# Patient Record
Sex: FEMALE | Race: BLACK OR AFRICAN AMERICAN | Employment: OTHER | URBAN - METROPOLITAN AREA
[De-identification: names, ages, dates, MRNs, and addresses within clinical notes are randomized per-mention and may not be internally consistent; named-entity substitution may affect disease eponyms.]

---

## 2017-02-08 ENCOUNTER — HOSPITAL ENCOUNTER (OUTPATIENT)
Dept: LAB | Age: 77
Discharge: HOME | End: 2017-02-08
Payer: MEDICARE

## 2017-02-08 ENCOUNTER — OFFICE VISIT (OUTPATIENT)
Dept: VASCULAR SURGERY | Age: 77
End: 2017-02-08

## 2017-02-08 DIAGNOSIS — M79.89 SWELLING OF LIMB: Primary | ICD-10-CM

## 2017-02-08 DIAGNOSIS — M10.071 ACUTE IDIOPATHIC GOUT OF RIGHT FOOT: ICD-10-CM

## 2017-02-08 LAB
ANION GAP BLD CALC-SCNC: 9 MMOL/L (ref 3–18)
BUN SERPL-MCNC: 15 MG/DL (ref 7–18)
BUN/CREAT SERPL: 12 (ref 12–20)
CALCIUM SERPL-MCNC: 9.4 MG/DL (ref 8.5–10.1)
CHLORIDE SERPL-SCNC: 107 MMOL/L (ref 100–108)
CO2 SERPL-SCNC: 26 MMOL/L (ref 21–32)
CREAT SERPL-MCNC: 1.22 MG/DL (ref 0.6–1.3)
CRP SERPL-MCNC: 4.6 MG/DL (ref 0–0.3)
GLUCOSE SERPL-MCNC: 86 MG/DL (ref 74–99)
POTASSIUM SERPL-SCNC: 4.1 MMOL/L (ref 3.5–5.5)
RHEUMATOID FACT SER QL LA: NEGATIVE
SODIUM SERPL-SCNC: 142 MMOL/L (ref 136–145)
URATE SERPL-MCNC: 7.4 MG/DL (ref 2.6–7.2)

## 2017-02-08 PROCEDURE — 84550 ASSAY OF BLOOD/URIC ACID: CPT | Performed by: PODIATRIST

## 2017-02-08 PROCEDURE — 36415 COLL VENOUS BLD VENIPUNCTURE: CPT | Performed by: PODIATRIST

## 2017-02-08 PROCEDURE — 86430 RHEUMATOID FACTOR TEST QUAL: CPT | Performed by: PODIATRIST

## 2017-02-08 PROCEDURE — 86140 C-REACTIVE PROTEIN: CPT | Performed by: PODIATRIST

## 2017-02-08 PROCEDURE — 80048 BASIC METABOLIC PNL TOTAL CA: CPT | Performed by: PODIATRIST

## 2017-02-08 PROCEDURE — 86225 DNA ANTIBODY NATIVE: CPT | Performed by: PODIATRIST

## 2017-02-08 PROCEDURE — 86060 ANTISTREPTOLYSIN O TITER: CPT | Performed by: PODIATRIST

## 2017-02-08 NOTE — PROCEDURES
[facility]   *** FINAL REPORT ***    Name: Shireen Byrne  MRN: [id number]       Outpatient  : 1940  HIS Order #: [id number]  [address] [facility] Visit #: [id number]  Date: 2017    TYPE OF TEST: Peripheral Venous Testing    REASON FOR TEST  Limb swelling    Right Leg:-  Deep venous thrombosis:           No  Superficial venous thrombosis:    Not examined  Deep venous insufficiency:        Not examined  Superficial venous insufficiency: Not examined    Left Leg:-  Deep venous thrombosis:           No  Superficial venous thrombosis:    Not examined  Deep venous insufficiency:        Not examined  Superficial venous insufficiency: Not examined      INTERPRETATION/FINDINGS  Duplex images were obtained using 2-D gray scale, color flow and  spectral doppler analysis. 1. No evidence of deep venous thrombosis identified in the common  femoral, femoral, profunda and popliteal veins. The left calf veins  are difficult to visualize in gray scale but patent with color and  doppler imaging. 2. Edema noted bilaterally in the calves. Susie Lee called w/report 11:15am    I have personally reviewed the data relevant to the interpretation of  this  study. TECHNOLOGIST: Bruce Macias RDMS  Signed: 2017 11:16 AM    PHYSICIAN: Chery Cuevas.  Jennifer Toscano MD  Signed: 2017 05:12 PM

## 2017-02-09 LAB
ASO AB SERPL-ACNC: 54 IU/ML (ref 0–200)
CENTROMERE B AB SER-ACNC: <0.2 AI (ref 0–0.9)
CHROMATIN AB SERPL-ACNC: 1.1 AI (ref 0–0.9)
DSDNA AB SER-ACNC: 7 IU/ML (ref 0–9)
ENA JO1 AB SER-ACNC: <0.2 AI (ref 0–0.9)
ENA RNP AB SER-ACNC: 1 AI (ref 0–0.9)
ENA SCL70 AB SER-ACNC: <0.2 AI (ref 0–0.9)
ENA SM AB SER-ACNC: 3.9 AI (ref 0–0.9)
ENA SS-A AB SER-ACNC: >8 AI (ref 0–0.9)
ENA SS-B AB SER-ACNC: >8 AI (ref 0–0.9)
SEE BELOW, 164869: ABNORMAL

## 2017-02-24 ENCOUNTER — HOSPITAL ENCOUNTER (EMERGENCY)
Age: 77
Discharge: HOME | End: 2017-02-24
Attending: EMERGENCY MEDICINE
Payer: MEDICARE

## 2017-02-24 ENCOUNTER — APPOINTMENT (OUTPATIENT)
Dept: GENERAL RADIOLOGY | Age: 77
End: 2017-02-24
Attending: EMERGENCY MEDICINE
Payer: MEDICARE

## 2017-02-24 VITALS
BODY MASS INDEX: 34.99 KG/M2 | DIASTOLIC BLOOD PRESSURE: 57 MMHG | TEMPERATURE: 97.6 F | HEART RATE: 65 BPM | WEIGHT: 210 LBS | RESPIRATION RATE: 15 BRPM | HEIGHT: 65 IN | OXYGEN SATURATION: 100 % | SYSTOLIC BLOOD PRESSURE: 156 MMHG

## 2017-02-24 DIAGNOSIS — R55 SYNCOPE AND COLLAPSE: Primary | ICD-10-CM

## 2017-02-24 LAB
ALBUMIN SERPL BCP-MCNC: 2.9 G/DL (ref 3.4–5)
ALBUMIN/GLOB SERPL: 0.6 {RATIO} (ref 0.8–1.7)
ALP SERPL-CCNC: 82 U/L (ref 45–117)
ALT SERPL-CCNC: 15 U/L (ref 13–56)
ANION GAP BLD CALC-SCNC: 7 MMOL/L (ref 3–18)
AST SERPL W P-5'-P-CCNC: 16 U/L (ref 15–37)
ATRIAL RATE: 61 BPM
BASOPHILS # BLD AUTO: 0 K/UL (ref 0–0.06)
BASOPHILS # BLD: 0 % (ref 0–2)
BILIRUB SERPL-MCNC: 0.3 MG/DL (ref 0.2–1)
BUN SERPL-MCNC: 39 MG/DL (ref 7–18)
BUN/CREAT SERPL: 14 (ref 12–20)
CALCIUM SERPL-MCNC: 9.6 MG/DL (ref 8.5–10.1)
CALCULATED P AXIS, ECG09: 16 DEGREES
CALCULATED R AXIS, ECG10: -74 DEGREES
CALCULATED T AXIS, ECG11: 104 DEGREES
CHLORIDE SERPL-SCNC: 98 MMOL/L (ref 100–108)
CK MB CFR SERPL CALC: 0.8 % (ref 0–4)
CK MB CFR SERPL CALC: 1.2 % (ref 0–4)
CK MB SERPL-MCNC: 1.2 NG/ML (ref 5–25)
CK MB SERPL-MCNC: 1.5 NG/ML (ref 5–25)
CK SERPL-CCNC: 128 U/L (ref 26–192)
CK SERPL-CCNC: 150 U/L (ref 26–192)
CO2 SERPL-SCNC: 29 MMOL/L (ref 21–32)
CREAT SERPL-MCNC: 2.69 MG/DL (ref 0.6–1.3)
DIAGNOSIS, 93000: NORMAL
DIFFERENTIAL METHOD BLD: ABNORMAL
EOSINOPHIL # BLD: 0.1 K/UL (ref 0–0.4)
EOSINOPHIL NFR BLD: 2 % (ref 0–5)
ERYTHROCYTE [DISTWIDTH] IN BLOOD BY AUTOMATED COUNT: 13.4 % (ref 11.6–14.5)
GLOBULIN SER CALC-MCNC: 5 G/DL (ref 2–4)
GLUCOSE SERPL-MCNC: 111 MG/DL (ref 74–99)
HCT VFR BLD AUTO: 32.7 % (ref 35–45)
HGB BLD-MCNC: 10.7 G/DL (ref 12–16)
LYMPHOCYTES # BLD AUTO: 18 % (ref 21–52)
LYMPHOCYTES # BLD: 1.2 K/UL (ref 0.9–3.6)
MCH RBC QN AUTO: 27.9 PG (ref 24–34)
MCHC RBC AUTO-ENTMCNC: 32.7 G/DL (ref 31–37)
MCV RBC AUTO: 85.2 FL (ref 74–97)
MONOCYTES # BLD: 0.5 K/UL (ref 0.05–1.2)
MONOCYTES NFR BLD AUTO: 7 % (ref 3–10)
NEUTS SEG # BLD: 4.8 K/UL (ref 1.8–8)
NEUTS SEG NFR BLD AUTO: 73 % (ref 40–73)
P-R INTERVAL, ECG05: 288 MS
PLATELET # BLD AUTO: 282 K/UL (ref 135–420)
PMV BLD AUTO: 9.4 FL (ref 9.2–11.8)
POTASSIUM SERPL-SCNC: 3.6 MMOL/L (ref 3.5–5.5)
PROT SERPL-MCNC: 7.9 G/DL (ref 6.4–8.2)
Q-T INTERVAL, ECG07: 496 MS
QRS DURATION, ECG06: 170 MS
QTC CALCULATION (BEZET), ECG08: 499 MS
RBC # BLD AUTO: 3.84 M/UL (ref 4.2–5.3)
SODIUM SERPL-SCNC: 134 MMOL/L (ref 136–145)
TROPONIN I SERPL-MCNC: 0.02 NG/ML (ref 0–0.04)
TROPONIN I SERPL-MCNC: <0.02 NG/ML (ref 0–0.04)
VENTRICULAR RATE, ECG03: 61 BPM
WBC # BLD AUTO: 6.5 K/UL (ref 4.6–13.2)

## 2017-02-24 PROCEDURE — 85025 COMPLETE CBC W/AUTO DIFF WBC: CPT | Performed by: EMERGENCY MEDICINE

## 2017-02-24 PROCEDURE — 71020 XR CHEST PA LAT: CPT

## 2017-02-24 PROCEDURE — 94762 N-INVAS EAR/PLS OXIMTRY CONT: CPT

## 2017-02-24 PROCEDURE — 82550 ASSAY OF CK (CPK): CPT | Performed by: EMERGENCY MEDICINE

## 2017-02-24 PROCEDURE — 93005 ELECTROCARDIOGRAM TRACING: CPT

## 2017-02-24 PROCEDURE — 80053 COMPREHEN METABOLIC PANEL: CPT | Performed by: EMERGENCY MEDICINE

## 2017-02-24 PROCEDURE — 99285 EMERGENCY DEPT VISIT HI MDM: CPT

## 2017-02-24 NOTE — ED TRIAGE NOTES
Patient arrived via EMS due to possible syncopal episode. Patient states she was at the foot doctor \"getting gout checked\" and when the nurse went to take her vital signs she states she \"got hot, face got flushed, eyes rolled in back of head and when she came to she vomited\". Patient states she has not eaten today. 150/78 60 16 100%RA Glu 154. Patient states she has a pacemaker/defib.

## 2017-02-24 NOTE — DISCHARGE INSTRUCTIONS
Lightheadedness or Faintness: Care Instructions  Your Care Instructions  Lightheadedness is a feeling that you are about to faint or \"pass out. \" You do not feel as if you or your surroundings are moving. It is different from vertigo, which is the feeling that you or things around you are spinning or tilting. Lightheadedness usually goes away or gets better when you lie down. If lightheadedness gets worse, it can lead to a fainting spell. It is common to feel lightheaded from time to time. Lightheadedness usually is not caused by a serious problem. It often is caused by a short-lasting drop in blood pressure and blood flow to your head that occurs when you get up too quickly from a seated or lying position. Follow-up care is a key part of your treatment and safety. Be sure to make and go to all appointments, and call your doctor if you are having problems. It's also a good idea to know your test results and keep a list of the medicines you take. How can you care for yourself at home? · Lie down for 1 or 2 minutes when you feel lightheaded. After lying down, sit up slowly and remain sitting for 1 to 2 minutes before slowly standing up. · Avoid movements, positions, or activities that have made you lightheaded in the past.  · Get plenty of rest, especially if you have a cold or flu, which can cause lightheadedness. · Make sure you drink plenty of fluids, especially if you have a fever or have been sweating. · Do not drive or put yourself and others in danger while you feel lightheaded. When should you call for help? Call 911 anytime you think you may need emergency care. For example, call if:  · You have symptoms of a stroke. These may include:  ¨ Sudden numbness, tingling, weakness, or loss of movement in your face, arm, or leg, especially on only one side of your body. ¨ Sudden vision changes. ¨ Sudden trouble speaking. ¨ Sudden confusion or trouble understanding simple statements.   ¨ Sudden problems with walking or balance. ¨ A sudden, severe headache that is different from past headaches. · You have symptoms of a heart attack. These may include:  ¨ Chest pain or pressure, or a strange feeling in the chest.  ¨ Sweating. ¨ Shortness of breath. ¨ Nausea or vomiting. ¨ Pain, pressure, or a strange feeling in the back, neck, jaw, or upper belly or in one or both shoulders or arms. ¨ Lightheadedness or sudden weakness. ¨ A fast or irregular heartbeat. After you call 911, the  may tell you to chew 1 adult-strength or 2 to 4 low-dose aspirin. Wait for an ambulance. Do not try to drive yourself. Watch closely for changes in your health, and be sure to contact your doctor if:  · Your lightheadedness gets worse or does not get better with home care. Where can you learn more? Go to http://jacquelyn-alberto.info/. Enter Z963 in the search box to learn more about \"Lightheadedness or Faintness: Care Instructions. \"  Current as of: May 27, 2016  Content Version: 11.1  © 6748-5682 [employer]. Care instructions adapted under license by [employer] (which disclaims liability or warranty for this information). If you have questions about a medical condition or this instruction, always ask your healthcare professional. [address] any warranty or liability for your use of this information.

## 2017-02-24 NOTE — ED PROVIDER NOTES
HPI Comments: Gisela Pratt  Is a 78year old female with pmhx of ICM s/p pacemaker defib,  CAD, HTN, CVA, who presents with syncope. Pt has been followed by podiatry for gout and fx and was in office today when she got nauseated, became poorly responsive and then vomited. Her daughter was there as a witness and stated the nurse was unable to attempt vital signs. PT recovered quickly with no post ictal phase. Pt has no complaints at this time. No cp. No sob. No focal weakness. No other aggravating or alleviating factors. No other associated symptoms. Past Medical History:   Diagnosis Date    Abnormal ankle brachial index 11/13/2014    Mild arterial disease in right leg. R VICTOR MANUEL 0.88. L VICTOR MANUEL 1.00.  Anemia     Arm DVT (deep venous thromboembolism), acute (HCC)     s/p anticoagulation    Atherosclerotic heart disease     Atrial fibrillation (HCC)     AV block     CAD (coronary artery disease)     Cardiomyopathy, ischemic     Carotid artery stenosis     Carotid duplex 11/13/2014    Mild <50% bilateral ICA plaquing. Possible left vertebral occlusion.  Chest pain     CVA (cerebral infarction)     Echocardiogram 08/19/2015    EF 55%. Apical inferior, apical septal hypk (new since study of 12/19/11), likely due to chronic V-pacing. Mild LVH. RVSP 30 mmHg. Mild LAE. Mild MR. Mild TR.  Gastritis     GERD (gastroesophageal reflux disease)     Hiatal hernia     Hyperlipidemia     Hypertension     Hypertensive cardiovascular disease     Intracranial aneurysm     left cavernous ICA 2mm aneurysm from head/neck CTA in 2014    Long term (current) use of anticoagulants 3/10/2011    Lower extremity venous duplex 01/26/2015    No DVT bilaterally.  Nuclear cardiac imaging test 09/24/2015    Low risk. Sm apical infarction w/no ischemia vs apical thinning. Sm basal inferior defect, likely artifact. EF 56%. Inferoseptal, inferoapical WMA related to sternotomy or paced rhythm.     Pacemaker     PAD (peripheral artery disease) (HCC)     PVC's     chronic    S/P CABG x 3 2008    LIMA-LAD. SVG-OM. SVG-dRCA     S/P cardiac cath 2008    pRCA 100%. LM patent. Cx patent. OM1 100%. mLAD 95%. LVEDP 27-29mmHg. EF 20%. mod MR    Tilt table evaluation 1995    Positive for orthostatic hypotension    Vitamin D deficiency        Past Surgical History:   Procedure Laterality Date    CABG, ARTERY-VEIN, THREE  2008    CARDIAC SURG PROCEDURE UNLIST      medtronic dual-chamber cardioverted-defibrillator    HX  SECTION      x2    HX ENDOSCOPY  3/1/16    HX ENDOSCOPY  3/1/16    HX HEMORRHOIDECTOMY      1985    HX HYSTERECTOMY          HX ORTHOPAEDIC      knee surgery    HX OTHER SURGICAL  2/10/16    Pacemaker Gen Change    HX TUMOR REMOVAL      removed from arm.  benign         No family history on file. Social History     Social History    Marital status:      Spouse name: N/A    Number of children: N/A    Years of education: N/A     Occupational History    Not on file. Social History Main Topics    Smoking status: Former Smoker    Smokeless tobacco: Never Used    Alcohol use No    Drug use: Not on file    Sexual activity: Not on file     Other Topics Concern    Not on file     Social History Narrative         ALLERGIES: Ace inhibitors; Codeine; and Lipitor [atorvastatin]    Review of Systems   All other systems reviewed and are negative. Vitals:    17 1012   BP: 127/58   Pulse: 63   Resp: 16   Temp: 97.6 °F (36.4 °C)   SpO2: 100%   Weight: 95.3 kg (210 lb)   Height: 5' 4.5\" (1.638 m)            Physical Exam   Constitutional: She is oriented to person, place, and time. She appears well-developed. HENT:   Head: Normocephalic and atraumatic. Eyes: EOM are normal. Pupils are equal, round, and reactive to light. Neck: Normal range of motion. Neck supple. Cardiovascular: Normal rate, regular rhythm and normal heart sounds. Exam reveals no friction rub. No murmur heard. Pulmonary/Chest: Effort normal and breath sounds normal. No respiratory distress. She has no wheezes. Abdominal: Soft. She exhibits no distension. There is no tenderness. There is no rebound and no guarding. Musculoskeletal: Normal range of motion. Neurological: She is alert and oriented to person, place, and time. Skin: Skin is warm and dry. Psychiatric: She has a normal mood and affect. Her behavior is normal. Thought content normal.        MDM  Number of Diagnoses or Management Options  Diagnosis management comments: EK:G av paced at 61. First deg av block at 288; NS IVCD at 170. Pacemaker interrogated. Low rate 60. No VF, no AT/AF.   1 near sycnope. Likely vagal; has pacemaker. 2. Cr noted; d/w pt; elevated but not necessitating admission. Repeat trop neg. Ok to go.        ED Course       Procedures

## 2017-02-28 ENCOUNTER — TELEPHONE (OUTPATIENT)
Dept: INTERNAL MEDICINE CLINIC | Age: 77
End: 2017-02-28

## 2017-02-28 ENCOUNTER — PATIENT OUTREACH (OUTPATIENT)
Dept: INTERNAL MEDICINE CLINIC | Age: 77
End: 2017-02-28

## 2017-02-28 RX ORDER — CELECOXIB 200 MG/1
CAPSULE ORAL 2 TIMES DAILY
COMMUNITY
End: 2017-03-12 | Stop reason: ALTCHOICE

## 2017-02-28 NOTE — TELEPHONE ENCOUNTER
Pt was recently in the ED and had JAS. I attempted to phone her but was unable to reach her so I left a message. Will ask our team to schedule her for a 30 minute apt.     Dr. Yris Tomlinson  [facility] [address]  Phone: [phone]  Fax: [phone]

## 2017-02-28 NOTE — PROGRESS NOTES
NNTOCED  Patient admitted to [facility], 2/24/17 to 2/24/17 for syncope and collapse. Presenting symptoms:   Syncope and collapse  Vomiting    New medications/changes to current medications:  None     ED admissions in the past 6 months: 1    Contacted patient for ED follow up. Verified 2 patient identifiers. Introduced self, role and reason for call. Patient reports:  Right foot pain  Bilateral foot/leg swelling; left foot more than right  BP has gone up  Dizziness X 2 days    Patient denies:  Chest pain/pressure  SOB  Weakness  N/V  Abdominal pain  Changes in vision    ADL's:  Feeds self: independently  Ambulates: with use of cane; previous independent    Self grooming: independently  Toileting: independently    DME:   Cane; has crutches on hand    Support:  Children    Appointments:  Dr. Melany Segura 3/3/17 at 3:30 PM  Patient aware and plans to drive self. Instructed patient to have someone else drive her to appointment if she is not feeling up to it. Patient reported she is doing ok. Has been in bed for X 2 weeks d/t painful right foot. Was unable to bear weight. Was using cruthches but they were slipping; now using cane. Dr. Deo Abdul has prescribed Celebrex for pain. Patient reported medication makes her \"woozy\". Son has been staying with her since being seen at ED. Patient also reported BP has gone up. Patient communicated when she refilled her prescription for Benicar she was given the generic form olmesartan and since that time her BP has been higher than normal. Was unable to provide BP readings but stated it had been stable prior to taking generic form of medication. Reviewed new medications or changes to previous medications and allergies reviewed. Patient verbalized she sometimes forget to take potassium pil. Discussed the importance of taking potassium while on Lasix. Instructed patient to contact office if condition worsens or with any new or concerning symptoms.  Patient verbalized understanding of information discussed and is aware of  when to seek medical attention. Instructed to bring all medications or list of medications with her to next appointment. Opportunity to ask questions was provided. Contact information was provided for future reference or further questions.

## 2017-03-01 ENCOUNTER — TELEPHONE (OUTPATIENT)
Dept: CARDIOLOGY CLINIC | Age: 77
End: 2017-03-01

## 2017-03-01 NOTE — TELEPHONE ENCOUNTER
Patient is scheduled for March 16, 2017 for appointment with Kd Bowers and a device check.            Message  Received: 5 days ago       [facility]; Gabino Rucker                     Please call this patient at home to move up her appt. She is currently scheduled to see Holly Soto on 4/3/17 at 0840am. She was seen in the ED today for syncope. Her device was interrogated and she was deemed stable for discharge home. Would like to have her follow up in the office with official pacer check as well.      Thank you,     Breonna Me

## 2017-03-03 ENCOUNTER — HOSPITAL ENCOUNTER (OUTPATIENT)
Dept: LAB | Age: 77
Discharge: HOME | End: 2017-03-03
Payer: MEDICARE

## 2017-03-03 ENCOUNTER — HOSPITAL ENCOUNTER (OUTPATIENT)
Dept: LAB | Age: 77
Discharge: HOME | End: 2017-03-03
Attending: INTERNAL MEDICINE
Payer: MEDICARE

## 2017-03-03 ENCOUNTER — OFFICE VISIT (OUTPATIENT)
Dept: INTERNAL MEDICINE CLINIC | Age: 77
End: 2017-03-03

## 2017-03-03 ENCOUNTER — HOSPITAL ENCOUNTER (OUTPATIENT)
Dept: ULTRASOUND IMAGING | Age: 77
Discharge: HOME | End: 2017-03-03
Attending: INTERNAL MEDICINE
Payer: MEDICARE

## 2017-03-03 VITALS
SYSTOLIC BLOOD PRESSURE: 181 MMHG | WEIGHT: 202.6 LBS | OXYGEN SATURATION: 100 % | HEIGHT: 65 IN | TEMPERATURE: 97.8 F | RESPIRATION RATE: 16 BRPM | HEART RATE: 62 BPM | DIASTOLIC BLOOD PRESSURE: 72 MMHG | BODY MASS INDEX: 33.76 KG/M2

## 2017-03-03 DIAGNOSIS — I10 ESSENTIAL HYPERTENSION: ICD-10-CM

## 2017-03-03 DIAGNOSIS — N39.0 URINARY TRACT INFECTION WITHOUT HEMATURIA, SITE UNSPECIFIED: ICD-10-CM

## 2017-03-03 DIAGNOSIS — M10.9 ACUTE GOUT OF FOOT, UNSPECIFIED CAUSE, UNSPECIFIED LATERALITY: ICD-10-CM

## 2017-03-03 DIAGNOSIS — N17.9 ACUTE RENAL FAILURE, UNSPECIFIED ACUTE RENAL FAILURE TYPE (HCC): Primary | ICD-10-CM

## 2017-03-03 DIAGNOSIS — N17.9 ACUTE RENAL FAILURE, UNSPECIFIED ACUTE RENAL FAILURE TYPE (HCC): ICD-10-CM

## 2017-03-03 LAB
APPEARANCE UR: CLEAR
BASOPHILS # BLD AUTO: 0.1 K/UL (ref 0–0.06)
BASOPHILS # BLD: 1 % (ref 0–2)
BILIRUB UR QL: NEGATIVE
COLOR UR: YELLOW
DIFFERENTIAL METHOD BLD: ABNORMAL
EOSINOPHIL # BLD: 0.3 K/UL (ref 0–0.4)
EOSINOPHIL NFR BLD: 6 % (ref 0–5)
ERYTHROCYTE [DISTWIDTH] IN BLOOD BY AUTOMATED COUNT: 14.2 % (ref 11.6–14.5)
GLUCOSE UR STRIP.AUTO-MCNC: NEGATIVE MG/DL
HCT VFR BLD AUTO: 33.6 % (ref 35–45)
HGB BLD-MCNC: 10.5 G/DL (ref 12–16)
HGB UR QL STRIP: NEGATIVE
KETONES UR QL STRIP.AUTO: NEGATIVE MG/DL
LEUKOCYTE ESTERASE UR QL STRIP.AUTO: NEGATIVE
LYMPHOCYTES # BLD AUTO: 43 % (ref 21–52)
LYMPHOCYTES # BLD: 2.2 K/UL (ref 0.9–3.6)
MCH RBC QN AUTO: 27.9 PG (ref 24–34)
MCHC RBC AUTO-ENTMCNC: 31.3 G/DL (ref 31–37)
MCV RBC AUTO: 89.1 FL (ref 74–97)
MONOCYTES # BLD: 0.2 K/UL (ref 0.05–1.2)
MONOCYTES NFR BLD AUTO: 4 % (ref 3–10)
NEUTS SEG # BLD: 2.2 K/UL (ref 1.8–8)
NEUTS SEG NFR BLD AUTO: 46 % (ref 40–73)
NITRITE UR QL STRIP.AUTO: NEGATIVE
PH UR STRIP: 5 [PH] (ref 5–8)
PLATELET # BLD AUTO: 225 K/UL (ref 135–420)
PLATELET COMMENTS,PCOM: ABNORMAL
PROT UR STRIP-MCNC: NEGATIVE MG/DL
RBC # BLD AUTO: 3.77 M/UL (ref 4.2–5.3)
RBC MORPH BLD: ABNORMAL
SP GR UR REFRACTOMETRY: 1.01 (ref 1–1.03)
UROBILINOGEN UR QL STRIP.AUTO: 0.2 EU/DL (ref 0.2–1)
WBC # BLD AUTO: 5 K/UL (ref 4.6–13.2)

## 2017-03-03 PROCEDURE — 85025 COMPLETE CBC W/AUTO DIFF WBC: CPT | Performed by: INTERNAL MEDICINE

## 2017-03-03 PROCEDURE — 87086 URINE CULTURE/COLONY COUNT: CPT | Performed by: INTERNAL MEDICINE

## 2017-03-03 PROCEDURE — 81003 URINALYSIS AUTO W/O SCOPE: CPT | Performed by: INTERNAL MEDICINE

## 2017-03-03 PROCEDURE — 36415 COLL VENOUS BLD VENIPUNCTURE: CPT | Performed by: INTERNAL MEDICINE

## 2017-03-03 PROCEDURE — 76770 US EXAM ABDO BACK WALL COMP: CPT

## 2017-03-03 RX ORDER — OXYCODONE AND ACETAMINOPHEN 5; 325 MG/1; MG/1
1 TABLET ORAL
Qty: 21 TAB | Refills: 0 | Status: SHIPPED | OUTPATIENT
Start: 2017-03-03 | End: 2017-03-16 | Stop reason: ALTCHOICE

## 2017-03-03 RX ORDER — AMOXICILLIN 500 MG/1
500 CAPSULE ORAL 2 TIMES DAILY
Qty: 20 CAP | Refills: 0 | Status: SHIPPED | OUTPATIENT
Start: 2017-03-03 | End: 2017-03-13

## 2017-03-03 RX ORDER — HYDRALAZINE HYDROCHLORIDE 25 MG/1
25 TABLET, FILM COATED ORAL 3 TIMES DAILY
Qty: 90 TAB | Refills: 3 | Status: SHIPPED | OUTPATIENT
Start: 2017-03-03 | End: 2017-03-16 | Stop reason: DRUGHIGH

## 2017-03-03 NOTE — PROGRESS NOTES
Chief Complaint   Patient presents with   [facility] ER visit [id number] due to Syncope      1. Have you been to the ER, urgent care clinic since your last visit? Hospitalized since your last visit? Yes: 2-24-17 Where: [facility] ER Reason: Syncope    2. Have you seen or consulted any other health care providers outside of the [address] [place] [address] since your last visit? Include any pap smears or colon screening.  No

## 2017-03-03 NOTE — MR AVS SNAPSHOT
Visit Information Date & Time Provider Department Dept. Phone Encounter #  
 3/3/2017  3:30 PM Jenn Gracia MD Internist of [address] [place] [address] Place [id number] Follow-up Instructions Return in about 1 week (around 3/10/2017) for BP check. Your Appointments 3/16/2017 10:30 AM  
POST HOSPITAL with Giselle Sorensen NP [facility] ([facility]) Appt Note: per Silvina Healy . . patient was in ER . . move April appt. sooner . . nothing with Rudolph Mckeon [place] [phone]  
[phone] [place] [phone]  
  
    
 3/16/2017 10:30 AM  
PROCEDURE with Pacer Lester Csi [facility] ([facility]) Appt Note: per Silvina Healy . . patient was in ER . . move April appt. sooner . . also seeing Darrian Sat today [place] [phone]  
[phone] [place] [phone]  
  
    
 4/3/2017  8:40 AM  
Follow Up with Lorenza Segovia DO [facility] ([facility]) Appt Note: Return in about 6 months [place] [phone]  
[phone] Bib Ramirez  
  
    
 7/10/2017  8:30 AM  
Office Visit with Jenn Gracia MD  
Internist of [address] [place] [address] Appt Note: 1 yr f/up; .  
 [address] [facility], [address] [facility] [address] [place]  
  
   
 [address] [facility], [address]  
  
    
 12/18/2017  2:45 PM  
PROCEDURE with [facility] 2  
[facility] (PAZ SCHEDULING) Appt Note: cv wild [address] [address]  
[phone]  [address] [place] [address] [place] [address]  
  
    
 12/18/2017  3:45 PM  
PROCEDURE with [facility] NONIMAGING  
 [facility] (PAZ SCHEDULING) Appt Note: leg art wild [address] [id number] [phone] [address] [facility] [address]  
  
    
 1/4/2018  9:00 AM  
Follow Up with [address]  
[facility] (PAZ [address] [place]) [address] [address]  
[phone]  
  
   
 [address] [address] Upcoming Health Maintenance Date Due Pneumococcal 65+ Low/Medium Risk (2 of 2 - PPSV23) 7/6/2017 MEDICARE YEARLY EXAM 7/7/2017 COLONOSCOPY 1/25/2018 GLAUCOMA SCREENING Q2Y 8/8/2018 DTaP/Tdap/Td series (2 - Td) 11/22/2026 Allergies as of 3/3/2017  Review Complete On: 3/3/2017 By: Katy Speaker Severity Noted Reaction Type Reactions Ace Inhibitors    Cough Codeine  11/12/2008    Unknown (comments), Nausea Only, Nausea and Vomiting Lipitor [Atorvastatin]  05/20/2014    Nausea and Vomiting, Vertigo Current Immunizations  Never Reviewed No immunizations on file. Not reviewed this visit You Were Diagnosed With   
  
 Codes Comments Essential hypertension    -  Primary ICD-10-CM: I10 
ICD-9-CM: 401.9 Acute renal failure, unspecified acute renal failure type ([facility]     ICD-10-CM: N17.9 ICD-9-CM: 584.9 Urinary tract infection without hematuria, site unspecified     ICD-10-CM: N39.0 ICD-9-CM: 599.0 Vitals BP Pulse Temp Resp Height(growth percentile) Weight(growth percentile) 181/72 (BP 1 Location: Left arm, BP Patient Position: Sitting) 62 97.8 °F (36.6 °C) (Oral) 16 5' 4.5\" (1.638 m) 202 lb 9.6 oz (91.9 kg) SpO2 BMI OB Status Smoking Status 100% 34.24 kg/m2 Postmenopausal Former Smoker BMI and BSA Data Body Mass Index Body Surface Area  
 34.24 kg/m 2 2.05 m 2 Preferred Pharmacy Pharmacy Name Phone [id number], [address] [place] [address] Your Updated Medication List  
  
   
This list is accurate as of: 3/3/17  4:45 PM.  Always use your most recent med list.  
  
  
  
  
 amoxicillin 500 mg capsule Commonly known as:  AMOXIL Take 1 Cap by mouth two (2) times a day for 10 days. aspirin 81 mg tablet Take 81 mg by mouth daily. carvedilol 25 mg tablet Commonly known as:  May Hush Take 1 Tab by mouth two (2) times a day. celecoxib 200 mg capsule Commonly known as:  CELEBREX Take  by mouth two (2) times a day. hydrALAZINE 25 mg tablet Commonly known as:  APRESOLINE Take 1 Tab by mouth three (3) times daily. KLOR-CON M20 20 mEq tablet Generic drug:  potassium chloride TAKE 1 TABLET BY MOUTH EVERY DAY  
  
 oxyCODONE-acetaminophen 5-325 mg per tablet Commonly known as:  PERCOCET Take 1 Tab by mouth every eight (8) hours as needed for Pain. Max Daily Amount: 3 Tabs. Prescriptions Printed Refills  
 oxyCODONE-acetaminophen (PERCOCET) 5-325 mg per tablet 0 Sig: Take 1 Tab by mouth every eight (8) hours as needed for Pain. Max Daily Amount: 3 Tabs. Class: Print Route: Oral  
  
Prescriptions Sent to Pharmacy Refills  
 hydrALAZINE (APRESOLINE) 25 mg tablet 3 Sig: Take 1 Tab by mouth three (3) times daily. Class: Normal  
 Pharmacy: [facility] #[id number], [address] Ph #: [phone] Route: Oral  
 amoxicillin (AMOXIL) 500 mg capsule 0 Sig: Take 1 Cap by mouth two (2) times a day for 10 days. Class: Normal  
 Pharmacy: [facility]/pharmacy #[id number] Ph #: [phone] Route: Oral  
  
Follow-up Instructions Return in about 1 week (around 3/10/2017) for BP check. To-Do List   
 03/03/2017 Lab:  CBC WITH AUTOMATED DIFF   
  
 03/03/2017 Microbiology:  CULTURE, URINE Around 03/03/2017 Lab: METABOLIC PANEL, BASIC   
  
 03/03/2017 Lab:  URINALYSIS W/ RFLX MICROSCOPIC   
  
 03/03/2017 Imaging:  US RETROPERITONEUM COMP   
  
 03/03/2017 5:15 PM  
  Appointment with [address] at [address] [place] [address] ([phone]) OUTSIDE FILMS  - Any outside films related to the study being scheduled should be brought with you on the day of the exam.  If this cannot be done there may be a delay in the reading of the study. MEDICATIONS  - Patient must bring a complete list of all medications currently taking to include prescriptions, over-the-counter meds, herbals, vitamins & any dietary supplements Patient Instructions There are no preventive care reminders to display for this patient. PLAN: 
Key points we discussed today: 1. Call our office if symptoms worsen or if you have any questions 2. Stop taking potassium, benicar, and lasix. 3. Continue taking coreg/carvedilol AND start taking hydralazine - to improve your blood pressure 4. We will check labs today and in 1 week. 5. Ultrasound of your kidneys have been ordered. 6. Amoxicillin course - antibiotic - Please take probiotics supplements/food products while on antibiotics to replenish the good bacteria in your intestinal tract that may be destroyed by the antibiotic medication I am prescribing for you today. Notify us if you develop diarrhea (multiple loose stools per day) while on antibiotics. 7. Drink 8 cups of water per day. 8. Your goal is to keep your \"top\" blood pressure number below 150 and your \"bottom\" blood pressure number below 90. If you can, check your blood pressure 3 times per week. Notify us if your blood pressure is greater than 150/90. 
 
9. Stop taking celebrex. Take low dose percocet. Dr. J Luis Cali [facility] [address] [place] [address] [address], [address] [place] [address] [address] Phone: [phone] Fax: [phone] Thank you for choosing [facility] for all of your health care needs. Hydralazine (By mouth) Hydralazine Hydrochloride (wmu-ZSXI-l-ingrid song-droe-KLOR-ant) Treats high blood pressure. Brand Name(s):Apresoline There may be other brand names for this medicine. When This Medicine Should Not Be Used: This medicine is not right for everyone. Do not use it if you had an allergic reaction to hydralazine, or if you have coronary artery disease or rheumatic heart disease. How to Use This Medicine:  
Tablet · Take your medicine as directed. Your dose may need to be changed several times to find what works best for you. · Missed dose: Take a dose as soon as you remember. If it is almost time for your next dose, wait until then and take a regular dose. Do not take extra medicine to make up for a missed dose. · Store the medicine in a closed container at room temperature, away from heat, moisture, and direct light. Drugs and Foods to Avoid: Ask your doctor or pharmacist before using any other medicine, including over-the-counter medicines, vitamins, and herbal products. · Some foods and medicines can affect how hydralazine works. Tell your doctor if you are using diazoxide or an MAO inhibitor. Warnings While Using This Medicine: · Tell your doctor if you are pregnant or breastfeeding, or if you have kidney disease, heart or blood vessel disease, heart rhythm problems, lupus, or if you had a heart attack or stroke. · This medicine may cause the following problems: 
¨ Lupus-like syndrome ¨ Changes in heart rhythm ¨ Nerve problems · This medicine may lower your blood pressure too much and cause you to feel dizzy. Do not drive or do anything else that could be dangerous until you know how this medicine affects you. · Your doctor will do lab tests at regular visits to check on the effects of this medicine. Keep all appointments. · Keep all medicine out of the reach of children.  Never share your medicine with anyone. Possible Side Effects While Using This Medicine:  
Call your doctor right away if you notice any of these side effects: · Allergic reaction: Itching or hives, swelling in your face or hands, swelling or tingling in your mouth or throat, chest tightness, trouble breathing · Change in how much or how often you urinate · Chest pain that may spread to your arms, jaw, back, or neck, trouble breathing, unusual sweating, faintness · Fast, pounding, or uneven heartbeat · Fever, chills, cough, sore throat, and body aches · Lightheadedness, dizziness, or fainting · Numbness, tingling, or burning pain in your hands, arms, legs, or feet · Unusual bleeding, bruising, or weakness If you notice these less serious side effects, talk with your doctor: · Diarrhea, nausea, vomiting, loss of appetite · Headache · Stuffy nose or watery eyes If you notice other side effects that you think are caused by this medicine, tell your doctor. Call your doctor for medical advice about side effects. You may report side effects to FDA at 1-429-FDA-8025 © 2016 [address] is for End User's use only and may not be sold, redistributed or otherwise used for commercial purposes. The above information is an  only. It is not intended as medical advice for individual conditions or treatments. Talk to your doctor, nurse or pharmacist before following any medical regimen to see if it is safe and effective for you. Acute High Blood Pressure: Care Instructions Your Care Instructions Acute high blood pressure is very high blood pressure. It's a serious problem. Very high blood pressure can damage your brain, heart, eyes, and kidneys. You may have been given medicines to lower your blood pressure. You may have gotten them as pills or through a needle in one of your veins. This is called an IV. And maybe you were given other medicines too.  These can be needed when high blood pressure causes other problems. To keep your blood pressure at a lower level, you may need to make healthy lifestyle changes. And you will probably need to take medicines. Be sure to follow up with your doctor about your blood pressure and what you can do about it. Follow-up care is a key part of your treatment and safety. Be sure to make and go to all appointments, and call your doctor if you are having problems. It's also a good idea to know your test results and keep a list of the medicines you take. How can you care for yourself at home? · See your doctor as often as he or she recommends. This is to make sure your blood pressure is under control. You will probably go at least 2 times a year. But it may be more often at first. 
· Take your blood pressure medicine exactly as prescribed. You may take one or more types. They include diuretics, beta-blockers, ACE inhibitors, calcium channel blockers, and angiotensin II receptor blockers. Call your doctor if you think you are having a problem with your medicine. · If you take blood pressure medicine, talk to your doctor before you take decongestants or anti-inflammatory medicine, such as ibuprofen. These can raise blood pressure. · Learn how to check your blood pressure at home. Check it often. · Ask your doctor if it's okay to drink alcohol. · Talk to your doctor about lifestyle changes that can help blood pressure. These include being active and not smoking. When should you call for help? Call 911 anytime you think you may need emergency care. This may mean having symptoms that suggest that your blood pressure is causing a serious heart or blood vessel problem. Your blood pressure may be over 180/110. For example, call 911 if: 
· You have symptoms of a heart attack. These may include: ¨ Chest pain or pressure, or a strange feeling in the chest. 
¨ Sweating. ¨ Shortness of breath. ¨ Nausea or vomiting. ¨ Pain, pressure, or a strange feeling in the back, neck, jaw, or upper belly or in one or both shoulders or arms. ¨ Lightheadedness or sudden weakness. ¨ A fast or irregular heartbeat. · You have symptoms of a stroke. These may include: 
¨ Sudden numbness, tingling, weakness, or loss of movement in your face, arm, or leg, especially on only one side of your body. ¨ Sudden vision changes. ¨ Sudden trouble speaking. ¨ Sudden confusion or trouble understanding simple statements. ¨ Sudden problems with walking or balance. ¨ A sudden, severe headache that is different from past headaches. · You have severe back or belly pain. Do not wait until your blood pressure comes down on its own. Get help right away. Call your doctor now or seek immediate care if: 
· Your blood pressure is much higher than normal (such as 180/110 or higher), but you don't have symptoms. · You think high blood pressure is causing symptoms, such as: ¨ Severe headache. ¨ Blurry vision. Watch closely for changes in your health, and be sure to contact your doctor if: 
· Your blood pressure measures 140/90 or higher at least 2 times. That means the top number is 140 or higher or the bottom number is 90 or higher, or both. · You think you may be having side effects from your blood pressure medicine. · Your blood pressure is usually normal, but it goes above normal at least 2 times. Where can you learn more? Go to http://jacquelyn-alberto.info/. Enter L848 in the search box to learn more about \"Acute High Blood Pressure: Care Instructions. \" Current as of: August 8, 2016 Content Version: 11.1 © 4970-7822 [employer]. Care instructions adapted under license by [employer] (which disclaims liability or warranty for this information).  If you have questions about a medical condition or this instruction, always ask your healthcare professional. [employer] disclaims any warranty or liability for your use of this information. Acute Kidney Injury: Care Instructions Your Care Instructions Acute kidney injury is the sudden loss of kidney function that happens when the kidneys stop working over a period of hours, days or, in some cases, weeks. It is also known as acute renal failure. Common causes of acute kidney injury are dehydration, blood loss, and medicines. When acute kidney injury happens, the kidneys cannot remove waste and excess fluids from the body. The waste and fluids build up and become harmful. Kidney function may return to normal if the cause of acute kidney injury is treated quickly. Your chance of a full recovery depends on what caused the problem, how quickly the cause was treated, and what other medical problems you have. A machine may be used to help your kidneys remove waste and fluids for a short period of time. This is called dialysis. Follow-up care is a key part of your treatment and safety. Be sure to make and go to all appointments, and call your doctor if you are having problems. It's also a good idea to know your test results and keep a list of the medicines you take. How can you care for yourself at home? · Talk to your doctor about how much fluid you should drink. · Eat a balanced diet. Talk to your doctor or a dietitian about what type of diet may be best for you. · Follow the instructions and schedule for dialysis that your doctor gives you. · Do not smoke. Smoking can make your condition worse. If you need help quitting, talk to your doctor about stop-smoking programs and medicines. These can increase your chances of quitting for good. · Do not drink alcohol. · Review all of your medicines with your doctor. Do not take any medicines, including nonsteroidal anti-inflammatory drugs (NSAIDs) such as ibuprofen (Advil, Motrin) or naproxen (Aleve), unless your doctor says it is safe for you to do so. · Make sure that anyone treating you for any health problem knows that you have had acute kidney injury. When should you call for help? Call 911 anytime you think you may need emergency care. For example, call if: 
· You passed out (lost consciousness). · You have severe trouble breathing. Call your doctor now or seek immediate medical care if: 
· You have less urine than normal or no urine. · You have trouble urinating or can urinate only very small amounts. · You are confused or have trouble thinking clearly. · You feel weaker or more tired than usual. 
· You are very thirsty, lightheaded, or dizzy. · You have nausea and vomiting. · You have new swelling of your arms or feet, or your swelling is worse. · You have blood in your urine. · Your body weight goes up every day. · You have new or worse trouble breathing. Watch closely for changes in your health, and be sure to contact your doctor if: 
· You do not get better as expected. Where can you learn more? Go to http://jacquelyn-alberto.info/. Enter R746 in the search box to learn more about \"Acute Kidney Injury: Care Instructions. \" Current as of: November 20, 2015 Content Version: 11.1 © 0327-1412 [employer]. Care instructions adapted under license by [employer] (which disclaims liability or warranty for this information). If you have questions about a medical condition or this instruction, always ask your healthcare professional. [address] any warranty or liability for your use of this information. Please provide this summary of care documentation to your next provider. Your primary care clinician is listed as Keith Peña. If you have any questions after today's visit, please call [phone].

## 2017-03-03 NOTE — PATIENT INSTRUCTIONS
There are no preventive care reminders to display for this patient. PLAN:  Key points we discussed today:  1. Call our office if symptoms worsen or if you have any questions    2. Stop taking potassium, benicar, and lasix. 3. Continue taking coreg/carvedilol AND start taking hydralazine - to improve your blood pressure    4. We will check labs today and in 1 week. 5. Ultrasound of your kidneys have been ordered. 6. Amoxicillin course - antibiotic - Please take probiotics supplements/food products while on antibiotics to replenish the good bacteria in your intestinal tract that may be destroyed by the antibiotic medication I am prescribing for you today. Notify us if you develop diarrhea (multiple loose stools per day) while on antibiotics. 7. Drink 8 cups of water per day. 8. Your goal is to keep your \"top\" blood pressure number below 150 and your \"bottom\" blood pressure number below 90. If you can, check your blood pressure 3 times per week. Notify us if your blood pressure is greater than 150/90.    9. Stop taking celebrex. Take low dose percocet. Dr. Justice Turk  [facility] [address].  Phone: [phone]  Fax: [phone]    Thank you for choosing [facility] for all of your health care needs. Hydralazine (By mouth)   Hydralazine Hydrochloride ([id number]-r-zeen song-droe-KLOR-ant)  Treats high blood pressure. Brand Name(s):Apresoline   There may be other brand names for this medicine. When This Medicine Should Not Be Used: This medicine is not right for everyone. Do not use it if you had an allergic reaction to hydralazine, or if you have coronary artery disease or rheumatic heart disease. How to Use This Medicine:   Tablet  · Take your medicine as directed. Your dose may need to be changed several times to find what works best for you. · Missed dose: Take a dose as soon as you remember.  If it is almost time for your next dose, wait until then and take a regular dose. Do not take extra medicine to make up for a missed dose. · Store the medicine in a closed container at room temperature, away from heat, moisture, and direct light. Drugs and Foods to Avoid:   Ask your doctor or pharmacist before using any other medicine, including over-the-counter medicines, vitamins, and herbal products. · Some foods and medicines can affect how hydralazine works. Tell your doctor if you are using diazoxide or an MAO inhibitor. Warnings While Using This Medicine:   · Tell your doctor if you are pregnant or breastfeeding, or if you have kidney disease, heart or blood vessel disease, heart rhythm problems, lupus, or if you had a heart attack or stroke. · This medicine may cause the following problems:  ¨ Lupus-like syndrome  ¨ Changes in heart rhythm  ¨ Nerve problems  · This medicine may lower your blood pressure too much and cause you to feel dizzy. Do not drive or do anything else that could be dangerous until you know how this medicine affects you. · Your doctor will do lab tests at regular visits to check on the effects of this medicine. Keep all appointments. · Keep all medicine out of the reach of children. Never share your medicine with anyone.   Possible Side Effects While Using This Medicine:   Call your doctor right away if you notice any of these side effects:  · Allergic reaction: Itching or hives, swelling in your face or hands, swelling or tingling in your mouth or throat, chest tightness, trouble breathing  · Change in how much or how often you urinate  · Chest pain that may spread to your arms, jaw, back, or neck, trouble breathing, unusual sweating, faintness  · Fast, pounding, or uneven heartbeat  · Fever, chills, cough, sore throat, and body aches  · Lightheadedness, dizziness, or fainting  · Numbness, tingling, or burning pain in your hands, arms, legs, or feet  · Unusual bleeding, bruising, or weakness  If you notice these less serious side effects, talk with your doctor:   · Diarrhea, nausea, vomiting, loss of appetite  · Headache  · Stuffy nose or watery eyes  If you notice other side effects that you think are caused by this medicine, tell your doctor. Call your doctor for medical advice about side effects. You may report side effects to FDA at 0-156-RCD-8289  © 2016 [address] is for End User's use only and may not be sold, redistributed or otherwise used for commercial purposes. The above information is an  only. It is not intended as medical advice for individual conditions or treatments. Talk to your doctor, nurse or pharmacist before following any medical regimen to see if it is safe and effective for you. Acute High Blood Pressure: Care Instructions  Your Care Instructions  Acute high blood pressure is very high blood pressure. It's a serious problem. Very high blood pressure can damage your brain, heart, eyes, and kidneys. You may have been given medicines to lower your blood pressure. You may have gotten them as pills or through a needle in one of your veins. This is called an IV. And maybe you were given other medicines too. These can be needed when high blood pressure causes other problems. To keep your blood pressure at a lower level, you may need to make healthy lifestyle changes. And you will probably need to take medicines. Be sure to follow up with your doctor about your blood pressure and what you can do about it. Follow-up care is a key part of your treatment and safety. Be sure to make and go to all appointments, and call your doctor if you are having problems. It's also a good idea to know your test results and keep a list of the medicines you take. How can you care for yourself at home? · See your doctor as often as he or she recommends. This is to make sure your blood pressure is under control.  You will probably go at least 2 times a year. But it may be more often at first.  · Take your blood pressure medicine exactly as prescribed. You may take one or more types. They include diuretics, beta-blockers, ACE inhibitors, calcium channel blockers, and angiotensin II receptor blockers. Call your doctor if you think you are having a problem with your medicine. · If you take blood pressure medicine, talk to your doctor before you take decongestants or anti-inflammatory medicine, such as ibuprofen. These can raise blood pressure. · Learn how to check your blood pressure at home. Check it often. · Ask your doctor if it's okay to drink alcohol. · Talk to your doctor about lifestyle changes that can help blood pressure. These include being active and not smoking. When should you call for help? Call 911 anytime you think you may need emergency care. This may mean having symptoms that suggest that your blood pressure is causing a serious heart or blood vessel problem. Your blood pressure may be over 180/110. For example, call 911 if:  · You have symptoms of a heart attack. These may include:  ¨ Chest pain or pressure, or a strange feeling in the chest.  ¨ Sweating. ¨ Shortness of breath. ¨ Nausea or vomiting. ¨ Pain, pressure, or a strange feeling in the back, neck, jaw, or upper belly or in one or both shoulders or arms. ¨ Lightheadedness or sudden weakness. ¨ A fast or irregular heartbeat. · You have symptoms of a stroke. These may include:  ¨ Sudden numbness, tingling, weakness, or loss of movement in your face, arm, or leg, especially on only one side of your body. ¨ Sudden vision changes. ¨ Sudden trouble speaking. ¨ Sudden confusion or trouble understanding simple statements. ¨ Sudden problems with walking or balance. ¨ A sudden, severe headache that is different from past headaches. · You have severe back or belly pain. Do not wait until your blood pressure comes down on its own. Get help right away.   Call your doctor now or seek immediate care if:  · Your blood pressure is much higher than normal (such as 180/110 or higher), but you don't have symptoms. · You think high blood pressure is causing symptoms, such as:  ¨ Severe headache. ¨ Blurry vision. Watch closely for changes in your health, and be sure to contact your doctor if:  · Your blood pressure measures 140/90 or higher at least 2 times. That means the top number is 140 or higher or the bottom number is 90 or higher, or both. · You think you may be having side effects from your blood pressure medicine. · Your blood pressure is usually normal, but it goes above normal at least 2 times. Where can you learn more? Go to http://jacquelyn-alberto.info/. Enter T704 in the search box to learn more about \"Acute High Blood Pressure: Care Instructions. \"  Current as of: August 8, 2016  Content Version: 11.1  © 7427-2827 [employer]. Care instructions adapted under license by [facility] (which disclaims liability or warranty for this information). If you have questions about a medical condition or this instruction, always ask your healthcare professional. [place] [address] any warranty or liability for your use of this information. Acute Kidney Injury: Care Instructions  Your Care Instructions  Acute kidney injury is the sudden loss of kidney function that happens when the kidneys stop working over a period of hours, days or, in some cases, weeks. It is also known as acute renal failure. Common causes of acute kidney injury are dehydration, blood loss, and medicines. When acute kidney injury happens, the kidneys cannot remove waste and excess fluids from the body. The waste and fluids build up and become harmful. Kidney function may return to normal if the cause of acute kidney injury is treated quickly.  Your chance of a full recovery depends on what caused the problem, how quickly the cause was treated, and what other medical problems you have. A machine may be used to help your kidneys remove waste and fluids for a short period of time. This is called dialysis. Follow-up care is a key part of your treatment and safety. Be sure to make and go to all appointments, and call your doctor if you are having problems. It's also a good idea to know your test results and keep a list of the medicines you take. How can you care for yourself at home? · Talk to your doctor about how much fluid you should drink. · Eat a balanced diet. Talk to your doctor or a dietitian about what type of diet may be best for you. · Follow the instructions and schedule for dialysis that your doctor gives you. · Do not smoke. Smoking can make your condition worse. If you need help quitting, talk to your doctor about stop-smoking programs and medicines. These can increase your chances of quitting for good. · Do not drink alcohol. · Review all of your medicines with your doctor. Do not take any medicines, including nonsteroidal anti-inflammatory drugs (NSAIDs) such as ibuprofen (Advil, Motrin) or naproxen (Aleve), unless your doctor says it is safe for you to do so. · Make sure that anyone treating you for any health problem knows that you have had acute kidney injury. When should you call for help? Call 911 anytime you think you may need emergency care. For example, call if:  · You passed out (lost consciousness). · You have severe trouble breathing. Call your doctor now or seek immediate medical care if:  · You have less urine than normal or no urine. · You have trouble urinating or can urinate only very small amounts. · You are confused or have trouble thinking clearly. · You feel weaker or more tired than usual.  · You are very thirsty, lightheaded, or dizzy. · You have nausea and vomiting. · You have new swelling of your arms or feet, or your swelling is worse. · You have blood in your urine.   · Your body weight goes up every day.  · You have new or worse trouble breathing. Watch closely for changes in your health, and be sure to contact your doctor if:  · You do not get better as expected. Where can you learn more? Go to http://jacquelyn-alberto.info/. Enter Z807 in the search box to learn more about \"Acute Kidney Injury: Care Instructions. \"  Current as of: November 20, 2015  Content Version: 11.1  © 2127-8718 [employer]. Care instructions adapted under license by [employer] (which disclaims liability or warranty for this information). If you have questions about a medical condition or this instruction, always ask your healthcare professional. [place] [address] any warranty or liability for your use of this information.

## 2017-03-05 LAB
BACTERIA SPEC CULT: NORMAL
SERVICE CMNT-IMP: NORMAL

## 2017-03-05 NOTE — PROGRESS NOTES
INTERNISTS OF [facility]:  3/5/2017, MRN: [id number]      Shadi Nunez is a 78 y.o. female and presents to clinic for Hospital Follow Up [facility] ER visit [id number] due to Syncope )    Subjective:   Patient is a 47-year-old -American female with history of allergic rhinitis, gout, cataract, overactive bladder, hyperlipidemia, GERD, chronic gastritis with bleeding in March 2016, history of stroke, vitamin D deficiency, dysphasia status post dilation, anemia, pacemaker for history of AV block, left cavernous ICA aneurysm, hypertension, atrial fibrillation, carotid stenosis, peripheral artery disease, PVCs, DVT, and CAD status post CABG. Acute kidney injury, HTN, and Gout:  - Patient went to the [facility] the emergency department on February 24, 2017 due to dizziness and syncopal episode.  -During the emergency department visit, the patient was found to have acute kidney injury with creatinine of 2.69.  -The patient was discharged home and instructed to follow-up with her PCP for further evaluation  -Patient reports some back discomfort along her left thoracic spine that is new  -She reports some lower abdominal cramping that is off and on as well since her emergency department visit  -Urination has declined since her emergency department visit.   Patient reports poor water intake and hydration.  -Patient takes Benicar 20 mg daily, Lasix 40 mg daily, carvedilol 25 mg twice daily, aspirin, Celebrex, and potassium.  -Patient has long-standing history of hypertension (for several years)  -Patient blood pressure is elevated today at 181/72  -Patient is compliant with medications and reports no adverse side effects  -She reports pain in her feet associated with history of gout (note that pt has h/o gout >6 months)      Patient Active Problem List    Diagnosis Date Noted    Seasonal allergic rhinitis 11/23/2016    Gout of left foot 11/22/2016    Cataract 08/09/2016    OAB (overactive bladder) 07/06/2016    Mixed hyperlipidemia 07/05/2016    Gastroesophageal reflux disease without esophagitis 07/05/2016    Chronic gastritis with bleeding - in February/March of 2016 per FOBT and EGD results 07/05/2016    History of CVA (cerebrovascular accident) 07/05/2016    Vitamin D deficiency 07/05/2016    Dysphagia s/p dilation 07/05/2016    Anemia 07/05/2016    Pacemaker (for h/o AV block) 07/05/2016    Intracranial aneurysm - left cavernous ICA on 2014 head/neck CTA 12/01/2014    HTN (hypertension) 10/13/2014    Atrial fibrillation ([facility] 10/13/2014    Carotid stenosis 10/13/2014    PAD (peripheral artery disease) ([facility] 10/13/2014    PVC's (premature ventricular contractions) 12/09/2011    [facility] 01/18/2011    Atherosclerotic heart disease, s/p CABG X3 in 6/08, in the setting of severe left ventricular dysfunction, and s/p a dual-chamber     S/P CABG x 3     Hypertensive cardiovascular disease        Current Outpatient Prescriptions   Medication Sig Dispense Refill    hydrALAZINE (APRESOLINE) 25 mg tablet Take 1 Tab by mouth three (3) times daily. 90 Tab 3    amoxicillin (AMOXIL) 500 mg capsule Take 1 Cap by mouth two (2) times a day for 10 days. 20 Cap 0    oxyCODONE-acetaminophen (PERCOCET) 5-325 mg per tablet Take 1 Tab by mouth every eight (8) hours as needed for Pain. Max Daily Amount: 3 Tabs. 21 Tab 0    celecoxib (CELEBREX) 200 mg capsule Take  by mouth two (2) times a day.  carvedilol (COREG) 25 mg tablet Take 1 Tab by mouth two (2) times a day. 180 Tab 3    KLOR-CON M20 20 mEq tablet TAKE 1 TABLET BY MOUTH EVERY DAY 90 Tab 3    aspirin 81 mg tablet Take 81 mg by mouth daily.          Allergies   Allergen Reactions    Ace Inhibitors Cough    Codeine Unknown (comments), Nausea Only and Nausea and Vomiting    Lipitor [Atorvastatin] Nausea and Vomiting and Vertigo       Past Medical History:   Diagnosis Date    Abnormal ankle brachial index 11/13/2014    Mild arterial disease in right leg. R VICTOR MANUEL 0.88. L VICTOR MANUEL 1.00.  Anemia     Arm DVT (deep venous thromboembolism), acute ([facility])     s/p anticoagulation    Atherosclerotic heart disease     Atrial fibrillation ([facility])     AV block     CAD (coronary artery disease)     Cardiomyopathy, ischemic     Carotid artery stenosis     Carotid duplex 2014    Mild <50% bilateral ICA plaquing. Possible left vertebral occlusion.  Chest pain     CVA (cerebral infarction)     Echocardiogram 2015    EF 55%. Apical inferior, apical septal hypk (new since study of 11), likely due to chronic V-pacing. Mild LVH. RVSP 30 mmHg. Mild LAE. Mild MR. Mild TR.  Gastritis     GERD (gastroesophageal reflux disease)     Hiatal hernia     Hyperlipidemia     Hypertension     Hypertensive cardiovascular disease     Intracranial aneurysm     left cavernous ICA 2mm aneurysm from head/neck CTA in     Long term (current) use of anticoagulants 3/10/2011    Lower extremity venous duplex 2015    No DVT bilaterally.  Nuclear cardiac imaging test 2015    Low risk. Sm apical infarction w/no ischemia vs apical thinning. Sm basal inferior defect, likely artifact. EF 56%. Inferoseptal, inferoapical WMA related to sternotomy or paced rhythm.  Pacemaker     PAD (peripheral artery disease) ([facility])     PVC's     chronic    S/P CABG x 3 2008    LIMA-LAD. SVG-OM. SVG-dRCA     S/P cardiac cath 2008    pRCA 100%. LM patent. Cx patent. OM1 100%. mLAD 95%. LVEDP 27-29mmHg. EF 20%.  mod MR    Tilt table evaluation 1995    Positive for orthostatic hypotension    Vitamin D deficiency        Past Surgical History:   Procedure Laterality Date    CABG, ARTERY-VEIN, THREE  2008    CARDIAC SURG PROCEDURE UNLIST      medtronic dual-chamber cardioverted-defibrillator    HX  SECTION      x2    HX ENDOSCOPY  3/1/16    HX ENDOSCOPY  3/1/16    HX HEMORRHOIDECTOMY      1985    HX HYSTERECTOMY 1980    HX ORTHOPAEDIC      knee surgery    HX OTHER SURGICAL  2/10/16    Pacemaker Gen Change    HX TUMOR REMOVAL      removed from arm.  benign       History reviewed. No pertinent family history. Social History   Substance Use Topics    Smoking status: Former Smoker    Smokeless tobacco: Never Used    Alcohol use No       ROS   Review of Systems   Constitutional: Positive for malaise/fatigue. Negative for chills and fever. HENT: Negative for ear pain and sore throat. Eyes: Negative for blurred vision and pain. Respiratory: Positive for cough. Negative for shortness of breath. Cardiovascular: Negative for chest pain. Gastrointestinal: Positive for abdominal pain. Genitourinary: Positive for flank pain. Negative for dysuria and hematuria. Musculoskeletal: Positive for joint pain (in feet from gout). Negative for myalgias. Skin: Negative for rash. Neurological: Negative for tingling, focal weakness and headaches. Endo/Heme/Allergies: Does not bruise/bleed easily. Psychiatric/Behavioral: Negative for substance abuse. Objective     Vitals:    03/03/17 1555   BP: 181/72   Pulse: 62   Resp: 16   Temp: 97.8 °F (36.6 °C)   TempSrc: Oral   SpO2: 100%   Weight: 202 lb 9.6 oz (91.9 kg)   Height: 5' 4.5\" (1.638 m)   PainSc:   4   PainLoc: Foot       Physical Exam   Constitutional: She is oriented to person, place, and time and well-developed, well-nourished, and in no distress. HENT:   Head: Normocephalic and atraumatic. Right Ear: External ear normal.   Left Ear: External ear normal.   Nose: Nose normal.   Mouth/Throat: Oropharynx is clear and moist. No oropharyngeal exudate. Eyes: Conjunctivae and EOM are normal. Pupils are equal, round, and reactive to light. Right eye exhibits no discharge. Left eye exhibits no discharge. No scleral icterus. Neck: Neck supple. Cardiovascular: Normal rate and intact distal pulses. Exam reveals no gallop and no friction rub.     No murmur heard.  Pulmonary/Chest: Effort normal and breath sounds normal. No respiratory distress. She has no wheezes. She has no rales. Abdominal: Soft. Bowel sounds are normal. She exhibits no distension and no mass. There is tenderness (along suprapubic area). There is no rebound and no guarding. No CVA tenderness to palpation   Musculoskeletal: She exhibits no edema or tenderness (BUE are NTTP; pt's left foot anterior area is slightly swollen and TTP; no increased warmth or redness is present). Lymphadenopathy:     She has no cervical adenopathy. Neurological: She is alert and oriented to person, place, and time. She exhibits normal muscle tone. Gait normal.   Skin: Skin is warm and dry. No erythema. Psychiatric: Affect normal.   Nursing note and vitals reviewed. LABS   Data Review:   Lab Results   Component Value Date/Time    WBC 5.0 03/03/2017 04:40 PM    HGB 10.5 03/03/2017 04:40 PM    HCT 33.6 03/03/2017 04:40 PM    PLATELET 811 04/18/2438 04:40 PM    MCV 89.1 03/03/2017 04:40 PM       Lab Results   Component Value Date/Time    Sodium 134 02/24/2017 11:10 AM    Potassium 3.6 02/24/2017 11:10 AM    Chloride 98 02/24/2017 11:10 AM    CO2 29 02/24/2017 11:10 AM    Anion gap 7 02/24/2017 11:10 AM    Glucose 111 02/24/2017 11:10 AM    BUN 39 02/24/2017 11:10 AM    Creatinine 2.69 02/24/2017 11:10 AM    BUN/Creatinine ratio 14 02/24/2017 11:10 AM    GFR est AA 21 02/24/2017 11:10 AM    GFR est non-AA 17 02/24/2017 11:10 AM    Calcium 9.6 02/24/2017 11:10 AM       Lab Results   Component Value Date/Time    Cholesterol, total 178 08/19/2015 12:00 AM    Cholesterol, Total 183 11/23/2015 08:46 AM    HDL Cholesterol 74 08/19/2015 12:00 AM    LDL, calculated 94 08/19/2015 12:00 AM    VLDL, calculated 10 08/19/2015 12:00 AM    Triglyceride 52 08/19/2015 12:00 AM       Assessment/Plan:   1. Essential hypertension: Blood pressure is not at goal today.  -Continue taking carvedilol.   Stop Lasix, potassium, and Benicar (in setting of JAS  - see #2)  -Checking a BMP. -Starting hydralazine 25 mg 3 times daily. Return to clinic for repeat BP check. ORDERS:  - hydrALAZINE (APRESOLINE) 25 mg tablet; Take 1 Tab by mouth three (3) times daily. Dispense: 90 Tab; Refill: 3  - METABOLIC PANEL, BASIC; Future    2. Acute renal failure, unspecified acute renal failure type: Etiology unknown. Must rule out urinary tract infection and obstruction. Likely from dehydration.  -Avoid nephrotoxic medications (benicar, celebrex, potassium, and lasix). -Ordering a urinalysis, BMP, CBC, and retroperitoneal ultrasound  -Amoxicillin course prescribed for presumed urinary tract infection pending UA and ultrasound results    ORDERS:  - URINALYSIS W/ RFLX MICROSCOPIC; Future  - METABOLIC PANEL, BASIC; Future  - amoxicillin (AMOXIL) 500 mg capsule; Take 1 Cap by mouth two (2) times a day for 10 days. Dispense: 20 Cap; Refill: 0  - US RETROPERITONEUM COMP; Future  - CBC WITH AUTOMATED DIFF; Future    3. Gout: stopping celebrex in setting of #2.  -Low-dose Percocet prescribed for presumed gout pain along feet  -Instructed patient on the potential side effects of this medication      Health Maintenance Due   Topic Date Due    Pneumococcal 65+ High/Highest Risk (1 of 2 - PCV13) 04/02/2007       Lab review: orders written for new lab studies as appropriate; see orders    I have discussed the diagnosis with the patient and the intended plan as seen in the above orders. The patient has received an after-visit summary and questions were answered concerning future plans. I have discussed medication side effects and warnings with the patient as well. I have reviewed the plan of care with the patient, accepted their input and they are in agreement with the treatment goals. All questions were answered. The patient understands the plan of care. Handouts provided today with above information.  Pt instructed if symptoms worsen to call the office or report to the ED for continued care. Greater than 50% of the visit time was spent in counseling and/or coordination of care. Follow-up Disposition:  Return in about 1 week (around 3/10/2017) for BP check.     Wisam Montoya MD

## 2017-03-08 ENCOUNTER — PATIENT OUTREACH (OUTPATIENT)
Dept: INTERNAL MEDICINE CLINIC | Age: 77
End: 2017-03-08

## 2017-03-09 ENCOUNTER — OFFICE VISIT (OUTPATIENT)
Dept: INTERNAL MEDICINE CLINIC | Age: 77
End: 2017-03-09

## 2017-03-09 ENCOUNTER — HOSPITAL ENCOUNTER (OUTPATIENT)
Dept: LAB | Age: 77
Discharge: HOME | End: 2017-03-09
Payer: MEDICARE

## 2017-03-09 VITALS
RESPIRATION RATE: 20 BRPM | BODY MASS INDEX: 35.12 KG/M2 | OXYGEN SATURATION: 99 % | HEART RATE: 60 BPM | WEIGHT: 210.8 LBS | HEIGHT: 65 IN | DIASTOLIC BLOOD PRESSURE: 65 MMHG | SYSTOLIC BLOOD PRESSURE: 144 MMHG | TEMPERATURE: 98.3 F

## 2017-03-09 DIAGNOSIS — N17.9 AKI (ACUTE KIDNEY INJURY) (HCC): ICD-10-CM

## 2017-03-09 DIAGNOSIS — I10 ESSENTIAL HYPERTENSION: ICD-10-CM

## 2017-03-09 DIAGNOSIS — N17.9 AKI (ACUTE KIDNEY INJURY) (HCC): Primary | ICD-10-CM

## 2017-03-09 LAB
ANION GAP BLD CALC-SCNC: 10 MMOL/L (ref 3–18)
BUN SERPL-MCNC: 22 MG/DL (ref 7–18)
BUN/CREAT SERPL: 16 (ref 12–20)
CALCIUM SERPL-MCNC: 8.4 MG/DL (ref 8.5–10.1)
CHLORIDE SERPL-SCNC: 107 MMOL/L (ref 100–108)
CO2 SERPL-SCNC: 22 MMOL/L (ref 21–32)
CREAT SERPL-MCNC: 1.37 MG/DL (ref 0.6–1.3)
GLUCOSE SERPL-MCNC: 95 MG/DL (ref 74–99)
POTASSIUM SERPL-SCNC: 4.6 MMOL/L (ref 3.5–5.5)
SODIUM SERPL-SCNC: 139 MMOL/L (ref 136–145)

## 2017-03-09 PROCEDURE — 80048 BASIC METABOLIC PNL TOTAL CA: CPT | Performed by: INTERNAL MEDICINE

## 2017-03-09 RX ORDER — FUROSEMIDE 40 MG/1
TABLET ORAL
Refills: 6 | COMMUNITY
Start: 2017-02-18 | End: 2017-03-09 | Stop reason: ALTCHOICE

## 2017-03-09 NOTE — MR AVS SNAPSHOT
Visit Information Date & Time Provider Department Dept. Phone Encounter #  
 3/9/2017 11:30 AM Cris Caldwell MD Internist of [address] [place] [address] [phone] Follow-up Instructions Return if symptoms worsen or fail to improve. Your Appointments 3/16/2017 10:30 AM  
POST HOSPITAL with Milagro Loredo NP [facility] ([address] [place] [address]) Appt Note: per Juan . . patient was in ER . . move April appt. sooner . . nothing with NATALIA [place]e [place] [phone]  
[phone] [phone]  
  
    
 3/16/2017 10:30 AM  
PROCEDURE with Pacer [facility] C[facility] ([address] [place] [address]) Appt Note: per Juan . . patient was in ER . . move April appt. sooner . . also seeing Yaniqueilia Moritz today [place] [place] [phone] [phone]  
  
    
 4/3/2017  8:40 AM  
Follow Up with Deanna Alcantara DO [facility] ([address] [place] [address]) Appt Note: Return in about 6 months [address] [place] [phone]  
[phone] [place]  
  
    
 7/10/2017  8:30 AM  
Office Visit with Cris Caldwell MD  
Internist of [address] [place] [address]) Appt Note: 1 yr f/up; .  
 [address] [facility], [address] [place] [address] [place]  
  
   
 [address] [facility], [address]  
  
    
 12/18/2017  2:45 PM  
PROCEDURE with [facility] IMAGING 2  
[facility] ([facility] SCHEDULING) Appt Note: cv wild [address] [address]  
[phone]  [address] [facility] [place] [address]  
  
    
 12/18/2017  3:45 PM  
PROCEDURE with [facility] NONIMAGING  
 [facility] ([facility]) Appt Note: leg art wild [address] [address]  
[phone] [address] [id number]  
  
    
 1/4/2018  9:00 AM  
Follow Up with HENRY Markham  
[facility] ([facility]) [address] [address] [address]  
[phone]  
  
   
 [address] [place] [address] [address] Upcoming Health Maintenance Date Due Pneumococcal 65+ High/Highest Risk (1 of 2 - PCV13) 4/2/2007 MEDICARE YEARLY EXAM 7/7/2017 COLONOSCOPY 1/25/2018 GLAUCOMA SCREENING Q2Y 8/8/2018 DTaP/Tdap/Td series (2 - Td) 11/22/2026 Allergies as of 3/9/2017  Review Complete On: 3/9/2017 By: Mauricio Carrasco Severity Noted Reaction Type Reactions Ace Inhibitors    Cough Codeine  11/12/2008    Unknown (comments), Nausea Only, Nausea and Vomiting Lipitor [Atorvastatin]  05/20/2014    Nausea and Vomiting, Vertigo Current Immunizations  Never Reviewed No immunizations on file. Not reviewed this visit You Were Diagnosed With   
  
 Codes Comments JAS (acute kidney injury) ([facility]    -  Primary ICD-10-CM: N17.9 ICD-9-CM: 024. 9 Vitals BP Pulse Temp Resp Height(growth percentile) Weight(growth percentile) 144/65 (BP 1 Location: Left arm, BP Patient Position: Sitting) 60 98.3 °F (36.8 °C) (Oral) 20 5' 4.5\" (1.638 m) 210 lb 12.8 oz (95.6 kg) SpO2 BMI OB Status Smoking Status 99% 35.62 kg/m2 Postmenopausal Former Smoker BMI and BSA Data Body Mass Index Body Surface Area  
 35.62 kg/m 2 2.09 m 2 Preferred Pharmacy Pharmacy Name Phone [id number] [address] Your Updated Medication List  
  
   
This list is accurate as of: 3/9/17 12:37 PM.  Always use your most recent med list.  
  
  
  
  
 amoxicillin 500 mg capsule Commonly known as:  AMOXIL Take 1 Cap by mouth two (2) times a day for 10 days. aspirin 81 mg tablet Take 81 mg by mouth daily. carvedilol 25 mg tablet Commonly known as:  Macel Hi Take 1 Tab by mouth two (2) times a day. celecoxib 200 mg capsule Commonly known as:  CELEBREX Take  by mouth two (2) times a day. hydrALAZINE 25 mg tablet Commonly known as:  APRESOLINE Take 1 Tab by mouth three (3) times daily. oxyCODONE-acetaminophen 5-325 mg per tablet Commonly known as:  PERCOCET Take 1 Tab by mouth every eight (8) hours as needed for Pain. Max Daily Amount: 3 Tabs. Follow-up Instructions Return if symptoms worsen or fail to improve. To-Do List   
 Around 03/09/2017 Lab:  METABOLIC PANEL, BASIC Patient Instructions Health Maintenance Due Topic Date Due  Pneumococcal 65+ High/Highest Risk (1 of 2 - PCV13) 04/02/2007 Please provide this summary of care documentation to your next provider. Your primary care clinician is listed as Socorro Moe. If you have any questions after today's visit, please call [phone].

## 2017-03-09 NOTE — PROGRESS NOTES
Chief Complaint   Patient presents with    Hypertension     follow up    Results     3-07-17 US Retroperitoneum to discuss    Labs     done 3-07-17 to discuss     1. Have you been to the ER, urgent care clinic since your last visit? Hospitalized since your last visit? No    2. Have you seen or consulted any other health care providers outside of the [address] [place] [address] since your last visit? Include any pap smears or colon screening.  No

## 2017-03-09 NOTE — PATIENT INSTRUCTIONS
Health Maintenance Due   Topic Date Due    Pneumococcal 65+ High/Highest Risk (1 of 2 - PCV13) 04/02/2007

## 2017-03-10 NOTE — PROGRESS NOTES
Pt should stop taking her celebrex. We will recheck her kidney function in 2 weeks with a lab visit. Pt's kidney function has improved and is almost at baseline normal value. Please notify her of these results and the plan of care mentioned above. Please schedule her for a lab visit in 2 wks.     Dr. Matthew Ta  [facility], [address] [place] [address] Str.  Phone: [phone]  Fax: [phone]

## 2017-03-12 NOTE — PROGRESS NOTES
INTERNISTS OF [facility]:  3/12/2017, MRN: [id number]      Phyllis Thao is a 78 y.o. female and presents to clinic for Hypertension (follow up); Results (3-07-17 US Retroperitoneum to discuss); and Labs (done 3-07-17 to discuss)    Subjective:   Patient is a 72-year-old -American female with history of allergic rhinitis, gout, cataract, overactive bladder, hyperlipidemia, GERD, chronic gastritis with bleeding in March 2016, history of stroke, vitamin D deficiency, dysphasia status post dilation, anemia, pacemaker for history of AV block, left cavernous ICA aneurysm, hypertension, atrial fibrillation, carotid stenosis, peripheral artery disease, PVCs, DVT, and CAD status post CABG. Acute kidney injury: Patient went to the [facility] emergency department on February 24, 2016 due to dizziness and syncopal episode. At that time, lab work revealed acute kidney injury with creatinine of 2.69. Patient also reported since then decreased urination and intermittent abdominal cramping. At that time she was taking Benicar 20 mg daily, Lasix 40 mg daily, carvedilol 25 mg twice daily, aspirin, potassium, and Celebrex. At her last appointment Benicar, Lasix, and potassium were discontinued. In order to treat her long-standing several year history of hypertension, patient was told to continue her carvedilol. Hydralazine was ordered to ensure blood pressure reduction in the absence of taking Lasix, Benicar, and potassium. Patient reports no adverse side effects of hydralazine. She has been hydrating well with water. Urination has increased. She had improved her fatigue. She denies dizziness. A retroperitoneal ultrasound was ordered since her last appointment. Results show no obstruction. At her last appointment a BMP was ordered. Patient's blood hemolyzed/clotted. The BMP was unable to be completed. A urine culture and urinalysis were obtained. Results are unremarkable.   Despite this, patient was placed on amoxicillin course for presumed urinary tract infection given her history of original symptoms in the emergency department. Patient notes no diarrhea. She has no dysuria or hematuria. She denies fever or chills. She has had no adverse reactions to amoxicillin. Patient has history of bilateral lower extremity edema for several years. For that reason she was started on Lasix for edema in addition to blood pressure reduction in the past.  Patient states a specialist recently rule out DVT in bilateral lower extremities. We do not have any records of this test.      Patient Active Problem List    Diagnosis Date Noted    Seasonal allergic rhinitis 11/23/2016    Gout of left foot 11/22/2016    Cataract 08/09/2016    OAB (overactive bladder) 07/06/2016    Mixed hyperlipidemia 07/05/2016    Gastroesophageal reflux disease without esophagitis 07/05/2016    Chronic gastritis with bleeding - in February/March of 2016 per FOBT and EGD results 07/05/2016    History of CVA (cerebrovascular accident) 07/05/2016    Vitamin D deficiency 07/05/2016    Dysphagia s/p dilation 07/05/2016    Anemia 07/05/2016    Pacemaker (for h/o AV block) 07/05/2016    Intracranial aneurysm - left cavernous ICA on 2014 head/neck CTA 12/01/2014    HTN (hypertension) 10/13/2014    Atrial fibrillation ([address] 10/13/2014    Carotid stenosis 10/13/2014    PAD (peripheral artery disease) ([address] 10/13/2014    PVC's (premature ventricular contractions) 12/09/2011    Deep vein thrombosis ([facility] 01/18/2011    Atherosclerotic heart disease, s/p CABG X3 in 6/08, in the setting of severe left ventricular dysfunction, and s/p a dual-chamber     S/P CABG x 3        Current Outpatient Prescriptions   Medication Sig Dispense Refill    hydrALAZINE (APRESOLINE) 25 mg tablet Take 1 Tab by mouth three (3) times daily. 90 Tab 3    amoxicillin (AMOXIL) 500 mg capsule Take 1 Cap by mouth two (2) times a day for 10 days.  20 Cap 0    celecoxib (CELEBREX) 200 mg capsule Take  by mouth two (2) times a day.  carvedilol (COREG) 25 mg tablet Take 1 Tab by mouth two (2) times a day. 180 Tab 3    aspirin 81 mg tablet Take 81 mg by mouth daily.  oxyCODONE-acetaminophen (PERCOCET) 5-325 mg per tablet Take 1 Tab by mouth every eight (8) hours as needed for Pain. Max Daily Amount: 3 Tabs. 21 Tab 0       Allergies   Allergen Reactions    Ace Inhibitors Cough    Codeine Unknown (comments), Nausea Only and Nausea and Vomiting    Lipitor [Atorvastatin] Nausea and Vomiting and Vertigo       Past Medical History:   Diagnosis Date    Abnormal ankle brachial index 11/13/2014    Mild arterial disease in right leg. R VICTOR MANUEL 0.88. L VICTOR MANUEL 1.00.  Anemia     Arm DVT (deep venous thromboembolism), acute (HCC)     s/p anticoagulation    Atherosclerotic heart disease     Atrial fibrillation (HCC)     AV block     CAD (coronary artery disease)     Cardiomyopathy, ischemic     Carotid artery stenosis     Carotid duplex 11/13/2014    Mild <50% bilateral ICA plaquing. Possible left vertebral occlusion.  Chest pain     CVA (cerebral infarction)     Echocardiogram 08/19/2015    EF 55%. Apical inferior, apical septal hypk (new since study of 12/19/11), likely due to chronic V-pacing. Mild LVH. RVSP 30 mmHg. Mild LAE. Mild MR. Mild TR.  Gastritis     GERD (gastroesophageal reflux disease)     Hiatal hernia     Hyperlipidemia     Hypertension     Hypertensive cardiovascular disease     Intracranial aneurysm     left cavernous ICA 2mm aneurysm from head/neck CTA in 2014    Long term (current) use of anticoagulants 3/10/2011    Lower extremity venous duplex 01/26/2015    No DVT bilaterally.  Nuclear cardiac imaging test 09/24/2015    Low risk. Sm apical infarction w/no ischemia vs apical thinning. Sm basal inferior defect, likely artifact. EF 56%. Inferoseptal, inferoapical WMA related to sternotomy or paced rhythm.     Pacemaker     PAD (peripheral artery disease) (HCC)     PVC's     chronic    S/P CABG x 3 2008    LIMA-LAD. SVG-OM. SVG-dRCA     S/P cardiac cath 2008    pRCA 100%. LM patent. Cx patent. OM1 100%. mLAD 95%. LVEDP 27-29mmHg. EF 20%. mod MR    Tilt table evaluation 1995    Positive for orthostatic hypotension    Vitamin D deficiency        Past Surgical History:   Procedure Laterality Date    CABG, ARTERY-VEIN, THREE  2008    CARDIAC SURG PROCEDURE UNLIST      medtronic dual-chamber cardioverted-defibrillator    HX  SECTION      x2    HX ENDOSCOPY  3/1/16    HX ENDOSCOPY  3/1/16    HX HEMORRHOIDECTOMY      1985    HX HYSTERECTOMY          HX ORTHOPAEDIC      knee surgery    HX OTHER SURGICAL  2/10/16    Pacemaker Gen Change    HX TUMOR REMOVAL      removed from arm.  benign       History reviewed. No pertinent family history. Social History   Substance Use Topics    Smoking status: Former Smoker    Smokeless tobacco: Never Used    Alcohol use No       ROS   Review of Systems   Constitutional: Negative for chills, fever and malaise/fatigue. HENT: Negative for ear pain and sore throat. Eyes: Negative for blurred vision and pain. Respiratory: Negative for cough and shortness of breath. Cardiovascular: Negative for chest pain. Gastrointestinal: Negative for abdominal pain. Genitourinary: Negative for dysuria and hematuria. Musculoskeletal: Negative for joint pain and myalgias. Skin: Negative for rash. Neurological: Negative for tingling, focal weakness and headaches. Endo/Heme/Allergies: Does not bruise/bleed easily. Psychiatric/Behavioral: Negative for substance abuse.        Objective     Vitals:    17 1154   BP: 144/65   Pulse: 60   Resp: 20   Temp: 98.3 °F (36.8 °C)   TempSrc: Oral   SpO2: 99%   Weight: 210 lb 12.8 oz (95.6 kg)   Height: 5' 4.5\" (1.638 m)   PainSc:   5   PainLoc: Foot       Physical Exam   Constitutional: She is oriented to person, place, and time and well-developed, well-nourished, and in no distress. HENT:   Head: Normocephalic and atraumatic. Right Ear: External ear normal.   Left Ear: External ear normal.   Nose: Nose normal.   Mouth/Throat: Oropharynx is clear and moist. No oropharyngeal exudate. Eyes: Conjunctivae and EOM are normal. Pupils are equal, round, and reactive to light. Right eye exhibits no discharge. Left eye exhibits no discharge. No scleral icterus. Neck: Neck supple. Cardiovascular: Normal rate, regular rhythm, normal heart sounds and intact distal pulses. Exam reveals no gallop and no friction rub. No murmur heard. Pulmonary/Chest: Effort normal and breath sounds normal. No respiratory distress. She has no wheezes. She has no rales. Abdominal: Soft. Bowel sounds are normal. She exhibits no distension and no mass. There is no tenderness. There is no rebound and no guarding. No hepatomegaly   Musculoskeletal: She exhibits no edema or tenderness (BUE are NTTP). Lymphadenopathy:     She has no cervical adenopathy. Neurological: She is alert and oriented to person, place, and time. She exhibits normal muscle tone. Gait normal.   Skin: Skin is warm and dry. No erythema. Psychiatric: Affect normal.   Nursing note and vitals reviewed.       LABS   Data Review:   Lab Results   Component Value Date/Time    WBC 5.0 03/03/2017 04:40 PM    HGB 10.5 03/03/2017 04:40 PM    HCT 33.6 03/03/2017 04:40 PM    PLATELET 345 43/17/2850 04:40 PM    MCV 89.1 03/03/2017 04:40 PM       Lab Results   Component Value Date/Time    Sodium 139 03/09/2017 12:52 PM    Potassium 4.6 03/09/2017 12:52 PM    Chloride 107 03/09/2017 12:52 PM    CO2 22 03/09/2017 12:52 PM    Anion gap 10 03/09/2017 12:52 PM    Glucose 95 03/09/2017 12:52 PM    BUN 22 03/09/2017 12:52 PM    Creatinine 1.37 03/09/2017 12:52 PM    BUN/Creatinine ratio 16 03/09/2017 12:52 PM    GFR est AA 46 03/09/2017 12:52 PM    GFR est non-AA 38 03/09/2017 12:52 PM Calcium 8.4 03/09/2017 12:52 PM       Lab Results   Component Value Date/Time    Cholesterol, total 178 08/19/2015 12:00 AM    Cholesterol, Total 183 11/23/2015 08:46 AM    HDL Cholesterol 74 08/19/2015 12:00 AM    LDL, calculated 94 08/19/2015 12:00 AM    VLDL, calculated 10 08/19/2015 12:00 AM    Triglyceride 52 08/19/2015 12:00 AM       Assessment/Plan:   1. Hypertension and JAS (acute kidney injury): Likely from dehydration and medications (benicar, lasix, and potassium). BP is goal (<150/90)  -Continue with carvedilol and hydralazine for blood pressure reduction  -Checking a BMP    ORDERS:  - METABOLIC PANEL, BASIC; Future    2. General:  -Requesting records of patient's most recent bilateral lower extremity PVL study      Health Maintenance Due   Topic Date Due    Pneumococcal 65+ High/Highest Risk (1 of 2 - PCV13) 04/02/2007       Lab review: orders written for new lab studies as appropriate; see orders    I have discussed the diagnosis with the patient and the intended plan as seen in the above orders. The patient has received an after-visit summary and questions were answered concerning future plans. I have discussed medication side effects and warnings with the patient as well. I have reviewed the plan of care with the patient, accepted their input and they are in agreement with the treatment goals. All questions were answered. The patient understands the plan of care. Handouts provided today with above information. Pt instructed if symptoms worsen to call the office or report to the ED for continued care. Greater than 50% of the visit time was spent in counseling and/or coordination of care. Follow-up Disposition:  Return if symptoms worsen or fail to improve.     Elizabeth Agarwal MD

## 2017-03-13 NOTE — PROGRESS NOTES
Patient notified of lab results and instructed to stop celebrex patient verbalized an understanding and was scheduled for a lab visit on 03/27/17 at 9:30am

## 2017-03-16 ENCOUNTER — OFFICE VISIT (OUTPATIENT)
Dept: CARDIOLOGY CLINIC | Age: 77
End: 2017-03-16

## 2017-03-16 ENCOUNTER — CLINICAL SUPPORT (OUTPATIENT)
Dept: CARDIOLOGY CLINIC | Age: 77
End: 2017-03-16

## 2017-03-16 VITALS
HEART RATE: 66 BPM | OXYGEN SATURATION: 98 % | HEIGHT: 64 IN | DIASTOLIC BLOOD PRESSURE: 78 MMHG | WEIGHT: 211 LBS | SYSTOLIC BLOOD PRESSURE: 156 MMHG | BODY MASS INDEX: 36.02 KG/M2

## 2017-03-16 DIAGNOSIS — I48.0 PAROXYSMAL ATRIAL FIBRILLATION (HCC): ICD-10-CM

## 2017-03-16 DIAGNOSIS — Z95.0 PACEMAKER: ICD-10-CM

## 2017-03-16 DIAGNOSIS — I42.9 CARDIOMYOPATHY (HCC): Primary | ICD-10-CM

## 2017-03-16 DIAGNOSIS — E78.2 MIXED HYPERLIPIDEMIA: ICD-10-CM

## 2017-03-16 DIAGNOSIS — M1A.09X0 CHRONIC GOUT OF MULTIPLE SITES, UNSPECIFIED CAUSE: ICD-10-CM

## 2017-03-16 DIAGNOSIS — I42.9 CARDIOMYOPATHY (HCC): ICD-10-CM

## 2017-03-16 DIAGNOSIS — Z95.810 CARDIAC DEFIBRILLATOR IN PLACE: ICD-10-CM

## 2017-03-16 DIAGNOSIS — I10 ESSENTIAL HYPERTENSION: Primary | ICD-10-CM

## 2017-03-16 DIAGNOSIS — Z95.810 AICD (AUTOMATIC CARDIOVERTER/DEFIBRILLATOR) PRESENT: ICD-10-CM

## 2017-03-16 RX ORDER — HYDRALAZINE HYDROCHLORIDE 50 MG/1
50 TABLET, FILM COATED ORAL 3 TIMES DAILY
Qty: 90 TAB | Refills: 6 | Status: SHIPPED | OUTPATIENT
Start: 2017-03-16 | End: 2017-04-03

## 2017-03-16 NOTE — PROGRESS NOTES
1. Have you been to the ER, urgent care clinic since your last visit? Hospitalized since your last visit? Yes, on 2/24/2017 for syncope    2. Have you seen or consulted any other health care providers outside of the [address] [place] [address] since your last visit? Include any pap smears or colon screening.  No

## 2017-03-16 NOTE — PATIENT INSTRUCTIONS
Restart Lasix (furosemide) at 20mg every other day.   Take 1/2 of your 40mg tablet every other day  Increase hydralazine to 50mg -  Take 2 of your 25mg tablets 3 times a day until you run out and then begin using the 50mg tablets and take 1 tablet, 3 times a day  BMP to be done as scheduled by Dr. Armen Varghese on 3/24/17  Follow-up with Dr. Jerome Camacho as scheduled in early April and as needed  Weigh daily and record  Limit sodium intake to 2000mg per day  Limit fluid intake to no more than  6 or 7, eight ounce glasses of any type of fluids per day  Call if you notice sudden or progressive weight gain (3-5 pounds in 2-3 days), increasing shortness of breath, abdominal bloating, increasing lower extremity edema, inability to lie flat or on your normal number of pillows, having to sit up to catch your breath, fatigue, increased somnolence (sleeping more), poor appetite

## 2017-03-16 NOTE — MR AVS SNAPSHOT
Visit Information Date & Time Provider Department Dept. Phone Encounter #  
 3/16/2017 10:30 AM Bisi Macias NP [facility] [id number] Your Appointments 3/27/2017  9:35 AM  
LAB with [facility] NURSE VISIT Internist of [facility] ([address]) Appt Note: labs [address], [address] [facility] [address] [place]  
  
   
 [address], [address]  
  
    
 4/3/2017  8:40 AM  
Follow Up with Arcadio Doll DO [facility] ([address]) Appt Note: Return in about 6 months Christiane Ellis [id number] [phone] [place]  
  
    
 7/10/2017  8:30 AM  
Office Visit with Malgorzata Pineda MD  
Internist of [address] [place] [address] [address] [facility]) Appt Note: 1 yr f/up; .  
 [address] [facility], [address] [facility] [address] [place]  
  
   
 [address] [facility], [address]  
  
    
 12/18/2017  2:45 PM  
PROCEDURE with [facility] IMAGING 2  
[facility] ([facility]ED[facility]) Appt Note: cv wild [address] [address]  
[phone] [address] [facility] [place] [address]  
  
    
 12/18/2017  3:45 PM  
PROCEDURE with [facility] NONIMAGING  
[facility] ([facility] SCHEDULING) Appt Note: leg art wild [address] [address]  
[phone] [address] [facility] [place] [address]  
  
    
 1/4/2018  9:00 AM  
Follow Up with HENRY Huntley  
[facility] ([facility] [place]) [address] [address]  
[phone]  
  
   
 [address] [address] Upcoming Health Maintenance Date Due Pneumococcal 65+ High/Highest Risk (1 of 2 - PCV13) 4/2/2007 MEDICARE YEARLY EXAM 7/7/2017 COLONOSCOPY 1/25/2018 GLAUCOMA SCREENING Q2Y 8/8/2018 DTaP/Tdap/Td series (2 - Td) 11/22/2026 Allergies as of 3/16/2017  Review Complete On: 3/16/2017 By: Lani Theodore NP Severity Noted Reaction Type Reactions Ace Inhibitors    Cough Codeine  11/12/2008    Unknown (comments), Nausea Only, Nausea and Vomiting Lipitor [Atorvastatin]  05/20/2014    Nausea and Vomiting, Vertigo Current Immunizations  Never Reviewed No immunizations on file. Not reviewed this visit You Were Diagnosed With   
  
 Codes Comments Essential hypertension    -  Primary ICD-10-CM: I10 
ICD-9-CM: 401.9 Cardiomyopathy ([address]     ICD-10-CM: I42.9 ICD-9-CM: 425.4 Paroxysmal atrial fibrillation (HCC)     ICD-10-CM: I48.0 ICD-9-CM: 427.31 Pacemaker     ICD-10-CM: Z95.0 ICD-9-CM: V45.01   
 AICD (automatic cardioverter/defibrillator) present     ICD-10-CM: Z95.810 ICD-9-CM: V45.02 Mixed hyperlipidemia     ICD-10-CM: E78.2 ICD-9-CM: 272.2 Chronic gout of multiple sites, unspecified cause     ICD-10-CM: M1A. 46Y4 ICD-9-CM: 274.02 Vitals BP Pulse Height(growth percentile) Weight(growth percentile) SpO2 BMI  
 156/78 (BP 1 Location: Left arm, BP Patient Position: Sitting) 66 5' 4\" (1.626 m) 211 lb (95.7 kg) 98% 36.22 kg/m2 OB Status Smoking Status Postmenopausal Former Smoker BMI and BSA Data Body Mass Index Body Surface Area  
 36.22 kg/m 2 2.08 m 2 Preferred Pharmacy Pharmacy Name Phone [id number]- [phone] Your Updated Medication List  
  
   
This list is accurate as of: 3/16/17 11:22 AM.  Always use your most recent med list.  
  
  
  
  
 aspirin 81 mg tablet Take 81 mg by mouth daily. carvedilol 25 mg tablet Commonly known as:  Gaylin Emmett Take 1 Tab by mouth two (2) times a day. hydrALAZINE 25 mg tablet Commonly known as:  APRESOLINE Take 1 Tab by mouth three (3) times daily. We Performed the Following AMB POC EKG ROUTINE W/ 12 LEADS, INTER & REP [09316 CPT(R)] Patient Instructions Restart Lasix (furosemide) at 20mg every other day. Take 1/2 of your 40mg tablet every other day Increase hydralazine to 50mg -  Take 2 of your 25mg tablets 3 times a day until you run out and then begin using the 50mg tablets and take 1 tablet, 3 times a day BMP to be done as scheduled by Dr. John Avalos on 3/24/17 Follow-up with Dr. Selena Paula as scheduled in early April and as needed Weigh daily and record Limit sodium intake to 2000mg per day Limit fluid intake to no more than  6 or 7, eight ounce glasses of any type of fluids per day Call if you notice sudden or progressive weight gain (3-5 pounds in 2-3 days), increasing shortness of breath, abdominal bloating, increasing lower extremity edema, inability to lie flat or on your normal number of pillows, having to sit up to catch your breath, fatigue, increased somnolence (sleeping more), poor appetite Please provide this summary of care documentation to your next provider. Your primary care clinician is listed as Cici Byers. If you have any questions after today's visit, please call [phone].

## 2017-03-16 NOTE — PROGRESS NOTES
Dominique Trevino presents today for a post ER follow-up after a near syncopal episode that occurred at the podiatrist's office. This occurred on February 24, 2017. She states that she became nauseated, became poorly responsive and then she vomited. She recovered quickly and there was no postictal phase. She was brought to the emergency room and diagnosed with a near syncopal episode likely vagal in nature. Cardiac enzymes and troponins were negative. She was also found to have acute kidney injury with a BUN of 39 and creatinine of 2.6. Her Lasix, Benicar, and potassium were held. Her blood pressure is currently being maintained by carvedilol and she states that she was recently started on hydralazine 25 mg 3 times a day. Mrs. Sourav Pak is a 78year old  female with a history of CAD (s/p CABG x 3 in June 2008), ischemic cardiomyopathy, hypertension, hyperlipidemia, GERD, CVA, and significant AV block (s/p dual chamber pacemaker in Oct. 2008 and generator change in 2016), and she also has a history of DVT for which she took Coumadin in the past which has been discontinued. Today, she states that she feels [place]. \"  She denies chest pain, tightness, heaviness, and palpitations. She denies shortness of breath at rest, admits to mild dyspnea on exertion, denies orthopnea and PND. She denies abdominal bloating. She denies lightheadedness, dizziness, and syncope. She admits to lower extremity edema. She denies claudication but has bilateral knee pain. Denies nausea, vomiting, diarrhea, fever, chills. One of her main complaints today is of gout. She states that she was previously taking allopurinol but it was discontinued and since that time, she has had more episodes of gout. PMH:  Past Medical History:   Diagnosis Date    Abnormal ankle brachial index 11/13/2014    Mild arterial disease in right leg. R VICTOR MANUEL 0.88. L VICTOR MANUEL 1.00.     Anemia     Arm DVT (deep venous thromboembolism), acute [facility])     s/p anticoagulation    Atherosclerotic heart disease     Atrial fibrillation (HCC)     AV block     CAD (coronary artery disease)     Cardiomyopathy, ischemic     Carotid artery stenosis     Carotid duplex 2014    Mild <50% bilateral ICA plaquing. Possible left vertebral occlusion.  Chest pain     CVA (cerebral infarction)     Echocardiogram 2015    EF 55%. Apical inferior, apical septal hypk (new since study of 11), likely due to chronic V-pacing. Mild LVH. RVSP 30 mmHg. Mild LAE. Mild MR. Mild TR.  Gastritis     GERD (gastroesophageal reflux disease)     Hiatal hernia     Hyperlipidemia     Hypertension     Hypertensive cardiovascular disease     Intracranial aneurysm     left cavernous ICA 2mm aneurysm from head/neck CTA in     Long term (current) use of anticoagulants 3/10/2011    Lower extremity venous duplex 2015    No DVT bilaterally.  Nuclear cardiac imaging test 2015    Low risk. Sm apical infarction w/no ischemia vs apical thinning. Sm basal inferior defect, likely artifact. EF 56%. Inferoseptal, inferoapical WMA related to sternotomy or paced rhythm.  Pacemaker     PAD (peripheral artery disease) ([facility])     PVC's     chronic    S/P CABG x 3 2008    LIMA-LAD. SVG-OM. SVG-dRCA     S/P cardiac cath 2008    pRCA 100%. LM patent. Cx patent. OM1 100%. mLAD 95%. LVEDP 27-29mmHg. EF 20%.  mod MR    Tilt table evaluation 1995    Positive for orthostatic hypotension    Vitamin D deficiency        PSH:  Past Surgical History:   Procedure Laterality Date    CABG, ARTERY-VEIN, THREE  2008    CARDIAC SURG PROCEDURE UNLIST      medtronic dual-chamber cardioverted-defibrillator    HX  SECTION      x2    HX ENDOSCOPY  3/1/16    HX ENDOSCOPY  3/1/16    HX HEMORRHOIDECTOMY      1985    HX HYSTERECTOMY          HX ORTHOPAEDIC      knee surgery    HX OTHER SURGICAL  2/10/16    Pacemaker Gen Change    HX TUMOR REMOVAL      removed from arm.  benign       MEDS:  Current Outpatient Prescriptions   Medication Sig    hydrALAZINE (APRESOLINE) 25 mg tablet Take 1 Tab by mouth three (3) times daily.  carvedilol (COREG) 25 mg tablet Take 1 Tab by mouth two (2) times a day.  aspirin 81 mg tablet Take 81 mg by mouth daily. No current facility-administered medications for this visit. Allergies and Sensitivities:  Allergies   Allergen Reactions    Ace Inhibitors Cough    Codeine Unknown (comments), Nausea Only and Nausea and Vomiting    Lipitor [Atorvastatin] Nausea and Vomiting and Vertigo       Family History:  History reviewed. No pertinent family history. Social History:  Social History   Substance Use Topics    Smoking status: Former Smoker    Smokeless tobacco: Never Used    Alcohol use No       Physical:  Visit Vitals    /78 (BP 1 Location: Left arm, BP Patient Position: Sitting)    Pulse 66    Ht 5' 4\" (1.626 m)    Wt 95.7 kg (211 lb)    SpO2 98%    BMI 36.22 kg/m2       She is up 1 pound since her last appointment. Exam:  Neck:  Supple, no JVD, no carotid bruits  CV:  Normal S1 and  S2, grade I-II apical systolic murmur. No diastolic murmur, rubs, or clicks noted. Lungs:  Clear to ausculation throughout, no wheezes or rales  Abd:  Soft, non-tender, non-distended with good bowel sounds. No hepatosplenomegaly  Extremities:  1+ edema bilaterally.       Data:    EKG:  Normal sinus rhythm with atrial sensing and ventricular pacing and some PACs with rate 66, ventricular pacing with one PVC      LABS:    Lab Results   Component Value Date/Time    Sodium 139 03/09/2017 12:52 PM    Potassium 4.6 03/09/2017 12:52 PM    Chloride 107 03/09/2017 12:52 PM    CO2 22 03/09/2017 12:52 PM    Glucose 95 03/09/2017 12:52 PM    BUN 22 03/09/2017 12:52 PM    Creatinine 1.37 03/09/2017 12:52 PM     Lab Results   Component Value Date/Time    Cholesterol, total 178 08/19/2015 12:00 AM    Cholesterol, Total 183 11/23/2015 08:46 AM    HDL Cholesterol 74 08/19/2015 12:00 AM    LDL, calculated 94 08/19/2015 12:00 AM    Triglyceride 52 08/19/2015 12:00 AM     Lab Results   Component Value Date/Time    ALT (SGPT) 15 02/24/2017 11:10 AM       Impression / Plan:  1. Chronic diastolic CHF, appears compensated but OptiVol level became elevated yesterday  2. Hypertension, blood pressure slightly elevated  3. CAD, s/p CABG x 3 in June 2008  4. History of DVT  5. Near syncopal episode, felt to be vagal in nature due to acute kidney injury and dehydration    Mrs. Eason presents today for follow-up after a recent ER visit after a near syncopal episode, felt to be vagal in nature due to acute kidney injury and dehydration. Her Benicar, Lasix, and potassium were discontinued at that time. She was also started on hydralazine 25mg TID for blood pressure control. Her carvedilol was unchanged at 25mg BID. She states that he has not had any further near syncopal or syncopal episodes. She recently saw her primary care physician, Dr. Milind Gamboa and she had lab work done. Her BMP was within normal limits. Her renal function had improved and her BUN was down to 22 and creatinine 1.3 on 3/9/2017. Her main complaint today is of gout in her right foot. She states that she has been having trouble with gout for some time especially since her allopurinol was discontinued. Her blood pressure is elevated today and as mentioned above, her Benicar was discontinued due to her acute kidney injury. At this time, her hydralazine will be increased to 50 mg 3 times daily. Her breath sounds are clear and she does have 1+ lower extremity edema. Her device was interrogated today and it showed that she is 99% ventricularly paced at 46% atrially paced. She has had no high rate episodes. As of yesterday, her Opti-Vol levels were noted to be increasing.   Her Lasix will be restarted but at 20 mg every other day (previously 40 mg daily). She is scheduled to have another BMP done next week as per Dr. Lindsay Rhodes. She is scheduled to see Dr. Minesh Sanabria in early April and she will be reassessed at that time. She will follow-up with Dr. Minesh Sanabria as scheduled and PRN.         Alyssa Brewster MSN, FNP-BC

## 2017-03-20 NOTE — PROGRESS NOTES
I have personally seen and evaluated the device findings. Interrogation reviewed and I agree with assessment.     Vaishali Mora

## 2017-03-23 ENCOUNTER — TELEPHONE (OUTPATIENT)
Dept: INTERNAL MEDICINE CLINIC | Age: 77
End: 2017-03-23

## 2017-03-23 ENCOUNTER — TELEPHONE (OUTPATIENT)
Dept: CARDIOLOGY CLINIC | Age: 77
End: 2017-03-23

## 2017-03-23 NOTE — TELEPHONE ENCOUNTER
Lashawn Shabazz from [facility] office called stating patient called their office complaining of side effects from hydralazine. I contacted patient, she states the hydralazine has made her feel dizzy, tired and she \" can't function\". She stated she is stopping hydralazine. I spoke with Danielle Tineo NP and she advised starting the patient on Imdur 60 mg daily. Patient states she does not want to start another medication at this time and will discuss further with Dr. Dalila Sheriff on her appointment on 4/3/17.

## 2017-03-23 NOTE — TELEPHONE ENCOUNTER
Patient called stated she is having a reaction to the hydralazine vomiting dizziness facial flushing she states that she is not taking it anymore she would like to go back on benicar however this has caused kidney injury patient advised to call her cardiologist patient requested that I call them for her which I did they will speak with one of the providers and call patient directly

## 2017-03-27 ENCOUNTER — HOSPITAL ENCOUNTER (OUTPATIENT)
Dept: LAB | Age: 77
Discharge: HOME | End: 2017-03-27
Payer: MEDICARE

## 2017-03-27 DIAGNOSIS — N17.9 AKI (ACUTE KIDNEY INJURY) (HCC): ICD-10-CM

## 2017-03-27 LAB
ANION GAP BLD CALC-SCNC: 11 MMOL/L (ref 3–18)
BUN SERPL-MCNC: 27 MG/DL (ref 7–18)
BUN/CREAT SERPL: 17 (ref 12–20)
CALCIUM SERPL-MCNC: 8.9 MG/DL (ref 8.5–10.1)
CHLORIDE SERPL-SCNC: 106 MMOL/L (ref 100–108)
CO2 SERPL-SCNC: 20 MMOL/L (ref 21–32)
CREAT SERPL-MCNC: 1.61 MG/DL (ref 0.6–1.3)
GLUCOSE SERPL-MCNC: 86 MG/DL (ref 74–99)
POTASSIUM SERPL-SCNC: 3.8 MMOL/L (ref 3.5–5.5)
SODIUM SERPL-SCNC: 137 MMOL/L (ref 136–145)

## 2017-03-27 PROCEDURE — 36415 COLL VENOUS BLD VENIPUNCTURE: CPT | Performed by: INTERNAL MEDICINE

## 2017-03-27 PROCEDURE — 80048 BASIC METABOLIC PNL TOTAL CA: CPT | Performed by: INTERNAL MEDICINE

## 2017-03-29 ENCOUNTER — TELEPHONE (OUTPATIENT)
Dept: INTERNAL MEDICINE CLINIC | Age: 77
End: 2017-03-29

## 2017-03-29 NOTE — TELEPHONE ENCOUNTER
Pt has an apt with the Cardiology team on Monday. I discussed her lab results. She stopped taking the hydralazine due to dizziness associated with this rx. She feels better since stopping this rx. I will add hydralazine as a medication intolerance. Pt's creatinine is elevated. She states that she was told to cut back her water intake to no more than 4 cups per day per the Cardiology team. I will wait for the pt to be seen by the Cardiology team before making recommendations. Will defer BP management and water intake to the Cardiology team. Recent labs suggest dehydration though.  Will defer f/u lab work to the Cardiology team.    Dr. Sabino Cui  [facility], [address] [place] [address] [address]  Phone: [phone]  Fax: [phone]

## 2017-03-30 ENCOUNTER — PATIENT OUTREACH (OUTPATIENT)
Dept: INTERNAL MEDICINE CLINIC | Age: 77
End: 2017-03-30

## 2017-03-30 NOTE — PROGRESS NOTES
Episode closed:   Patient admitted to [facility], 2/24/17 to 2/24/17 for syncope and collapse. Attended follow up with Dr. Justice Corea on 3/3/17. No hospitalization or ED admission post 30 days from discharge of 2/24/17.

## 2017-04-01 ENCOUNTER — TELEPHONE (OUTPATIENT)
Dept: INTERNAL MEDICINE CLINIC | Age: 77
End: 2017-04-01

## 2017-04-01 DIAGNOSIS — N17.9 ACUTE KIDNEY INJURY (HCC): ICD-10-CM

## 2017-04-01 DIAGNOSIS — R76.8 POSITIVE SM/RNP ANTIBODY: Primary | ICD-10-CM

## 2017-04-01 DIAGNOSIS — R76.8 ANTI-SMITH (ANTI-SM) ANTIBODY AND ANTIRIBONUCLEAR PROTEIN (ANTI-RNP) ANTIBODY POSITIVE: ICD-10-CM

## 2017-04-01 DIAGNOSIS — M10.9 GOUT, UNSPECIFIED CAUSE, UNSPECIFIED CHRONICITY, UNSPECIFIED SITE: ICD-10-CM

## 2017-04-01 DIAGNOSIS — M35.00 SJOGREN'S SYNDROME, WITH UNSPECIFIED ORGAN INVOLVEMENT (HCC): ICD-10-CM

## 2017-04-01 NOTE — TELEPHONE ENCOUNTER
Pt has positive RNP Ab, Parnell Ab, Sjogren Abs, and Antichromatin Ab. Placing referral to Rheumatology team and Nephrology team for evaluation.     Dr. Bryant Gonzalez  [facility] [address]  Phone: [phone]  Fax: [phone]

## 2017-04-03 ENCOUNTER — OFFICE VISIT (OUTPATIENT)
Dept: CARDIOLOGY CLINIC | Age: 77
End: 2017-04-03

## 2017-04-03 VITALS
HEART RATE: 61 BPM | DIASTOLIC BLOOD PRESSURE: 80 MMHG | SYSTOLIC BLOOD PRESSURE: 160 MMHG | HEIGHT: 64 IN | BODY MASS INDEX: 34.31 KG/M2 | OXYGEN SATURATION: 96 % | WEIGHT: 201 LBS

## 2017-04-03 DIAGNOSIS — I48.0 PAROXYSMAL ATRIAL FIBRILLATION (HCC): Primary | ICD-10-CM

## 2017-04-03 DIAGNOSIS — I25.10 ATHEROSCLEROSIS OF NATIVE CORONARY ARTERY OF NATIVE HEART WITHOUT ANGINA PECTORIS: ICD-10-CM

## 2017-04-03 DIAGNOSIS — I65.23 BILATERAL CAROTID ARTERY STENOSIS: ICD-10-CM

## 2017-04-03 DIAGNOSIS — Z95.1 S/P CABG X 3: ICD-10-CM

## 2017-04-03 DIAGNOSIS — E78.2 MIXED HYPERLIPIDEMIA: ICD-10-CM

## 2017-04-03 DIAGNOSIS — I10 ESSENTIAL HYPERTENSION: ICD-10-CM

## 2017-04-03 RX ORDER — AMLODIPINE BESYLATE 2.5 MG/1
2.5 TABLET ORAL DAILY
Qty: 30 TAB | Refills: 3 | Status: SHIPPED | OUTPATIENT
Start: 2017-04-03 | End: 2017-08-22 | Stop reason: ALTCHOICE

## 2017-04-03 NOTE — PROGRESS NOTES
Review of Systems   Constitutional: Negative for chills, diaphoresis, fever, malaise/fatigue and weight loss. Respiratory: Positive for cough, sputum production and shortness of breath. Negative for hemoptysis and wheezing. Cardiovascular: Positive for orthopnea and claudication. Negative for chest pain, palpitations, leg swelling and PND. Gastrointestinal: Negative. Musculoskeletal: Positive for back pain and joint pain. Negative for falls, myalgias and neck pain. Neurological: Positive for weakness.

## 2017-04-03 NOTE — PATIENT INSTRUCTIONS
Discontinue Lasix which you have been taking every other day. Get a bathroom scale and weigh daily the first thing in the morning after you get out of bed and urinate while still in you nightgown weigh yourself every morning. If you gain more than 3 pounds above the baseline which would be the first day you weigh yourself just call us and let us know.  Call [phone] and ask for [facility] or [place]

## 2017-04-03 NOTE — MR AVS SNAPSHOT
Visit Information Date & Time Provider Department Dept. Phone Encounter #  
 4/3/2017  8:40 AM Joanne Palafox DO [facility] [id number] Follow-up Instructions Return in about 6 months (around 10/3/2017), or if symptoms worsen or fail to improve. Follow-up and Disposition History Your Appointments 7/10/2017  8:30 AM  
Office Visit with Roselyn Augustine MD  
[facility] [address] [place] [address]) Appt Note: 1 yr f/up; .  
 [address] [facility], [address] [id number] [address]  
  
   
 [address] [facility], [address]  
  
    
 12/18/2017  2:45 PM  
PROCEDURE with [facility] IMAGING 2  
[facility] (PAZ SCHEDULING) Appt Note: cv wild [address] [address]  
[phone] [address] [address]  
  
    
 12/18/2017  3:45 PM  
PROCEDURE with [facility] NONIMAGING  
[facility] (PAZ SCHEDULING) Appt Note: leg art wild [address] [address]  
[phone] [address] [address]  
  
    
 1/4/2018  9:00 AM  
Follow Up with HENRY Torres  
[facility] (PAZ [address] [place]) [address] [address]  
[phone]  
  
   
 [address] [address] Upcoming Health Maintenance Date Due Pneumococcal 65+ High/Highest Risk (1 of 2 - PCV13) 4/2/2007 MEDICARE YEARLY EXAM 7/7/2017 COLONOSCOPY 1/25/2018 GLAUCOMA SCREENING Q2Y 8/8/2018 DTaP/Tdap/Td series (2 - Td) 11/22/2026 Allergies as of 4/3/2017  Review Complete On: 4/3/2017 By: Joanne Palafox DO Severity Noted Reaction Type Reactions Ace Inhibitors    Cough Codeine  11/12/2008    Unknown (comments), Nausea Only, Nausea and Vomiting Hydralazine  03/29/2017   Intolerance Other (comments) Dizziness and Fatigue Lipitor [Atorvastatin]  05/20/2014    Nausea and Vomiting, Vertigo Current Immunizations  Never Reviewed No immunizations on file. Not reviewed this visit You Were Diagnosed With   
  
 Codes Comments Paroxysmal atrial fibrillation (HCC)    -  Primary ICD-10-CM: I48.0 ICD-9-CM: 427.31 Atherosclerosis of native coronary artery of native heart without angina pectoris     ICD-10-CM: I25.10 ICD-9-CM: 414.01 S/P CABG x 3     ICD-10-CM: Z95.1 ICD-9-CM: V45.81 Mixed hyperlipidemia     ICD-10-CM: E78.2 ICD-9-CM: 272.2 Essential hypertension     ICD-10-CM: I10 
ICD-9-CM: 401.9 Bilateral carotid artery stenosis     ICD-10-CM: I65.23 ICD-9-CM: 433.10, 433.30 Vitals BP Pulse Height(growth percentile) Weight(growth percentile) SpO2 BMI  
 160/80 (BP 1 Location: Left arm, BP Patient Position: Sitting) 61 5' 4\" (1.626 m) 201 lb (91.2 kg) 96% 34.5 kg/m2 OB Status Smoking Status Postmenopausal Former Smoker Vitals History BMI and BSA Data Body Mass Index Body Surface Area 34.5 kg/m 2 2.03 m 2 Preferred Pharmacy Pharmacy Name Phone [id number]- [facility], [address] [place] [address] Your Updated Medication List  
  
   
This list is accurate as of: 4/3/17  9:51 AM.  Always use your most recent med list. amLODIPine 2.5 mg tablet Commonly known as:  Janet Pinedo Take 1 Tab by mouth daily. aspirin 81 mg tablet Take 81 mg by mouth daily. carvedilol 25 mg tablet Commonly known as:  Merkim Schimke Take 1 Tab by mouth two (2) times a day. Prescriptions Sent to Pharmacy Refills  
 amLODIPine (NORVASC) 2.5 mg tablet 3 Sig: Take 1 Tab by mouth daily. Class: Normal  
 Pharmacy: [id number], [address] [facility], [address] #: [phone] Route: Oral  
  
We Performed the Following AMB POC EKG ROUTINE W/ 12 LEADS, INTER & REP [64057 CPT(R)] Follow-up Instructions Return in about 6 months (around 10/3/2017), or if symptoms worsen or fail to improve. Patient Instructions Discontinue Lasix which you have been taking every other day. Get a bathroom scale and weigh daily the first thing in the morning after you get out of bed and urinate while still in you nightgown weigh yourself every morning. If you gain more than 3 pounds above the baseline which would be the first day you weigh yourself just call us and let us know. Call [phone] and ask for [place] or [place] Please provide this summary of care documentation to your next provider. Your primary care clinician is listed as Rosa Cohen. If you have any questions after today's visit, please call [phone].

## 2017-04-03 NOTE — PROGRESS NOTES
1. Have you been to the ER, urgent care clinic since your last visit? Hospitalized since your last visit? No     2. Have you seen or consulted any other health care providers outside of the [address] [place] [address] since your last visit? Include any pap smears or colon screening.  No

## 2017-04-03 NOTE — PROGRESS NOTES
HPI: I saw Marla Frances in my office today in cardiovascular evaluation regarding her ischemic cardiomyopathy. Ms. Liz Gauthier is a very pleasant 78year old obese  female with history of hypertension, hyperlipidemia, gastroesophageal reflux disease, and a CVA in the past. She also has history of coronary artery disease, status post coronary artery bypass grafting surgery x three in June of 2008 by Dr. Ld Baeza with the following bypasses:     1. Left internal mammary artery to the LAD. 2. Reverse saphenous vein graft to the first obtuse marginal branch of the circumflex. 3. Reverse saphenous vein graft to the distal right coronary artery.     She developed a deep vein thrombosis in her left arm after surgery and had to be on Coumadin for a number of months, but due to the fact that she was not having any paroxysmal atrial fibrillation and we were having trouble keeping her INRs controlled, we decided to take her off Coumadin a couple of years ago. She has had some problems with AV block and a dual chamber pacemaker was originally placed in October of 2008 with a generator change in February of 2016 by my associate Dr. Michela Junior. She comes in today relates that she is doing reasonably well now, but when they switched her to generic Benicar in mid January she began to have problems and apparently became dehydrated and had acute renal failure with a creatinine up to 2.69 on February 24, 2017 up from 1.22 on February 8, 2017. She had her Benicar discontinued and she was started on hydralazine but the hydralazine caused her to be very nauseated and dizzy and she was unable to function and she is actually discontinued that drug a week or so ago on her own. I should note that her creatinine came down to 1.37 on 3/9/2017 but then has gone back up to 1.61. She had been taking Lasix and had been decreased to every other day which is what she is taking now. Encounter Diagnoses   Name Primary?  Atherosclerosis of native coronary artery of native heart without angina pectoris     S/P CABG x 3 in 2008     Mixed hyperlipidemia     Essential hypertension     Paroxysmal atrial fibrillation (HCC) Yes    Bilateral carotid artery stenosis        Discussion: This patient appears to be doing reasonably well currently although her blood pressure is still not well controlled. Her renal function seems to be an issue and so I think we are going to have to discontinue her Lasix altogether and I have asked her to begin to weigh herself every day and if her weight goes up by more than 3 pounds over her baseline weight tomorrow morning she needs to give us a call and I would then possibly consider Lasix but I would go ahead and do a BMP again at that time to check on her renal function again. Her blood pressure is elevated today and I checked it again myself and got 160/80 so I think we need to get her blood pressure somewhat better controlled and since she is only taking Coreg 25 mill grams twice a day and had been taking Lasix 40 mg every other day I am going to just add in place of the Lasix we are discontinuing a very low dose of amlodipine at 2.5 mg daily or a low-dose nifedipine LA at 30 mg she ever her insurance covers better to help with her blood pressures since neither of these medications will affect her kidney function. She does have history of dyslipidemia for which she had been on Lipitor and that was discontinued. She has not had a lipid profile since 2015 and I would leave management of her cholesterol to Dr. Chepe Dean but I certainly would recommend that we get an cholesterol in the near future so that we can decide whether or not she really should be on cholesterol medication moving forward.     Her recent pacemaker check showed that her pacemaker was functioning normally with a number of years left on the battery and sInce she is otherwise doing well I will plan to see her again in several months. PCP:  Korina Gregory MD       Past Medical History:   Diagnosis Date    Abnormal ankle brachial index 11/13/2014    Mild arterial disease in right leg. R VICTOR MANUEL 0.88. L VICTOR MANUEL 1.00.  Anemia     Arm DVT (deep venous thromboembolism), acute ([facility])     s/p anticoagulation    Atherosclerotic heart disease     Atrial fibrillation ([facility])     AV block     CAD (coronary artery disease)     Cardiomyopathy, ischemic     Carotid artery stenosis     Carotid duplex 11/13/2014    Mild <50% bilateral ICA plaquing. Possible left vertebral occlusion.  Chest pain     CVA (cerebral infarction)     Echocardiogram 08/19/2015    EF 55%. Apical inferior, apical septal hypk (new since study of 12/19/11), likely due to chronic V-pacing. Mild LVH. RVSP 30 mmHg. Mild LAE. Mild MR. Mild TR.  Gastritis     GERD (gastroesophageal reflux disease)     Hiatal hernia     Hyperlipidemia     Hypertension     Hypertensive cardiovascular disease     Intracranial aneurysm     left cavernous ICA 2mm aneurysm from head/neck CTA in 2014    Long term (current) use of anticoagulants 3/10/2011    Lower extremity venous duplex 01/26/2015    No DVT bilaterally.  Nuclear cardiac imaging test 09/24/2015    Low risk. Sm apical infarction w/no ischemia vs apical thinning. Sm basal inferior defect, likely artifact. EF 56%. Inferoseptal, inferoapical WMA related to sternotomy or paced rhythm.  Pacemaker     PAD (peripheral artery disease) ([facility])     PVC's     chronic    S/P CABG x 3 06/08/2008    LIMA-LAD. SVG-OM. SVG-dRCA     S/P cardiac cath 06/08/2008    pRCA 100%. LM patent. Cx patent. OM1 100%. mLAD 95%. LVEDP 27-29mmHg. EF 20%.  mod MR    Tilt table evaluation 06/01/1995    Positive for orthostatic hypotension    Vitamin D deficiency          Past Surgical History:   Procedure Laterality Date    CABG, ARTERY-VEIN, THREE  6/2008    CARDIAC SURG PROCEDURE UNLIST      medtronic dual-chamber cardioverted-defibrillator    HX  SECTION      x2    HX ENDOSCOPY  3/1/16    HX ENDOSCOPY  3/1/16    HX HEMORRHOIDECTOMY      1985    HX HYSTERECTOMY          HX ORTHOPAEDIC      knee surgery    HX OTHER SURGICAL  2/10/16    Pacemaker Gen Change    HX TUMOR REMOVAL      removed from arm.  benign         Current Outpatient Rx   Name  Route  Sig  Dispense  Refill    KLOR-CON M20 20 mEq tablet        TAKE 1 TABLET BY MOUTH EVERY DAY    90 Tab    3      simvastatin (ZOCOR) 40 mg tablet    Oral    Take 40 mg by mouth two (2) times a week.  esomeprazole (NEXIUM) 40 mg capsule    Oral    Take 40 mg by mouth daily.  carvedilol (COREG) 25 mg tablet    Oral    Take 1 Tab by mouth two (2) times a day. 60 Tab    3      BENICAR 20 mg tablet        TAKE 1 TABLET BY MOUTH EVERY DAY    90 Tab    3      furosemide (LASIX) 40 mg tablet    Oral    Take 40 mg by mouth as needed.  aspirin 81 mg tablet    Oral    Take 81 mg by mouth daily. Allergies   Allergen Reactions    Ace Inhibitors Cough    Codeine Unknown (comments), Nausea Only and Nausea and Vomiting    Hydralazine Other (comments)     Dizziness and Fatigue    Lipitor [Atorvastatin] Nausea and Vomiting and Vertigo         Social History     Social History    Marital status:      Spouse name: N/A    Number of children: N/A    Years of education: N/A     Social History Main Topics    Smoking status: Former Smoker    Smokeless tobacco: Never Used    Alcohol use No    Drug use: None    Sexual activity: Not Asked     Other Topics Concern    None     Social History Narrative       No family history on file. Review of Systems:   Constitutional: Negative for chills, diaphoresis, fever, malaise/fatigue and weight loss. Respiratory: Positive for cough, sputum production and shortness of breath. Negative for hemoptysis and wheezing.     Cardiovascular: Positive for orthopnea and claudication. Negative for chest pain, palpitations, leg swelling and PND. Gastrointestinal: Negative. Musculoskeletal: Positive for back pain and joint pain. Negative for falls, myalgias and neck pain. Neurological: Positive for weakness. Physical Exam:   The patient is an alert, oriented, well developed, well nourished 78 y.o.  female who was in no acute distress at the time of my examination. Visit Vitals    /84    Pulse 61    Ht 5' 4\" (1.626 m)    Wt 91.2 kg (201 lb)    SpO2 96%    BMI 34.5 kg/m2      BP Readings from Last 3 Encounters:   04/03/17 152/84   03/16/17 156/78   03/09/17 144/65        Wt Readings from Last 3 Encounters:   04/03/17 91.2 kg (201 lb)   03/16/17 95.7 kg (211 lb)   03/09/17 95.6 kg (210 lb 12.8 oz)       HEENT: Conjunctivae white, mucosa moist, no pallor or cyanosis. Neck: Supple without masses, tenderness or thyromegaly. No jugular venous distention. Carotid upstrokes are full bilaterally, without bruits. Chest: symmetrical with good excursion. Cardiovascular: Well-healed incision in left upper chest over pacer-defibrillator insertion site with some keloid and some tenderness over the site. Mid-sternotomy scar with some keloid. [place] is displaced between the MCL and AAL. No lifts, heaves, or thrills felt on palpation. Normal S1 and S2, with a grade I-II/VI apical systolic murmur without radiation and without appreciable diastolic murmur, rubs, clicks or gallops   Lungs: Clear to auscultation in all fields. Abdomen: Soft, with no masses, tenderness or organomegaly. Extremities: Thick lower legs without edema in lower extremities with some decrease in peripheral pulses. Review of Data: Please refer to past medical history for most recent cardiac testing.     Lab Results   Component Value Date/Time    Cholesterol, total 178 08/19/2015 12:00 AM    Cholesterol, Total 183 11/23/2015 08:46 AM    HDL Cholesterol 74 08/19/2015 12:00 AM LDL, calculated 94 08/19/2015 12:00 AM    VLDL, calculated 10 08/19/2015 12:00 AM    Triglyceride 52 08/19/2015 12:00 AM       Results for orders placed or performed in visit on 03/16/17   AMB POC EKG ROUTINE W/ 12 LEADS, INTER & REP     Status: None    Narrative    Normal sinus rhythm with atrial sensing and ventricular pacing and some PACs with rate 66, ventricular pacing with one PVC. Dmitry Shah D.O., F.A.C.C. [facility]  [facility]  [address] [place] [address]. [address] [id number]  [address]    PLEASE NOTE:  This document has been produced using voice recognition software. Unrecognized errors in transcription may be present.

## 2017-04-03 NOTE — TELEPHONE ENCOUNTER
I spoke with the pt regarding her positive rheumatology labs. She states that she was told in the past that she may have rheumatoid arthritis, information which was never revealed to me until this phone call. I discussed my concern that she has an underlying rheumatologic disease and that it is causing kidney injury. Pt agreed with the referral to see a rheumatologist. The referral was placed. The pt was told to contact me if she is never contacted for an apt to see the Rheumatology team in 1 wk.     Dr. Willy Arambula  [facility] [address].  Phone: [phone]  Fax: [phone]

## 2017-05-02 ENCOUNTER — HOSPITAL ENCOUNTER (OUTPATIENT)
Dept: LAB | Age: 77
Discharge: HOME | End: 2017-05-02
Payer: MEDICARE

## 2017-05-02 DIAGNOSIS — M10.00 ACUTE IDIOPATHIC GOUT, UNSPECIFIED SITE: ICD-10-CM

## 2017-05-02 LAB
ALBUMIN SERPL BCP-MCNC: 3.1 G/DL (ref 3.4–5)
ALBUMIN/GLOB SERPL: 0.9 {RATIO} (ref 0.8–1.7)
ALP SERPL-CCNC: 91 U/L (ref 45–117)
ALT SERPL-CCNC: 11 U/L (ref 13–56)
ANION GAP BLD CALC-SCNC: 7 MMOL/L (ref 3–18)
AST SERPL W P-5'-P-CCNC: 9 U/L (ref 15–37)
BASOPHILS # BLD AUTO: 0 K/UL (ref 0–0.06)
BASOPHILS # BLD: 0 % (ref 0–2)
BILIRUB SERPL-MCNC: 0.3 MG/DL (ref 0.2–1)
BUN SERPL-MCNC: 25 MG/DL (ref 7–18)
BUN/CREAT SERPL: 22 (ref 12–20)
CALCIUM SERPL-MCNC: 9.1 MG/DL (ref 8.5–10.1)
CHLORIDE SERPL-SCNC: 108 MMOL/L (ref 100–108)
CO2 SERPL-SCNC: 26 MMOL/L (ref 21–32)
CREAT SERPL-MCNC: 1.13 MG/DL (ref 0.6–1.3)
DIFFERENTIAL METHOD BLD: ABNORMAL
EOSINOPHIL # BLD: 0.2 K/UL (ref 0–0.4)
EOSINOPHIL NFR BLD: 4 % (ref 0–5)
ERYTHROCYTE [DISTWIDTH] IN BLOOD BY AUTOMATED COUNT: 14.5 % (ref 11.6–14.5)
GLOBULIN SER CALC-MCNC: 3.5 G/DL (ref 2–4)
GLUCOSE SERPL-MCNC: 87 MG/DL (ref 74–99)
HCT VFR BLD AUTO: 28.4 % (ref 35–45)
HGB BLD-MCNC: 8.8 G/DL (ref 12–16)
LYMPHOCYTES # BLD AUTO: 38 % (ref 21–52)
LYMPHOCYTES # BLD: 1.8 K/UL (ref 0.9–3.6)
MCH RBC QN AUTO: 27 PG (ref 24–34)
MCHC RBC AUTO-ENTMCNC: 31 G/DL (ref 31–37)
MCV RBC AUTO: 87.1 FL (ref 74–97)
MONOCYTES # BLD: 0.3 K/UL (ref 0.05–1.2)
MONOCYTES NFR BLD AUTO: 6 % (ref 3–10)
NEUTS SEG # BLD: 2.4 K/UL (ref 1.8–8)
NEUTS SEG NFR BLD AUTO: 52 % (ref 40–73)
PLATELET # BLD AUTO: 260 K/UL (ref 135–420)
PMV BLD AUTO: 9.5 FL (ref 9.2–11.8)
POTASSIUM SERPL-SCNC: 4.5 MMOL/L (ref 3.5–5.5)
PROT SERPL-MCNC: 6.6 G/DL (ref 6.4–8.2)
RBC # BLD AUTO: 3.26 M/UL (ref 4.2–5.3)
SODIUM SERPL-SCNC: 141 MMOL/L (ref 136–145)
URATE SERPL-MCNC: 9.3 MG/DL (ref 2.6–7.2)
WBC # BLD AUTO: 4.7 K/UL (ref 4.6–13.2)

## 2017-05-02 PROCEDURE — 84550 ASSAY OF BLOOD/URIC ACID: CPT | Performed by: PODIATRIST

## 2017-05-02 PROCEDURE — 80053 COMPREHEN METABOLIC PANEL: CPT | Performed by: PODIATRIST

## 2017-05-02 PROCEDURE — 85025 COMPLETE CBC W/AUTO DIFF WBC: CPT | Performed by: PODIATRIST

## 2017-05-02 PROCEDURE — 36415 COLL VENOUS BLD VENIPUNCTURE: CPT | Performed by: PODIATRIST

## 2017-05-02 PROCEDURE — 86038 ANTINUCLEAR ANTIBODIES: CPT | Performed by: PODIATRIST

## 2017-05-03 LAB — ANA SER QL: POSITIVE

## 2017-05-05 ENCOUNTER — TELEPHONE (OUTPATIENT)
Dept: CARDIOLOGY CLINIC | Age: 77
End: 2017-05-05

## 2017-05-05 NOTE — TELEPHONE ENCOUNTER
Patient states she went to her PCP and her blood pressure was still a little elevated , she thinks her B/p was 180/90. Dr. Melissa Lucas  Started her on  the  Amlodipine 2.5 mg. She wants Dr. Melissa Lucas to increase amlodpine. PCP asked her to inform Dr. Melissa Lucas. After asking the patient if she kept a record of her blood pressure she said no, patient also stated that she missed a dose. Advise  Patient to continue current dose and to keep daily log of B/P for at least a week and then call office back with results. Patient also states her lower extremity is swollen and think she should be back on lasix. Patient weight is 205lbs today. She said she is not sure of her baseline weight. Dr. Melissa Lucas said it is ok for her to take lasix as prescribed before, But to make sure a she keep a record of her weight to prevent dehydration. Patient verbalized understanding of instructions.

## 2017-05-10 ENCOUNTER — TELEPHONE (OUTPATIENT)
Dept: INTERNAL MEDICINE CLINIC | Age: 77
End: 2017-05-10

## 2017-05-10 NOTE — TELEPHONE ENCOUNTER
I attempted to phone Russell Aldana back and left a message, as I was unable to reach the podiatry team. I will try again tomorrow.     Dr. Tigist Wong  [facility] [place] [address] [address]  Phone: [phone]  Fax: [phone]

## 2017-05-11 ENCOUNTER — TELEPHONE (OUTPATIENT)
Dept: INTERNAL MEDICINE CLINIC | Age: 77
End: 2017-05-11

## 2017-06-11 DIAGNOSIS — I10 ESSENTIAL HYPERTENSION: Primary | ICD-10-CM

## 2017-06-11 DIAGNOSIS — D64.9 ANEMIA, UNSPECIFIED TYPE: ICD-10-CM

## 2017-06-11 DIAGNOSIS — M35.1 MIXED CONNECTIVE TISSUE DISEASE (HCC): ICD-10-CM

## 2017-06-14 RX ORDER — FUROSEMIDE 40 MG/1
TABLET ORAL
Qty: 30 TAB | Refills: 3 | Status: SHIPPED | OUTPATIENT
Start: 2017-06-14 | End: 2018-11-20

## 2017-06-22 ENCOUNTER — HOSPITAL ENCOUNTER (OUTPATIENT)
Dept: LAB | Age: 77
Discharge: HOME | End: 2017-06-22
Payer: MEDICARE

## 2017-06-22 DIAGNOSIS — R80.9 PROTEINURIA: ICD-10-CM

## 2017-06-22 DIAGNOSIS — N18.30 CHRONIC KIDNEY DISEASE, STAGE III (MODERATE) (HCC): ICD-10-CM

## 2017-06-22 DIAGNOSIS — D64.9 ANEMIA, UNSPECIFIED: ICD-10-CM

## 2017-06-22 DIAGNOSIS — I25.10 CORONARY ATHEROSCLEROSIS OF NATIVE CORONARY ARTERY: ICD-10-CM

## 2017-06-22 DIAGNOSIS — E21.1 HYPERPARATHYROIDISM DUE TO 1,25(0H)2D3 (HCC): ICD-10-CM

## 2017-06-22 DIAGNOSIS — E66.9 OBESITY, UNSPECIFIED: ICD-10-CM

## 2017-06-22 DIAGNOSIS — N17.9 ACUTE KIDNEY FAILURE, UNSPECIFIED (HCC): ICD-10-CM

## 2017-06-22 DIAGNOSIS — I12.9 RENAL HYPERTENSION: ICD-10-CM

## 2017-06-22 DIAGNOSIS — M10.9 GOUT, UNSPECIFIED: ICD-10-CM

## 2017-06-22 LAB
ALBUMIN SERPL BCP-MCNC: 3.3 G/DL (ref 3.4–5)
ANION GAP BLD CALC-SCNC: 8 MMOL/L (ref 3–18)
APPEARANCE UR: CLEAR
BACTERIA URNS QL MICRO: ABNORMAL /HPF
BILIRUB UR QL: NEGATIVE
BUN SERPL-MCNC: 27 MG/DL (ref 7–18)
BUN/CREAT SERPL: 21 (ref 12–20)
CALCIUM SERPL-MCNC: 8.5 MG/DL (ref 8.5–10.1)
CALCIUM SERPL-MCNC: 8.6 MG/DL (ref 8.5–10.1)
CHLORIDE SERPL-SCNC: 108 MMOL/L (ref 100–108)
CO2 SERPL-SCNC: 26 MMOL/L (ref 21–32)
COLOR UR: YELLOW
CREAT SERPL-MCNC: 1.3 MG/DL (ref 0.6–1.3)
CREAT UR-MCNC: 116 MG/DL (ref 30–125)
EPITH CASTS URNS QL MICRO: ABNORMAL /LPF (ref 0–5)
FERRITIN SERPL-MCNC: 273 NG/ML (ref 8–388)
GLUCOSE SERPL-MCNC: 84 MG/DL (ref 74–99)
GLUCOSE UR STRIP.AUTO-MCNC: NEGATIVE MG/DL
HCT VFR BLD AUTO: 30.4 % (ref 35–45)
HGB BLD-MCNC: 9.5 G/DL (ref 12–16)
HGB UR QL STRIP: NEGATIVE
IRON SATN MFR SERPL: 16 %
IRON SERPL-MCNC: 42 UG/DL (ref 50–175)
KETONES UR QL STRIP.AUTO: NEGATIVE MG/DL
LEUKOCYTE ESTERASE UR QL STRIP.AUTO: ABNORMAL
MICROALBUMIN UR-MCNC: 9.04 MG/DL (ref 0–3)
MICROALBUMIN/CREAT UR-RTO: 78 MG/G (ref 0–30)
NITRITE UR QL STRIP.AUTO: NEGATIVE
PH UR STRIP: 5.5 [PH] (ref 5–8)
PHOSPHATE SERPL-MCNC: 3.2 MG/DL (ref 2.5–4.9)
POTASSIUM SERPL-SCNC: 4 MMOL/L (ref 3.5–5.5)
PROT UR STRIP-MCNC: ABNORMAL MG/DL
PTH-INTACT SERPL-MCNC: 355.3 PG/ML (ref 14–72)
RBC #/AREA URNS HPF: ABNORMAL /HPF (ref 0–5)
SODIUM SERPL-SCNC: 142 MMOL/L (ref 136–145)
SP GR UR REFRACTOMETRY: 1.01 (ref 1–1.03)
TIBC SERPL-MCNC: 263 UG/DL (ref 250–450)
UROBILINOGEN UR QL STRIP.AUTO: 0.2 EU/DL (ref 0.2–1)
WBC URNS QL MICRO: ABNORMAL /HPF (ref 0–4)

## 2017-06-22 PROCEDURE — 82043 UR ALBUMIN QUANTITATIVE: CPT | Performed by: INTERNAL MEDICINE

## 2017-06-22 PROCEDURE — 83970 ASSAY OF PARATHORMONE: CPT | Performed by: INTERNAL MEDICINE

## 2017-06-22 PROCEDURE — 83540 ASSAY OF IRON: CPT | Performed by: INTERNAL MEDICINE

## 2017-06-22 PROCEDURE — 36415 COLL VENOUS BLD VENIPUNCTURE: CPT | Performed by: INTERNAL MEDICINE

## 2017-06-22 PROCEDURE — 81001 URINALYSIS AUTO W/SCOPE: CPT | Performed by: INTERNAL MEDICINE

## 2017-06-22 PROCEDURE — 85014 HEMATOCRIT: CPT | Performed by: INTERNAL MEDICINE

## 2017-06-22 PROCEDURE — 82728 ASSAY OF FERRITIN: CPT | Performed by: INTERNAL MEDICINE

## 2017-06-22 PROCEDURE — 80069 RENAL FUNCTION PANEL: CPT | Performed by: INTERNAL MEDICINE

## 2017-06-27 ENCOUNTER — HOSPITAL ENCOUNTER (OUTPATIENT)
Dept: LAB | Age: 77
Discharge: HOME | End: 2017-06-27
Payer: MEDICARE

## 2017-06-27 DIAGNOSIS — I25.10 TRIPLE VESSEL DISEASE OF THE HEART: ICD-10-CM

## 2017-06-27 DIAGNOSIS — R80.9 MICROALBUMINURIA: ICD-10-CM

## 2017-06-27 DIAGNOSIS — I12.9 HYPERTENSIVE KIDNEY DISEASE, BENIGN, STAGE 1-4 OR UNSPECIFIED CHRONIC KIDNEY DISEASE: ICD-10-CM

## 2017-06-27 DIAGNOSIS — E66.9 OBESITY: ICD-10-CM

## 2017-06-27 DIAGNOSIS — R82.81 PYURIA: ICD-10-CM

## 2017-06-27 DIAGNOSIS — N18.30 CHRONIC KIDNEY DISEASE, STAGE III (MODERATE) (HCC): ICD-10-CM

## 2017-06-27 DIAGNOSIS — N17.9 ACUTE KIDNEY INJURY (HCC): ICD-10-CM

## 2017-06-27 DIAGNOSIS — D64.9 CHRONIC ANEMIA: ICD-10-CM

## 2017-06-27 DIAGNOSIS — M10.9 GOUT: ICD-10-CM

## 2017-06-27 LAB
APPEARANCE UR: ABNORMAL
BACTERIA URNS QL MICRO: ABNORMAL /HPF
BILIRUB UR QL: NEGATIVE
COLOR UR: YELLOW
EPITH CASTS URNS QL MICRO: ABNORMAL /LPF (ref 0–5)
GLUCOSE UR STRIP.AUTO-MCNC: NEGATIVE MG/DL
HGB UR QL STRIP: NEGATIVE
KETONES UR QL STRIP.AUTO: NEGATIVE MG/DL
LEUKOCYTE ESTERASE UR QL STRIP.AUTO: ABNORMAL
NITRITE UR QL STRIP.AUTO: NEGATIVE
PH UR STRIP: 5.5 [PH] (ref 5–8)
PROT UR STRIP-MCNC: ABNORMAL MG/DL
RBC #/AREA URNS HPF: NEGATIVE /HPF (ref 0–5)
SP GR UR REFRACTOMETRY: 1.01 (ref 1–1.03)
UROBILINOGEN UR QL STRIP.AUTO: 0.2 EU/DL (ref 0.2–1)
WBC URNS QL MICRO: ABNORMAL /HPF (ref 0–4)

## 2017-06-27 PROCEDURE — 87086 URINE CULTURE/COLONY COUNT: CPT | Performed by: INTERNAL MEDICINE

## 2017-06-27 PROCEDURE — 87077 CULTURE AEROBIC IDENTIFY: CPT | Performed by: INTERNAL MEDICINE

## 2017-06-27 PROCEDURE — 81001 URINALYSIS AUTO W/SCOPE: CPT | Performed by: INTERNAL MEDICINE

## 2017-06-27 PROCEDURE — 36415 COLL VENOUS BLD VENIPUNCTURE: CPT | Performed by: INTERNAL MEDICINE

## 2017-06-27 PROCEDURE — 87186 SC STD MICRODIL/AGAR DIL: CPT | Performed by: INTERNAL MEDICINE

## 2017-06-29 LAB
BACTERIA SPEC CULT: ABNORMAL
SERVICE CMNT-IMP: ABNORMAL

## 2017-07-10 ENCOUNTER — HOSPITAL ENCOUNTER (OUTPATIENT)
Dept: LAB | Age: 77
Discharge: HOME | End: 2017-07-10
Payer: MEDICARE

## 2017-07-10 ENCOUNTER — OFFICE VISIT (OUTPATIENT)
Dept: INTERNAL MEDICINE CLINIC | Age: 77
End: 2017-07-10

## 2017-07-10 VITALS
HEART RATE: 61 BPM | HEIGHT: 64 IN | WEIGHT: 210.2 LBS | DIASTOLIC BLOOD PRESSURE: 79 MMHG | SYSTOLIC BLOOD PRESSURE: 178 MMHG | BODY MASS INDEX: 35.89 KG/M2 | OXYGEN SATURATION: 99 % | TEMPERATURE: 97 F | RESPIRATION RATE: 20 BRPM

## 2017-07-10 DIAGNOSIS — Z00.00 ROUTINE GENERAL MEDICAL EXAMINATION AT A HEALTH CARE FACILITY: ICD-10-CM

## 2017-07-10 DIAGNOSIS — N18.30 CKD (CHRONIC KIDNEY DISEASE) STAGE 3, GFR 30-59 ML/MIN (HCC): ICD-10-CM

## 2017-07-10 DIAGNOSIS — D64.9 ANEMIA, UNSPECIFIED TYPE: ICD-10-CM

## 2017-07-10 DIAGNOSIS — R82.81 PYURIA: ICD-10-CM

## 2017-07-10 DIAGNOSIS — Z71.89 ADVANCE CARE PLANNING: ICD-10-CM

## 2017-07-10 DIAGNOSIS — I10 ESSENTIAL HYPERTENSION: ICD-10-CM

## 2017-07-10 DIAGNOSIS — R76.8 ANA POSITIVE: ICD-10-CM

## 2017-07-10 DIAGNOSIS — Z13.39 SCREENING FOR ALCOHOLISM: ICD-10-CM

## 2017-07-10 DIAGNOSIS — R60.0 BILATERAL EDEMA OF LOWER EXTREMITY: ICD-10-CM

## 2017-07-10 DIAGNOSIS — Z12.31 ENCOUNTER FOR SCREENING MAMMOGRAM FOR BREAST CANCER: ICD-10-CM

## 2017-07-10 DIAGNOSIS — N18.30 CKD (CHRONIC KIDNEY DISEASE) STAGE 3, GFR 30-59 ML/MIN (HCC): Primary | ICD-10-CM

## 2017-07-10 PROBLEM — R80.9 MICROALBUMINURIA: Status: ACTIVE | Noted: 2017-07-10

## 2017-07-10 LAB
ALBUMIN SERPL BCP-MCNC: 3.5 G/DL (ref 3.4–5)
ALBUMIN/GLOB SERPL: 1 {RATIO} (ref 0.8–1.7)
ALP SERPL-CCNC: 100 U/L (ref 45–117)
ALT SERPL-CCNC: 14 U/L (ref 13–56)
ANION GAP BLD CALC-SCNC: 8 MMOL/L (ref 3–18)
APPEARANCE UR: CLEAR
AST SERPL W P-5'-P-CCNC: 11 U/L (ref 15–37)
BASOPHILS # BLD AUTO: 0 K/UL (ref 0–0.1)
BASOPHILS # BLD: 1 % (ref 0–2)
BILIRUB SERPL-MCNC: 0.2 MG/DL (ref 0.2–1)
BILIRUB UR QL: NEGATIVE
BUN SERPL-MCNC: 36 MG/DL (ref 7–18)
BUN/CREAT SERPL: 28 (ref 12–20)
CALCIUM SERPL-MCNC: 8.9 MG/DL (ref 8.5–10.1)
CHLORIDE SERPL-SCNC: 108 MMOL/L (ref 100–108)
CHOLEST SERPL-MCNC: 215 MG/DL
CO2 SERPL-SCNC: 24 MMOL/L (ref 21–32)
COLOR UR: YELLOW
CREAT SERPL-MCNC: 1.3 MG/DL (ref 0.6–1.3)
CREAT UR-MCNC: 49.5 MG/DL (ref 30–125)
DIFFERENTIAL METHOD BLD: ABNORMAL
EOSINOPHIL # BLD: 0.2 K/UL (ref 0–0.4)
EOSINOPHIL NFR BLD: 4 % (ref 0–5)
ERYTHROCYTE [DISTWIDTH] IN BLOOD BY AUTOMATED COUNT: 15.3 % (ref 11.6–14.5)
GLOBULIN SER CALC-MCNC: 3.4 G/DL (ref 2–4)
GLUCOSE SERPL-MCNC: 81 MG/DL (ref 74–99)
GLUCOSE UR STRIP.AUTO-MCNC: NEGATIVE MG/DL
HCT VFR BLD AUTO: 31.9 % (ref 35–45)
HDLC SERPL-MCNC: 93 MG/DL (ref 40–60)
HDLC SERPL: 2.3 {RATIO} (ref 0–5)
HGB BLD-MCNC: 10 G/DL (ref 12–16)
HGB UR QL STRIP: NEGATIVE
IRON SATN MFR SERPL: 18 %
IRON SERPL-MCNC: 48 UG/DL (ref 50–175)
KETONES UR QL STRIP.AUTO: NEGATIVE MG/DL
LDLC SERPL CALC-MCNC: 108.6 MG/DL (ref 0–100)
LEUKOCYTE ESTERASE UR QL STRIP.AUTO: NEGATIVE
LIPID PROFILE,FLP: ABNORMAL
LYMPHOCYTES # BLD AUTO: 40 % (ref 21–52)
LYMPHOCYTES # BLD: 1.9 K/UL (ref 0.9–3.6)
MCH RBC QN AUTO: 27 PG (ref 24–34)
MCHC RBC AUTO-ENTMCNC: 31.3 G/DL (ref 31–37)
MCV RBC AUTO: 86.2 FL (ref 74–97)
MICROALBUMIN UR-MCNC: 3.44 MG/DL (ref 0–3)
MICROALBUMIN/CREAT UR-RTO: 69 MG/G (ref 0–30)
MONOCYTES # BLD: 0.2 K/UL (ref 0.05–1.2)
MONOCYTES NFR BLD AUTO: 5 % (ref 3–10)
NEUTS SEG # BLD: 2.4 K/UL (ref 1.8–8)
NEUTS SEG NFR BLD AUTO: 50 % (ref 40–73)
NITRITE UR QL STRIP.AUTO: NEGATIVE
PH UR STRIP: 5.5 [PH] (ref 5–8)
PLATELET # BLD AUTO: 237 K/UL (ref 135–420)
PMV BLD AUTO: 10.2 FL (ref 9.2–11.8)
POTASSIUM SERPL-SCNC: 4.2 MMOL/L (ref 3.5–5.5)
PROT SERPL-MCNC: 6.9 G/DL (ref 6.4–8.2)
PROT UR STRIP-MCNC: NEGATIVE MG/DL
RBC # BLD AUTO: 3.7 M/UL (ref 4.2–5.3)
SODIUM SERPL-SCNC: 140 MMOL/L (ref 136–145)
SP GR UR REFRACTOMETRY: 1.01 (ref 1–1.03)
T4 FREE SERPL-MCNC: 1.2 NG/DL (ref 0.7–1.5)
TIBC SERPL-MCNC: 265 UG/DL (ref 250–450)
TRIGL SERPL-MCNC: 67 MG/DL (ref ?–150)
TSH SERPL DL<=0.05 MIU/L-ACNC: 1.87 UIU/ML (ref 0.36–3.74)
UROBILINOGEN UR QL STRIP.AUTO: 0.2 EU/DL (ref 0.2–1)
VLDLC SERPL CALC-MCNC: 13.4 MG/DL
WBC # BLD AUTO: 4.7 K/UL (ref 4.6–13.2)

## 2017-07-10 PROCEDURE — 82043 UR ALBUMIN QUANTITATIVE: CPT | Performed by: INTERNAL MEDICINE

## 2017-07-10 PROCEDURE — 36415 COLL VENOUS BLD VENIPUNCTURE: CPT | Performed by: INTERNAL MEDICINE

## 2017-07-10 PROCEDURE — 80061 LIPID PANEL: CPT | Performed by: INTERNAL MEDICINE

## 2017-07-10 PROCEDURE — 83540 ASSAY OF IRON: CPT | Performed by: INTERNAL MEDICINE

## 2017-07-10 PROCEDURE — 80053 COMPREHEN METABOLIC PANEL: CPT | Performed by: INTERNAL MEDICINE

## 2017-07-10 PROCEDURE — 85025 COMPLETE CBC W/AUTO DIFF WBC: CPT | Performed by: INTERNAL MEDICINE

## 2017-07-10 PROCEDURE — 81003 URINALYSIS AUTO W/O SCOPE: CPT | Performed by: INTERNAL MEDICINE

## 2017-07-10 PROCEDURE — 84439 ASSAY OF FREE THYROXINE: CPT | Performed by: INTERNAL MEDICINE

## 2017-07-10 RX ORDER — FERROUS SULFATE 325(65) MG
TABLET, DELAYED RELEASE (ENTERIC COATED) ORAL
Refills: 3 | COMMUNITY
Start: 2017-06-30 | End: 2018-02-27 | Stop reason: ALTCHOICE

## 2017-07-10 NOTE — ACP (ADVANCE CARE PLANNING)
Advance Care Planning  Advance Care Planning (ACP) Provider Conversation     Date of ACP Conversation: 07/10/17  Persons included in Conversation:  patient    Authorized Decision Maker (if patient is incapable of making informed decisions): This person is:   Healthcare Agent/Medical Power of  under Advance Directive          For Patients with Decision Making Capacity:   Values/Goals: Exploration of values, goals, and preferences if recovery is not expected, even with continued medical treatment in the event of:  Imminent death  Severe, permanent brain injury    Conversation Outcomes / Follow-Up Plan:   Advanced Directive in the case that an injury or illness causes the patient to be unable to make health care decisions was discussed with the patient. Patient does not have a scanned advanced directive in the EHR. She would prefer for her son to be her power of . He lives locally. She states that she completed her advanced directive but left at home. We discussed the content of the advanced directive including the 2 scenarios regarding end-of-life care/wishes. All questions were answered. The patient agreed to bring her advanced directive that he can be scanned into the EHR.     Dr. Michael Thompson  [facility] [address].  Phone: [phone]  Fax: [phone]

## 2017-07-10 NOTE — PATIENT INSTRUCTIONS
Health Maintenance Due   Topic Date Due    Pneumococcal 65+ High/Highest Risk (1 of 2 - PCV13) 04/02/2007    MEDICARE YEARLY EXAM  07/07/2017     Patient still has her copy of the Advanced Directive form and understands to bring it in once completed      Medicare Part B Preventive Services Limitations Recommendation Scheduled   Bone Mass Measurement  (age 72 & older, biennial) Requires diagnosis related to osteoporosis or estrogen deficiency. Biennial benefit unless patient has history of long-term glucocorticoid tx or baseline is needed because initial test was by other method This should be done at age 72 and again if on osteoporosis medication at 2-3 year intervals. Overdue   Cardiovascular Screening Blood Tests (every 5 years)  Total cholesterol, HDL, Triglycerides Order as a panel if possible We should check your cholesterol panel at least once every 5 years. Overdue   Colorectal Cancer Screening  -Fecal occult blood test (annual)  -Flexible sigmoidoscopy (5y)  -Screening colonoscopy (10y)  -Barium Enema  Due per your Gastroenterologist's recommendations. Up to date   Counseling to Prevent Tobacco Use (up to 8 sessions per year)  - Counseling greater than 3 and up to 10 minutes  - Counseling greater than 10 minutes Patients must be asymptomatic of tobacco-related conditions to receive as preventive service Continue with smoking cessation    Diabetes Screening Tests (at least every 3 years, Medicare covers annually or at 6-month intervals for prediabetic patients)    Fasting blood sugar (FBS) or glucose tolerance test (GTT) Patient must be diagnosed with one of the following:  -Hypertension, Dyslipidemia, obesity, previous impaired FBS or GTT  Or any two of the following: overweight, FH of diabetes, age ? 72, history of gestational diabetes, birth of baby weighing more than 9 pounds Should be done yearly Overdue   Diabetes Self-Management Training (DSMT) (no USPSTF recommendation) Requires referral by treating physician for patient with diabetes or renal disease. 10 hours of initial DSMT session of no less than 30 minutes each in a continuous 12-month period. 2 hours of follow-up DSMT in subsequent years. Not applicable    Glaucoma Screening (no USPSTF recommendation) Diabetes mellitus, family history, , age 48 or over,  American, age 72 or over Continue with annual eye exams. Up to date   Human Immunodeficiency Virus (HIV) Screening (annually for increased risk patients)  HIV-1 and HIV-2 by EIA, ROSE MARIE, rapid antibody test, or oral mucosa transudate Patient must be at increased risk for HIV infection per USPSTF guidelines or pregnant. Tests covered annually for patients at increased risk. Pregnant patients may receive up to 3 test during pregnancy. Not applicable    Medical Nutrition Therapy (MNT) (for diabetes or renal disease not recommended schedule) Requires referral by treating physician for patient with diabetes or renal disease. Can be provided in same year as diabetes self-management training (DSMT), and CMS recommends medical nutrition therapy take place after DSMT. Up to 3 hours for initial year and 2 hours in subsequent years. Not applicable    Shingles Vaccination A shingles vaccine is also recommended once in a lifetime after age 61 Vaccination recommended for shingles vaccination. Overdue   Seasonal Influenza Vaccination (annually)  Continue with yearly \"flu\" shot annually Due later this year   Pneumococcal Vaccination (once after 65)  Please receive this vaccination at age 72. Overdue   Hepatitis B Vaccinations (if medium/high risk) Medium/high risk factors:  End-stage renal disease,  Hemophiliacs who received Factor VIII or IX concentrates, Clients of institutions for the mentally retarded, Persons who live in the same house as a HepB virus carrier, Homosexual men, Illicit injectable drug abusers.  Not applicable    Screening Mammography (biennial age 54-69) Annually (age 36 or over) You need a mammogram yearly to screen for breast cancer. Overdue   Screening Pap Tests and Pelvic Examination (up to age 79 and after 79 if unknown history or abnormal study last 10 years) Every 24 months except high risk You need no Pap smear at this time. Ultrasound Screening for Abdominal Aortic Aneurysm (AAA) (once) Patient must be referred through IPPE and not have had a screening for abdominal aortic aneurysm before under Medicare. Limited to patients who meet one of the following criteria:  - Men who are 73-68 years old and have smoked more than 100 cigarettes in their lifetime.  -Anyone with a FH of AAA  -Anyone recommended for screening by USPSTF Not applicable           Advance Care Planning: Care Instructions  Your Care Instructions  It can be hard to live with an illness that cannot be cured. But if your health is getting worse, you may want to make decisions about end-of-life care. Planning for the end of your life does not mean that you are giving up. It is a way to make sure that your wishes are met. Clearly stating your wishes can make it easier for your loved ones. Making plans while you are still able may also ease your mind and make your final days less stressful and more meaningful. Follow-up care is a key part of your treatment and safety. Be sure to make and go to all appointments, and call your doctor if you are having problems. It's also a good idea to know your test results and keep a list of the medicines you take. What can you do to plan for the end of life? · You can bring these issues up with your doctor. You do not need to wait until your doctor starts the conversation. You might start with \"I would not be willing to live with . Melchor De Jesus \" When you complete this sentence it helps your doctor understand your wishes. · Talk openly and honestly with your doctor. This is the best way to understand the decisions you will need to make as your health changes.  Know that you can always change your mind. · Ask your doctor about commonly used life-support measures. These include tube feedings, breathing machines, and fluids given through a vein (IV). Understanding these treatments will help you decide whether you want them. · You may choose to have these life-supporting treatments for a limited time. This allows a trial period to see whether they will help you. You may also decide that you want your doctor to take only certain measures to keep you alive. It is important to spell out these conditions so that your doctor and family understand them. · Talk to your doctor about how long you are likely to live. He or she may be able to give you an idea of what usually happens with your specific illness. · Think about preparing papers that state your wishes. This way there will not be any confusion about what you want. You can change your instructions at any time. Which papers should you prepare? Advance directives are legal papers that tell doctors how you want to be cared for at the end of your life. You do not need a  to write these papers. Ask your doctor or your state health department for information on how to write your advance directives. They may have the forms for each of these types of papers. Make sure your doctor has a copy of these on file, and give a copy to a family member or close friend. · Consider a do-not-resuscitate order (DNR). This order asks that no extra treatments be done if your heart stops or you stop breathing. Extra treatments may include cardiopulmonary resuscitation (CPR), electrical shock to restart your heart, or a machine to breathe for you. If you decide to have a DNR order, ask your doctor to explain and write it. Place the order in your home where everyone can easily see it. · Consider a living will. A living will explains your wishes about life support and other treatments at the end of your life if you become unable to speak for yourself. Living crawley tell doctors to use or not use treatments that would keep you alive. You must have one or two witnesses or a notary present when you sign this form. · Consider a durable power of  for health care. This allows you to name a person to make decisions about your care if you are not able to. Most people ask a close friend or family member. Talk to this person about the kinds of treatments you want and those that you do not want. Make sure this person understands your wishes. These legal papers are simple to change. Tell your doctor what you want to change, and ask him or her to make a note in your medical file. Give your family updated copies of the papers. Where can you learn more? Go to http://jacquelyn-alberto.info/. Enter P184 in the search box to learn more about \"Advance Care Planning: Care Instructions. \"  Current as of: November 17, 2016  Content Version: 11.3  © 4153-4298 [employer]. Care instructions adapted under license by [employer] (which disclaims liability or warranty for this information). If you have questions about a medical condition or this instruction, always ask your healthcare professional. [place] [address] any warranty or liability for your use of this information.

## 2017-07-10 NOTE — PROGRESS NOTES
Chief Complaint   Patient presents with   Brittany Fisher Annual Wellness Visit     Medicare     Patient still has her copy of the Advanced Directive form and understands to bring it in once completed. 1. Have you been to the ER, urgent care clinic since your last visit? Hospitalized since your last visit? No    2. Have you seen or consulted any other health care providers outside of the [address] [place] [address] since your last visit? Include any pap smears or colon screening.  No

## 2017-07-10 NOTE — MR AVS SNAPSHOT
Visit Information Date & Time Provider Department Dept. Phone Encounter #  
 7/10/2017  8:30 AM Marie Jordan MD Internist of [facility] [address] [id number] Follow-up Instructions Return in about 6 weeks (around 8/21/2017) for BP check. Your Appointments 8/22/2017  8:00 AM  
Office Visit with Marie Jordan MD  
Internist of [address]) Appt Note: ov per [place] [address], Suite [facility] [place] [address] [place]  
  
   
 [address], [address]  
  
    
 10/24/2017  9:00 AM  
Follow Up with Ne Reis DO [facility] ([address] [place] [address]) Appt Note: 6 month follow up [place] [id number] [address] [id number] [phone] [facility] 53 [phone]  
  
    
 12/18/2017  2:45 PM  
PROCEDURE with [facility]ING 2 [facility] ([address] [place] [address]) Appt Note: cv wild; .  
 [address] 200 [facility] [address]  
[phone] Sarah Rich [address] [facility]  
  
    
 12/18/2017  3:45 PM  
PROCEDURE with [facility] NONIMAGING [facility] ([address] [place] [address]) Appt Note: leg art wild; .  
 [address] 722 200 [facility] [address]  
[phone] 2300 [facility] Renée Lunsford 47 [facility]  
  
    
 1/4/2018  9:00 AM  
Follow Up with Miroslava Ahuja [facility] ([address] [place] [address]) Appt Note: 2 year fu after studies at [facility] on 12/18/2017; .  
 [address] 200 [facility]  
[phone] 2300 [facility] [place] [place] 200 [facility] [address] Upcoming Health Maintenance Date Due Pneumococcal 65+ High/Highest Risk (1 of 2 - PCV13) 4/2/2007 MEDICARE YEARLY EXAM 7/7/2017 INFLUENZA AGE 9 TO ADULT 8/1/2017 COLONOSCOPY 1/25/2018 GLAUCOMA SCREENING Q2Y 8/8/2018 DTaP/Tdap/Td series (2 - Td) 11/22/2026 Allergies as of 7/10/2017  Review Complete On: 7/10/2017 By: Lucy Crockett MD  
  
 Severity Noted Reaction Type Reactions Ace Inhibitors    Cough Codeine  11/12/2008    Unknown (comments), Nausea Only, Nausea and Vomiting Hydralazine  03/29/2017   Intolerance Other (comments) Dizziness and Fatigue Lipitor [Atorvastatin]  05/20/2014    Nausea and Vomiting, Vertigo Current Immunizations  Never Reviewed No immunizations on file. Not reviewed this visit You Were Diagnosed With   
  
 Codes Comments Pyuria    -  Primary ICD-10-CM: N39.0 ICD-9-CM: 791.9 Essential hypertension     ICD-10-CM: I10 
ICD-9-CM: 401.9 CKD (chronic kidney disease) stage 3, GFR 30-59 ml/min     ICD-10-CM: N18.3 ICD-9-CM: 556. 3 FOREST positive     ICD-10-CM: R76.8 ICD-9-CM: 795.79 Anemia, unspecified type     ICD-10-CM: D64.9 ICD-9-CM: 285.9 Bilateral edema of lower extremity     ICD-10-CM: R60.0 ICD-9-CM: 782.3 Encounter for screening mammogram for breast cancer     ICD-10-CM: Z12.31 
ICD-9-CM: V76.12 Vitals BP Pulse Temp Resp Height(growth percentile) Weight(growth percentile) 178/79 (BP 1 Location: Left arm, BP Patient Position: Sitting) 61 97 °F (36.1 °C) (Oral) 20 5' 4\" (1.626 m) 210 lb 3.2 oz (95.3 kg) SpO2 BMI OB Status Smoking Status 99% 36.08 kg/m2 Postmenopausal Former Smoker Vitals History BMI and BSA Data Body Mass Index Body Surface Area 36.08 kg/m 2 2.07 m 2 Preferred Pharmacy Pharmacy Name Phone [id number] [phone] Your Updated Medication List  
  
   
This list is accurate as of: 7/10/17  9:26 AM.  Always use your most recent med list. amLODIPine 2.5 mg tablet Commonly known as:  Brock Faustin Take 1 Tab by mouth daily. aspirin 81 mg tablet Take 81 mg by mouth daily. carvedilol 25 mg tablet Commonly known as:  Carolyn Shade Take 1 Tab by mouth two (2) times a day. ferrous sulfate 325 mg (65 mg iron) EC tablet Commonly known as:  IRON  
TAKE 1 TABLET BY MOUTH ONCE A DAY  
  
 furosemide 40 mg tablet Commonly known as:  LASIX TAKE 1 TABLET BY MOUTH AS NEEDED We Performed the Following REFERRAL TO UROLOGY [ZRO559 Custom] Follow-up Instructions Return in about 6 weeks (around 8/21/2017) for BP check. To-Do List   
 07/10/2017 Lab:  CBC WITH AUTOMATED DIFF   
  
 07/10/2017 Imaging:  DUPLEX LOWER EXT VENOUS BILAT   
  
 07/10/2017 Lab:  IRON PROFILE   
  
 07/10/2017 Lab:  LIPID PANEL   
  
 07/10/2017 Imaging:  SHIVA MAMMO BI SCREENING INCL CAD   
  
 07/10/2017 Lab:  METABOLIC PANEL, COMPREHENSIVE   
  
 07/10/2017 Lab:  MICROALBUMIN, UR, RAND W/ MICROALBUMIN/CREA RATIO Around 07/10/2017 Lab:  TSH AND FREE T4   
  
 07/10/2017 Lab:  URINALYSIS W/ RFLX MICROSCOPIC Referral Information Referral ID Referred By Referred To  
  
 [id number] Maria R Knight Not Available Visits Status Start Date End Date 1 New Request 7/10/17 7/10/18 If your referral has a status of pending review or denied, additional information will be sent to support the outcome of this decision. Patient Instructions Health Maintenance Due Topic Date Due  Pneumococcal 65+ High/Highest Risk (1 of 2 - PCV13) 04/02/2007  MEDICARE YEARLY EXAM  07/07/2017 Patient still has her copy of the Advanced Directive form and understands to bring it in once completed Medicare Part B Preventive Services Limitations Recommendation Scheduled Bone Mass Measurement 
(age 72 & older, biennial) Requires diagnosis related to osteoporosis or estrogen deficiency.  Biennial benefit unless patient has history of long-term glucocorticoid tx or baseline is needed because initial test was by other method This should be done at age 72 and again if on osteoporosis medication at 2-3 year intervals. Overdue Cardiovascular Screening Blood Tests (every 5 years) Total cholesterol, HDL, Triglycerides Order as a panel if possible We should check your cholesterol panel at least once every 5 years. Overdue Colorectal Cancer Screening 
-Fecal occult blood test (annual) -Flexible sigmoidoscopy (5y) 
-Screening colonoscopy (10y) -Barium Enema  Due per your Gastroenterologist's recommendations. Up to date Counseling to Prevent Tobacco Use (up to 8 sessions per year) - Counseling greater than 3 and up to 10 minutes - Counseling greater than 10 minutes Patients must be asymptomatic of tobacco-related conditions to receive as preventive service Continue with smoking cessation Diabetes Screening Tests (at least every 3 years, Medicare covers annually or at 6-month intervals for prediabetic patients) Fasting blood sugar (FBS) or glucose tolerance test (GTT) Patient must be diagnosed with one of the following: 
-Hypertension, Dyslipidemia, obesity, previous impaired FBS or GTT 
Or any two of the following: overweight, FH of diabetes, age ? 72, history of gestational diabetes, birth of baby weighing more than 9 pounds Should be done yearly Overdue Diabetes Self-Management Training (DSMT) (no USPSTF recommendation) Requires referral by treating physician for patient with diabetes or renal disease. 10 hours of initial DSMT session of no less than 30 minutes each in a continuous 12-month period. 2 hours of follow-up DSMT in subsequent years. Not applicable Glaucoma Screening (no USPSTF recommendation) Diabetes mellitus, family history, , age 48 or over,  American, age 72 or over Continue with annual eye exams. Up to date Human Immunodeficiency Virus (HIV) Screening (annually for increased risk patients) HIV-1 and HIV-2 by EIA, ROSE MARIE, rapid antibody test, or oral mucosa transudate Patient must be at increased risk for HIV infection per USPSTF guidelines or pregnant. Tests covered annually for patients at increased risk. Pregnant patients may receive up to 3 test during pregnancy. Not applicable Medical Nutrition Therapy (MNT) (for diabetes or renal disease not recommended schedule) Requires referral by treating physician for patient with diabetes or renal disease. Can be provided in same year as diabetes self-management training (DSMT), and CMS recommends medical nutrition therapy take place after DSMT. Up to 3 hours for initial year and 2 hours in subsequent years. Not applicable Shingles Vaccination A shingles vaccine is also recommended once in a lifetime after age 61 Vaccination recommended for shingles vaccination. Overdue Seasonal Influenza Vaccination (annually)  Continue with yearly \"flu\" shot annually Due later this year Pneumococcal Vaccination (once after 65)  Please receive this vaccination at age 72. Overdue Hepatitis B Vaccinations (if medium/high risk) Medium/high risk factors:  End-stage renal disease, Hemophiliacs who received Factor VIII or IX concentrates, Clients of institutions for the mentally retarded, Persons who live in the same house as a HepB virus carrier, Homosexual men, Illicit injectable drug abusers. Not applicable Screening Mammography (biennial age 54-69) Annually (age 36 or over) You need a mammogram yearly to screen for breast cancer. Overdue Screening Pap Tests and Pelvic Examination (up to age 79 and after 79 if unknown history or abnormal study last 10 years) Every 24 months except high risk You need no Pap smear at this time. Ultrasound Screening for Abdominal Aortic Aneurysm (AAA) (once) Patient must be referred through IPPE and not have had a screening for abdominal aortic aneurysm before under Medicare.   Limited to patients who meet one of the following criteria: 
- Men who are 73-68 years old and have smoked more than 100 cigarettes in their lifetime. 
-Anyone with a FH of AAA 
-Anyone recommended for screening by USPSTF Not applicable Advance Care Planning: Care Instructions Your Care Instructions It can be hard to live with an illness that cannot be cured. But if your health is getting worse, you may want to make decisions about end-of-life care. Planning for the end of your life does not mean that you are giving up. It is a way to make sure that your wishes are met. Clearly stating your wishes can make it easier for your loved ones. Making plans while you are still able may also ease your mind and make your final days less stressful and more meaningful. Follow-up care is a key part of your treatment and safety. Be sure to make and go to all appointments, and call your doctor if you are having problems. It's also a good idea to know your test results and keep a list of the medicines you take. What can you do to plan for the end of life? · You can bring these issues up with your doctor. You do not need to wait until your doctor starts the conversation. You might start with \"I would not be willing to live with . Lacie Yeny Lacie Yeny Lacie Yeny \" When you complete this sentence it helps your doctor understand your wishes. · Talk openly and honestly with your doctor. This is the best way to understand the decisions you will need to make as your health changes. Know that you can always change your mind. · Ask your doctor about commonly used life-support measures. These include tube feedings, breathing machines, and fluids given through a vein (IV). Understanding these treatments will help you decide whether you want them. · You may choose to have these life-supporting treatments for a limited time. This allows a trial period to see whether they will help you.  You may also decide that you want your doctor to take only certain measures to keep you alive. It is important to spell out these conditions so that your doctor and family understand them. · Talk to your doctor about how long you are likely to live. He or she may be able to give you an idea of what usually happens with your specific illness. · Think about preparing papers that state your wishes. This way there will not be any confusion about what you want. You can change your instructions at any time. Which papers should you prepare? Advance directives are legal papers that tell doctors how you want to be cared for at the end of your life. You do not need a  to write these papers. Ask your doctor or your [facility] department for information on how to write your advance directives. They may have the forms for each of these types of papers. Make sure your doctor has a copy of these on file, and give a copy to a family member or close friend. · Consider a do-not-resuscitate order (DNR). This order asks that no extra treatments be done if your heart stops or you stop breathing. Extra treatments may include cardiopulmonary resuscitation (CPR), electrical shock to restart your heart, or a machine to breathe for you. If you decide to have a DNR order, ask your doctor to explain and write it. Place the order in your home where everyone can easily see it. · Consider a living will. A living will explains your wishes about life support and other treatments at the end of your life if you become unable to speak for yourself. Living crawley tell doctors to use or not use treatments that would keep you alive. You must have one or two witnesses or a notary present when you sign this form. · Consider a durable power of  for health care. This allows you to name a person to make decisions about your care if you are not able to. Most people ask a close friend or family member. Talk to this person about the kinds of treatments you want and those that you do not want.  Make sure this person understands your wishes. These legal papers are simple to change. Tell your doctor what you want to change, and ask him or her to make a note in your medical file. Give your family updated copies of the papers. Where can you learn more? Go to http://jacquelyn-alberto.info/. Enter P184 in the search box to learn more about \"Advance Care Planning: Care Instructions. \" Current as of: November 17, 2016 Content Version: 11.3 © 3038-5459 [employer]. Care instructions adapted under license by [employer] (which disclaims liability or warranty for this information). If you have questions about a medical condition or this instruction, always ask your healthcare professional. [address] any warranty or liability for your use of this information. Please provide this summary of care documentation to your next provider. Your primary care clinician is listed as Luiz Foote. If you have any questions after today's visit, please call [phone].

## 2017-07-10 NOTE — PROGRESS NOTES
INTERNISTS OF [facility]:  7/10/2017, MRN: [id number]      Kerwin Edge is a 78 y.o. female and presents to clinic for Annual Wellness Visit (Medicare)    Subjective:   Patient is a 68-year-old -American female with history of allergic rhinitis, gout, cataract, overactive bladder, hyperlipidemia, GERD, chronic gastritis with bleeding in March 2016, history of stroke, vitamin D deficiency, dysphasia status post dilation, anemia, pacemaker for history of AV block, left cavernous ICA aneurysm, hypertension, atrial fibrillation, carotid stenosis, peripheral artery disease, PVCs, DVT hx (LUE - postoperatively), and CAD status post CABG. 1. JAS and SLE: The patient has had intermittent acute kidney injury episodes off and on since February. She was taken off of Benicar and NSAIDs. She also has a history of not consuming adequate hydration. She ended up having a retroperitoneal ultrasound that did not show any significant findings. She is positive for FOREST, RNP Ab, Sjogren Abs, Antichromatin Ab, and Parnell Ab. She also has an anemia. Per the EHR, she has h/o DVT (left upper extremity postoperatively). The patient was referred in April to Dr. Renan Turner with rheumatology. Because the patient did not recognize a phone number to her office, she did not answer to schedule an appointment. She is followed by nephrology who has an appointment with her every 6-8 weeks. 2. HTN: This is a chronic condition, present over 6 months. Patient has associated chronic kidney disease stage III. He also has microalbuminuria. She takes carvedilol, amlodipine, and Lasix. She is compliant with her medications. Blood pressure was initially 943 systolic today. BP Readings from Last 3 Encounters:   07/10/17 178/79   04/03/17 160/80   03/16/17 156/78     3. Pyuria: The patient has 2 urinalyses done within the past month showing significant pyuria.   She denies dysuria, hematuria, urinary frequency, and abdominal pain.    Results for Kenyon Lux (MRN [id number]) as of 7/10/2017 10:20   Ref. Range 6/22/2017 09:05 6/22/2017 09:05 6/27/2017 06:30   WBC Latest Ref Range: 0 - 4 /hpf 25 to 35  35 to 40     4. Bilateral lower extremity edema: The patient has a history of a right upper extremity DVT. She has no history of DVT in her bilateral lower extremities. She has no shortness of breath. She has bilateral lower extremity edema for several months at least.  She is on a small dose of amlodipine 2.5 mg daily. The patient had edema prior to taking amlodipine. She has not had bilateral PVL studies to evaluate her legs.         Patient Active Problem List    Diagnosis Date Noted    Microalbuminuria 07/10/2017    FOREST positive 07/10/2017    CKD (chronic kidney disease) stage 3, GFR 30-59 ml/min 07/10/2017    Positive sm/RNP antibody, Sjogren Abs, Antichromatin Ab, and Parnell Ab 04/01/2017    Seasonal allergic rhinitis 11/23/2016    Gout of left foot 11/22/2016    Cataract 08/09/2016    OAB (overactive bladder) 07/06/2016    Mixed hyperlipidemia 07/05/2016    Gastroesophageal reflux disease without esophagitis 07/05/2016    Chronic gastritis with bleeding - in February/March of 2016 per FOBT and EGD results 07/05/2016    History of CVA (cerebrovascular accident) 07/05/2016    Vitamin D deficiency 07/05/2016    Dysphagia s/p dilation 07/05/2016    Anemia 07/05/2016    Pacemaker (for h/o AV block) 07/05/2016    Intracranial aneurysm - left cavernous ICA on 2014 head/neck CTA 12/01/2014    HTN (hypertension) 10/13/2014    Atrial fibrillation ([address] 10/13/2014    Carotid stenosis 10/13/2014    PAD (peripheral artery disease) ([address] 10/13/2014    PVC's (premature ventricular contractions) 12/09/2011    Deep vein thrombosis ([facility] [address] 01/18/2011    Atherosclerotic heart disease, s/p CABG X3 in 6/08, in the setting of severe left ventricular dysfunction, and s/p a dual-chamber     S/P CABG x 3        Current Outpatient Prescriptions   Medication Sig Dispense Refill    ferrous sulfate (IRON) 325 mg (65 mg iron) EC tablet TAKE 1 TABLET BY MOUTH ONCE A DAY  3    furosemide (LASIX) 40 mg tablet TAKE 1 TABLET BY MOUTH AS NEEDED 30 Tab 3    amLODIPine (NORVASC) 2.5 mg tablet Take 1 Tab by mouth daily. 30 Tab 3    carvedilol (COREG) 25 mg tablet Take 1 Tab by mouth two (2) times a day. 180 Tab 3    aspirin 81 mg tablet Take 81 mg by mouth daily. Allergies   Allergen Reactions    Ace Inhibitors Cough    Codeine Unknown (comments), Nausea Only and Nausea and Vomiting    Hydralazine Other (comments)     Dizziness and Fatigue    Lipitor [Atorvastatin] Nausea and Vomiting and Vertigo       Past Medical History:   Diagnosis Date    Abnormal ankle brachial index 11/13/2014    Mild arterial disease in right leg. R VICTOR MANUEL 0.88. L VICTOR MANUEL 1.00.  Anemia     Arm DVT (deep venous thromboembolism), acute (HCC)     s/p anticoagulation    Atherosclerotic heart disease     Atrial fibrillation (HCC)     AV block     CAD (coronary artery disease)     Cardiomyopathy, ischemic     Carotid artery stenosis     Carotid duplex 11/13/2014    Mild <50% bilateral ICA plaquing. Possible left vertebral occlusion.  Chest pain     CVA (cerebral infarction)     Echocardiogram 08/19/2015    EF 55%. Apical inferior, apical septal hypk (new since study of 12/19/11), likely due to chronic V-pacing. Mild LVH. RVSP 30 mmHg. Mild LAE. Mild MR. Mild TR.  Gastritis     GERD (gastroesophageal reflux disease)     Hiatal hernia     Hyperlipidemia     Hypertension     Hypertensive cardiovascular disease     Intracranial aneurysm     left cavernous ICA 2mm aneurysm from head/neck CTA in 2014    Long term (current) use of anticoagulants 3/10/2011    Lower extremity venous duplex 01/26/2015    No DVT bilaterally.  Nuclear cardiac imaging test 09/24/2015    Low risk. Sm apical infarction w/no ischemia vs apical thinning.   Sm basal inferior defect, likely artifact. EF 56%. Inferoseptal, inferoapical WMA related to sternotomy or paced rhythm.  Pacemaker     PAD (peripheral artery disease) (HCC)     PVC's     chronic    S/P CABG x 3 2008    LIMA-LAD. SVG-OM. SVG-dRCA     S/P cardiac cath 2008    pRCA 100%. LM patent. Cx patent. OM1 100%. mLAD 95%. LVEDP 27-29mmHg. EF 20%. mod MR    Tilt table evaluation 1995    Positive for orthostatic hypotension    Vitamin D deficiency        Past Surgical History:   Procedure Laterality Date    CABG, ARTERY-VEIN, THREE  2008    CARDIAC SURG PROCEDURE UNLIST      medtronic dual-chamber cardioverted-defibrillator    HX  SECTION      x2    HX ENDOSCOPY  3/1/16    HX ENDOSCOPY  3/1/16    HX HEMORRHOIDECTOMY      1985    HX HYSTERECTOMY          HX ORTHOPAEDIC      knee surgery    HX OTHER SURGICAL  2/10/16    Pacemaker Gen Change    HX TUMOR REMOVAL      removed from arm.  benign       History reviewed. No pertinent family history. Social History   Substance Use Topics    Smoking status: Former Smoker    Smokeless tobacco: Never Used    Alcohol use No       ROS   Review of Systems   Constitutional: Negative for chills and fever. HENT: Negative for congestion, ear pain and sore throat. Eyes: Negative for blurred vision and pain. Respiratory: Negative for cough and shortness of breath. Cardiovascular: Positive for leg swelling (chronic). Negative for chest pain. Gastrointestinal: Negative for abdominal pain, blood in stool and melena. Genitourinary: Negative for dysuria, hematuria and urgency. Musculoskeletal: Negative for joint pain and myalgias. Skin: Negative for rash. Neurological: Negative for tingling, focal weakness and headaches. Endo/Heme/Allergies: Does not bruise/bleed easily. Psychiatric/Behavioral: Negative for substance abuse.        Objective     Vitals:    07/10/17 0828 07/10/17 0921   BP: 166/75 178/79   Pulse: 60 61 Resp: 20    Temp: 97 °F (36.1 °C)    TempSrc: Oral    SpO2: 99%    Weight: 210 lb 3.2 oz (95.3 kg)    Height: 5' 4\" (1.626 m)    PainSc:   0 - No pain        Physical Exam   Constitutional: She is oriented to person, place, and time and well-developed, well-nourished, and in no distress. HENT:   Head: Normocephalic and atraumatic. Right Ear: External ear normal.   Left Ear: External ear normal.   Nose: Nose normal.   Mouth/Throat: Oropharynx is clear and moist. No oropharyngeal exudate. Clear TMs   Eyes: Conjunctivae and EOM are normal. Pupils are equal, round, and reactive to light. Right eye exhibits no discharge. Left eye exhibits no discharge. No scleral icterus. Neck: Neck supple. Cardiovascular: Normal rate, regular rhythm, normal heart sounds and intact distal pulses. Exam reveals no gallop and no friction rub. No murmur heard. Pulmonary/Chest: Effort normal and breath sounds normal. No respiratory distress. She has no wheezes. She has no rales. Abdominal: Soft. Bowel sounds are normal. She exhibits no distension. There is no tenderness. There is no rebound and no guarding. Musculoskeletal: She exhibits edema (BLE symmetric edema). She exhibits no tenderness (BUE are NTTP). Lymphadenopathy:     She has no cervical adenopathy. Neurological: She is alert and oriented to person, place, and time. She exhibits normal muscle tone. Gait normal.   Skin: Skin is warm and dry. No erythema. Psychiatric: Affect normal.   Nursing note and vitals reviewed.       LABS   Data Review:   Lab Results   Component Value Date/Time    WBC 4.7 05/02/2017 10:45 AM    HGB 9.5 06/22/2017 09:05 AM    HCT 30.4 06/22/2017 09:05 AM    PLATELET 909 84/15/5470 10:45 AM    MCV 87.1 05/02/2017 10:45 AM       Lab Results   Component Value Date/Time    Sodium 142 06/22/2017 09:05 AM    Potassium 4.0 06/22/2017 09:05 AM    Chloride 108 06/22/2017 09:05 AM    CO2 26 06/22/2017 09:05 AM    Anion gap 8 06/22/2017 09:05 AM Glucose 84 06/22/2017 09:05 AM    BUN 27 06/22/2017 09:05 AM    Creatinine 1.30 06/22/2017 09:05 AM    BUN/Creatinine ratio 21 06/22/2017 09:05 AM    GFR est AA 48 06/22/2017 09:05 AM    GFR est non-AA 40 06/22/2017 09:05 AM    Calcium 8.6 06/22/2017 09:05 AM    Calcium 8.5 06/22/2017 09:05 AM       Lab Results   Component Value Date/Time    Cholesterol, total 178 08/19/2015 12:00 AM    Cholesterol, Total 183 11/23/2015 08:46 AM    HDL Cholesterol 74 08/19/2015 12:00 AM    LDL, calculated 94 08/19/2015 12:00 AM    VLDL, calculated 10 08/19/2015 12:00 AM    Triglyceride 52 08/19/2015 12:00 AM       Assessment/Plan:   1. Pyuria: The pt is presently asymptomatic. We will check a CBC, CMP, and urinalysis  We are placing a referral to the urology team evaluation of pyuria. The patient may need cystoscopy to further evaluate her urinalyses. ORDERS:  - CBC WITH AUTOMATED DIFF; Future  - METABOLIC PANEL, COMPREHENSIVE; Future  - URINALYSIS W/ RFLX MICROSCOPIC; Future  - REFERRAL TO UROLOGY    2. Essential hypertension: BP is not at goal. +Microalbuminuria. We will check a CMP, urine protein screen, and lipid panel. Continue with medications as prescribed. I will have her return for a blood pressure check. If it is still above 150/90, I will adjust her medications appropriately. ORDERS:  - METABOLIC PANEL, COMPREHENSIVE; Future  - LIPID PANEL; Future  - MICROALBUMIN, UR, RAND W/ MICROALBUMIN/CREA RATIO; Future    3. CKD (chronic kidney disease) stage 3, h/o JAS off/on, and Positive autoimmune serologies: Last creatinine of 1.3. +Suspected connective tissue disease/autoimmune disease. Checking a CMP, urinalysis, and urine protein screen  I encouraged patient to continue following up with her nephrologist.  Rogerio Mcdonald encouraged patient to go to her appointments with Dr. Adolfo Faustin and with the urology team given underlying connective tissue disorder and pyuria.     ORDERS:  - METABOLIC PANEL, COMPREHENSIVE; Future  - URINALYSIS W/ RFLX MICROSCOPIC; Future  - MICROALBUMIN, UR, RAND W/ MICROALBUMIN/CREA RATIO; Future  - REFERRAL TO UROLOGY    4. Anemia: +Iron deficient. On iron rx. Hemoglobin is in the 9 range. +Suspected connective tissue disease per positive autoimmune serologies. Checking a CBC, CMP, urinalysis, TFTs, and an iron profile. Continue with iron therapy as prescribed and the results above. ORDERS:  - CBC WITH AUTOMATED DIFF; Future  - METABOLIC PANEL, COMPREHENSIVE; Future  - URINALYSIS W/ RFLX MICROSCOPIC; Future  - TSH AND FREE T4; Future  - IRON PROFILE; Future    5. Bilateral edema of lower extremity: Has h/o LUE DVT s/p rx. Checking a bilateral lower cavity PVL study to rule out DVT. ORDERS:  - DUPLEX LOWER EXT VENOUS BILAT; Future      Lab review: labs are reviewed in the EHR. Labs ordered as mentioned above. I have discussed the diagnosis with the patient and the intended plan as seen in the above orders. The patient has received an after-visit summary and questions were answered concerning future plans. I have discussed medication side effects and warnings with the patient as well. I have reviewed the plan of care with the patient, accepted their input and they are in agreement with the treatment goals. All questions were answered. The patient understands the plan of care. Handouts provided today with above information. Pt instructed if symptoms worsen to call the office or report to the ED for continued care. Greater than 50% of the visit time was spent in counseling and/or coordination of care. Follow-up Disposition:  Return in about 6 weeks (around 8/21/2017) for BP check. MEDICARE ANNUAL WELLNESS VISIT/EXAM   INTERNISTS [facility]:  07/10/17, [id number]      The Subsequent [facility] PROGRESS NOTE    I have reviewed the patient's medical history in detail and updated the computerized patient record. Jada Carreno is a 78 y.o.   female and presents for an annual wellness exam.    Subjective:   Health Maintenance History:  Immunizations reviewed: Tdap over-due and pt declines this vaccination today   Pneumovax: over-due and pt declines this vaccination today   Flu: Due later this year  Zoster: over-due and pt declines this vaccination today      There is no immunization history on file for this patient. Colonoscopy: up to date; her next colonoscopy is due in 1-2 years per pt hx. We do not have records to substantiate this. Eye exam: Up to date. No changes to her vision reported today    Mammo: Overdue; no breast pain/masses    Dexascan: Overdue    Pelvic/Pap: No vaginal bleeding      Past Medical History:   Diagnosis Date    Abnormal ankle brachial index 11/13/2014    Mild arterial disease in right leg. R VICTOR MANUEL 0.88. L VICTOR MANUEL 1.00.  Anemia     Arm DVT (deep venous thromboembolism), acute (HCC)     s/p anticoagulation    Atherosclerotic heart disease     Atrial fibrillation (HCC)     AV block     CAD (coronary artery disease)     Cardiomyopathy, ischemic     Carotid artery stenosis     Carotid duplex 11/13/2014    Mild <50% bilateral ICA plaquing. Possible left vertebral occlusion.  Chest pain     CVA (cerebral infarction)     Echocardiogram 08/19/2015    EF 55%. Apical inferior, apical septal hypk (new since study of 12/19/11), likely due to chronic V-pacing. Mild LVH. RVSP 30 mmHg. Mild LAE. Mild MR. Mild TR.  Gastritis     GERD (gastroesophageal reflux disease)     Hiatal hernia     Hyperlipidemia     Hypertension     Hypertensive cardiovascular disease     Intracranial aneurysm     left cavernous ICA 2mm aneurysm from head/neck CTA in 2014    Long term (current) use of anticoagulants 3/10/2011    Lower extremity venous duplex 01/26/2015    No DVT bilaterally.  Nuclear cardiac imaging test 09/24/2015    Low risk. Sm apical infarction w/no ischemia vs apical thinning. Sm basal inferior defect, likely artifact. EF 56%. Inferoseptal, inferoapical WMA related to sternotomy or paced rhythm.  Pacemaker     PAD (peripheral artery disease) (HCC)     PVC's     chronic    S/P CABG x 3 2008    LIMA-LAD. SVG-OM. SVG-dRCA     S/P cardiac cath 2008    pRCA 100%. LM patent. Cx patent. OM1 100%. mLAD 95%. LVEDP 27-29mmHg. EF 20%. mod MR    Tilt table evaluation 1995    Positive for orthostatic hypotension    Vitamin D deficiency         Past Surgical History:   Procedure Laterality Date    CABG, ARTERY-VEIN, THREE  2008    CARDIAC SURG PROCEDURE UNLIST      medtronic dual-chamber cardioverted-defibrillator    HX  SECTION      x2    HX ENDOSCOPY  3/1/16    HX ENDOSCOPY  3/1/16    HX HEMORRHOIDECTOMY          HX HYSTERECTOMY          HX ORTHOPAEDIC      knee surgery    HX OTHER SURGICAL  2/10/16    Pacemaker Gen Change    HX TUMOR REMOVAL      removed from arm.  benign       Current Outpatient Prescriptions   Medication Sig Dispense Refill    ferrous sulfate (IRON) 325 mg (65 mg iron) EC tablet TAKE 1 TABLET BY MOUTH ONCE A DAY  3    furosemide (LASIX) 40 mg tablet TAKE 1 TABLET BY MOUTH AS NEEDED 30 Tab 3    amLODIPine (NORVASC) 2.5 mg tablet Take 1 Tab by mouth daily. 30 Tab 3    carvedilol (COREG) 25 mg tablet Take 1 Tab by mouth two (2) times a day. 180 Tab 3    aspirin 81 mg tablet Take 81 mg by mouth daily. Allergies   Allergen Reactions    Ace Inhibitors Cough    Codeine Unknown (comments), Nausea Only and Nausea and Vomiting    Hydralazine Other (comments)     Dizziness and Fatigue    Lipitor [Atorvastatin] Nausea and Vomiting and Vertigo       History reviewed. No pertinent family history.     Social History   Substance Use Topics    Smoking status: Former Smoker    Smokeless tobacco: Never Used    Alcohol use No       Patient Active Problem List   Diagnosis Code    Atherosclerotic heart disease, s/p CABG X3 in , in the setting of severe left ventricular dysfunction, and s/p a dual-chamber I25.10    S/P CABG x 3 Z95.1    Deep vein thrombosis (HCC) I82.409    PVC's (premature ventricular contractions) I49.3    HTN (hypertension) I10    Atrial fibrillation (HCC) I48.91    Carotid stenosis I65.29    PAD (peripheral artery disease) ([facility]) I73.9    Intracranial aneurysm - left cavernous ICA on 2014 head/neck CTA I67.1    Mixed hyperlipidemia E78.2    Gastroesophageal reflux disease without esophagitis K21.9    Chronic gastritis with bleeding - in February/March of 2016 per FOBT and EGD results K29.51    History of CVA (cerebrovascular accident) Z80.78    Vitamin D deficiency E55.9    Dysphagia s/p dilation R13.10    Anemia D64.9    Pacemaker (for h/o AV block) Z95.0    OAB (overactive bladder) N32.81    Cataract H26.9    Gout of left foot M10.9    Seasonal allergic rhinitis J30.2    Positive sm/RNP antibody, Sjogren Abs, Antichromatin Ab, and Parnell Ab R76.8    Microalbuminuria R80.9    FOREST positive R76.8    CKD (chronic kidney disease) stage 3, GFR 30-59 ml/min N18.3       Review of Systems   Constitutional: Negative for chills and fever. HENT: Negative for congestion, ear pain and sore throat. Eyes: Negative for blurred vision and pain. Respiratory: Negative for cough and shortness of breath. Cardiovascular: Positive for leg swelling (chronic). Negative for chest pain. Gastrointestinal: Negative for abdominal pain, blood in stool and melena. Genitourinary: Negative for dysuria, hematuria and urgency. Musculoskeletal: Negative for joint pain and myalgias. Skin: Negative for rash. Neurological: Negative for tingling, focal weakness and headaches. Endo/Heme/Allergies: Does not bruise/bleed easily. Psychiatric/Behavioral: Negative for substance abuse.        Depression Risk Factor Screening:    Patient Health Questionnaire (PHQ-2)   Over the last 2 weeks, how often have you been bothered by any of the following problems? · Little interest or pleasure in doing things? · Not at all. [0]  · Feeling down, depressed, or hopeless? · Not at all. [0]    Total Score: 0/6  PHQ-2 Assessment Scoring:   A score of 2 or more requires further screening with the PHQ-9    Alcohol Risk Factor Screening:   Women:   1. On any occasion during the past 3 months, have you had more than 3 drinks containing alcohol? no  2. Do you average more than 7 drinks per week?no    Tobacco Use Screening:     History   Smoking Status    Former Smoker   Smokeless Tobacco    Never Used     Hearing Loss    none reported today. Pt denies hearing loss. Activities of Daily Living   Self-care. She does not require assistance with ADLs/IADLs. Requires assistance with: no ADLs    Fall Risk   No falls within the past year    Abuse Screen   None; she feels safe in her home. Additional Examination Findings:  Vitals:    07/10/17 0828 07/10/17 0921   BP: 166/75 178/79   Pulse: 60 61   Resp: 20    Temp: 97 °F (36.1 °C)    TempSrc: Oral    SpO2: 99%    Weight: 210 lb 3.2 oz (95.3 kg)    Height: 5' 4\" (1.626 m)    PainSc:   0 - No pain       Body mass index is 36.08 kg/(m^2). Evaluation of Cognitive Function:  Mood/affect: Euthymic  Appearance: Well-groomed  Family member/caregiver input: The pt is not accompanied by a family member today    Physical Exam   Constitutional: She is oriented to person, place, and time and well-developed, well-nourished, and in no distress. HENT:   Head: Normocephalic and atraumatic. Right Ear: External ear normal.   Left Ear: External ear normal.   Nose: Nose normal.   Mouth/Throat: Oropharynx is clear and moist. No oropharyngeal exudate. Clear TMs   Eyes: Conjunctivae and EOM are normal. Pupils are equal, round, and reactive to light. Right eye exhibits no discharge. Left eye exhibits no discharge. No scleral icterus. Neck: Neck supple.    Cardiovascular: Normal rate, regular rhythm, normal heart sounds and intact distal pulses. Exam reveals no gallop and no friction rub. No murmur heard. Pulmonary/Chest: Effort normal and breath sounds normal. No respiratory distress. She has no wheezes. She has no rales. Abdominal: Soft. Bowel sounds are normal. She exhibits no distension. There is no tenderness. There is no rebound and no guarding. Musculoskeletal: She exhibits edema (BLE symmetric edema). She exhibits no tenderness (BUE are NTTP). Lymphadenopathy:     She has no cervical adenopathy. Neurological: She is alert and oriented to person, place, and time. She exhibits normal muscle tone. Gait normal.   Skin: Skin is warm and dry. No erythema. Psychiatric: Affect normal.   Nursing note and vitals reviewed. Dementia Screen (Mini-Cog):   Three Item Recall: 3/3  Clock Drawing (ten past eleven) Exercise: unremarkable clock drawing exercise      LABS   Data Review:   Lab Results   Component Value Date/Time    Sodium 142 06/22/2017 09:05 AM    Potassium 4.0 06/22/2017 09:05 AM    Chloride 108 06/22/2017 09:05 AM    CO2 26 06/22/2017 09:05 AM    Anion gap 8 06/22/2017 09:05 AM    Glucose 84 06/22/2017 09:05 AM    BUN 27 06/22/2017 09:05 AM    Creatinine 1.30 06/22/2017 09:05 AM    BUN/Creatinine ratio 21 06/22/2017 09:05 AM    GFR est AA 48 06/22/2017 09:05 AM    GFR est non-AA 40 06/22/2017 09:05 AM    Calcium 8.6 06/22/2017 09:05 AM    Calcium 8.5 06/22/2017 09:05 AM       Lab Results   Component Value Date/Time    WBC 4.7 05/02/2017 10:45 AM    HGB 9.5 06/22/2017 09:05 AM    HCT 30.4 06/22/2017 09:05 AM    PLATELET 859 31/63/4360 10:45 AM    MCV 87.1 05/02/2017 10:45 AM       Lab Results   Component Value Date/Time    Cholesterol, total 178 08/19/2015 12:00 AM    Cholesterol, Total 183 11/23/2015 08:46 AM    HDL Cholesterol 74 08/19/2015 12:00 AM    LDL, calculated 94 08/19/2015 12:00 AM    VLDL, calculated 10 08/19/2015 12:00 AM    Triglyceride 52 08/19/2015 12:00 AM       Patient Care Team:  Lawrence King MD as PCP - General (Family Practice)  Jenna Garrison MD (Vascular Surgery)  Adilson Ramírez DO (Cardiology)  [address] [place] [address], [place] (Podiatry)  Michelle Rico RN as Ambulatory Care Navigator (Internal Medicine)    End-of-life planning  Advanced Directive in the case that an injury or illness causes the patient to be unable to make health care decisions was discussed with the patient. Patient does not have a scanned advanced directive in the EHR. She would prefer for her son to be her power of . He lives locally. She states that she completed her advanced directive but left at home. We discussed the content of the advanced directive including the 2 scenarios regarding end-of-life care/wishes. All questions were answered. The patient agreed to bring her advanced directive that he can be scanned into the EHR. Advice/Referrals/Counselling/Plan:   Education and counseling provided:  Are appropriate based on today's review and evaluation  End-of-Life planning (with patient's consent)  Pneumococcal Vaccine  Influenza Vaccine  Screening Mammography  Screening Pap and pelvic (covered once every 2 years)  Colorectal cancer screening tests  Cardiovascular screening blood test  Bone mass measurement (DEXA)  Screening for glaucoma  Diabetes screening test     Include in education list (weight loss, physical activity, smoking cessation, fall prevention, and nutrition)    ICD-10-CM ICD-9-CM    1. CKD (chronic kidney disease) stage 3, GFR 30-59 ml/min V80.9 981.6 METABOLIC PANEL, COMPREHENSIVE      URINALYSIS W/ RFLX MICROSCOPIC      MICROALBUMIN, UR, RAND W/ MICROALBUMIN/CREA RATIO      REFERRAL TO UROLOGY   2. Pyuria N39.0 791.9 CBC WITH AUTOMATED DIFF      METABOLIC PANEL, COMPREHENSIVE      URINALYSIS W/ RFLX MICROSCOPIC      REFERRAL TO UROLOGY   3. Essential hypertension Y42 487.9 METABOLIC PANEL, COMPREHENSIVE      LIPID PANEL      MICROALBUMIN, UR, RAND W/ MICROALBUMIN/CREA RATIO   4. FOREST positive R76.8 795.79 CBC WITH AUTOMATED DIFF      METABOLIC PANEL, COMPREHENSIVE      URINALYSIS W/ RFLX MICROSCOPIC   5. Anemia, unspecified type D64.9 285.9 CBC WITH AUTOMATED DIFF      METABOLIC PANEL, COMPREHENSIVE      URINALYSIS W/ RFLX MICROSCOPIC      TSH AND FREE T4      IRON PROFILE   6. Bilateral edema of lower extremity R60.0 782.3 DUPLEX LOWER EXT VENOUS BILAT   7. Encounter for screening mammogram for breast cancer Z12.31 V76.12 SHIVA MAMMO BI SCREENING INCL CAD     lab results and schedule of future lab studies reviewed with patient  reviewed diet, exercise and weight control. Brief written plan, checklist - health maintenance - given to patient. Assessment/Plan:    Health maintenance:  The patient declined all vaccinations today. She declined a bone density study at this time. A mammogram was ordered to screen for breast cancer. We requesting records of her last colonoscopy. Lab review: labs are reviewed in the [facility] ([facility] Provider Conversation     Date of ACP Conversation: 07/10/17  Persons included in Conversation:  patient    Authorized Decision Maker (if patient is incapable of making informed decisions): This person is:   Healthcare Agent/Medical Power of  under Advance Directive          For Patients with Decision Making Capacity:   Values/Goals: Exploration of values, goals, and preferences if recovery is not expected, even with continued medical treatment in the event of:  Imminent death  Severe, permanent brain injury    Conversation Outcomes / Follow-Up Plan:   Advanced Directive in the case that an injury or illness causes the patient to be unable to make health care decisions was discussed with the patient. Patient does not have a scanned advanced directive in the EHR. She would prefer for her son to be her power of . He lives locally.   She states that she completed her advanced directive but left at home.  We discussed the content of the advanced directive including the 2 scenarios regarding end-of-life care/wishes. All questions were answered. The patient agreed to bring her advanced directive that he can be scanned into the EHR. I have discussed the diagnosis/diagnoses with the patient and the intended plan as seen in the above orders. The patient has received an after-visit summary and questions were answered concerning future plans. I have discussed medication side effects and warnings with the patient as well. I have reviewed the plan of care with the patient, accepted their input and they are in agreement with the treatment goals. All questions were answered. Follow-up Disposition:  Return in about 6 weeks (around 8/21/2017) for BP check.

## 2017-07-20 ENCOUNTER — HOSPITAL ENCOUNTER (OUTPATIENT)
Dept: MAMMOGRAPHY | Age: 77
Discharge: HOME | End: 2017-07-20
Attending: INTERNAL MEDICINE
Payer: MEDICARE

## 2017-07-20 ENCOUNTER — HOSPITAL ENCOUNTER (OUTPATIENT)
Dept: VASCULAR SURGERY | Age: 77
Discharge: HOME | End: 2017-07-20
Attending: INTERNAL MEDICINE
Payer: MEDICARE

## 2017-07-20 DIAGNOSIS — Z12.31 ENCOUNTER FOR SCREENING MAMMOGRAM FOR BREAST CANCER: ICD-10-CM

## 2017-07-20 DIAGNOSIS — R60.0 BILATERAL EDEMA OF LOWER EXTREMITY: ICD-10-CM

## 2017-07-20 PROCEDURE — 77067 SCR MAMMO BI INCL CAD: CPT

## 2017-07-20 PROCEDURE — 93970 EXTREMITY STUDY: CPT

## 2017-07-20 NOTE — PROCEDURES
[facility]  *** FINAL REPORT ***    Name: Chris Watt  MRN: [id number]    Outpatient  : 1940  HIS Order #: [id number]  [address] [facility] Visit #: [id number]  Date: 2017    TYPE OF TEST: Peripheral Venous Testing    REASON FOR TEST  Pain in limb    Right Leg:-  Deep venous thrombosis:           No  Superficial venous thrombosis:    No  Deep venous insufficiency:        Not examined  Superficial venous insufficiency: Not examined    Left Leg:-  Deep venous thrombosis:           No  Superficial venous thrombosis:    No  Deep venous insufficiency:        Not examined  Superficial venous insufficiency: Not examined      INTERPRETATION/FINDINGS  Duplex images were obtained using 2-D gray scale, color flow, and  spectral Doppler analysis. Right leg :  1. Deep vein(s) visualized include the common femoral, proximal  femoral, mid femoral, distal femoral, popliteal(above knee),  popliteal(fossa), popliteal(below knee), posterior tibial and peroneal   veins. 2. No evidence of deep venous thrombosis detected in the veins  visualized. 3. Superficial vein(s) visualized include the great saphenous vein. 4. No evidence of superficial thrombosis detected. Left leg :  1. Deep vein(s) visualized include the common femoral, proximal  femoral, mid femoral, distal femoral, popliteal(above knee),  popliteal(fossa), popliteal(below knee), posterior tibial and peroneal   veins. 2. No evidence of deep venous thrombosis detected in the veins  visualized. 3. Superficial vein(s) visualized include the great saphenous vein. 4. No evidence of superficial thrombosis detected. ADDITIONAL COMMENTS    I have personally reviewed the data relevant to the interpretation of  this  study. TECHNOLOGIST: Dg Tavares, [facility], RVT/  Signed: 2017 09:58 AM    PHYSICIAN: Angelina Oneill.  Lord Hannah MD  Signed: 2017 08:56 AM

## 2017-07-20 NOTE — ACP (ADVANCE CARE PLANNING)
Advance Care Planning (ACP) Provider Conversation Snapshot    Date of ACP Conversation: 07/20/17  Persons included in Conversation:  patient  Length of ACP Conversation in minutes:  <16 minutes (Non-Billable)    Authorized Decision Maker (if patient is incapable of making informed decisions): This person is:   Healthcare Agent/Medical Power of  under Advance Directive      For Patients with Decision Making Capacity:   Values/Goals: Exploration of values, goals, and preferences if recovery is not expected, even with continued medical treatment in the event of:  Imminent death  Severe, permanent brain injury    Conversation Outcomes / Follow-Up Plan:   Completed new Advance Directive. The pt wants Reanna Rutledge and Mago Sher to be her POA and successor POA respectively. The pt declines life support measures in the event that she has \"injury to the brain,\" and she is dying - death is imminent - and she will not recover with medical treatment. Otherwise, she wants all treatments to prolong her life. If she is unaware of her self, others, and her surroundings, and she is unable to interact with others, and it is reasonably certain she will not recover, even with medical treatment, the pt still wants 1 year on life support treatment/measures before the removal of such treatment modalities if she does not improve/regain her abilities. She completed her advanced directive.     Dr. Torres Whiteside  [facility] [address].  Phone: [phone]  Fax: [phone]

## 2017-08-22 ENCOUNTER — HOSPITAL ENCOUNTER (OUTPATIENT)
Dept: LAB | Age: 77
Discharge: HOME | End: 2017-08-22
Payer: MEDICARE

## 2017-08-22 ENCOUNTER — OFFICE VISIT (OUTPATIENT)
Dept: INTERNAL MEDICINE CLINIC | Age: 77
End: 2017-08-22

## 2017-08-22 VITALS
OXYGEN SATURATION: 99 % | DIASTOLIC BLOOD PRESSURE: 82 MMHG | WEIGHT: 214.6 LBS | BODY MASS INDEX: 36.64 KG/M2 | SYSTOLIC BLOOD PRESSURE: 185 MMHG | RESPIRATION RATE: 20 BRPM | HEIGHT: 64 IN | HEART RATE: 65 BPM | TEMPERATURE: 98.4 F

## 2017-08-22 DIAGNOSIS — I10 ESSENTIAL HYPERTENSION: Primary | ICD-10-CM

## 2017-08-22 DIAGNOSIS — R19.5 POSITIVE OCCULT STOOL BLOOD TEST: ICD-10-CM

## 2017-08-22 DIAGNOSIS — D64.9 ANEMIA, UNSPECIFIED TYPE: ICD-10-CM

## 2017-08-22 DIAGNOSIS — M35.1 MIXED CONNECTIVE TISSUE DISEASE (HCC): ICD-10-CM

## 2017-08-22 DIAGNOSIS — R82.81 PYURIA: ICD-10-CM

## 2017-08-22 PROCEDURE — 82274 ASSAY TEST FOR BLOOD FECAL: CPT | Performed by: INTERNAL MEDICINE

## 2017-08-22 RX ORDER — NIFEDIPINE 60 MG/1
60 TABLET, EXTENDED RELEASE ORAL DAILY
Qty: 30 TAB | Refills: 11 | Status: SHIPPED | OUTPATIENT
Start: 2017-08-22 | End: 2017-08-29 | Stop reason: SINTOL

## 2017-08-22 NOTE — PROGRESS NOTES
[facility]:  8/22/2017, MRN: [id number]      Leo Kowalski is a 78 y.o. female and presents to clinic for Hypertension (follow up); Chronic Kidney Disease (follow up); and Labs (done 7-10-17 to discuss)    Subjective:   Patient is a 59-year-old -American female with history of allergic rhinitis, gout, cataract, overactive bladder, hyperlipidemia, GERD, mixed connective tissue disease (positive FOREST, RNP Ab, Sjogren's Ab, anti-chromatin Ab, and Anti-Parnell Ab), chronic gastritis with bleeding in March 2016, history of stroke, vitamin D deficiency, dysphasia status post dilation, anemia, pacemaker for history of AV block, left cavernous ICA aneurysm, hypertension, atrial fibrillation, carotid stenosis, peripheral artery disease, PVCs, DVT hx (LUE - postoperatively), and CAD status post CABG. 1.  Acute kidney injury and Mixed Connective Tissue Disease: The patient's most recent lab work shows resolution of underlying acute kidney injury. She has had intermittent episodes of AK I the past several months. Complicating this matter is the fact that the patient admits to not always hydrating well with water. At some point, a retroperitoneal ultrasound was done earlier this year did not show any significant findings. Note that she is positive for FOREST, RNP antibody, Sjogren's antibodies, anti-chromatin antibody, anti-Smith antibody. She also has anemia. The patient was referred to the rheumatology team for evaluation. The patient has yet to schedule an appointment with rheumatology team.  No alopecia or mouth sores. No chest pain or shortness of breath at rest.    2.  Hypertension: This is a chronic condition, present over one year. Her last creatinine was in the 1.2 range. She is followed by a nephrologist.  She has a history of microalbuminuria. She is on carvedilol, amlodipine, and Lasix. She admits only taking the Lasix every other day for bilateral lower extremity edema.   Her amlodipine dose is only 2.5 mg daily. 3.  Pyuria: The patient has a history of significant pyuria on more than 2 urinalyses. A referral was placed to the urology team at her last appointment. She denies all urinary symptoms. She was scheduled to see Dr. Clinton Piña but missed this appointment. 4.  Anemia: The patient has a hemoglobin consistently in the 10 range. She has mixed connective tissue disease per my diagnosis based on recent serology studies mentioned above. She denies hematuria, vaginal bleeding, and blood in the stool. She had a fecal occult blood test in 2016. At that time she was found to have microscopic blood in her stool. Per patient, she is not due for her colonoscopy. She has not had a follow-up fecal occult blood test since 2016. She does have shortness of breath when walking for prolonged amounts of time. Given bilateral lower extremity edema, and her last appointment if PVL study was ordered and negative for DVT. She has a history of a left upper extremity DVT postoperatively. She completed anticoagulation appropriately.       Patient Active Problem List    Diagnosis Date Noted    Microalbuminuria 07/10/2017    FOREST positive 07/10/2017    CKD (chronic kidney disease) stage 3, GFR 30-59 ml/min 07/10/2017    Positive sm/RNP antibody, Sjogren Abs, Antichromatin Ab, and Parnell Ab 04/01/2017    Seasonal allergic rhinitis 11/23/2016    Gout of left foot 11/22/2016    Cataract 08/09/2016    OAB (overactive bladder) 07/06/2016    Mixed hyperlipidemia 07/05/2016    Gastroesophageal reflux disease without esophagitis 07/05/2016    Chronic gastritis with bleeding - in February/March of 2016 per FOBT and EGD results 07/05/2016    History of CVA (cerebrovascular accident) 07/05/2016    Vitamin D deficiency 07/05/2016    Dysphagia s/p dilation 07/05/2016    Anemia 07/05/2016    Pacemaker (for h/o AV block) 07/05/2016    Intracranial aneurysm - left cavernous ICA on 2014 head/neck CTA 12/01/2014    HTN (hypertension) 10/13/2014    Atrial fibrillation ([facility] [place] [facility] 10/13/2014    Carotid stenosis 10/13/2014    PAD (peripheral artery disease) ([facility] [place] [facility] 10/13/2014    PVC's (premature ventricular contractions) 12/09/2011    Deep vein thrombosis ([facility] [place] [facility] 01/18/2011    Atherosclerotic heart disease, s/p CABG X3 in 6/08, in the setting of severe left ventricular dysfunction, and s/p a dual-chamber     S/P CABG x 3        Current Outpatient Prescriptions   Medication Sig Dispense Refill    NIFEdipine ER (ADALAT CC) 60 mg ER tablet Take 1 Tab by mouth daily. 30 Tab 11    ferrous sulfate (IRON) 325 mg (65 mg iron) EC tablet TAKE 1 TABLET BY MOUTH ONCE A DAY  3    furosemide (LASIX) 40 mg tablet TAKE 1 TABLET BY MOUTH AS NEEDED 30 Tab 3    carvedilol (COREG) 25 mg tablet Take 1 Tab by mouth two (2) times a day. 180 Tab 3    aspirin 81 mg tablet Take 81 mg by mouth daily. Allergies   Allergen Reactions    Ace Inhibitors Cough    Codeine Unknown (comments), Nausea Only and Nausea and Vomiting    Hydralazine Other (comments)     Dizziness and Fatigue    Lipitor [Atorvastatin] Nausea and Vomiting and Vertigo       Past Medical History:   Diagnosis Date    Abnormal ankle brachial index 11/13/2014    Mild arterial disease in right leg. R VICTOR MANUEL 0.88. L VICTOR MANUEL 1.00.  Anemia     Arm DVT (deep venous thromboembolism), acute (HCC)     s/p anticoagulation    Atherosclerotic heart disease     Atrial fibrillation (HCC)     AV block     CAD (coronary artery disease)     Cardiomyopathy, ischemic     Carotid artery stenosis     Carotid duplex 11/13/2014    Mild <50% bilateral ICA plaquing. Possible left vertebral occlusion.  Chest pain     CVA (cerebral infarction)     Echocardiogram 08/19/2015    EF 55%. Apical inferior, apical septal hypk (new since study of 12/19/11), likely due to chronic V-pacing. Mild LVH. RVSP 30 mmHg. Mild LAE. Mild MR. Mild TR.       Gastritis     GERD (gastroesophageal reflux disease)     Hiatal hernia     Hyperlipidemia     Hypertension     Hypertensive cardiovascular disease     Intracranial aneurysm     left cavernous ICA 2mm aneurysm from head/neck CTA in 2014    Long term (current) use of anticoagulants 3/10/2011    Lower extremity venous duplex 2015    No DVT bilaterally.  Nuclear cardiac imaging test 2015    Low risk. Sm apical infarction w/no ischemia vs apical thinning. Sm basal inferior defect, likely artifact. EF 56%. Inferoseptal, inferoapical WMA related to sternotomy or paced rhythm.  Pacemaker     PAD (peripheral artery disease) (HCC)     PVC's     chronic    S/P CABG x 3 2008    LIMA-LAD. SVG-OM. SVG-dRCA     S/P cardiac cath 2008    pRCA 100%. LM patent. Cx patent. OM1 100%. mLAD 95%. LVEDP 27-29mmHg. EF 20%. mod MR    Tilt table evaluation 1995    Positive for orthostatic hypotension    Vitamin D deficiency        Past Surgical History:   Procedure Laterality Date    CABG, ARTERY-VEIN, THREE  2008    CARDIAC SURG PROCEDURE UNLIST      medtronic dual-chamber cardioverted-defibrillator    HX  SECTION      x2    HX ENDOSCOPY  3/1/16    HX ENDOSCOPY  3/1/16    HX HEMORRHOIDECTOMY      1985    HX HYSTERECTOMY          HX ORTHOPAEDIC      knee surgery    HX OTHER SURGICAL  2/10/16    Pacemaker Gen Change    HX TUMOR REMOVAL      removed from arm.  benign       History reviewed. No pertinent family history. Social History   Substance Use Topics    Smoking status: Former Smoker    Smokeless tobacco: Never Used    Alcohol use No       ROS   Review of Systems   Constitutional: Negative for chills and fever. HENT: Negative for ear pain and sore throat. Eyes: Negative for blurred vision and pain. Respiratory: Negative for cough and shortness of breath. Cardiovascular: Positive for leg swelling (chronic). Negative for chest pain.    Gastrointestinal: Negative for abdominal pain, blood in stool and melena. Genitourinary: Negative for dysuria. Musculoskeletal: Negative for joint pain and myalgias. Skin: Negative for rash. Neurological: Negative for tingling, focal weakness and headaches. Endo/Heme/Allergies: Does not bruise/bleed easily. Psychiatric/Behavioral: Negative for substance abuse. Objective     Vitals:    08/22/17 0804   BP: 185/82   Pulse: 65   Resp: 20   Temp: 98.4 °F (36.9 °C)   TempSrc: Oral   SpO2: 99%   Weight: 214 lb 9.6 oz (97.3 kg)   Height: 5' 4\" (1.626 m)   PainSc:   0 - No pain       Physical Exam   Constitutional: She is oriented to person, place, and time and well-developed, well-nourished, and in no distress. HENT:   Head: Normocephalic and atraumatic. Right Ear: External ear normal.   Left Ear: External ear normal.   Nose: Nose normal.   Mouth/Throat: Oropharynx is clear and moist. No oropharyngeal exudate. Eyes: Conjunctivae and EOM are normal. Pupils are equal, round, and reactive to light. Right eye exhibits no discharge. Left eye exhibits no discharge. No scleral icterus. Neck: Neck supple. Cardiovascular: Normal rate, regular rhythm, normal heart sounds and intact distal pulses. Exam reveals no gallop and no friction rub. No murmur heard. Pulmonary/Chest: Effort normal and breath sounds normal. No respiratory distress. She has no wheezes. She has no rales. Abdominal: Soft. Bowel sounds are normal. She exhibits no distension. There is no tenderness. Musculoskeletal: She exhibits edema (+1 edema - symmetric in BLE). She exhibits no tenderness (bue are NTTP). Lymphadenopathy:     She has no cervical adenopathy. Neurological: She is alert and oriented to person, place, and time. She exhibits normal muscle tone. Gait normal.   Skin: Skin is warm and dry. No erythema. Psychiatric: Affect normal.   Nursing note and vitals reviewed.       LABS   Data Review:   Lab Results   Component Value Date/Time    WBC 4.7 07/10/2017 11:17 AM    HGB 10.0 07/10/2017 11:17 AM    HCT 31.9 07/10/2017 11:17 AM    PLATELET 818 06/40/3843 11:17 AM    MCV 86.2 07/10/2017 11:17 AM       Lab Results   Component Value Date/Time    Sodium 140 07/10/2017 11:17 AM    Potassium 4.2 07/10/2017 11:17 AM    Chloride 108 07/10/2017 11:17 AM    CO2 24 07/10/2017 11:17 AM    Anion gap 8 07/10/2017 11:17 AM    Glucose 81 07/10/2017 11:17 AM    BUN 36 07/10/2017 11:17 AM    Creatinine 1.30 07/10/2017 11:17 AM    BUN/Creatinine ratio 28 07/10/2017 11:17 AM    GFR est AA 48 07/10/2017 11:17 AM    GFR est non-AA 40 07/10/2017 11:17 AM    Calcium 8.9 07/10/2017 11:17 AM       Lab Results   Component Value Date/Time    Cholesterol, total 215 07/10/2017 11:17 AM    HDL Cholesterol 93 07/10/2017 11:17 AM    LDL, calculated 108.6 07/10/2017 11:17 AM    VLDL, calculated 13.4 07/10/2017 11:17 AM    Triglyceride 67 07/10/2017 11:17 AM    CHOL/HDL Ratio 2.3 07/10/2017 11:17 AM     Assessment/Plan:   1. Anemia: H/o a positive fecal occult blood test in 2016.   -Ordering occult blood stool test.  If this is positive, I discussed the need for her to get a colonoscopy to investigate. ORDERS:  - OCCULT BLOOD, IMMUNOASSAY (FIT); Future    2. Mixed connective tissue disease: +Positive for FOREST, RNP Ab, Sjogren Abs, Antichromatin Ab, and Parnell Ab  -We will assist the patient was getting an appointment scheduled with the rheumatology team.  She was given the contact information for Dr. Rhys Collins office again and instructed to schedule an appointment ASAP to establish care.  -A several page handout was given to the patient on mixed connective tissue disease. All questions were answered. 3.  Pyuria: Etiology is unknown.  -Patient was given the contact information to Dr. Rodrigues Corporal office. We instructed her to schedule an appointment for potential cystoscopy to better investigate her UA findings    4.   Hypertension: Blood pressure is not at goal.  Her home BP cuff measurement is not consistent with our machine today.  -I encouraged patient to use a different BP cuff. -Discontinuing amlodipine given bilateral lower extremity edema.  -Ordering nifedipine 60 mg daily. The patient was instructed to continue taking carvedilol. I instructed her to also take Lasix on a daily basis. -I will have her return to clinic in 1 week to check her blood pressure. Health Maintenance Due   Topic Date Due    COLONOSCOPY  01/25/2018     Lab review: labs are reviewed in the EHR    I have discussed the diagnosis with the patient and the intended plan as seen in the above orders. The patient has received an after-visit summary and questions were answered concerning future plans. I have discussed medication side effects and warnings with the patient as well. I have reviewed the plan of care with the patient, accepted their input and they are in agreement with the treatment goals. All questions were answered. The patient understands the plan of care. Handouts provided today with above information. Pt instructed if symptoms worsen to call the office or report to the ED for continued care. Greater than 50% of the visit time was spent in counseling and/or coordination of care. Follow-up Disposition:  Return in about 1 week (around 8/29/2017), or if symptoms worsen or fail to improve, for BP check.     Krystyna Castellon MD

## 2017-08-22 NOTE — PATIENT INSTRUCTIONS
Body Mass Index: Care Instructions  Your Care Instructions    Body mass index (BMI) can help you see if your weight is raising your risk for health problems. It uses a formula to compare how much you weigh with how tall you are. · A BMI lower than 18.5 is considered underweight. · A BMI between 18.5 and 24.9 is considered healthy. · A BMI between 25 and 29.9 is considered overweight. A BMI of 30 or higher is considered obese. If your BMI is in the normal range, it means that you have a lower risk for weight-related health problems. If your BMI is in the overweight or obese range, you may be at increased risk for weight-related health problems, such as high blood pressure, heart disease, stroke, arthritis or joint pain, and diabetes. If your BMI is in the underweight range, you may be at increased risk for health problems such as fatigue, lower protection (immunity) against illness, muscle loss, bone loss, hair loss, and hormone problems. BMI is just one measure of your risk for weight-related health problems. You may be at higher risk for health problems if you are not active, you eat an unhealthy diet, or you drink too much alcohol or use tobacco products. Follow-up care is a key part of your treatment and safety. Be sure to make and go to all appointments, and call your doctor if you are having problems. It's also a good idea to know your test results and keep a list of the medicines you take. How can you care for yourself at home? · Practice healthy eating habits. This includes eating plenty of fruits, vegetables, whole grains, lean protein, and low-fat dairy. · If your doctor recommends it, get more exercise. Walking is a good choice. Bit by bit, increase the amount you walk every day. Try for at least 30 minutes on most days of the week. · Do not smoke. Smoking can increase your risk for health problems. If you need help quitting, talk to your doctor about stop-smoking programs and medicines. These can increase your chances of quitting for good. · Limit alcohol to 2 drinks a day for men and 1 drink a day for women. Too much alcohol can cause health problems. If you have a BMI higher than 25  · Your doctor may do other tests to check your risk for weight-related health problems. This may include measuring the distance around your waist. A waist measurement of more than 40 inches in men or 35 inches in women can increase the risk of weight-related health problems. · Talk with your doctor about steps you can take to stay healthy or improve your health. You may need to make lifestyle changes to lose weight and stay healthy, such as changing your diet and getting regular exercise. If you have a BMI lower than 18.5  · Your doctor may do other tests to check your risk for health problems. · Talk with your doctor about steps you can take to stay healthy or improve your health. You may need to make lifestyle changes to gain or maintain weight and stay healthy, such as getting more healthy foods in your diet and doing exercises to build muscle. Where can you learn more? Go to http://jacquelyn-alberto.info/. Enter S176 in the search box to learn more about \"Body Mass Index: Care Instructions. \"  Current as of: January 23, 2017  Content Version: 11.3  © 1913-8269 [employer]. Care instructions adapted under license by [employer] (which disclaims liability or warranty for this information). If you have questions about a medical condition or this instruction, always ask your healthcare professional. [place] [address] any warranty or liability for your use of this information. Health Maintenance Due   Topic Date Due    INFLUENZA AGE 9 TO ADULT  08/01/2017    COLONOSCOPY  01/25/2018     Nifedipine (By mouth)   Nifedipine (ory-ZDM-e-peen)  Treats high blood pressure and angina (chest pain). This medicine is a calcium channel blocker.    Brand Name(s): Adalat CC, Afeditab CR, Nifedical XL, Procardia, Procardia XL   There may be other brand names for this medicine. When This Medicine Should Not Be Used: This medicine is not right for everyone. Do not use it if you had an allergic reaction to nifedipine. How to Use This Medicine:   Capsule, Liquid Filled Capsule, Long Acting Tablet  · Take your medicine as directed. Your dose may need to be changed several times to find what works best for you. · It is best to take this medicine on an empty stomach. · Swallow the capsule or tablet whole. Do not break, crush, or chew it. · If you take the extended-release tablet, part of the tablet may pass into your stools. This is normal and is nothing to worry about. · Missed dose: Take a dose as soon as you remember. If it is almost time for your next dose, wait until then and take a regular dose. Do not take extra medicine to make up for a missed dose. · Store the medicine in a closed container at room temperature, away from heat, moisture, and direct light. Drugs and Foods to Avoid:   Ask your doctor or pharmacist before using any other medicine, including over-the-counter medicines, vitamins, and herbal products. · Some foods and medicines can affect how nifedipine works. Tell your doctor if you are using the following:   ¨ Cimetidine, clarithromycin, digoxin, erythromycin, fentanyl, fluconazole, fluoxetine, indinavir, itraconazole, nefazodone, nelfinavir, phenytoin, quinidine, ranitidine, saquinavir  ¨ Beta-blockers  ¨ Blood thinner (such as warfarin)  · Do not eat grapefruit or drink grapefruit juice while you are using this medicine. Warnings While Using This Medicine:   · Tell your doctor if you are pregnant or breastfeeding, or if you have stomach problems, heart failure, coronary artery disease, or recently had a heart attack.   · This medicine may cause the following problems:   ¨ Heart failure  ¨ Worsening chest pain or increased risk of heart attack  ¨ Stomach or bowel blockage or ulcers  · This medicine could lower your blood pressure too much, especially when you first use it or if you are dehydrated. Stand or sit up slowly if you feel lightheaded or dizzy. · Do not stop using this medicine without asking your doctor, even if you feel well. This medicine will not cure high blood pressure, but it will help keep it in normal range. You may have to take blood pressure medicine for the rest of your life. · Tell any doctor or dentist who treats you that you are using this medicine. You may need to stop using this medicine several days before you have surgery or medical tests. · Tell any doctor or dentist who treats you that you are using this medicine. This medicine may affect certain medical test results. · Your doctor will do lab tests at regular visits to check on the effects of this medicine. Keep all appointments. · Keep all medicine out of the reach of children. Never share your medicine with anyone. Possible Side Effects While Using This Medicine:   Call your doctor right away if you notice any of these side effects:  · Allergic reaction: Itching or hives, swelling in your face or hands, swelling or tingling in your mouth or throat, chest tightness, trouble breathing  · Blistering, peeling, red skin rash  · Chest pain that may spread, trouble breathing, nausea, unusual sweating, fainting  · Fast, pounding, or uneven heartbeat  · Lightheadedness, dizziness, or fainting  · Rapid weight gain, swelling in your hands, ankles, or feet  · Stomach pain or bloating, nausea, vomiting, weight loss  If you notice these less serious side effects, talk with your doctor:   · Headache  · Muscle cramps or tremors  · Warmth or redness in your face, neck, arms, or upper chest  If you notice other side effects that you think are caused by this medicine, tell your doctor. Call your doctor for medical advice about side effects.  You may report side effects to FDA at 5-455-FDA-3662  © 2017 [facility] Information is for End User's use only and may not be sold, redistributed or otherwise used for commercial purposes. The above information is an  only. It is not intended as medical advice for individual conditions or treatments. Talk to your doctor, nurse or pharmacist before following any medical regimen to see if it is safe and effective for you.

## 2017-08-22 NOTE — MR AVS SNAPSHOT
Visit Information Date & Time Provider Department Dept. Phone Encounter #  
 8/22/2017  8:00 AM Art Moran MD Internist of [facility] [address] [id number] Follow-up Instructions Return in about 1 week (around 8/29/2017), or if symptoms worsen or fail to improve, for BP check. Your Appointments 8/29/2017 10:15 AM  
Office Visit with Art Moran MD  
Internist of [address] [place] [address]) Appt Note: ov 1wk per felton [address], [address] [place] [address]  
  
   
 [address], [facility]  
  
    
 9/5/2017  9:30 AM  
New Patient with Anaya Spencer MD  
[facility] [address] [place] [address]) Appt Note: Pyuria/Mailed paperwork; .  
 [address] [address]  
[phone] [address] [address]  
  
    
 10/24/2017  9:00 AM  
Follow Up with Awais Lindo DO [facility] [place] [address] ([address] [place] [address]) Appt Note: 6 month follow up [place] [phone]  
[phone] [facility] 53 [id number]  
  
    
 12/18/2017  2:45 PM  
PROCEDURE with [facility] 2 [facility] ([address] [place] [address]) Appt Note: cv wild; .  
 [phone] [facility] [address]  
[phone] Sarah Rich [address] [place] [address]  
  
    
 12/18/2017  3:45 PM  
PROCEDURE with [facility] NONIMAGING [facility] ([address] [place] [address]) Appt Note: leg art wild; .  
 [phone] [facility] [address]  
[phone] 2300 [facility] Areli Judah [address] [place] [address]  
  
    
 1/4/2018  9:00 AM  
Follow Up with Miroslava Norris [facility] ([address] [place] [address]) Appt Note: 2 year fu after studies at [facility] on 12/18/2017; .  
 [phone] [facility] [address]  
[phone] 2301 [facility] 200 [facility] [address] Upcoming Health Maintenance Date Due COLONOSCOPY 1/25/2018 Pneumococcal 65+ High/Highest Risk (2 of 2 - PPSV23) 9/4/2017 MEDICARE YEARLY EXAM 7/11/2018 GLAUCOMA SCREENING Q2Y 8/8/2018 DTaP/Tdap/Td series (2 - Td) 11/22/2026 Allergies as of 8/22/2017  Review Complete On: 8/22/2017 By: Johnny Levy Severity Noted Reaction Type Reactions Ace Inhibitors    Cough Codeine  11/12/2008    Unknown (comments), Nausea Only, Nausea and Vomiting Hydralazine  03/29/2017   Intolerance Other (comments) Dizziness and Fatigue Lipitor [Atorvastatin]  05/20/2014    Nausea and Vomiting, Vertigo Current Immunizations  Never Reviewed No immunizations on file. Not reviewed this visit You Were Diagnosed With   
  
 Codes Comments Anemia, unspecified type    -  Primary ICD-10-CM: D64.9 ICD-9-CM: 285.9 Positive occult stool blood test     ICD-10-CM: R19.5 ICD-9-CM: 792.1 Mixed connective tissue disease ([facility]     ICD-10-CM: M35.1 ICD-9-CM: 710.8 Vitals BP Pulse Temp Resp Height(growth percentile) Weight(growth percentile) 185/82 (BP 1 Location: Left arm, BP Patient Position: Sitting) 65 98.4 °F (36.9 °C) (Oral) 20 5' 4\" (1.626 m) 214 lb 9.6 oz (97.3 kg) SpO2 BMI OB Status Smoking Status 99% 36.84 kg/m2 Postmenopausal Former Smoker BMI and BSA Data Body Mass Index Body Surface Area  
 36.84 kg/m 2 2.1 m 2 Preferred Pharmacy Pharmacy Name Phone [id number] Karolina, [address] [place] [address] [id number] [place] [address] Your Updated Medication List  
  
   
This list is accurate as of: 8/22/17  8:44 AM.  Always use your most recent med list.  
  
  
  
  
 aspirin 81 mg tablet Take 81 mg by mouth daily. carvedilol 25 mg tablet Commonly known as:  Francetammi Elders Take 1 Tab by mouth two (2) times a day. ferrous sulfate 325 mg (65 mg iron) EC tablet Commonly known as:  IRON  
TAKE 1 TABLET BY MOUTH ONCE A DAY  
  
 furosemide 40 mg tablet Commonly known as:  LASIX TAKE 1 TABLET BY MOUTH AS NEEDED  
  
 NIFEdipine ER 60 mg ER tablet Commonly known as:  ADALAT CC Take 1 Tab by mouth daily. Prescriptions Sent to Pharmacy Refills NIFEdipine ER (ADALAT CC) 60 mg ER tablet 11 Sig: Take 1 Tab by mouth daily. Class: Normal  
 Pharmacy: [id number]- Nadiya Rico, [address] [facility],[address] #: [phone] Route: Oral  
  
Follow-up Instructions Return in about 1 week (around 8/29/2017), or if symptoms worsen or fail to improve, for BP check. To-Do List   
 08/22/2017 Lab:  OCCULT BLOOD, IMMUNOASSAY (FIT) Patient Instructions Body Mass Index: Care Instructions Your Care Instructions Body mass index (BMI) can help you see if your weight is raising your risk for health problems. It uses a formula to compare how much you weigh with how tall you are. · A BMI lower than 18.5 is considered underweight. · A BMI between 18.5 and 24.9 is considered healthy. · A BMI between 25 and 29.9 is considered overweight. A BMI of 30 or higher is considered obese. If your BMI is in the normal range, it means that you have a lower risk for weight-related health problems. If your BMI is in the overweight or obese range, you may be at increased risk for weight-related health problems, such as high blood pressure, heart disease, stroke, arthritis or joint pain, and diabetes. If your BMI is in the underweight range, you may be at increased risk for health problems such as fatigue, lower protection (immunity) against illness, muscle loss, bone loss, hair loss, and hormone problems. BMI is just one measure of your risk for weight-related health problems. You may be at higher risk for health problems if you are not active, you eat an unhealthy diet, or you drink too much alcohol or use tobacco products. Follow-up care is a key part of your treatment and safety. Be sure to make and go to all appointments, and call your doctor if you are having problems. It's also a good idea to know your test results and keep a list of the medicines you take. How can you care for yourself at home? · Practice healthy eating habits. This includes eating plenty of fruits, vegetables, whole grains, lean protein, and low-fat dairy. · If your doctor recommends it, get more exercise. Walking is a good choice. Bit by bit, increase the amount you walk every day. Try for at least 30 minutes on most days of the week. · Do not smoke. Smoking can increase your risk for health problems. If you need help quitting, talk to your doctor about stop-smoking programs and medicines. These can increase your chances of quitting for good. · Limit alcohol to 2 drinks a day for men and 1 drink a day for women. Too much alcohol can cause health problems. If you have a BMI higher than 25 · Your doctor may do other tests to check your risk for weight-related health problems. This may include measuring the distance around your waist. A waist measurement of more than 40 inches in men or 35 inches in women can increase the risk of weight-related health problems. · Talk with your doctor about steps you can take to stay healthy or improve your health. You may need to make lifestyle changes to lose weight and stay healthy, such as changing your diet and getting regular exercise. If you have a BMI lower than 18.5 · Your doctor may do other tests to check your risk for health problems. · Talk with your doctor about steps you can take to stay healthy or improve your health.  You may need to make lifestyle changes to gain or maintain weight and stay healthy, such as getting more healthy foods in your diet and doing exercises to build muscle. Where can you learn more? Go to http://jacquelyn-alberto.info/. Enter S176 in the search box to learn more about \"Body Mass Index: Care Instructions. \" Current as of: January 23, 2017 Content Version: 11.3 © 8051-3422 [employer]. Care instructions adapted under license by [employer] (which disclaims liability or warranty for this information). If you have questions about a medical condition or this instruction, always ask your healthcare professional. [place] [address] any warranty or liability for your use of this information. Health Maintenance Due Topic Date Due  
 INFLUENZA AGE 9 TO ADULT  08/01/2017  COLONOSCOPY  01/25/2018 Nifedipine (By mouth) Nifedipine (tez-EUC-b-peen) Treats high blood pressure and angina (chest pain). This medicine is a calcium channel blocker. Brand Name(s): Adalat CC, Afeditab CR, Nifedical XL, Procardia, Procardia XL There may be other brand names for this medicine. When This Medicine Should Not Be Used: This medicine is not right for everyone. Do not use it if you had an allergic reaction to nifedipine. How to Use This Medicine:  
Capsule, Liquid Filled Capsule, Long Acting Tablet · Take your medicine as directed. Your dose may need to be changed several times to find what works best for you. · It is best to take this medicine on an empty stomach. · Swallow the capsule or tablet whole. Do not break, crush, or chew it. · If you take the extended-release tablet, part of the tablet may pass into your stools. This is normal and is nothing to worry about. · Missed dose: Take a dose as soon as you remember. If it is almost time for your next dose, wait until then and take a regular dose. Do not take extra medicine to make up for a missed dose. · Store the medicine in a closed container at room temperature, away from heat, moisture, and direct light. Drugs and Foods to Avoid: Ask your doctor or pharmacist before using any other medicine, including over-the-counter medicines, vitamins, and herbal products. · Some foods and medicines can affect how nifedipine works. Tell your doctor if you are using the following: ¨ Cimetidine, clarithromycin, digoxin, erythromycin, fentanyl, fluconazole, fluoxetine, indinavir, itraconazole, nefazodone, nelfinavir, phenytoin, quinidine, ranitidine, saquinavir ¨ Beta-blockers ¨ Blood thinner (such as warfarin) · Do not eat grapefruit or drink grapefruit juice while you are using this medicine. Warnings While Using This Medicine: · Tell your doctor if you are pregnant or breastfeeding, or if you have stomach problems, heart failure, coronary artery disease, or recently had a heart attack. · This medicine may cause the following problems:  
¨ Heart failure ¨ Worsening chest pain or increased risk of heart attack ¨ Stomach or bowel blockage or ulcers · This medicine could lower your blood pressure too much, especially when you first use it or if you are dehydrated. Stand or sit up slowly if you feel lightheaded or dizzy. · Do not stop using this medicine without asking your doctor, even if you feel well. This medicine will not cure high blood pressure, but it will help keep it in normal range. You may have to take blood pressure medicine for the rest of your life. · Tell any doctor or dentist who treats you that you are using this medicine. You may need to stop using this medicine several days before you have surgery or medical tests. · Tell any doctor or dentist who treats you that you are using this medicine. This medicine may affect certain medical test results. · Your doctor will do lab tests at regular visits to check on the effects of this medicine. Keep all appointments. · Keep all medicine out of the reach of children. Never share your medicine with anyone. Possible Side Effects While Using This Medicine:  
Call your doctor right away if you notice any of these side effects: · Allergic reaction: Itching or hives, swelling in your face or hands, swelling or tingling in your mouth or throat, chest tightness, trouble breathing · Blistering, peeling, red skin rash · Chest pain that may spread, trouble breathing, nausea, unusual sweating, fainting · Fast, pounding, or uneven heartbeat · Lightheadedness, dizziness, or fainting · Rapid weight gain, swelling in your hands, ankles, or feet · Stomach pain or bloating, nausea, vomiting, weight loss If you notice these less serious side effects, talk with your doctor:  
· Headache · Muscle cramps or tremors · Warmth or redness in your face, neck, arms, or upper chest 
If you notice other side effects that you think are caused by this medicine, tell your doctor. Call your doctor for medical advice about side effects. You may report side effects to FDA at 1-567-QTU-9264 © 2017 [facility] Information is for End User's use only and may not be sold, redistributed or otherwise used for commercial purposes. The above information is an  only. It is not intended as medical advice for individual conditions or treatments. Talk to your doctor, nurse or pharmacist before following any medical regimen to see if it is safe and effective for you. Please provide this summary of care documentation to your next provider. Your primary care clinician is listed as Opal Donnelly. If you have any questions after today's visit, please call [phone].

## 2017-08-22 NOTE — PROGRESS NOTES
Chief Complaint   Patient presents with    Hypertension     follow up    Chronic Kidney Disease     follow up    Labs     done 7-10-17 to discuss     1. Have you been to the ER, urgent care clinic since your last visit? Hospitalized since your last visit? No    2. Have you seen or consulted any other health care providers outside of the [facility] since your last visit? Include any pap smears or colon screening.  No

## 2017-08-25 LAB — HEMOCCULT STL QL IA: NEGATIVE

## 2017-08-29 ENCOUNTER — OFFICE VISIT (OUTPATIENT)
Dept: INTERNAL MEDICINE CLINIC | Age: 77
End: 2017-08-29

## 2017-08-29 VITALS
BODY MASS INDEX: 36.81 KG/M2 | DIASTOLIC BLOOD PRESSURE: 80 MMHG | HEIGHT: 64 IN | TEMPERATURE: 96.4 F | RESPIRATION RATE: 20 BRPM | OXYGEN SATURATION: 98 % | WEIGHT: 215.6 LBS | HEART RATE: 69 BPM | SYSTOLIC BLOOD PRESSURE: 138 MMHG

## 2017-08-29 DIAGNOSIS — D64.9 ANEMIA, UNSPECIFIED TYPE: ICD-10-CM

## 2017-08-29 DIAGNOSIS — R82.81 PYURIA: ICD-10-CM

## 2017-08-29 DIAGNOSIS — I10 ESSENTIAL HYPERTENSION: Primary | ICD-10-CM

## 2017-08-29 DIAGNOSIS — M35.1 MIXED CONNECTIVE TISSUE DISEASE (HCC): ICD-10-CM

## 2017-08-29 RX ORDER — AMLODIPINE BESYLATE 2.5 MG/1
TABLET ORAL
Refills: 1 | COMMUNITY
Start: 2017-08-03 | End: 2018-06-12 | Stop reason: SDUPTHER

## 2017-08-29 NOTE — MR AVS SNAPSHOT
Visit Information Date & Time Provider Department Dept. Phone Encounter #  
 8/29/2017 10:15 AM Mehreen Montana MD Internist of [address] [place] [address] Place [id number] Follow-up Instructions Return in about 6 months (around 2/28/2018) for BP check. Your Appointments 9/11/2017  9:00 AM  
New Patient with Shi Tilley MD  
[facility]) Appt Note: Pyuria/Mailed paperwork; leave address to office on ; .  
 [address] [address]  
[phone] [address] [address]  
  
    
 10/24/2017  9:00 AM  
Follow Up with Yudelka Clark DO [facility] ([facility]) Appt Note: 6 month follow up [place] [address] [address] [address] [phone] [place] [phone]  
  
    
 12/18/2017  2:45 PM  
PROCEDURE with [facility] IMAGING 2 [facility] ([facility]) Appt Note: cv wild; .  
 [place], [place] [address] [facility] [address]  
[phone] Sarah Du [address]  
  
    
 12/18/2017  3:45 PM  
PROCEDURE with [facility] NONIMAGING [facility] ([facility]) Appt Note: leg art wild; .  
 [place], [place] [address] [facility] [address]  
[phone] [address]  
  
    
 1/4/2018  9:00 AM  
Follow Up with Miroslava Calhoun [facility] ([facility]) Appt Note: 2 year fu after studies at [facility] on 12/18/2017; .  
 [place], [place] [address] [address] [facility] [address]  
[phone] [facility] [address]  
  
    
 2/27/2018  8:30 AM  
Office Visit with Mehreen Montana MD  
Internist of [address] [place] [address] Appt Note: ov 6mos. purnima [address] [facility], [address]  [facility] [address] [place]  
  
   
 [address], [address] Upcoming Health Maintenance Date Due COLONOSCOPY 1/25/2018 Pneumococcal 65+ High/Highest Risk (2 of 2 - PPSV23) 9/4/2017 MEDICARE YEARLY EXAM 7/11/2018 GLAUCOMA SCREENING Q2Y 8/8/2018 DTaP/Tdap/Td series (2 - Td) 11/22/2026 Allergies as of 8/29/2017  Review Complete On: 8/29/2017 By: Gaby Jones Severity Noted Reaction Type Reactions Nifedipine High 08/29/2017    Nausea and Vomiting, Other (comments) Dizzy Ace Inhibitors    Cough Codeine  11/12/2008    Unknown (comments), Nausea Only, Nausea and Vomiting Hydralazine  03/29/2017   Intolerance Other (comments) Dizziness and Fatigue Lipitor [Atorvastatin]  05/20/2014    Nausea and Vomiting, Vertigo Current Immunizations  Never Reviewed No immunizations on file. Not reviewed this visit Vitals BP Pulse Temp Resp Height(growth percentile) Weight(growth percentile) 138/80 (BP 1 Location: Left arm, BP Patient Position: Sitting) 69 96.4 °F (35.8 °C) (Oral) 20 5' 4\" (1.626 m) 215 lb 9.6 oz (97.8 kg) SpO2 BMI OB Status Smoking Status 98% 37.01 kg/m2 Postmenopausal Former Smoker Vitals History BMI and BSA Data Body Mass Index Body Surface Area  
 37.01 kg/m 2 2.1 m 2 Preferred Pharmacy Pharmacy Name Phone [id number] [address] Your Updated Medication List  
  
   
This list is accurate as of: 8/29/17 11:08 AM.  Always use your most recent med list. amLODIPine 2.5 mg tablet Commonly known as:  Mg Selby TAKE 1 TABLET BY MOUTH DAILY  
  
 aspirin 81 mg tablet Take 81 mg by mouth daily. carvedilol 25 mg tablet Commonly known as:  Dale Gamez Take 1 Tab by mouth two (2) times a day. ferrous sulfate 325 mg (65 mg iron) EC tablet Commonly known as:  IRON  
TAKE 1 TABLET BY MOUTH ONCE A DAY  
  
 furosemide 40 mg tablet Commonly known as:  LASIX TAKE 1 TABLET BY MOUTH AS NEEDED Follow-up Instructions Return in about 6 months (around 2/28/2018) for BP check. Patient Instructions Anemia: Care Instructions Your Care Instructions Anemia is a low level of red blood cells, which carry oxygen throughout your body. Many things can cause anemia. Lack of iron is one of the most common causes. Your body needs iron to make hemoglobin, a substance in red blood cells that carries oxygen from the lungs to your body's cells. Without enough iron, the body produces fewer and smaller red blood cells. As a result, your body's cells do not get enough oxygen, and you feel tired and weak. And you may have trouble concentrating. Bleeding is the most common cause of a lack of iron. You may have heavy menstrual bleeding or bleeding caused by conditions such as ulcers, hemorrhoids, or cancer. Regular use of aspirin or other anti-inflammatory medicines (such as ibuprofen) also can cause bleeding in some people. A lack of iron in your diet also can cause anemia, especially at times when the body needs more iron, such as during pregnancy, infancy, and the teen years. Your doctor may have prescribed iron pills. It may take several months of treatment for your iron levels to return to normal. Your doctor also may suggest that you eat foods that are rich in iron, such as meat and beans. There are many other causes of anemia. It is not always due to a lack of iron. Finding the specific cause of your anemia will help your doctor find the right treatment for you. Follow-up care is a key part of your treatment and safety. Be sure to make and go to all appointments, and call your doctor if you are having problems. It's also a good idea to know your test results and keep a list of the medicines you take. How can you care for yourself at home? · Take your medicines exactly as prescribed. Call your doctor if you think you are having a problem with your medicine. · If your doctor recommends iron pills, take them as directed: ¨ Try to take the pills on an empty stomach about 1 hour before or 2 hours after meals. But you may need to take iron with food to avoid an upset stomach. ¨ Do not take antacids or drink milk or caffeine drinks (such as coffee, tea, or cola) at the same time or within 2 hours of the time that you take your iron. They can make it hard for your body to absorb the iron. ¨ Vitamin C (from food or supplements) helps your body absorb iron. Try taking iron pills with a glass of orange juice or some other food that is high in vitamin C, such as citrus fruits. ¨ Iron pills may cause stomach problems, such as heartburn, nausea, diarrhea, constipation, and cramps. Be sure to drink plenty of fluids, and include fruits, vegetables, and fiber in your diet each day. Iron pills often make your bowel movements dark or green. ¨ If you forget to take an iron pill, do not take a double dose of iron the next time you take a pill. ¨ Keep iron pills out of the reach of small children. An overdose of iron can be very dangerous. · Follow your doctor's advice about eating iron-rich foods. These include red meat, shellfish, poultry, eggs, beans, raisins, whole-grain bread, and leafy green vegetables. · Steam vegetables to help them keep their iron content. When should you call for help? Call 911 anytime you think you may need emergency care. For example, call if: 
· You have symptoms of a heart attack. These may include: ¨ Chest pain or pressure, or a strange feeling in the chest. 
¨ Sweating. ¨ Shortness of breath. ¨ Nausea or vomiting. ¨ Pain, pressure, or a strange feeling in the back, neck, jaw, or upper belly or in one or both shoulders or arms. ¨ Lightheadedness or sudden weakness. ¨ A fast or irregular heartbeat. After you call 911, the  may tell you to chew 1 adult-strength or 2 to 4 low-dose aspirin. Wait for an ambulance. Do not try to drive yourself. · You passed out (lost consciousness). Call your doctor now or seek immediate medical care if: 
· You have new or increased shortness of breath. · You are dizzy or lightheaded, or you feel like you may faint. · Your fatigue and weakness continue or get worse. · You have any abnormal bleeding, such as: 
¨ Nosebleeds. ¨ Vaginal bleeding that is different (heavier, more frequent, at a different time of the month) than what you are used to. ¨ Bloody or black stools, or rectal bleeding. ¨ Bloody or pink urine. Watch closely for changes in your health, and be sure to contact your doctor if: 
· You do not get better as expected. Where can you learn more? Go to http://jacquelyn-alberto.info/. Enter R301 in the search box to learn more about \"Anemia: Care Instructions. \" Current as of: October 13, 2016 Content Version: 11.3 © 1383-7195 [employer]. Care instructions adapted under license by [employer] (which disclaims liability or warranty for this information). If you have questions about a medical condition or this instruction, always ask your healthcare professional. [address] any warranty or liability for your use of this information. Health Maintenance Due Topic Date Due  
 COLONOSCOPY  01/25/2018 Please provide this summary of care documentation to your next provider. Your primary care clinician is listed as Gil Sandhu. If you have any questions after today's visit, please call [phone].

## 2017-08-29 NOTE — PROGRESS NOTES
INTERNISTS OF [place]:  8/30/2017, MRN: [id number]      Ja Pisano is a 78 y.o. female and presents to clinic for Hypertension (follow up)    Subjective:   Patient is a 72-year-old -American female with history of allergic rhinitis, gout, cataract, overactive bladder, hyperlipidemia, GERD, mixed connective tissue disease (positive FOREST, RNP Ab, Sjogren's Ab, anti-chromatin Ab, and Anti-Parnell Ab), chronic gastritis with bleeding in March 2016, history of stroke, vitamin D deficiency, dysphasia status post dilation, anemia, pacemaker for history of AV block, left cavernous ICA aneurysm, hypertension, atrial fibrillation, carotid stenosis, peripheral artery disease, PVCs, DVT hx (LUE - postoperatively), and CAD status post CABG. 1.  Mixed connective tissue disease:  The patient has a history of positive for FOREST, RNP antibody, Sjogren's antibodies, anti-chromatin antibody, anti-Parnell antibody. She was referred to the rheumatology team for evaluation/treatment of suspected mixed connective tissue disease. She scheduled an apt with  in 11/17. 2.  Hypertension: Patient has chronic kidney disease that may be related to underlying hypertension and/or #1. She is followed by the nephrology team.  She has a history of microalbuminuria. The patient was instructed at her last appointment to take Lasix daily, carvedilol, and nifedipine but at a higher dose of 60 mg daily. She has underlying bilateral lower extremity edema as a result her amlodipine was discontinued at her last appointment. She presents today to follow-up on her blood pressure. She attempted on 2 occasions to take nifedipine instead of amlodipine and has dizziness and nausea. As result, she stopped taking this medication and restart her amlodipine. The patient feels stressed this morning, having to transport both of her grandchildren on errands. Her blood pressure today is 181/78.   The patient admits to taking Lasix every other day despite being told to take it daily. Her last dose of Lasix was yesterday and is overdue. 3.  Pyuria: The patient has had recurrent pyuria noted on repeat urinalyses. The patient was referred to Dr. Iwona Hogue office but did not attend her appointment. She is scheduled to see Chuck Doty in 1-2 wks. 4.  Anemia: The patient has a stable hemoglobin in 10 range. She had a negative occult blood stool test since her last apt. no vaginal bleeding. No hematochezia or melena.       Patient Active Problem List    Diagnosis Date Noted    Mixed connective tissue disease ([facility] 08/22/2017    Microalbuminuria 07/10/2017    CKD (chronic kidney disease) stage 3, GFR 30-59 ml/min 07/10/2017    Seasonal allergic rhinitis 11/23/2016    Gout of left foot 11/22/2016    Cataract 08/09/2016    OAB (overactive bladder) 07/06/2016    Mixed hyperlipidemia 07/05/2016    Gastroesophageal reflux disease without esophagitis 07/05/2016    Chronic gastritis with bleeding - in February/March of 2016 per FOBT and EGD results 07/05/2016    History of CVA (cerebrovascular accident) 07/05/2016    Vitamin D deficiency 07/05/2016    Dysphagia s/p dilation 07/05/2016    Anemia 07/05/2016    Pacemaker (for h/o AV block) 07/05/2016    Intracranial aneurysm - left cavernous ICA on 2014 head/neck CTA 12/01/2014    HTN (hypertension) 10/13/2014    Atrial fibrillation ([facility] 10/13/2014    Carotid stenosis 10/13/2014    PAD (peripheral artery disease) ([facility] 10/13/2014    PVC's (premature ventricular contractions) 12/09/2011    Atherosclerotic heart disease, s/p CABG X3 in 6/08, in the setting of severe left ventricular dysfunction, and s/p a dual-chamber     S/P CABG x 3        Current Outpatient Prescriptions   Medication Sig Dispense Refill    amLODIPine (NORVASC) 2.5 mg tablet TAKE 1 TABLET BY MOUTH DAILY  1    furosemide (LASIX) 40 mg tablet TAKE 1 TABLET BY MOUTH AS NEEDED 30 Tab 3    carvedilol (COREG) 25 mg tablet Take 1 Tab by mouth two (2) times a day. 180 Tab 3    aspirin 81 mg tablet Take 81 mg by mouth daily.  ferrous sulfate (IRON) 325 mg (65 mg iron) EC tablet TAKE 1 TABLET BY MOUTH ONCE A DAY  3       Allergies   Allergen Reactions    Nifedipine Nausea and Vomiting and Other (comments)     Dizzy    Ace Inhibitors Cough    Codeine Unknown (comments), Nausea Only and Nausea and Vomiting    Hydralazine Other (comments)     Dizziness and Fatigue    Lipitor [Atorvastatin] Nausea and Vomiting and Vertigo       Past Medical History:   Diagnosis Date    Abnormal ankle brachial index 11/13/2014    Mild arterial disease in right leg. R VICTOR MANUEL 0.88. L VICTOR MANUEL 1.00.  Anemia     Arm DVT (deep venous thromboembolism), acute (HCC)     s/p anticoagulation    Atherosclerotic heart disease     Atrial fibrillation (HCC)     AV block     CAD (coronary artery disease)     Cardiomyopathy, ischemic     Carotid artery stenosis     Carotid duplex 11/13/2014    Mild <50% bilateral ICA plaquing. Possible left vertebral occlusion.  Chest pain     CVA (cerebral infarction)     Echocardiogram 08/19/2015    EF 55%. Apical inferior, apical septal hypk (new since study of 12/19/11), likely due to chronic V-pacing. Mild LVH. RVSP 30 mmHg. Mild LAE. Mild MR. Mild TR.  Gastritis     GERD (gastroesophageal reflux disease)     Hiatal hernia     Hyperlipidemia     Hypertension     Hypertensive cardiovascular disease     Intracranial aneurysm     left cavernous ICA 2mm aneurysm from head/neck CTA in 2014    Long term (current) use of anticoagulants 3/10/2011    Lower extremity venous duplex 01/26/2015    No DVT bilaterally.  Nuclear cardiac imaging test 09/24/2015    Low risk. Sm apical infarction w/no ischemia vs apical thinning. Sm basal inferior defect, likely artifact. EF 56%. Inferoseptal, inferoapical WMA related to sternotomy or paced rhythm.     Pacemaker     PAD (peripheral artery disease) ([facility]     PVC's     chronic    S/P CABG x 3 2008    LIMA-LAD. SVG-OM. SVG-dRCA     S/P cardiac cath 2008    pRCA 100%. LM patent. Cx patent. OM1 100%. mLAD 95%. LVEDP 27-29mmHg. EF 20%. mod MR    Tilt table evaluation 1995    Positive for orthostatic hypotension    Vitamin D deficiency        Past Surgical History:   Procedure Laterality Date    CABG, ARTERY-VEIN, THREE  2008    CARDIAC SURG PROCEDURE UNLIST      medtronic dual-chamber cardioverted-defibrillator    HX  SECTION      x2    HX ENDOSCOPY  3/1/16    HX ENDOSCOPY  3/1/16    HX HEMORRHOIDECTOMY      1985    HX HYSTERECTOMY          HX ORTHOPAEDIC      knee surgery    HX OTHER SURGICAL  2/10/16    Pacemaker Gen Change    HX TUMOR REMOVAL      removed from arm.  benign       History reviewed. No pertinent family history. Social History   Substance Use Topics    Smoking status: Former Smoker    Smokeless tobacco: Never Used      Comment: just smoked 2 weeks in college    Alcohol use No       ROS   Review of Systems   Constitutional: Negative for chills and fever. HENT: Negative for ear pain and sore throat. Eyes: Negative for blurred vision and pain. Respiratory: Negative for shortness of breath. Cardiovascular: Positive for leg swelling (chronic). Negative for chest pain. Gastrointestinal: Negative for abdominal pain, blood in stool and melena. Genitourinary: Negative for dysuria and hematuria. Musculoskeletal: Negative for joint pain and myalgias. Skin: Negative for rash. Neurological: Negative for tingling, focal weakness and headaches. Endo/Heme/Allergies: Does not bruise/bleed easily. Psychiatric/Behavioral: Negative for substance abuse.        Objective     Vitals:    17 1033 17 1104   BP: 181/78 138/80   Pulse: 69 69   Resp: 20    Temp: 96.4 °F (35.8 °C)    TempSrc: Oral    SpO2: 98%    Weight: 215 lb 9.6 oz (97.8 kg)    Height: 5' 4\" (1.626 m)    PainSc:   0 - No pain Physical Exam   Constitutional: She is oriented to person, place, and time and well-developed, well-nourished, and in no distress. HENT:   Head: Normocephalic and atraumatic. Right Ear: External ear normal.   Left Ear: External ear normal.   Nose: Nose normal.   Mouth/Throat: Oropharynx is clear and moist. No oropharyngeal exudate. Eyes: Conjunctivae and EOM are normal. Pupils are equal, round, and reactive to light. Right eye exhibits no discharge. Left eye exhibits no discharge. No scleral icterus. Neck: Neck supple. Cardiovascular: Normal rate, regular rhythm, normal heart sounds and intact distal pulses. Exam reveals no gallop and no friction rub. No murmur heard. Pulmonary/Chest: Effort normal and breath sounds normal. No respiratory distress. She has no wheezes. She has no rales. Abdominal: Soft. Bowel sounds are normal. She exhibits no distension. There is no tenderness. There is no rebound and no guarding. Musculoskeletal: She exhibits edema (chronic in BLE). She exhibits no tenderness (BUE are NTTP). Lymphadenopathy:     She has no cervical adenopathy. Neurological: She is alert and oriented to person, place, and time. She exhibits normal muscle tone. Gait normal.   Skin: Skin is warm and dry. No erythema. Psychiatric: Affect normal.   Nursing note and vitals reviewed.       LABS   Data Review:   Lab Results   Component Value Date/Time    WBC 4.7 07/10/2017 11:17 AM    HGB 10.0 07/10/2017 11:17 AM    HCT 31.9 07/10/2017 11:17 AM    PLATELET 088 30/02/6781 11:17 AM    MCV 86.2 07/10/2017 11:17 AM       Lab Results   Component Value Date/Time    Sodium 140 07/10/2017 11:17 AM    Potassium 4.2 07/10/2017 11:17 AM    Chloride 108 07/10/2017 11:17 AM    CO2 24 07/10/2017 11:17 AM    Anion gap 8 07/10/2017 11:17 AM    Glucose 81 07/10/2017 11:17 AM    BUN 36 07/10/2017 11:17 AM    Creatinine 1.30 07/10/2017 11:17 AM    BUN/Creatinine ratio 28 07/10/2017 11:17 AM    GFR est AA 48 07/10/2017 11:17 AM    GFR est non-AA 40 07/10/2017 11:17 AM    Calcium 8.9 07/10/2017 11:17 AM       Lab Results   Component Value Date/Time    Cholesterol, total 215 07/10/2017 11:17 AM    HDL Cholesterol 93 07/10/2017 11:17 AM    LDL, calculated 108.6 07/10/2017 11:17 AM    VLDL, calculated 13.4 07/10/2017 11:17 AM    Triglyceride 67 07/10/2017 11:17 AM    CHOL/HDL Ratio 2.3 07/10/2017 11:17 AM       Assessment/Plan:   1. Anemia: Negative fecal occult blood test (2017). +Mixed connective tissue disease.   -We discussed her diagnoses of anemia. I suspect it is likely connected to her diagnosis of mixed connective tissue disease. I instructed the patient to notify me if she develops any melena, hematochezia, vaginal bleeding, or hematuria. -I encouraged her to follow-up with Dr. Kayden Sellers    2. Mixed connective tissue disease: +Positive for FOREST, RNP Ab, Sjogren Abs, Antichromatin Ab, and Parnell Ab  -I encouraged patient to keep her schedule appointment with Dr. Kayden Sellers. We briefly discussed the diagnosis of mixed connective tissue disease and the importance of following with a  rheumatologist.    3.  Pyuria: Unknown etiology.  -The patient was encouraged to keep her appointment with Dr. Leandro Valdez. We discussed the importance of following up with Dr. Leandro Valdez for cystoscopy if deemed necessary to evaluate unknown etiology of pyuria. 4.  Hypertension: Blood pressure is not at goal but the patient did not take her Lasix. She was unable to tolerate nifedipine.  -Nifedipine was added as a medication intolerance.  -Okay to continue with amlodipine, carvedilol, and Lasix. I instructed the patient to take Lasix daily. When she takes these 3 medications as prescribed, her blood pressure has been less than 150/90 from previous measurements earlier this year. I instructed the patient to return to clinic if her pressures are consistently greater than 150/91 taking all 3 of these medications daily.       Health Maintenance Due Topic Date Due    COLONOSCOPY  01/25/2018       Lab review: labs are reviewed in the EHR    I have discussed the diagnosis with the patient and the intended plan as seen in the above orders. The patient has received an after-visit summary and questions were answered concerning future plans. I have discussed medication side effects and warnings with the patient as well. I have reviewed the plan of care with the patient, accepted their input and they are in agreement with the treatment goals. All questions were answered. The patient understands the plan of care. Handouts provided today with above information. Pt instructed if symptoms worsen to call the office or report to the ED for continued care. Greater than 50% of the visit time was spent in counseling and/or coordination of care. I spent 25 minutes the patient for this encounter, 15 of which were spent counseling her as described above. Follow-up Disposition:  Return in about 6 months (around 2/28/2018) for BP check.     Ed Kumar MD

## 2017-08-29 NOTE — PATIENT INSTRUCTIONS
Anemia: Care Instructions  Your Care Instructions    Anemia is a low level of red blood cells, which carry oxygen throughout your body. Many things can cause anemia. Lack of iron is one of the most common causes. Your body needs iron to make hemoglobin, a substance in red blood cells that carries oxygen from the lungs to your body's cells. Without enough iron, the body produces fewer and smaller red blood cells. As a result, your body's cells do not get enough oxygen, and you feel tired and weak. And you may have trouble concentrating. Bleeding is the most common cause of a lack of iron. You may have heavy menstrual bleeding or bleeding caused by conditions such as ulcers, hemorrhoids, or cancer. Regular use of aspirin or other anti-inflammatory medicines (such as ibuprofen) also can cause bleeding in some people. A lack of iron in your diet also can cause anemia, especially at times when the body needs more iron, such as during pregnancy, infancy, and the teen years. Your doctor may have prescribed iron pills. It may take several months of treatment for your iron levels to return to normal. Your doctor also may suggest that you eat foods that are rich in iron, such as meat and beans. There are many other causes of anemia. It is not always due to a lack of iron. Finding the specific cause of your anemia will help your doctor find the right treatment for you. Follow-up care is a key part of your treatment and safety. Be sure to make and go to all appointments, and call your doctor if you are having problems. It's also a good idea to know your test results and keep a list of the medicines you take. How can you care for yourself at home? · Take your medicines exactly as prescribed. Call your doctor if you think you are having a problem with your medicine. · If your doctor recommends iron pills, take them as directed:  ¨ Try to take the pills on an empty stomach about 1 hour before or 2 hours after meals. But you may need to take iron with food to avoid an upset stomach. ¨ Do not take antacids or drink milk or caffeine drinks (such as coffee, tea, or cola) at the same time or within 2 hours of the time that you take your iron. They can make it hard for your body to absorb the iron. ¨ Vitamin C (from food or supplements) helps your body absorb iron. Try taking iron pills with a glass of orange juice or some other food that is high in vitamin C, such as citrus fruits. ¨ Iron pills may cause stomach problems, such as heartburn, nausea, diarrhea, constipation, and cramps. Be sure to drink plenty of fluids, and include fruits, vegetables, and fiber in your diet each day. Iron pills often make your bowel movements dark or green. ¨ If you forget to take an iron pill, do not take a double dose of iron the next time you take a pill. ¨ Keep iron pills out of the reach of small children. An overdose of iron can be very dangerous. · Follow your doctor's advice about eating iron-rich foods. These include red meat, shellfish, poultry, eggs, beans, raisins, whole-grain bread, and leafy green vegetables. · Steam vegetables to help them keep their iron content. When should you call for help? Call 911 anytime you think you may need emergency care. For example, call if:  · You have symptoms of a heart attack. These may include:  ¨ Chest pain or pressure, or a strange feeling in the chest.  ¨ Sweating. ¨ Shortness of breath. ¨ Nausea or vomiting. ¨ Pain, pressure, or a strange feeling in the back, neck, jaw, or upper belly or in one or both shoulders or arms. ¨ Lightheadedness or sudden weakness. ¨ A fast or irregular heartbeat. After you call 911, the  may tell you to chew 1 adult-strength or 2 to 4 low-dose aspirin. Wait for an ambulance. Do not try to drive yourself. · You passed out (lost consciousness).   Call your doctor now or seek immediate medical care if:  · You have new or increased shortness of breath. · You are dizzy or lightheaded, or you feel like you may faint. · Your fatigue and weakness continue or get worse. · You have any abnormal bleeding, such as:  ¨ Nosebleeds. ¨ Vaginal bleeding that is different (heavier, more frequent, at a different time of the month) than what you are used to. ¨ Bloody or black stools, or rectal bleeding. ¨ Bloody or pink urine. Watch closely for changes in your health, and be sure to contact your doctor if:  · You do not get better as expected. Where can you learn more? Go to http://jacquelyn-alberto.info/. Enter R301 in the search box to learn more about \"Anemia: Care Instructions. \"  Current as of: October 13, 2016  Content Version: 11.3  © 9790-6959 [employer]. Care instructions adapted under license by [employer] (which disclaims liability or warranty for this information). If you have questions about a medical condition or this instruction, always ask your healthcare professional. [place] [address] any warranty or liability for your use of this information.   Health Maintenance Due   Topic Date Due    COLONOSCOPY  01/25/2018

## 2017-08-29 NOTE — PROGRESS NOTES
Chief Complaint   Patient presents with    Hypertension     follow up     1. Have you been to the ER, urgent care clinic since your last visit? Hospitalized since your last visit? No    2. Have you seen or consulted any other health care providers outside of the [facility] [place] since your last visit? Include any pap smears or colon screening.  No

## 2017-08-30 PROBLEM — R82.81 PYURIA: Status: ACTIVE | Noted: 2017-08-30

## 2017-09-11 ENCOUNTER — OFFICE VISIT (OUTPATIENT)
Dept: UROLOGY | Age: 77
End: 2017-09-11

## 2017-09-11 VITALS — HEIGHT: 64 IN | HEART RATE: 60 BPM | OXYGEN SATURATION: 97 % | WEIGHT: 210 LBS | BODY MASS INDEX: 35.85 KG/M2

## 2017-09-11 DIAGNOSIS — N28.1 RENAL CYST: ICD-10-CM

## 2017-09-11 DIAGNOSIS — R82.81 PYURIA: Primary | ICD-10-CM

## 2017-09-11 LAB
BILIRUB UR QL STRIP: NEGATIVE
GLUCOSE UR-MCNC: NEGATIVE MG/DL
KETONES P FAST UR STRIP-MCNC: NEGATIVE MG/DL
PH UR STRIP: 5.5 [PH] (ref 4.6–8)
PROT UR QL STRIP: NORMAL MG/DL
SP GR UR STRIP: 1.02 (ref 1–1.03)
UA UROBILINOGEN AMB POC: NORMAL (ref 0.2–1)
URINALYSIS CLARITY POC: CLEAR
URINALYSIS COLOR POC: YELLOW
URINE BLOOD POC: NEGATIVE
URINE LEUKOCYTES POC: NEGATIVE
URINE NITRITES POC: NEGATIVE

## 2017-09-11 RX ORDER — NIFEDIPINE 60 MG/1
TABLET, EXTENDED RELEASE ORAL
Refills: 11 | COMMUNITY
Start: 2017-08-22 | End: 2018-02-27 | Stop reason: ALTCHOICE

## 2017-09-11 NOTE — PATIENT INSTRUCTIONS
Urinary Tract Infection in Women: Care Instructions  Your Care Instructions    A urinary tract infection, or UTI, is a general term for an infection anywhere between the kidneys and the urethra (where urine comes out). Most UTIs are bladder infections. They often cause pain or burning when you urinate. UTIs are caused by bacteria and can be cured with antibiotics. Be sure to complete your treatment so that the infection goes away. Follow-up care is a key part of your treatment and safety. Be sure to make and go to all appointments, and call your doctor if you are having problems. It's also a good idea to know your test results and keep a list of the medicines you take. How can you care for yourself at home? · Take your antibiotics as directed. Do not stop taking them just because you feel better. You need to take the full course of antibiotics. · Drink extra water and other fluids for the next day or two. This may help wash out the bacteria that are causing the infection. (If you have kidney, heart, or liver disease and have to limit fluids, talk with your doctor before you increase your fluid intake.)  · Avoid drinks that are carbonated or have caffeine. They can irritate the bladder. · Urinate often. Try to empty your bladder each time. · To relieve pain, take a hot bath or lay a heating pad set on low over your lower belly or genital area. Never go to sleep with a heating pad in place. To prevent UTIs  · Drink plenty of water each day. This helps you urinate often, which clears bacteria from your system. (If you have kidney, heart, or liver disease and have to limit fluids, talk with your doctor before you increase your fluid intake.)  · Urinate when you need to. · Urinate right after you have sex. · Change sanitary pads often. · Avoid douches, bubble baths, feminine hygiene sprays, and other feminine hygiene products that have deodorants.   · After going to the bathroom, wipe from front to back.  When should you call for help? Call your doctor now or seek immediate medical care if:  · Symptoms such as fever, chills, nausea, or vomiting get worse or appear for the first time. · You have new pain in your back just below your rib cage. This is called flank pain. · There is new blood or pus in your urine. · You have any problems with your antibiotic medicine. Watch closely for changes in your health, and be sure to contact your doctor if:  · You are not getting better after taking an antibiotic for 2 days. · Your symptoms go away but then come back. Where can you learn more? Go to http://jacquelyn-alberto.info/. Enter G839 in the search box to learn more about \"Urinary Tract Infection in Women: Care Instructions. \"  Current as of: November 28, 2016  Content Version: 11.3  © 8302-6388 [employer]. Care instructions adapted under license by [employer] (which disclaims liability or warranty for this information). If you have questions about a medical condition or this instruction, always ask your healthcare professional. [address] any warranty or liability for your use of this information.

## 2017-09-11 NOTE — PROGRESS NOTES
Allie Plant    Chief Complaint   Patient presents with    New Patient     pyuria       History and Physical    The patient is a delightful 65-year-old -American female who comes to this office with 2 fundamental urologic issues. She does have a degree of chronic renal insufficiency with proteinuria and a creatinine of 1.5. In March of this year the patient underwent standard renal ultrasound and there is a question about 1 lesion in the left mid kidney of 1 cm could not be adequately characterized as a cyst at that time. The patient also had leukocyturia on 2 occasions in June and did have an E. coli urinary tract infection. The patient did not have any symptoms. The patient did not take any antibiotics but a subsequent urine at the [facility] office was unremarkable. The patient does not have any urinary irritative symptoms    Past Medical History:   Diagnosis Date    Abnormal ankle brachial index 11/13/2014    Mild arterial disease in right leg. R VICTOR MANUEL 0.88. L VICTOR MANUEL 1.00.  Anemia     Arm DVT (deep venous thromboembolism), acute ([facility])     s/p anticoagulation    Atherosclerotic heart disease     Atrial fibrillation ([facility])     AV block     CAD (coronary artery disease)     Cardiomyopathy, ischemic     Carotid artery stenosis     Carotid duplex 11/13/2014    Mild <50% bilateral ICA plaquing. Possible left vertebral occlusion.  Cerebral artery occlusion with cerebral infarction [facility])     Chest pain     CVA (cerebral infarction)     Echocardiogram 08/19/2015    EF 55%. Apical inferior, apical septal hypk (new since study of 12/19/11), likely due to chronic V-pacing. Mild LVH. RVSP 30 mmHg. Mild LAE. Mild MR. Mild TR.       Gastritis     GERD (gastroesophageal reflux disease)     Hiatal hernia     Hyperlipidemia     Hypertension     Hypertensive cardiovascular disease     Intracranial aneurysm     left cavernous ICA 2mm aneurysm from head/neck CTA in 2014    Long term (current) use of anticoagulants 3/10/2011    Lower extremity venous duplex 01/26/2015    No DVT bilaterally.  Nuclear cardiac imaging test 09/24/2015    Low risk. Sm apical infarction w/no ischemia vs apical thinning. Sm basal inferior defect, likely artifact. EF 56%. Inferoseptal, inferoapical WMA related to sternotomy or paced rhythm.  Pacemaker     PAD (peripheral artery disease) ([facility])     PVC's     chronic    S/P CABG x 3 06/08/2008    LIMA-LAD. SVG-OM. SVG-dRCA     S/P cardiac cath 06/08/2008    pRCA 100%. LM patent. Cx patent. OM1 100%. mLAD 95%. LVEDP 27-29mmHg. EF 20%.  mod MR    Tilt table evaluation 06/01/1995    Positive for orthostatic hypotension    Vitamin D deficiency      Patient Active Problem List   Diagnosis Code    Atherosclerotic heart disease, s/p CABG X3 in 6/08, in the setting of severe left ventricular dysfunction, and s/p a dual-chamber I25.10    S/P CABG x 3 Z95.1    PVC's (premature ventricular contractions) I49.3    HTN (hypertension) I10    Atrial fibrillation ([facility]) I48.91    Carotid stenosis I65.29    PAD (peripheral artery disease) ([facility]) I73.9    Intracranial aneurysm - left cavernous ICA on 2014 head/neck CTA I67.1    Mixed hyperlipidemia E78.2    Gastroesophageal reflux disease without esophagitis K21.9    Chronic gastritis with bleeding - in February/March of 2016 per FOBT and EGD results K29.51    History of CVA (cerebrovascular accident) Z80.78    Vitamin D deficiency E55.9    Dysphagia s/p dilation R13.10    Anemia D64.9    Pacemaker (for h/o AV block) Z95.0    OAB (overactive bladder) N32.81    Cataract H26.9    Gout of left foot M10.9    Seasonal allergic rhinitis J30.2    Microalbuminuria R80.9    CKD (chronic kidney disease) stage 3, GFR 30-59 ml/min N18.3    Mixed connective tissue disease ([facility]) M35.1    Pyuria N39.0     Past Surgical History:   Procedure Laterality Date    CABG, ARTERY-VEIN, THREE  6/2008   [facility] CARDIAC SURG PROCEDURE UNLIST      medtronic dual-chamber cardioverted-defibrillator    HX  SECTION      x2    HX ENDOSCOPY  3/1/16    HX ENDOSCOPY  3/1/16    HX HEMORRHOIDECTOMY          HX HYSTERECTOMY          HX ORTHOPAEDIC      knee surgery    HX OTHER SURGICAL  2/10/16    Pacemaker Gen Change    HX TUMOR REMOVAL      removed from arm.  benign     Current Outpatient Prescriptions   Medication Sig Dispense Refill    amLODIPine (NORVASC) 2.5 mg tablet TAKE 1 TABLET BY MOUTH DAILY  1    furosemide (LASIX) 40 mg tablet TAKE 1 TABLET BY MOUTH AS NEEDED 30 Tab 3    carvedilol (COREG) 25 mg tablet Take 1 Tab by mouth two (2) times a day. 180 Tab 3    aspirin 81 mg tablet Take 81 mg by mouth daily.  NIFEdipine ER (ADALAT CC) 60 mg ER tablet TAKE 1 TABLET BY MOUTH EVERY DAY  11    ferrous sulfate (IRON) 325 mg (65 mg iron) EC tablet TAKE 1 TABLET BY MOUTH ONCE A DAY  3     Allergies   Allergen Reactions    Nifedipine Nausea and Vomiting and Other (comments)     Dizzy    Ace Inhibitors Cough    Codeine Unknown (comments), Nausea Only and Nausea and Vomiting    Hydralazine Other (comments)     Dizziness and Fatigue    Lipitor [Atorvastatin] Nausea and Vomiting and Vertigo     Social History     Social History    Marital status:      Spouse name: N/A    Number of children: N/A    Years of education: N/A     Occupational History    Not on file. Social History Main Topics    Smoking status: Former Smoker    Smokeless tobacco: Never Used      Comment: just smoked 2 weeks in college    Alcohol use No    Drug use: No    Sexual activity: No     Other Topics Concern    Not on file     Social History Narrative      History reviewed. No pertinent family history.           Visit Vitals    Pulse 60    Ht 5' 4\" (1.626 m)    Wt 210 lb (95.3 kg)    SpO2 97%    BMI 36.05 kg/m2     Physical        Gen: WDWN adult NAD  Head  : normocephalic,  Normal ROM; eyes without normal pupils, EOMs, no masses;  conjunctiva normal  Neck: normal movement,  no evident mass,  No evident adenopathy, trachea midline,    Abd :bowel sounds normal, no masses, tenderness, organomegaly  Flanks     -    Extremities-  edema,  No arthritis, deformity, swelling  Psych- oriented, no evident anxiety, no cognitive impairment evident      Urine today is negative with exception of anticipated proteinuria  Renal ultrasound is reviewed with the patient and this appears to be a fairly straightforward simple cyst to me                  Impression/ PLAN  Stage III renal insufficiency without obstructive component  Probable renal cyst  Leukocyturia secondary to E. coli urinary tract infection without symptoms now resolved with negative urine    Plan:  Because it has been 6 months since the last renal ultrasound I believe that it is reasonable to repeat that study and see if we cannot get better acoustic windows. I have reassured the patient that I believe that this is not of any great significance  The urinary tract infection is no longer present. There is certainly going to be some difficulties because the patient does not have relevant symptoms. I believe, however, that the patient's frequent visits to her primary care doctor's office with urine analyses done each time should be sufficient to monitor her. She certainly should come in if she develops any irritative symptoms. She will return after the ultrasound so that we can review            This visit exceeded 30 minutes and >50% was counselling  The patient understands the discussion and plan    PLEASE NOTE:      This document has been produced using voice recognition software.   Unrecognized errors in transcription may be present    Raheem Olson MD

## 2017-09-11 NOTE — MR AVS SNAPSHOT
Visit Information Date & Time Provider Department Dept. Phone Encounter #  
 9/11/2017  9:00 AM Raheem Olson [facility] [phone] Your Appointments 10/24/2017  9:00 AM  
Follow Up with Rosalind Tenorio DO [facility] ([address] [place] [address]) Appt Note: 6 month follow up Christiane Rudolph [phone]  
[phone] Sarah [phone]  
  
    
 12/18/2017  2:45 PM  
PROCEDURE with [facility] IMAGING 2 [facility] ([address] [place] [address]) Appt Note: cv wild; .  
 [phone] [facility] [address]  
[phone] Sarah Du [address]  
  
    
 12/18/2017  3:45 PM  
PROCEDURE with [facility] NONIMAGING [facility] ([address] [place] [address]) Appt Note: leg art wild; .  
 [phone] [facility] [address]  
[phone] [address]  
  
    
 1/4/2018  9:00 AM  
Follow Up with Miroslava Mc[facility] ([address] [place] [address]) Appt Note: 2 year fu after studies at [facility] on 12/18/2017; .  
 [phone] [facility] [address]  
[phone] [address]  
  
    
 2/27/2018  8:30 AM  
Office Visit with Cat Velez MD  
Internist of [address] [place] [address]) Appt Note: ov 6mos. [place] [address], [address] [place]  
  
   
 [address], [address] Upcoming Health Maintenance Date Due Pneumococcal 65+ High/Highest Risk (2 of 2 - PPSV23) 9/4/2017 COLONOSCOPY 1/25/2018 MEDICARE YEARLY EXAM 7/11/2018 GLAUCOMA SCREENING Q2Y 8/8/2018 DTaP/Tdap/Td series (2 - Td) 11/22/2026 Allergies as of 9/11/2017  Review Complete On: 9/11/2017 By: Gerard Martel MD  
  
 Severity Noted Reaction Type Reactions Nifedipine High 08/29/2017    Nausea and Vomiting, Other (comments) Dizzy Ace Inhibitors    Cough Codeine  11/12/2008    Unknown (comments), Nausea Only, Nausea and Vomiting Hydralazine  03/29/2017   Intolerance Other (comments) Dizziness and Fatigue Lipitor [Atorvastatin]  05/20/2014    Nausea and Vomiting, Vertigo Current Immunizations  Never Reviewed No immunizations on file. Not reviewed this visit You Were Diagnosed With   
  
 Codes Comments Pyuria    -  Primary ICD-10-CM: N39.0 ICD-9-CM: 791.9 Vitals Pulse Height(growth percentile) Weight(growth percentile) SpO2 BMI OB Status 60 5' 4\" (1.626 m) 210 lb (95.3 kg) 97% 36.05 kg/m2 Postmenopausal  
 Smoking Status Former Smoker Vitals History BMI and BSA Data Body Mass Index Body Surface Area 36.05 kg/m 2 2.07 m 2 Preferred Pharmacy Pharmacy Name Phone [id number] Your Updated Medication List  
  
   
This list is accurate as of: 9/11/17 10:09 AM.  Always use your most recent med list. amLODIPine 2.5 mg tablet Commonly known as:  Deldiane Matos TAKE 1 TABLET BY MOUTH DAILY  
  
 aspirin 81 mg tablet Take 81 mg by mouth daily. carvedilol 25 mg tablet Commonly known as:  Carolyn Shade Take 1 Tab by mouth two (2) times a day. ferrous sulfate 325 mg (65 mg iron) EC tablet Commonly known as:  IRON  
TAKE 1 TABLET BY MOUTH ONCE A DAY  
  
 furosemide 40 mg tablet Commonly known as:  LASIX TAKE 1 TABLET BY MOUTH AS NEEDED  
  
 NIFEdipine ER 60 mg ER tablet Commonly known as:  ADALAT CC  
TAKE 1 TABLET BY MOUTH EVERY DAY We Performed the Following AMB POC URINALYSIS DIP STICK AUTO W/O MICRO [29152 CPT(R)] Patient Instructions Urinary Tract Infection in Women: Care Instructions Your Care Instructions A urinary tract infection, or UTI, is a general term for an infection anywhere between the kidneys and the urethra (where urine comes out). Most UTIs are bladder infections. They often cause pain or burning when you urinate. UTIs are caused by bacteria and can be cured with antibiotics. Be sure to complete your treatment so that the infection goes away. Follow-up care is a key part of your treatment and safety. Be sure to make and go to all appointments, and call your doctor if you are having problems. It's also a good idea to know your test results and keep a list of the medicines you take. How can you care for yourself at home? · Take your antibiotics as directed. Do not stop taking them just because you feel better. You need to take the full course of antibiotics. · Drink extra water and other fluids for the next day or two. This may help wash out the bacteria that are causing the infection. (If you have kidney, heart, or liver disease and have to limit fluids, talk with your doctor before you increase your fluid intake.) · Avoid drinks that are carbonated or have caffeine. They can irritate the bladder. · Urinate often. Try to empty your bladder each time. · To relieve pain, take a hot bath or lay a heating pad set on low over your lower belly or genital area. Never go to sleep with a heating pad in place. To prevent UTIs · Drink plenty of water each day. This helps you urinate often, which clears bacteria from your system. (If you have kidney, heart, or liver disease and have to limit fluids, talk with your doctor before you increase your fluid intake.) · Urinate when you need to. · Urinate right after you have sex. · Change sanitary pads often. · Avoid douches, bubble baths, feminine hygiene sprays, and other feminine hygiene products that have deodorants. · After going to the bathroom, wipe from front to back. When should you call for help? Call your doctor now or seek immediate medical care if: · Symptoms such as fever, chills, nausea, or vomiting get worse or appear for the first time. · You have new pain in your back just below your rib cage. This is called flank pain. · There is new blood or pus in your urine. · You have any problems with your antibiotic medicine. Watch closely for changes in your health, and be sure to contact your doctor if: 
· You are not getting better after taking an antibiotic for 2 days. · Your symptoms go away but then come back. Where can you learn more? Go to http://jacquelyn-alberto.info/. Enter M836 in the search box to learn more about \"Urinary Tract Infection in Women: Care Instructions. \" Current as of: November 28, 2016 Content Version: 11.3 © 1225-7846 [employer]. Care instructions adapted under license by [employer] (which disclaims liability or warranty for this information). If you have questions about a medical condition or this instruction, always ask your healthcare professional. [place] [address] any warranty or liability for your use of this information. Please provide this summary of care documentation to your next provider. Your primary care clinician is listed as Aida Silva. If you have any questions after today's visit, please call [phone].

## 2017-09-11 NOTE — PROGRESS NOTES
Ms. Keyon Hay has a reminder for a \"due or due soon\" health maintenance. I have asked that she contact her primary care provider for follow-up on this health maintenance.

## 2017-09-25 ENCOUNTER — HOSPITAL ENCOUNTER (OUTPATIENT)
Dept: ULTRASOUND IMAGING | Age: 77
Discharge: HOME | End: 2017-09-25
Attending: UROLOGY
Payer: MEDICARE

## 2017-09-25 DIAGNOSIS — N28.1 RENAL CYST: ICD-10-CM

## 2017-09-25 PROCEDURE — 76770 US EXAM ABDO BACK WALL COMP: CPT

## 2017-10-02 RX ORDER — CARVEDILOL 25 MG/1
TABLET ORAL
Qty: 180 TAB | Refills: 3 | Status: SHIPPED | OUTPATIENT
Start: 2017-10-02 | End: 2018-10-31 | Stop reason: SDUPTHER

## 2017-10-12 ENCOUNTER — OFFICE VISIT (OUTPATIENT)
Dept: UROLOGY | Age: 77
End: 2017-10-12

## 2017-10-12 VITALS
OXYGEN SATURATION: 97 % | HEIGHT: 64 IN | BODY MASS INDEX: 37.22 KG/M2 | DIASTOLIC BLOOD PRESSURE: 80 MMHG | HEART RATE: 70 BPM | WEIGHT: 218 LBS | SYSTOLIC BLOOD PRESSURE: 160 MMHG

## 2017-10-12 DIAGNOSIS — N18.30 CKD (CHRONIC KIDNEY DISEASE) STAGE 3, GFR 30-59 ML/MIN (HCC): Primary | ICD-10-CM

## 2017-10-12 NOTE — MR AVS SNAPSHOT
Visit Information Date & Time Provider Department Dept. Phone Encounter #  
 10/12/2017  9:00 AM HAROLDO Stoner [id number] [phone] Follow-up Instructions Return if symptoms worsen or fail to improve. Your Appointments 10/24/2017  9:00 AM  
Follow Up with Patrick Girard DO [facility] ([facility]) Appt Note: 6 month follow up [place] [address] [address] [address] [phone] Sarah  [id number]  
  
    
 12/18/2017  2:45 PM  
PROCEDURE with BSVVS IMAGING 2 [facility] ([facility]) Appt Note: cv wild; .  
 [address] 25 616 [address] [facility]  
[phone] Sarah Du Chucho [address] 407 [address] [place] [address]  
  
    
 12/18/2017  3:45 PM  
PROCEDURE with BSVVS NONIMAGING [facility] ([facility]) Appt Note: leg art wild; .  
 [address] 301 [address] [facility]  
[phone] [address]  
  
    
 1/4/2018  9:00 AM  
Follow Up with Corey Khalil [facility] ([facility]) Appt Note: 2 year fu after studies at [facility] on 12/18/2017; .  
 [address] [facility] 25 412 200 [facility]  
[phone] [address]  
  
    
 2/27/2018  8:30 AM  
Office Visit with Bijal Gibson MD  
[facility] of [address] [place] [address]) Appt Note: ov 6mos. felton [address], [address] [place] [address] [address] [place]  
  
   
 [address], [address] Upcoming Health Maintenance Date Due Pneumococcal 65+ High/Highest Risk (2 of 2 - PPSV23) 9/4/2017 COLONOSCOPY 1/25/2018 MEDICARE YEARLY EXAM 7/11/2018 GLAUCOMA SCREENING Q2Y 9/6/2019 DTaP/Tdap/Td series (2 - Td) 11/22/2026 Allergies as of 10/12/2017  Review Complete On: 10/12/2017 By: Amber Peters LPN Severity Noted Reaction Type Reactions Nifedipine High 08/29/2017    Nausea and Vomiting, Other (comments) Dizzy Ace Inhibitors    Cough Codeine  11/12/2008    Unknown (comments), Nausea Only, Nausea and Vomiting Hydralazine  03/29/2017   Intolerance Other (comments) Dizziness and Fatigue Lipitor [Atorvastatin]  05/20/2014    Nausea and Vomiting, Vertigo Current Immunizations  Never Reviewed No immunizations on file. Not reviewed this visit You Were Diagnosed With   
  
 Codes Comments CKD (chronic kidney disease) stage 3, GFR 30-59 ml/min    -  Primary ICD-10-CM: N18.3 ICD-9-CM: 529. 3 Vitals BP Pulse Height(growth percentile) Weight(growth percentile) SpO2 BMI  
 160/80 (BP 1 Location: Left arm, BP Patient Position: Sitting) 70 5' 4\" (1.626 m) 218 lb (98.9 kg) 97% 37.42 kg/m2 OB Status Smoking Status Postmenopausal Former Smoker Vitals History BMI and BSA Data Body Mass Index Body Surface Area  
 37.42 kg/m 2 2.11 m 2 Preferred Pharmacy Pharmacy Name Phone [facility]/PHARMACY #[id number]- Rosalie Ramirez, [address] [place] [address] Your Updated Medication List  
  
   
This list is accurate as of: 10/12/17  9:34 AM.  Always use your most recent med list. amLODIPine 2.5 mg tablet Commonly known as:  iRa Mckee TAKE 1 TABLET BY MOUTH DAILY  
  
 aspirin 81 mg tablet Take 81 mg by mouth daily. carvedilol 25 mg tablet Commonly known as:  COREG  
TAKE 1 TABLET BY MOUTH (2) TIMES A DAY  
  
 ferrous sulfate 325 mg (65 mg iron) EC tablet Commonly known as:  IRON  
TAKE 1 TABLET BY MOUTH ONCE A DAY  
  
 furosemide 40 mg tablet Commonly known as:  LASIX TAKE 1 TABLET BY MOUTH AS NEEDED  
  
 NIFEdipine ER 60 mg ER tablet Commonly known as:  ADALAT CC  
TAKE 1 TABLET BY MOUTH EVERY DAY Follow-up Instructions Return if symptoms worsen or fail to improve. Patient Instructions Urinary Tract Infection in Women: Care Instructions Your Care Instructions A urinary tract infection, or UTI, is a general term for an infection anywhere between the kidneys and the urethra (where urine comes out). Most UTIs are bladder infections. They often cause pain or burning when you urinate. UTIs are caused by bacteria and can be cured with antibiotics. Be sure to complete your treatment so that the infection goes away. Follow-up care is a key part of your treatment and safety. Be sure to make and go to all appointments, and call your doctor if you are having problems. It's also a good idea to know your test results and keep a list of the medicines you take. How can you care for yourself at home? · Take your antibiotics as directed. Do not stop taking them just because you feel better. You need to take the full course of antibiotics. · Drink extra water and other fluids for the next day or two. This may help wash out the bacteria that are causing the infection. (If you have kidney, heart, or liver disease and have to limit fluids, talk with your doctor before you increase your fluid intake.) · Avoid drinks that are carbonated or have caffeine. They can irritate the bladder. · Urinate often. Try to empty your bladder each time. · To relieve pain, take a hot bath or lay a heating pad set on low over your lower belly or genital area. Never go to sleep with a heating pad in place. To prevent UTIs · Drink plenty of water each day. This helps you urinate often, which clears bacteria from your system. (If you have kidney, heart, or liver disease and have to limit fluids, talk with your doctor before you increase your fluid intake.) · Urinate when you need to. · Urinate right after you have sex. · Change sanitary pads often. · Avoid douches, bubble baths, feminine hygiene sprays, and other feminine hygiene products that have deodorants. · After going to the bathroom, wipe from front to back. When should you call for help? Call your doctor now or seek immediate medical care if: · Symptoms such as fever, chills, nausea, or vomiting get worse or appear for the first time. · You have new pain in your back just below your rib cage. This is called flank pain. · There is new blood or pus in your urine. · You have any problems with your antibiotic medicine. Watch closely for changes in your health, and be sure to contact your doctor if: 
· You are not getting better after taking an antibiotic for 2 days. · Your symptoms go away but then come back. Where can you learn more? Go to http://jacquelyn-alberto.info/. Enter N842 in the search box to learn more about \"Urinary Tract Infection in Women: Care Instructions. \" Current as of: November 28, 2016 Content Version: 11.3 © 1136-1420 [employer]. Care instructions adapted under license by [employer] (which disclaims liability or warranty for this information). If you have questions about a medical condition or this instruction, always ask your healthcare professional. [address] any warranty or liability for your use of this information. Please provide this summary of care documentation to your next provider. Your primary care clinician is listed as Servando Carpio. If you have any questions after today's visit, please call [phone].

## 2017-10-12 NOTE — PATIENT INSTRUCTIONS
Urinary Tract Infection in Women: Care Instructions  Your Care Instructions    A urinary tract infection, or UTI, is a general term for an infection anywhere between the kidneys and the urethra (where urine comes out). Most UTIs are bladder infections. They often cause pain or burning when you urinate. UTIs are caused by bacteria and can be cured with antibiotics. Be sure to complete your treatment so that the infection goes away. Follow-up care is a key part of your treatment and safety. Be sure to make and go to all appointments, and call your doctor if you are having problems. It's also a good idea to know your test results and keep a list of the medicines you take. How can you care for yourself at home? · Take your antibiotics as directed. Do not stop taking them just because you feel better. You need to take the full course of antibiotics. · Drink extra water and other fluids for the next day or two. This may help wash out the bacteria that are causing the infection. (If you have kidney, heart, or liver disease and have to limit fluids, talk with your doctor before you increase your fluid intake.)  · Avoid drinks that are carbonated or have caffeine. They can irritate the bladder. · Urinate often. Try to empty your bladder each time. · To relieve pain, take a hot bath or lay a heating pad set on low over your lower belly or genital area. Never go to sleep with a heating pad in place. To prevent UTIs  · Drink plenty of water each day. This helps you urinate often, which clears bacteria from your system. (If you have kidney, heart, or liver disease and have to limit fluids, talk with your doctor before you increase your fluid intake.)  · Urinate when you need to. · Urinate right after you have sex. · Change sanitary pads often. · Avoid douches, bubble baths, feminine hygiene sprays, and other feminine hygiene products that have deodorants.   · After going to the bathroom, wipe from front to back.  When should you call for help? Call your doctor now or seek immediate medical care if:  · Symptoms such as fever, chills, nausea, or vomiting get worse or appear for the first time. · You have new pain in your back just below your rib cage. This is called flank pain. · There is new blood or pus in your urine. · You have any problems with your antibiotic medicine. Watch closely for changes in your health, and be sure to contact your doctor if:  · You are not getting better after taking an antibiotic for 2 days. · Your symptoms go away but then come back. Where can you learn more? Go to http://jacquelyn-alberto.info/. Enter P728 in the search box to learn more about \"Urinary Tract Infection in Women: Care Instructions. \"  Current as of: November 28, 2016  Content Version: 11.3  © 9997-2283 [employer], [employer]. Care instructions adapted under license by [employer] (which disclaims liability or warranty for this information). If you have questions about a medical condition or this instruction, always ask your healthcare professional. [address] any warranty or liability for your use of this information.

## 2017-10-12 NOTE — PROGRESS NOTES
The patient returns and the renal ultrasound was reviewed with the patient. There is no doubt on this image, with superior acoustic windows, that this is a simple renal cyst.    The patient is reassured and is returned to family practice. Because she is getting fairly regular visits there I think that a voided urine should be monitored. If there should be pyuria and the patient not having symptoms, a catheter urine should be obtained and compared because the white blood cells in her urine may simply be a contaminant specimen from the vagina. The patient will return to see me if there is needed. 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 The patient expresses understanding of the treatment plan and wishes to proceed    This dictation used voice recognition software and there may be mistakes.     Sahil Rodriguez MD

## 2017-10-24 ENCOUNTER — OFFICE VISIT (OUTPATIENT)
Dept: CARDIOLOGY CLINIC | Age: 77
End: 2017-10-24

## 2017-10-24 ENCOUNTER — CLINICAL SUPPORT (OUTPATIENT)
Dept: CARDIOLOGY CLINIC | Age: 77
End: 2017-10-24

## 2017-10-24 VITALS
HEART RATE: 66 BPM | OXYGEN SATURATION: 98 % | BODY MASS INDEX: 37.05 KG/M2 | WEIGHT: 217 LBS | HEIGHT: 64 IN | SYSTOLIC BLOOD PRESSURE: 132 MMHG | DIASTOLIC BLOOD PRESSURE: 84 MMHG

## 2017-10-24 DIAGNOSIS — I25.10 CORONARY ARTERY DISEASE INVOLVING NATIVE CORONARY ARTERY OF NATIVE HEART WITHOUT ANGINA PECTORIS: ICD-10-CM

## 2017-10-24 DIAGNOSIS — Z95.810 CARDIAC DEFIBRILLATOR IN SITU: ICD-10-CM

## 2017-10-24 DIAGNOSIS — I10 ESSENTIAL HYPERTENSION: ICD-10-CM

## 2017-10-24 DIAGNOSIS — Z95.1 S/P CABG X 3: ICD-10-CM

## 2017-10-24 DIAGNOSIS — I65.23 BILATERAL CAROTID ARTERY STENOSIS: ICD-10-CM

## 2017-10-24 DIAGNOSIS — R60.0 EDEMA OF BOTH LEGS: ICD-10-CM

## 2017-10-24 DIAGNOSIS — I48.91 ATRIAL FIBRILLATION, UNSPECIFIED TYPE (HCC): Primary | ICD-10-CM

## 2017-10-24 DIAGNOSIS — I49.3 PVC'S (PREMATURE VENTRICULAR CONTRACTIONS): ICD-10-CM

## 2017-10-24 DIAGNOSIS — I48.0 PAROXYSMAL ATRIAL FIBRILLATION (HCC): ICD-10-CM

## 2017-10-24 DIAGNOSIS — E78.2 MIXED HYPERLIPIDEMIA: Primary | ICD-10-CM

## 2017-10-24 RX ORDER — SIMVASTATIN 20 MG/1
20 TABLET, FILM COATED ORAL
Qty: 90 TAB | Refills: 3 | Status: SHIPPED | OUTPATIENT
Start: 2017-10-24 | End: 2018-08-23 | Stop reason: ALTCHOICE

## 2017-10-24 NOTE — PROGRESS NOTES
I have personally seen and evaluated the device findings. Interrogation reviewed and I agree with assessment.     Wendy Geiger

## 2017-10-24 NOTE — PROGRESS NOTES
1. Have you been to the ER, urgent care clinic since your last visit? Hospitalized since your last visit?no    2. Have you seen or consulted any other health care providers outside of the [address] [place] [address] since your last visit? Include any pap smears or colon screening.  no

## 2017-10-24 NOTE — PROGRESS NOTES
Review of Systems   Constitutional: Positive for malaise/fatigue. Negative for chills, diaphoresis, fever and weight loss. Respiratory: Positive for shortness of breath. Negative for cough, hemoptysis, sputum production and wheezing. Cardiovascular: Positive for palpitations, orthopnea, claudication, leg swelling and PND. Negative for chest pain. Gastrointestinal: Negative. Musculoskeletal: Positive for joint pain. Negative for back pain, falls, myalgias and neck pain. Neurological: Negative for weakness.

## 2017-10-24 NOTE — MR AVS SNAPSHOT
Visit Information Date & Time Provider Department Dept. Phone Encounter #  
 10/24/2017  9:00 AM Ena Pereira DO [facility] [id number] Follow-up Instructions Return in about 6 months (around 4/24/2018), or if symptoms worsen or fail to improve. Your Appointments 12/18/2017  2:45 PM  
PROCEDURE with BSVVS IMAGING 2 [facility] ([address] [place] [address]) Appt Note: cv wild; Cassie [place], [place] [address] [facility]  
[phone] [address]  
  
    
 12/18/2017  3:45 PM  
PROCEDURE with BSVVS NONIMAGING [facility] ([address] [place] [address]) Appt Note: leg art wild; Cassie [place], [place] [address] [address] [facility]  
[phone] [address]  
  
    
 1/4/2018  9:00 AM  
Follow Up with [address] [place] [address] [facility] ([address] [place] [address]) Appt Note: 2 year fu after studies at [facility] on 12/18/2017; .  
 Izzy [place], [place] [address] [facility]  
[phone] [facility] [address]  
  
    
 2/27/2018  8:30 AM  
Office Visit with Aurea Berger MD  
[facility] of [address]) Appt Note: ov 6mos. felton [address] [facility], [address] [place]  
  
   
 [address], [address] Upcoming Health Maintenance Date Due Pneumococcal 65+ High/Highest Risk (2 of 2 - PPSV23) 9/4/2017 COLONOSCOPY 1/25/2018 MEDICARE YEARLY EXAM 7/11/2018 GLAUCOMA SCREENING Q2Y 9/6/2019 DTaP/Tdap/Td series (2 - Td) 11/22/2026 Allergies as of 10/24/2017  Review Complete On: 10/12/2017 By: Nunu Zheng LPN Severity Noted Reaction Type Reactions Nifedipine High 08/29/2017    Nausea and Vomiting, Other (comments) Dizzy Ace Inhibitors    Cough Codeine  11/12/2008    Unknown (comments), Nausea Only, Nausea and Vomiting Hydralazine  03/29/2017   Intolerance Other (comments) Dizziness and Fatigue Lipitor [Atorvastatin]  05/20/2014    Nausea and Vomiting, Vertigo Current Immunizations  Never Reviewed No immunizations on file. Not reviewed this visit You Were Diagnosed With   
  
 Codes Comments Mixed hyperlipidemia    -  Primary ICD-10-CM: R02.4 ICD-9-CM: 272.2 Essential hypertension     ICD-10-CM: I10 
ICD-9-CM: 401.9 Paroxysmal atrial fibrillation (HCC)     ICD-10-CM: I48.0 ICD-9-CM: 427.31 Bilateral carotid artery stenosis     ICD-10-CM: I65.23 ICD-9-CM: 433.10, 433.30 PVC's (premature ventricular contractions)     ICD-10-CM: I49.3 ICD-9-CM: 427.69 Edema of both legs     ICD-10-CM: R60.0 ICD-9-CM: 743. 3 Coronary artery disease involving native coronary artery of native heart without angina pectoris     ICD-10-CM: I25.10 ICD-9-CM: 414.01 Vitals BP Pulse Height(growth percentile) Weight(growth percentile) SpO2 BMI  
 132/84 66 5' 4\" (1.626 m) 217 lb (98.4 kg) 98% 37.25 kg/m2 OB Status Smoking Status Postmenopausal Former Smoker BMI and BSA Data Body Mass Index Body Surface Area  
 37.25 kg/m 2 2.11 m 2 Preferred Pharmacy Pharmacy Name Phone [facility]/PHARMACY #[id number]- [address] Your Updated Medication List  
  
   
This list is accurate as of: 10/24/17  9:58 AM.  Always use your most recent med list. amLODIPine 2.5 mg tablet Commonly known as:  [employer] Darting TAKE 1 TABLET BY MOUTH DAILY  
  
 aspirin 81 mg tablet Take 81 mg by mouth daily. carvedilol 25 mg tablet Commonly known as:  COREG  
TAKE 1 TABLET BY MOUTH (2) TIMES A DAY  
  
 ferrous sulfate 325 mg (65 mg iron) EC tablet Commonly known as:  IRON  
TAKE 1 TABLET BY MOUTH ONCE A DAY  
  
 furosemide 40 mg tablet Commonly known as:  LASIX TAKE 1 TABLET BY MOUTH AS NEEDED  
  
 NIFEdipine ER 60 mg ER tablet Commonly known as:  ADALAT CC  
TAKE 1 TABLET BY MOUTH EVERY DAY  
  
 simvastatin 20 mg tablet Commonly known as:  ZOCOR Take 1 Tab by mouth nightly. Prescriptions Sent to Pharmacy Refills  
 simvastatin (ZOCOR) 20 mg tablet 3 Sig: Take 1 Tab by mouth nightly. Class: Normal  
 Pharmacy: [facility]/pharmacy [address] [place] [address] Ph #: [phone] Route: Oral  
  
We Performed the Following AMB POC EKG ROUTINE W/ 12 LEADS, INTER & REP [03344 CPT(R)] Follow-up Instructions Return in about 6 months (around 4/24/2018), or if symptoms worsen or fail to improve. To-Do List   
 10/24/2017 Lab:  METABOLIC PANEL, BASIC   
  
 10/24/2017 Lab:  NT-PRO BNP Please provide this summary of care documentation to your next provider. Your primary care clinician is listed as Spike Britt. If you have any questions after today's visit, please call [phone].

## 2017-10-24 NOTE — PROGRESS NOTES
HPI: I saw Julio Giles in my office today in cardiovascular evaluation regarding her ischemic cardiomyopathy. Ms. Amairani Cardona is a very pleasant 78year old obese  female with history of hypertension, hyperlipidemia, gastroesophageal reflux disease, and a CVA in the past. She also has history of coronary artery disease, status post coronary artery bypass grafting surgery x three in June of 2008 by Dr. Gaurav Maguire with the following bypasses:      1. Left internal mammary artery to the LAD. 2. Reverse saphenous vein graft to the first obtuse marginal branch of the circumflex. 3. Reverse saphenous vein graft to the distal right coronary artery.      She developed a deep vein thrombosis in her left arm after surgery and had to be on Coumadin for a number of months, but due to the fact that she was not having any paroxysmal atrial fibrillation and we were having trouble keeping her INRs controlled, we decided to take her off Coumadin a couple of years ago. She has had some problems with AV block and a dual chamber pacemaker was originally placed in October of 2008 with a generator change in February of 2016 by my associate Dr. Brenden Gallardo. She comes in today and relates that she is doing reasonably well although she has decreased her Lasix to every other day and she has gained some weight recently and has developed a little bit of peripheral edema. She does sleep on 2 pillows for comfort and has done so for quite some time. She initially suggested she was having paroxysmal nocturnal dyspnea, but retrospectively relates that she does not wake up from sleep short of breath. Although, she does get short of breath with significant exertion and that may be a little worse than it has been. Encounter Diagnoses   Name Primary?     Coronary artery disease involving native coronary artery of native heart without angina pectoris     S/P CABG x 3 in June of 2008     Mixed hyperlipidemia Yes    Essential hypertension     Paroxysmal atrial fibrillation (HCC)     Bilateral carotid artery stenosis     PVC's (premature ventricular contractions)     Edema of both legs        Discussion: This patient appears to be doing about as well as we could expect, although she has cut down on her Lasix to every other day and has been gaining some weight with little peripheral edema recently and she also has been a little bit more short of breath with activity so I am going to get a BMP and a BNP to see whether or not we should be increasing her Lasix back to daily which is what I suspect we may wish to do. Her latest lipid profile which was completed July 10, 2017 showed total cholesterol 215 with triglycerides of 67, HDL 93, LDL of 108.6, and VLDL of 13.4 which is somewhat suboptimal control, but with a very high HDL one might not want to treat her. However, with her known coronary artery disease and the fact that in the past she had been on simvastatin I am simply going to get her reinitiated on that medication at 20 mg a day rather than the 40 mg that she was on previously since she is now on amlodipine which could increase the simvastatin effect. Her pacemaker check today showing 7.8 years left on her battery with one 7 beat run of nonsustained VT in the past 3 months. She has ventricularly paced 94% of the time and atrially paced 40% of the time. Since she is otherwise doing well I will simply plan to see her again in 6 month. PCP:  Cristiane Cline MD       Past Medical History:   Diagnosis Date    Abnormal ankle brachial index 11/13/2014    Mild arterial disease in right leg. R VICTOR MANUEL 0.88. L VICTOR MANUEL 1.00.     Anemia     Arm DVT (deep venous thromboembolism), acute (HCC)     s/p anticoagulation    Atherosclerotic heart disease     Atrial fibrillation (HCC)     AV block     CAD (coronary artery disease)     Cardiomyopathy, ischemic     Carotid artery stenosis     Carotid duplex 11/13/2014 Mild <50% bilateral ICA plaquing. Possible left vertebral occlusion.  Cerebral artery occlusion with cerebral infarction [facility])     Chest pain     CVA (cerebral infarction)     Echocardiogram 2015    EF 55%. Apical inferior, apical septal hypk (new since study of 11), likely due to chronic V-pacing. Mild LVH. RVSP 30 mmHg. Mild LAE. Mild MR. Mild TR.  Gastritis     GERD (gastroesophageal reflux disease)     Hiatal hernia     Hyperlipidemia     Hypertension     Hypertensive cardiovascular disease     Intracranial aneurysm     left cavernous ICA 2mm aneurysm from head/neck CTA in     Long term (current) use of anticoagulants 3/10/2011    Lower extremity venous duplex 2015    No DVT bilaterally.  Nuclear cardiac imaging test 2015    Low risk. Sm apical infarction w/no ischemia vs apical thinning. Sm basal inferior defect, likely artifact. EF 56%. Inferoseptal, inferoapical WMA related to sternotomy or paced rhythm.  Pacemaker     [facility])     PVC's     chronic    S/P CABG x 3 2008    LIMA-LAD. SVG-OM. SVG-dRCA     S/P cardiac cath 2008    pRCA 100%. LM patent. Cx patent. OM1 100%. mLAD 95%. LVEDP 27-29mmHg. EF 20%.  mod MR    Tilt table evaluation 1995    Positive for orthostatic hypotension    Vitamin D deficiency          Past Surgical History:   Procedure Laterality Date    CABG, ARTERY-VEIN, THREE  2008    CARDIAC SURG PROCEDURE UNLIST      medtronic dual-chamber cardioverted-defibrillator    HX  SECTION      x2    HX ENDOSCOPY  3/1/16    HX ENDOSCOPY  3/1/16    HX HEMORRHOIDECTOMY          HX HYSTERECTOMY          HX ORTHOPAEDIC      knee surgery    HX OTHER SURGICAL  2/10/16    Pacemaker Gen Change    HX TUMOR REMOVAL      removed from arm.  benign         Current Outpatient Rx   Name  Route  Sig  Dispense  Refill    KLOR-CON M20 20 mEq tablet        TAKE 1 TABLET BY MOUTH EVERY DAY    90 Tab    3      simvastatin (ZOCOR) 40 mg tablet    Oral    Take 40 mg by mouth two (2) times a week.  esomeprazole (NEXIUM) 40 mg capsule    Oral    Take 40 mg by mouth daily.  carvedilol (COREG) 25 mg tablet    Oral    Take 1 Tab by mouth two (2) times a day. 60 Tab    3      BENICAR 20 mg tablet        TAKE 1 TABLET BY MOUTH EVERY DAY    90 Tab    3      furosemide (LASIX) 40 mg tablet    Oral    Take 40 mg by mouth as needed.  aspirin 81 mg tablet    Oral    Take 81 mg by mouth daily. Allergies   Allergen Reactions    Nifedipine Nausea and Vomiting and Other (comments)     Dizzy    Ace Inhibitors Cough    Codeine Unknown (comments), Nausea Only and Nausea and Vomiting    Hydralazine Other (comments)     Dizziness and Fatigue    Lipitor [Atorvastatin] Nausea and Vomiting and Vertigo         Social History     Social History    Marital status:      Spouse name: N/A    Number of children: N/A    Years of education: N/A     Social History Main Topics    Smoking status: Former Smoker    Smokeless tobacco: Never Used      Comment: just smoked 2 weeks in college    Alcohol use No    Drug use: No    Sexual activity: No     Other Topics Concern    None     Social History Narrative       No family history on file. Review of Systems:   Constitutional: Positive for malaise/fatigue. Negative for chills, diaphoresis, fever and weight loss. Respiratory: Positive for shortness of breath. Negative for cough, hemoptysis, sputum production and wheezing. Cardiovascular: Positive for palpitations, orthopnea, claudication, leg swelling and PND. Negative for chest pain. Gastrointestinal: Negative. Musculoskeletal: Positive for joint pain. Negative for back pain, falls, myalgias and neck pain. Neurological: Negative for weakness.      Physical Exam:   The patient is an alert, oriented, well developed, well nourished 78 y.o. [facility] American female who was in no acute distress at the time of my examination. Visit Vitals    /84    Pulse 66    Ht 5' 4\" (1.626 m)    Wt 98.4 kg (217 lb)    SpO2 98%    BMI 37.25 kg/m2      BP Readings from Last 3 Encounters:   10/24/17 132/84   10/12/17 160/80   08/29/17 138/80        Wt Readings from Last 3 Encounters:   10/24/17 98.4 kg (217 lb)   10/12/17 98.9 kg (218 lb)   09/11/17 95.3 kg (210 lb)       HEENT: Conjunctivae white, mucosa moist, no pallor or cyanosis. Neck: Supple without masses, tenderness or thyromegaly. No jugular venous distention. Carotid upstrokes are full bilaterally, without bruits. Chest: symmetrical with good excursion. Cardiovascular: Well-healed incision in left upper chest over pacer-defibrillator insertion site with some keloid and some tenderness over the site. Mid-sternotomy scar with some keloid. [place] is displaced between the MCL and AAL. No lifts, heaves, or thrills felt on palpation. Normal S1 and S2, with a grade I-II/VI apical systolic murmur without radiation and without appreciable diastolic murmur, rubs, clicks or gallops   Lungs: Clear to auscultation in all fields. Abdomen: Soft, with no masses, tenderness or organomegaly. Extremities: Thick lower legs with 1 + edema in lower extremities to mid calves and some decrease in peripheral pulses. Review of Data: Please refer to past medical history for most recent cardiac testing.     Lab Results   Component Value Date/Time    Cholesterol, total 215 07/10/2017 11:17 AM    HDL Cholesterol 93 07/10/2017 11:17 AM    LDL, calculated 108.6 07/10/2017 11:17 AM    VLDL, calculated 13.4 07/10/2017 11:17 AM    Triglyceride 67 07/10/2017 11:17 AM    CHOL/HDL Ratio 2.3 07/10/2017 11:17 AM       Results for orders placed or performed in visit on 10/24/17   AMB POC EKG ROUTINE W/ 12 LEADS, INTER & REP     Status: None    Narrative    Normal sinus rhythm with atrial sensing and ventricular pacing with some PACs this tracing is similar to past tracings. Charlene Redmond D.O., F.A.C.C. [facility]  [facility] [place]  [address]. [address] [address]    PLEASE NOTE:  This document has been produced using voice recognition software. Unrecognized errors in transcription may be present.

## 2017-10-26 ENCOUNTER — HOSPITAL ENCOUNTER (OUTPATIENT)
Dept: LAB | Age: 77
Discharge: HOME | End: 2017-10-26
Payer: MEDICARE

## 2017-10-26 DIAGNOSIS — R60.0 EDEMA OF BOTH LEGS: ICD-10-CM

## 2017-10-26 LAB
ANION GAP SERPL CALC-SCNC: 7 MMOL/L (ref 3–18)
BNP SERPL-MCNC: 936 PG/ML (ref 0–1800)
BUN SERPL-MCNC: 38 MG/DL (ref 7–18)
BUN/CREAT SERPL: 26 (ref 12–20)
CALCIUM SERPL-MCNC: 9 MG/DL (ref 8.5–10.1)
CHLORIDE SERPL-SCNC: 107 MMOL/L (ref 100–108)
CO2 SERPL-SCNC: 27 MMOL/L (ref 21–32)
CREAT SERPL-MCNC: 1.46 MG/DL (ref 0.6–1.3)
GLUCOSE SERPL-MCNC: 103 MG/DL (ref 74–99)
POTASSIUM SERPL-SCNC: 3.9 MMOL/L (ref 3.5–5.5)
SODIUM SERPL-SCNC: 141 MMOL/L (ref 136–145)

## 2017-10-26 PROCEDURE — 83880 ASSAY OF NATRIURETIC PEPTIDE: CPT | Performed by: INTERNAL MEDICINE

## 2017-10-26 PROCEDURE — 80048 BASIC METABOLIC PNL TOTAL CA: CPT | Performed by: INTERNAL MEDICINE

## 2017-10-26 PROCEDURE — 36415 COLL VENOUS BLD VENIPUNCTURE: CPT | Performed by: INTERNAL MEDICINE

## 2017-10-30 NOTE — PROGRESS NOTES
Per your last note \" This patient appears to be doing about as well as we could expect, although she has cut down on her Lasix to every other day and has been gaining some weight with little peripheral edema recently and she also has been a little bit more short of breath with activity so I am going to get a BMP and a BNP to see whether or not we should be increasing her Lasix back to daily which is what I suspect we may wish to do.

## 2017-11-08 NOTE — PROGRESS NOTES
This lady's BNP appears to be normal for her age and consequently it would appear as if she needs increased diuretics at this time. Please let her know and if she has increased shortness of breath issues she should give us a call.  ES

## 2017-11-16 ENCOUNTER — TELEPHONE (OUTPATIENT)
Dept: CARDIOLOGY CLINIC | Age: 77
End: 2017-11-16

## 2017-11-16 NOTE — TELEPHONE ENCOUNTER
----- Message from Carol Brooks DO sent at 11/8/2017  8:31 AM EST -----  This lady's BNP appears to be normal for her age and consequently it would appear as if she needs increased diuretics at this time. Please let her know and if she has increased shortness of breath issues she should give us a call.  ES

## 2017-12-04 ENCOUNTER — TELEPHONE (OUTPATIENT)
Dept: INTERNAL MEDICINE CLINIC | Age: 77
End: 2017-12-04

## 2017-12-04 NOTE — TELEPHONE ENCOUNTER
Call from [facility] at Dr. Kathy Milian office, they are req pt's last lab results CBC and Creatinin faxed to [phone], Trudi Santana

## 2018-01-09 DIAGNOSIS — I65.23 BILATERAL CAROTID ARTERY STENOSIS: Primary | ICD-10-CM

## 2018-01-09 DIAGNOSIS — I73.9 PAD (PERIPHERAL ARTERY DISEASE) (HCC): ICD-10-CM

## 2018-01-10 ENCOUNTER — OFFICE VISIT (OUTPATIENT)
Dept: VASCULAR SURGERY | Age: 78
End: 2018-01-10

## 2018-01-10 DIAGNOSIS — I73.9 PAD (PERIPHERAL ARTERY DISEASE) (HCC): ICD-10-CM

## 2018-01-10 DIAGNOSIS — I65.23 BILATERAL CAROTID ARTERY STENOSIS: ICD-10-CM

## 2018-01-10 NOTE — PROCEDURES
[facility]   *** FINAL REPORT ***    Name: Fred Little  MRN: [id number]       Outpatient  : 1940  HIS Order #: [id number]  [address] [facility] Visit #: [id number]  Date: 10 Josr 2018    TYPE OF TEST: Peripheral Arterial Testing    REASON FOR TEST  Peripheral vascular dz NOS    Right Leg  Segmentals: Abnormal                     mmHg  Brachial         187  High thigh  Low thigh  Calf             176  Posterior tibial 182  Dorsalis pedis   183  Peroneal  Metatarsal  Toe pressure      98  Doppler:    Normal  Ankle/Brachial: 0.93    Site of occlusive disease:-  tibioperoneal segment    Left Leg  Segmentals: Abnormal                     mmHg  Brachial         196  High thigh  Low thigh  Calf             202  Posterior tibial 175  Dorsalis pedis   181  Peroneal  Metatarsal  Toe pressure     100  Doppler:    Normal  Ankle/Brachial: 0.92    Site of occlusive disease:-  tibioperoneal segment    INTERPRETATION/FINDINGS  Physiologic testing was performed using continuous wave doppler and  segmental pressures. 1. Mild peripheral arterial disease at rest in both legs with tibial  artery disease. 2. The right ankle/brachial index is 0.93 and the left ankle/brachial  index is 0.92.  3. The right digit/brachial index is 0.50 and the left digit/brachial  index is 0.51.  4. Compared to the previous study on 12-1-15 there is no significant  change on the right and there has been slight progression of the  disease on the left from normal to mild. ADDITIONAL COMMENTS    I have personally reviewed the data relevant to the interpretation of  this  study. TECHNOLOGIST: Laure Haider RVT, RDMS  Signed: 01/10/2018 08:58 AM    PHYSICIAN: Tan Addison D.O.   Signed: 2018 05:23 PM

## 2018-01-25 ENCOUNTER — CLINICAL SUPPORT (OUTPATIENT)
Dept: CARDIOLOGY CLINIC | Age: 78
End: 2018-01-25

## 2018-01-25 DIAGNOSIS — Z95.810 AICD (AUTOMATIC CARDIOVERTER/DEFIBRILLATOR) PRESENT: ICD-10-CM

## 2018-01-25 DIAGNOSIS — I42.9 CARDIOMYOPATHY, UNSPECIFIED TYPE (HCC): Primary | ICD-10-CM

## 2018-01-25 NOTE — PROGRESS NOTES
I have personally seen and evaluated the device findings. Interrogation reviewed and I agree with assessment.     Humaira Colmenares

## 2018-02-01 ENCOUNTER — OFFICE VISIT (OUTPATIENT)
Dept: VASCULAR SURGERY | Age: 78
End: 2018-02-01

## 2018-02-01 VITALS
BODY MASS INDEX: 37.05 KG/M2 | DIASTOLIC BLOOD PRESSURE: 86 MMHG | RESPIRATION RATE: 21 BRPM | SYSTOLIC BLOOD PRESSURE: 164 MMHG | HEART RATE: 65 BPM | HEIGHT: 64 IN | WEIGHT: 217 LBS

## 2018-02-01 DIAGNOSIS — I25.10 ATHEROSCLEROSIS OF NATIVE CORONARY ARTERY OF NATIVE HEART WITHOUT ANGINA PECTORIS: ICD-10-CM

## 2018-02-01 DIAGNOSIS — I65.23 BILATERAL CAROTID ARTERY STENOSIS: ICD-10-CM

## 2018-02-01 DIAGNOSIS — Z86.73 HISTORY OF CVA (CEREBROVASCULAR ACCIDENT): ICD-10-CM

## 2018-02-01 DIAGNOSIS — I73.9 PAD (PERIPHERAL ARTERY DISEASE) (HCC): Primary | ICD-10-CM

## 2018-02-01 NOTE — PROGRESS NOTES
Christine Saini    Chief Complaint   Patient presents with    Carotid Artery Stenosis       History and Physical    Christine Saini is a 78 y.o. female with history of mild carotid artery stenosis as well as mild PAD who presents for her two-year follow-up. She does have history of stroke in the past as well as CAD s/p CABG and pacemaker placement. Patient has been doing very well. She does report that shortly after her last visit she broke her left foot. She states that while this was healing she was ambulating with a cane and unfortunately tripped and fell and sprained her right ankle. She states that both of her injuries have healed quite nicely at this time. She does have some pains in her feet and follows with podiatry very closely. She denies any symptoms of claudication. No skin changes. No ulcers. No rest pain. She also denies any strokelike symptoms. No amaurosis fugax. No fevers or chills. Overall she feels that she is doing very well and is in her usual state of health. 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 She remains active and independent. Past Medical History:   Diagnosis Date    Abnormal ankle brachial index 11/13/2014    Mild arterial disease in right leg. R VICTOR MANUEL 0.88. L VICTOR MANUEL 1.00.  Anemia     Arm DVT (deep venous thromboembolism), acute (HCC)     s/p anticoagulation    Atherosclerotic heart disease     Atrial fibrillation (HCC)     AV block     CAD (coronary artery disease)     Cardiomyopathy, ischemic     Carotid artery stenosis     Carotid duplex 11/13/2014    Mild <50% bilateral ICA plaquing. Possible left vertebral occlusion.  Cerebral artery occlusion with cerebral infarction [facility])     Chest pain     CVA (cerebral infarction)     Echocardiogram 08/19/2015    EF 55%. Apical inferior, apical septal hypk (new since study of 12/19/11), likely due to chronic V-pacing.   Mild LVH.  RVSP 30 mmHg. Mild LAE. Mild MR. Mild TR.  Gastritis     GERD (gastroesophageal reflux disease)     Hiatal hernia     Hyperlipidemia     Hypertension     Hypertensive cardiovascular disease     Intracranial aneurysm     left cavernous ICA 2mm aneurysm from head/neck CTA in 2014    Long term (current) use of anticoagulants 3/10/2011    Lower extremity venous duplex 2015    No DVT bilaterally.  Nuclear cardiac imaging test 2015    Low risk. Sm apical infarction w/no ischemia vs apical thinning. Sm basal inferior defect, likely artifact. EF 56%. Inferoseptal, inferoapical WMA related to sternotomy or paced rhythm.  Pacemaker     PAD [facility]peripheral artery disease) ([facility])     PVC's     chronic    S/P CABG x 3 2008    LIMA-LAD. SVG-OM. SVG-dRCA     S/P cardiac cath 2008    pRCA 100%. LM patent. Cx patent. OM1 100%. mLAD 95%. LVEDP 27-29mmHg. EF 20%.  mod MR    Tilt table evaluation 1995    Positive for orthostatic hypotension    Vitamin D deficiency      Past Surgical History:   Procedure Laterality Date    CABG, ARTERY-VEIN, THREE  2008    CARDIAC SURG PROCEDURE UNLIST      medtronic dual-chamber cardioverted-defibrillator    HX  SECTION      x2    HX ENDOSCOPY  3/1/16    HX ENDOSCOPY  3/1/16    HX HEMORRHOIDECTOMY          HX HYSTERECTOMY          HX ORTHOPAEDIC      knee surgery    HX OTHER SURGICAL  2/10/16    Pacemaker Gen Change    HX TUMOR REMOVAL      removed from arm.  benign     Patient Active Problem List   Diagnosis Code    Atherosclerotic heart disease, s/p CABG X3 in , in the setting of severe left ventricular dysfunction, and s/p a dual-chamber I25.10    S/P CABG x 3 Z95.1    PVC's (premature ventricular contractions) I49.3    HTN (hypertension) I10    Atrial fibrillation ([facility]) I48.91    Carotid stenosis I65.29    PAD (peripheral artery disease) ([facility]) I73.9    Intracranial aneurysm - left cavernous ICA on 2014 head/neck CTA I67.1    Mixed hyperlipidemia E78.2    Gastroesophageal reflux disease without esophagitis K21.9    Chronic gastritis with bleeding - in February/March of 2016 per FOBT and EGD results K29.51    History of CVA (cerebrovascular accident) Z80.78    Vitamin D deficiency E55.9    Dysphagia s/p dilation R13.10    Anemia D64.9    Pacemaker (for h/o AV block) Z95.0    OAB (overactive bladder) N32.81    Cataract H26.9    Gout of left foot M10.9    Seasonal allergic rhinitis J30.2    Microalbuminuria R80.9    CKD (chronic kidney disease) stage 3, GFR 30-59 ml/min N18.3    Mixed connective tissue disease (HCC) M35.1    Pyuria N39.0     Current Outpatient Prescriptions   Medication Sig Dispense Refill    simvastatin (ZOCOR) 20 mg tablet Take 1 Tab by mouth nightly. 90 Tab 3    carvedilol (COREG) 25 mg tablet TAKE 1 TABLET BY MOUTH (2) TIMES A  Tab 3    amLODIPine (NORVASC) 2.5 mg tablet TAKE 1 TABLET BY MOUTH DAILY  1    furosemide (LASIX) 40 mg tablet TAKE 1 TABLET BY MOUTH AS NEEDED 30 Tab 3    aspirin 81 mg tablet Take 81 mg by mouth daily.  NIFEdipine ER (ADALAT CC) 60 mg ER tablet TAKE 1 TABLET BY MOUTH EVERY DAY  11    ferrous sulfate (IRON) 325 mg (65 mg iron) EC tablet TAKE 1 TABLET BY MOUTH ONCE A DAY  3     Allergies   Allergen Reactions    Nifedipine Nausea and Vomiting and Other (comments)     Dizzy    Ace Inhibitors Cough    Codeine Unknown (comments), Nausea Only and Nausea and Vomiting    Hydralazine Other (comments)     Dizziness and Fatigue    Lipitor [Atorvastatin] Nausea and Vomiting and Vertigo       Physical Exam:    Visit Vitals    /86 (BP 1 Location: Left arm, BP Patient Position: Sitting)    Pulse 65    Resp 21    Ht 5' 4\" (1.626 m)    Wt 217 lb (98.4 kg)    BMI 37.25 kg/m2      General: Well-appearing female in no acute distress   HEENT: EOMI, no scleral icterus is noted. Pulmonary: No increased work or breathing is noted. Abdomen: obese, nondistended. Extremities: Warm and well perfused bilaterally. Pt has no significant BLE edema. No ulcers. Neuro: Cranial nerves II through XII are grossly intact   Integument: No ulcerations are identified visibly    Carotid ultrasound:  Mild plaquing of the internal carotid arteries bilaterally in the  range of less than 50%  without evidence of a hemodynamically  significant stenosis. 2. No significant stenosis in the external carotid arteries  bilaterally. 3. Antegrade flow in the right vertebral artery. 4. Absent flow in the left vertebral artery. Leg art:  INTERPRETATION/FINDINGS  Physiologic testing was performed using continuous wave doppler and  segmental pressures. 1. Mild peripheral arterial disease at rest in both legs with tibial  artery disease. 2. The right ankle/brachial index is 0.93 and the left ankle/brachial  index is 0.92.  3. The right digit/brachial index is 0.50 and the left digit/brachial  index is 0.51.  4. Compared to the previous study on 12-1-15 there is no significant  change on the right and there has been slight progression of the  disease on the left from normal to mild. Impression and Plan:  Keyla Carter is a 78 y.o. female with mild bilateral carotid artery stenosis as well as mild bilateral PAD. Imaging was reviewed along with patient in the office today. She is doing very well overall and is asymptomatic at this time. She is on a statin and ASA. We will continue to follow her with routine surveillance every 2 years. Patient is well aware of the signs and symptoms of worsening arterial disease and will call the office sooner as needed. Plan was discussed. Patient expresses understanding and agrees.       Follow-up Disposition:  Return in about 2 years (around 2/1/2020). Palma Almaraz  [phone]        PLEASE NOTE:  This document has been produced using voice recognition software. Unrecognized errors in transcription may be present.

## 2018-02-27 ENCOUNTER — OFFICE VISIT (OUTPATIENT)
Dept: INTERNAL MEDICINE CLINIC | Age: 78
End: 2018-02-27

## 2018-02-27 VITALS
HEART RATE: 57 BPM | TEMPERATURE: 96.6 F | RESPIRATION RATE: 18 BRPM | OXYGEN SATURATION: 100 % | WEIGHT: 212.2 LBS | BODY MASS INDEX: 36.23 KG/M2 | DIASTOLIC BLOOD PRESSURE: 74 MMHG | SYSTOLIC BLOOD PRESSURE: 173 MMHG | HEIGHT: 64 IN

## 2018-02-27 DIAGNOSIS — N18.30 CKD (CHRONIC KIDNEY DISEASE) STAGE 3, GFR 30-59 ML/MIN (HCC): Primary | ICD-10-CM

## 2018-02-27 DIAGNOSIS — I10 ESSENTIAL HYPERTENSION: ICD-10-CM

## 2018-02-27 DIAGNOSIS — I48.91 ATRIAL FIBRILLATION, UNSPECIFIED TYPE (HCC): ICD-10-CM

## 2018-02-27 DIAGNOSIS — D64.9 ANEMIA, UNSPECIFIED TYPE: ICD-10-CM

## 2018-02-27 DIAGNOSIS — R82.81 PYURIA: ICD-10-CM

## 2018-02-27 DIAGNOSIS — R80.9 MICROALBUMINURIA: ICD-10-CM

## 2018-02-27 DIAGNOSIS — E78.2 MIXED HYPERLIPIDEMIA: ICD-10-CM

## 2018-02-27 DIAGNOSIS — E55.9 VITAMIN D DEFICIENCY: ICD-10-CM

## 2018-02-27 DIAGNOSIS — Z95.0 PACEMAKER: ICD-10-CM

## 2018-02-27 DIAGNOSIS — R73.9 HYPERGLYCEMIA: ICD-10-CM

## 2018-02-27 DIAGNOSIS — M35.1 MIXED CONNECTIVE TISSUE DISEASE (HCC): ICD-10-CM

## 2018-02-27 RX ORDER — HYDROCORTISONE 25 MG/G
OINTMENT TOPICAL
Refills: 2 | Status: ON HOLD | COMMUNITY
Start: 2018-02-02 | End: 2020-01-01

## 2018-02-27 RX ORDER — DICLOFENAC SODIUM 10 MG/G
GEL TOPICAL
Refills: 2 | COMMUNITY
Start: 2018-02-17 | End: 2018-12-20

## 2018-02-27 NOTE — MR AVS SNAPSHOT
[facility] 
 
 
 [address] [facility], [facility] [address] 
[phone] Patient: Belkys Salgado MRN: [id number] ZCY:0/8/7516 Visit Information Date & Time Provider Department Dept. Phone Encounter #  
 2/27/2018  8:30 AM Areli Holliday MD [facility] [id number] Follow-up Instructions Return in about 6 months (around 8/27/2018) for BP check, labs. Your Appointments 5/8/2018  8:20 AM  
Follow Up with Virgilio Mclain DO [facility] ([address] [place] [address]) Appt Note: 6 mth f/up [place] [id number]  
[phone] [place] [address]  
  
    
 5/8/2018  8:20 AM  
PROCEDURE with Pacer Lester Csi [facility] ([address] [place] [address]) Appt Note: w/maríaen appt. Christiane Rogel [phone]  
[phone] [place]  
  
    
 8/23/2018  8:30 AM  
Office Visit with Areli Holliday MD  
[facility] [address]) Appt Note: 6 month f/u  
 [address] [facility], [address] [facility] [address] [place]  
  
   
 [address] [facility], [address]  
  
    
 1/30/2019  9:00 AM  
PROCEDURE with BSVVS IMAGING 2 [facility] ([address] [place] [address]) Appt Note: CV 2 YRS/ KNAAK; CV [address]  
[phone] [address] [place] [address]  
  
    
 1/30/2019 10:00 AM  
PROCEDURE with BSVVS NONIMAGING [facility] ([address] [place] [address]) Appt Note: LEG ART 2 [facility]  
 [address] [place], [place] [address] [address]  
[phone]  [address] [place] [address]  
  
    
 2/13/2020  9:00 AM  
 Follow Up with Miroslava [facility] ([facility]) Appt Note: 2 years fup after studies [address] [facility] [address] [facility]  
[phone] [address] [facility] [place], [place] [address] [facility] Upcoming Health Maintenance Date Due Pneumococcal 65+ High/Highest Risk (2 of 2 - PPSV23) 9/4/2017 COLONOSCOPY 1/25/2018 MEDICARE YEARLY EXAM 7/11/2018 GLAUCOMA SCREENING Q2Y 9/6/2019 DTaP/Tdap/Td series (2 - Td) 11/22/2026 Allergies as of 2/27/2018  Review Complete On: 2/27/2018 By: Les Barrera Severity Noted Reaction Type Reactions Nifedipine High 08/29/2017    Nausea and Vomiting, Other (comments) Dizzy Ace Inhibitors    Cough Codeine  11/12/2008    Unknown (comments), Nausea Only, Nausea and Vomiting Hydralazine  03/29/2017   Intolerance Other (comments) Dizziness and Fatigue Lipitor [Atorvastatin]  05/20/2014    Nausea and Vomiting, Vertigo Current Immunizations  Never Reviewed No immunizations on file. Not reviewed this visit Vitals BP Pulse Temp Resp Height(growth percentile) Weight(growth percentile) 173/74 (BP 1 Location: Left arm, BP Patient Position: Sitting) (!) 57 96.6 °F (35.9 °C) (Oral) 18 5' 4\" (1.626 m) 212 lb 3.2 oz (96.3 kg) SpO2 BMI OB Status Smoking Status 100% 36.42 kg/m2 Postmenopausal Never Smoker Vitals History BMI and BSA Data Body Mass Index Body Surface Area  
 36.42 kg/m 2 2.09 m 2 Preferred Pharmacy Pharmacy Name Phone [id number] [place], [address] Your Updated Medication List  
  
   
This list is accurate as of 2/27/18  8:51 AM.  Always use your most recent med list. amLODIPine 2.5 mg tablet Commonly known as:  Franki Yu TAKE 1 TABLET BY MOUTH DAILY  
  
 aspirin 81 mg tablet Take 81 mg by mouth daily. carvedilol 25 mg tablet Commonly known as:  COREG  
TAKE 1 TABLET BY MOUTH (2) TIMES A DAY  
  
 diclofenac 1 % Gel Commonly known as:  VOLTAREN  
APPLY 4GRAMS/ TO KNEE 4 TIMES A DAY AS NEEDED TRANSDERMAL 90 DAYS  
  
 furosemide 40 mg tablet Commonly known as:  LASIX TAKE 1 TABLET BY MOUTH AS NEEDED  
  
 hydrocortisone 2.5 % ointment Commonly known as:  HYTONE  
APPLY LIGHTLY TO THE AFFECTED AREAS ON THE LEGS AND FEET TWICE A DAY. simvastatin 20 mg tablet Commonly known as:  ZOCOR Take 1 Tab by mouth nightly. Follow-up Instructions Return in about 6 months (around 8/27/2018) for BP check, labs. Patient Instructions Health Maintenance Due Topic Date Due  Pneumococcal 65+ High/Highest Risk (2 of 2 - PPSV23) 09/04/2017  COLONOSCOPY  01/25/2018 Please provide this summary of care documentation to your next provider. Your primary care clinician is listed as Kate Diggs. If you have any questions after today's visit, please call [phone].

## 2018-02-27 NOTE — PROGRESS NOTES
[facility]:  2/27/2018, MRN: [id number]      Jamar Bowman is a 78 y.o. female and presents to clinic for Hypertension (follow up patient states her blood pressures at home around 140/90 and at times a little lower)    Subjective:   Patient is a 82-year-old -American female with history of allergic rhinitis, gout, cataract, overactive bladder, hyperlipidemia, GERD, mixed connective tissue disease (positive FOREST, RNP Ab, Sjogren's Ab, anti-chromatin Ab, and Anti-Parnell Ab), chronic gastritis with bleeding in March 2016, history of stroke, vitamin D deficiency, dysphasia status post dilation, anemia, pacemaker for history of AV block, left cavernous ICA aneurysm, hypertension, atrial fibrillation, carotid stenosis, peripheral artery disease, PVCs, DVT hx (LUE - postoperatively), and CAD status post CABG. 1.  Chronic kidney disease: The patient had acute kidney injury off and on over the past year. She was referred to the nephrology team and has an upcoming appointment. No etiology was ever found. She has positive FOREST, RNP antibody, Sjogren's antibodies, anti-chromatin antibody, and anti-Smith antibody serologies. She is followed by the rheumatology team (Dr. James Hassan). She urinates frequently but reports no dysuria or hematuria. She had significant pyuria. She was referred and evaluated by the urology team.  Per Dr. Afshan Gomez, the patient does not have any significant pathology. Pyuria was thought to be secondary to contamination of her urinalyses. 2.  Hypertension: This is a chronic condition, present for at least a year. Once again, she is followed by the nephrology team.  She has a history of microalbuminuria. Please see #1. She is on Lasix, carvedilol, and amlodipine. She had bilateral extremity edema but does not want to come off of amlodipine. In the past, she took nifedipine in place of amlodipine but was unable to tolerate this medication secondary to dizziness and nausea. Today, she reports very little edema in bilateral extremities. She reports no adverse side effects of taking her medications. Her blood pressure is elevated today but at other doctor's appointments per patient history, her blood pressure is 130s over 80s. She also has underlying atrial fibrillation. She is followed by the cardiology team as well for history of AV block. She had a pacemaker that was recently interrogated. She is not regularly exercising. She is trying to lose weight by maintaining a heart healthy diet, eating more more salads. 3.  History of anemia: No hematochezia, melena, vaginal bleeding, or gross hematuria. Her last hemoglobin was in the 10 range. She is presently not on any iron rx.       Patient Active Problem List    Diagnosis Date Noted    Pyuria 08/30/2017    Mixed connective tissue disease ([facility] 08/22/2017    Microalbuminuria 07/10/2017    CKD (chronic kidney disease) stage 3, GFR 30-59 ml/min 07/10/2017    Seasonal allergic rhinitis 11/23/2016    Gout of left foot 11/22/2016    Cataract 08/09/2016    OAB (overactive bladder) 07/06/2016    Mixed hyperlipidemia 07/05/2016    Gastroesophageal reflux disease without esophagitis 07/05/2016    Chronic gastritis with bleeding - in February/March of 2016 per FOBT and EGD results 07/05/2016    History of CVA (cerebrovascular accident) 07/05/2016    Vitamin D deficiency 07/05/2016    Dysphagia s/p dilation 07/05/2016    Anemia 07/05/2016    Pacemaker (for h/o AV block) 07/05/2016    Intracranial aneurysm - left cavernous ICA on 2014 head/neck CTA 12/01/2014    HTN (hypertension) 10/13/2014    Atrial fibrillation ([address] 10/13/2014    Carotid stenosis 10/13/2014    PAD (peripheral artery disease) ([address] 10/13/2014    PVC's (premature ventricular contractions) 12/09/2011    Atherosclerotic heart disease, s/p CABG X3 in 6/08, in the setting of severe left ventricular dysfunction, and s/p a dual-chamber     S/P CABG x 3 Current Outpatient Prescriptions   Medication Sig Dispense Refill    diclofenac (VOLTAREN) 1 % gel APPLY 4GRAMS/ TO KNEE 4 TIMES A DAY AS NEEDED TRANSDERMAL 90 DAYS  2    hydrocortisone (HYTONE) 2.5 % ointment APPLY LIGHTLY TO THE AFFECTED AREAS ON THE LEGS AND FEET TWICE A DAY. 2    carvedilol (COREG) 25 mg tablet TAKE 1 TABLET BY MOUTH (2) TIMES A  Tab 3    amLODIPine (NORVASC) 2.5 mg tablet TAKE 1 TABLET BY MOUTH DAILY  1    furosemide (LASIX) 40 mg tablet TAKE 1 TABLET BY MOUTH AS NEEDED 30 Tab 3    aspirin 81 mg tablet Take 81 mg by mouth daily.  simvastatin (ZOCOR) 20 mg tablet Take 1 Tab by mouth nightly. 90 Tab 3       Allergies   Allergen Reactions    Nifedipine Nausea and Vomiting and Other (comments)     Dizzy    Ace Inhibitors Cough    Codeine Unknown (comments), Nausea Only and Nausea and Vomiting    Hydralazine Other (comments)     Dizziness and Fatigue    Lipitor [Atorvastatin] Nausea and Vomiting and Vertigo       Past Medical History:   Diagnosis Date    Abnormal ankle brachial index 11/13/2014    Mild arterial disease in right leg. R VICTOR MANUEL 0.88. L VICTOR MANUEL 1.00.  Anemia     Arm DVT (deep venous thromboembolism), acute (HCC)     s/p anticoagulation    Atherosclerotic heart disease     Atrial fibrillation (HCC)     AV block     CAD (coronary artery disease)     Cardiomyopathy, ischemic     Carotid artery stenosis     Carotid duplex 11/13/2014    Mild <50% bilateral ICA plaquing. Possible left vertebral occlusion.  Cerebral artery occlusion with cerebral infarction [facility])     Chest pain     CVA (cerebral infarction)     Echocardiogram 08/19/2015    EF 55%. Apical inferior, apical septal hypk (new since study of 12/19/11), likely due to chronic V-pacing. Mild LVH. RVSP 30 mmHg. Mild LAE. Mild MR. Mild TR.       Gastritis     GERD (gastroesophageal reflux disease)     Hiatal hernia     Hyperlipidemia     Hypertension     Hypertensive cardiovascular disease     Intracranial aneurysm     left cavernous ICA 2mm aneurysm from head/neck CTA in 2014    Long term (current) use of anticoagulants 3/10/2011    Lower extremity venous duplex 2015    No DVT bilaterally.  Nuclear cardiac imaging test 2015    Low risk. Sm apical infarction w/no ischemia vs apical thinning. Sm basal inferior defect, likely artifact. EF 56%. Inferoseptal, inferoapical WMA related to sternotomy or paced rhythm.  Pacemaker     PAD (peripheral artery disease) (HCC)     PVC's     chronic    S/P CABG x 3 2008    LIMA-LAD. SVG-OM. SVG-dRCA     S/P cardiac cath 2008    pRCA 100%. LM patent. Cx patent. OM1 100%. mLAD 95%. LVEDP 27-29mmHg. EF 20%. mod MR    Tilt table evaluation 1995    Positive for orthostatic hypotension    Vitamin D deficiency        Past Surgical History:   Procedure Laterality Date    CABG, ARTERY-VEIN, THREE  2008    CARDIAC SURG PROCEDURE UNLIST      medtronic dual-chamber cardioverted-defibrillator    HX  SECTION      x2    HX ENDOSCOPY  3/1/16    HX ENDOSCOPY  3/1/16    HX HEMORRHOIDECTOMY      1985    HX HYSTERECTOMY          HX ORTHOPAEDIC      knee surgery    HX OTHER SURGICAL  2/10/16    Pacemaker Gen Change    HX TUMOR REMOVAL      removed from arm.  benign       History reviewed. No pertinent family history. Social History   Substance Use Topics    Smoking status: Never Smoker    Smokeless tobacco: Never Used      Comment: just smoked 2 weeks in college    Alcohol use No       ROS   Review of Systems   Constitutional: Negative for chills and fever. HENT: Negative for ear pain and sore throat. Eyes: Negative for blurred vision and pain. Respiratory: Negative for cough and shortness of breath. Cardiovascular: Negative for chest pain. Gastrointestinal: Negative for abdominal pain, blood in stool and melena. Genitourinary: Negative for dysuria and hematuria.    Musculoskeletal: Negative for joint pain and myalgias. Skin: Negative for rash. Neurological: Negative for tingling, focal weakness and headaches. Endo/Heme/Allergies: Does not bruise/bleed easily. Psychiatric/Behavioral: Negative for substance abuse. Objective     Vitals:    02/27/18 0830 02/27/18 0835   BP: 177/79 173/74   Pulse: 62 (!) 57   Resp: 18    Temp: 96.6 °F (35.9 °C)    TempSrc: Oral    SpO2: 100%    Weight: 212 lb 3.2 oz (96.3 kg)    Height: 5' 4\" (1.626 m)    PainSc:   2    PainLoc: Foot        Physical Exam   Constitutional: She is oriented to person, place, and time and well-developed, well-nourished, and in no distress. HENT:   Head: Normocephalic and atraumatic. Right Ear: External ear normal.   Left Ear: External ear normal.   Nose: Nose normal.   Mouth/Throat: Oropharynx is clear and moist. No oropharyngeal exudate. Eyes: Conjunctivae and EOM are normal. Pupils are equal, round, and reactive to light. Right eye exhibits no discharge. Left eye exhibits no discharge. No scleral icterus. Neck: Neck supple. Cardiovascular: Normal rate, normal heart sounds and intact distal pulses. Exam reveals no gallop and no friction rub. No murmur heard. Irregularly irregular HR   Pulmonary/Chest: Effort normal and breath sounds normal. No respiratory distress. She has no wheezes. She has no rales. Abdominal: Soft. Bowel sounds are normal. She exhibits no distension. Musculoskeletal: She exhibits edema (+1 edema in BLE - unchanged). She exhibits no tenderness. Lymphadenopathy:     She has no cervical adenopathy. Neurological: She is alert and oriented to person, place, and time. She exhibits normal muscle tone. Gait normal.   Skin: Skin is warm and dry. No erythema. Psychiatric: Affect normal.   Nursing note and vitals reviewed.       LABS   Data Review:   Lab Results   Component Value Date/Time    WBC 4.7 07/10/2017 11:17 AM    HGB 10.0 (L) 07/10/2017 11:17 AM    HCT 31.9 (L) 07/10/2017 11:17 AM    PLATELET 845 50/25/9688 11:17 AM    MCV 86.2 07/10/2017 11:17 AM       Lab Results   Component Value Date/Time    Sodium 141 10/26/2017 11:05 AM    Potassium 3.9 10/26/2017 11:05 AM    Chloride 107 10/26/2017 11:05 AM    CO2 27 10/26/2017 11:05 AM    Anion gap 7 10/26/2017 11:05 AM    Glucose 103 (H) 10/26/2017 11:05 AM    BUN 38 (H) 10/26/2017 11:05 AM    Creatinine 1.46 (H) 10/26/2017 11:05 AM    BUN/Creatinine ratio 26 (H) 10/26/2017 11:05 AM    GFR est AA 42 (L) 10/26/2017 11:05 AM    GFR est non-AA 35 (L) 10/26/2017 11:05 AM    Calcium 9.0 10/26/2017 11:05 AM       Lab Results   Component Value Date/Time    Cholesterol, total 215 (H) 07/10/2017 11:17 AM    HDL Cholesterol 93 (H) 07/10/2017 11:17 AM    LDL, calculated 108.6 (H) 07/10/2017 11:17 AM    VLDL, calculated 13.4 07/10/2017 11:17 AM    Triglyceride 67 07/10/2017 11:17 AM    CHOL/HDL Ratio 2.3 07/10/2017 11:17 AM       Assessment/Plan:   1. Chronic kidney disease: +H/o positive  FOREST, RNP antibody, Sjogren's antibodies, anti-chromatin antibody, and anti-Smith antibody serologies. Continue to follow with nephrology and rheumatology. Checking a CBC, CMP, CRP, lipid panel, A1c, and a urine protein screen just before her follow-up appointment. 2.  Hypertension and H/o AF: Stable. BP at her other doctor's apts are much better than her BP today per pt hx. She just took her BP rx just before her apt today. I encouraged the patient to monitor her blood pressure. I encouraged her to notify me if it is consistently greater than 150/90. Continue to follow with the cardiology team.  Continue with medications per their recommendations. I encourage the patient to maintain a cardiac diet and to stay active, exercising as much as she can. We discussed the importance of weight reduction. I will recheck her weight at her follow-up appointment.   Checking a CBC, CMP, CRP, lipid panel, A1c, and a urine protein screen just before her follow-up appointment. 3.  Anemia:   - Checking iron labs and a CBC just before her f/u apt. Health Maintenance Due   Topic Date Due    Pneumococcal 65+ High/Highest Risk (2 of 2 - PPSV23) 09/04/2017    COLONOSCOPY  01/25/2018     Lab review: labs are reviewed in the EHR    I have discussed the diagnosis with the patient and the intended plan as seen in the above orders. The patient has received an after-visit summary and questions were answered concerning future plans. I have discussed medication side effects and warnings with the patient as well. I have reviewed the plan of care with the patient, accepted their input and they are in agreement with the treatment goals. All questions were answered. The patient understands the plan of care. Handouts provided today with above information. Pt instructed if symptoms worsen to call the office or report to the ED for continued care. Greater than 50% of the visit time was spent in counseling and/or coordination of care. Follow-up Disposition:  Return in about 6 months (around 8/27/2018) for BP check, labs.     Yaakov Dooley MD

## 2018-02-27 NOTE — PROGRESS NOTES
Chief Complaint   Patient presents with    Hypertension     follow up patient states her blood pressures at home around 140/90 and at times a little lower     1. Have you been to the ER, urgent care clinic since your last visit? Hospitalized since your last visit? No    2. Have you seen or consulted any other health care providers outside of the [address] [place] [address] since your last visit? Include any pap smears or colon screening.  No

## 2018-02-27 NOTE — PATIENT INSTRUCTIONS
Health Maintenance Due   Topic Date Due    Pneumococcal 65+ High/Highest Risk (2 of 2 - PPSV23) 09/04/2017    COLONOSCOPY  01/25/2018

## 2018-04-10 ENCOUNTER — HOSPITAL ENCOUNTER (OUTPATIENT)
Dept: LAB | Age: 78
Discharge: HOME | End: 2018-04-10
Payer: MEDICARE

## 2018-04-10 DIAGNOSIS — N25.81 HYPERPARATHYROIDISM DUE TO RENAL INSUFFICIENCY (HCC): ICD-10-CM

## 2018-04-10 DIAGNOSIS — N39.0 ACUTE URINARY TRACT INFECTION: ICD-10-CM

## 2018-04-10 DIAGNOSIS — M10.9 GOUT: ICD-10-CM

## 2018-04-10 DIAGNOSIS — I12.9 HYPERTENSIVE KIDNEY DISEASE WITH CHRONIC KIDNEY DISEASE STAGE IV (HCC): ICD-10-CM

## 2018-04-10 DIAGNOSIS — N18.30 CHRONIC KIDNEY DISEASE, STAGE III (MODERATE) (HCC): ICD-10-CM

## 2018-04-10 DIAGNOSIS — R80.9 MICROALBUMINURIA: ICD-10-CM

## 2018-04-10 DIAGNOSIS — E66.9 OBESITY: ICD-10-CM

## 2018-04-10 DIAGNOSIS — D64.9 CHRONIC ANEMIA: ICD-10-CM

## 2018-04-10 DIAGNOSIS — D50.9 IRON (FE) DEFICIENCY ANEMIA: ICD-10-CM

## 2018-04-10 DIAGNOSIS — I25.10 TRIPLE VESSEL DISEASE OF THE HEART: ICD-10-CM

## 2018-04-10 DIAGNOSIS — N18.4 HYPERTENSIVE KIDNEY DISEASE WITH CHRONIC KIDNEY DISEASE STAGE IV (HCC): ICD-10-CM

## 2018-04-10 LAB
25(OH)D3 SERPL-MCNC: 9.6 NG/ML (ref 30–100)
ALBUMIN SERPL-MCNC: 3.2 G/DL (ref 3.4–5)
ANION GAP SERPL CALC-SCNC: 7 MMOL/L (ref 3–18)
BUN SERPL-MCNC: 29 MG/DL (ref 7–18)
BUN/CREAT SERPL: 22 (ref 12–20)
CALCIUM SERPL-MCNC: 8.8 MG/DL (ref 8.5–10.1)
CALCIUM SERPL-MCNC: 9.1 MG/DL (ref 8.5–10.1)
CHLORIDE SERPL-SCNC: 107 MMOL/L (ref 100–108)
CO2 SERPL-SCNC: 26 MMOL/L (ref 21–32)
CREAT SERPL-MCNC: 1.31 MG/DL (ref 0.6–1.3)
CREAT UR-MCNC: 114 MG/DL (ref 30–125)
FERRITIN SERPL-MCNC: 286 NG/ML (ref 8–388)
GLUCOSE SERPL-MCNC: 86 MG/DL (ref 74–99)
HCT VFR BLD AUTO: 30.7 % (ref 35–45)
HGB BLD-MCNC: 9.6 G/DL (ref 12–16)
MICROALBUMIN UR-MCNC: 91.5 MG/DL (ref 0–3)
MICROALBUMIN/CREAT UR-RTO: 803 MG/G (ref 0–30)
PHOSPHATE SERPL-MCNC: 3.3 MG/DL (ref 2.5–4.9)
POTASSIUM SERPL-SCNC: 4 MMOL/L (ref 3.5–5.5)
PTH-INTACT SERPL-MCNC: 336.6 PG/ML (ref 18.4–88)
SODIUM SERPL-SCNC: 140 MMOL/L (ref 136–145)

## 2018-04-10 PROCEDURE — 82728 ASSAY OF FERRITIN: CPT | Performed by: INTERNAL MEDICINE

## 2018-04-10 PROCEDURE — 83540 ASSAY OF IRON: CPT | Performed by: INTERNAL MEDICINE

## 2018-04-10 PROCEDURE — 82043 UR ALBUMIN QUANTITATIVE: CPT | Performed by: INTERNAL MEDICINE

## 2018-04-10 PROCEDURE — 80069 RENAL FUNCTION PANEL: CPT | Performed by: INTERNAL MEDICINE

## 2018-04-10 PROCEDURE — 85018 HEMOGLOBIN: CPT | Performed by: INTERNAL MEDICINE

## 2018-04-10 PROCEDURE — 82306 VITAMIN D 25 HYDROXY: CPT | Performed by: INTERNAL MEDICINE

## 2018-04-10 PROCEDURE — 83970 ASSAY OF PARATHORMONE: CPT | Performed by: INTERNAL MEDICINE

## 2018-04-10 PROCEDURE — 36415 COLL VENOUS BLD VENIPUNCTURE: CPT | Performed by: INTERNAL MEDICINE

## 2018-05-07 LAB
IRON SATN MFR SERPL: 18 % (ref 20–50)
IRON SERPL-MCNC: 42 UG/DL (ref 50–175)
TIBC SERPL-MCNC: 231 UG/DL (ref 250–450)

## 2018-05-08 ENCOUNTER — OFFICE VISIT (OUTPATIENT)
Dept: CARDIOLOGY CLINIC | Age: 78
End: 2018-05-08

## 2018-05-08 ENCOUNTER — HOSPITAL ENCOUNTER (OUTPATIENT)
Dept: LAB | Age: 78
Discharge: HOME | End: 2018-05-08
Payer: MEDICARE

## 2018-05-08 ENCOUNTER — CLINICAL SUPPORT (OUTPATIENT)
Dept: CARDIOLOGY CLINIC | Age: 78
End: 2018-05-08

## 2018-05-08 VITALS
HEART RATE: 63 BPM | DIASTOLIC BLOOD PRESSURE: 90 MMHG | SYSTOLIC BLOOD PRESSURE: 180 MMHG | HEIGHT: 64 IN | OXYGEN SATURATION: 99 % | BODY MASS INDEX: 36.02 KG/M2 | WEIGHT: 211 LBS

## 2018-05-08 DIAGNOSIS — I34.0 MITRAL VALVE INSUFFICIENCY, UNSPECIFIED ETIOLOGY: ICD-10-CM

## 2018-05-08 DIAGNOSIS — R06.02 SOB (SHORTNESS OF BREATH): ICD-10-CM

## 2018-05-08 DIAGNOSIS — I42.9 CARDIOMYOPATHY, UNSPECIFIED TYPE (HCC): Primary | ICD-10-CM

## 2018-05-08 DIAGNOSIS — I48.91 ATRIAL FIBRILLATION, UNSPECIFIED TYPE (HCC): ICD-10-CM

## 2018-05-08 DIAGNOSIS — I25.10 CORONARY ARTERY DISEASE INVOLVING NATIVE CORONARY ARTERY OF NATIVE HEART WITHOUT ANGINA PECTORIS: Primary | ICD-10-CM

## 2018-05-08 DIAGNOSIS — Z95.810 AICD (AUTOMATIC CARDIOVERTER/DEFIBRILLATOR) PRESENT: ICD-10-CM

## 2018-05-08 DIAGNOSIS — I10 ESSENTIAL HYPERTENSION: ICD-10-CM

## 2018-05-08 DIAGNOSIS — I65.23 BILATERAL CAROTID ARTERY STENOSIS: ICD-10-CM

## 2018-05-08 DIAGNOSIS — Z95.1 S/P CABG X 3: ICD-10-CM

## 2018-05-08 DIAGNOSIS — E78.2 MIXED HYPERLIPIDEMIA: ICD-10-CM

## 2018-05-08 PROBLEM — E66.01 SEVERE OBESITY (BMI 35.0-39.9) WITH COMORBIDITY (HCC): Status: ACTIVE | Noted: 2018-05-08

## 2018-05-08 LAB
ANION GAP SERPL CALC-SCNC: 5 MMOL/L (ref 3–18)
BNP SERPL-MCNC: 2046 PG/ML (ref 0–1800)
BUN SERPL-MCNC: 21 MG/DL (ref 7–18)
BUN/CREAT SERPL: 18 (ref 12–20)
CALCIUM SERPL-MCNC: 8.3 MG/DL (ref 8.5–10.1)
CHLORIDE SERPL-SCNC: 110 MMOL/L (ref 100–108)
CO2 SERPL-SCNC: 26 MMOL/L (ref 21–32)
CREAT SERPL-MCNC: 1.19 MG/DL (ref 0.6–1.3)
GLUCOSE SERPL-MCNC: 87 MG/DL (ref 74–99)
POTASSIUM SERPL-SCNC: 4.5 MMOL/L (ref 3.5–5.5)
SODIUM SERPL-SCNC: 141 MMOL/L (ref 136–145)

## 2018-05-08 PROCEDURE — 36415 COLL VENOUS BLD VENIPUNCTURE: CPT | Performed by: INTERNAL MEDICINE

## 2018-05-08 PROCEDURE — 83880 ASSAY OF NATRIURETIC PEPTIDE: CPT | Performed by: INTERNAL MEDICINE

## 2018-05-08 PROCEDURE — 80048 BASIC METABOLIC PNL TOTAL CA: CPT | Performed by: INTERNAL MEDICINE

## 2018-05-08 NOTE — PROGRESS NOTES
1. Have you been to the ER, urgent care clinic since your last visit? Hospitalized since your last visit?no    2. Have you seen or consulted any other health care providers outside of the [address] [place] [address] since your last visit? Include any pap smears or colon screening.  no

## 2018-05-08 NOTE — MR AVS SNAPSHOT
[address] [place] [address] [address] [place] [address] 
[phone] Patient: Corinne Eid MRN: [id number] JOM:4/7/1963 Visit Information Date & Time Provider Department Dept. Phone Encounter #  
 5/8/2018  8:20 AM Schuyler Gibson DO [facility] [id number] Follow-up Instructions Return in about 6 months (around 11/8/2018), or if symptoms worsen or fail to improve. Follow-up and Disposition History Your Appointments 8/23/2018  8:30 AM  
Office Visit with Kannan Doherty MD  
[facility]) Appt Note: 6 month f/u  
 [address] [facility], [address] [facility] [address] [place]  
  
   
 [address] [facility], [address]  
  
    
 1/30/2019  9:00 AM  
PROCEDURE with BSVVS IMAGING 2 [facility] ([facility]) Appt Note: CV 2 YRS/ FERNK; CV [address] [facility]  
[phone] [address] [place] [address]  
  
    
 1/30/2019 10:00 AM  
PROCEDURE with BSVVS NONIMAGING [facility] ([facility]) Appt Note: LEG ART 2 [facility]  
 [address] 200 [facility]  
[phone] [address]  
  
    
 2/13/2020  9:00 AM  
Follow Up with Pearl Landon, Corey Osman [facility] ([facility]) Appt Note: 2 years fup after studies [address] [facility] 597 200 [facility]  
[phone] 2300 [facility] 200 [facility] Upcoming Health Maintenance Date Due Pneumococcal 65+ High/Highest Risk (2 of 2 - PPSV23) 9/4/2017 COLONOSCOPY 1/25/2018 MEDICARE YEARLY EXAM 7/11/2018 Influenza Age 5 to Adult 8/1/2018 GLAUCOMA SCREENING Q2Y 9/6/2019 DTaP/Tdap/Td series (2 - Td) 11/22/2026 Allergies as of 5/8/2018  Review Complete On: 5/8/2018 By: Lizabeth Salgado DO Severity Noted Reaction Type Reactions Nifedipine High 08/29/2017    Nausea and Vomiting, Other (comments) Dizzy Ace Inhibitors    Cough Codeine  11/12/2008    Unknown (comments), Nausea Only, Nausea and Vomiting Hydralazine  03/29/2017   Intolerance Other (comments) Dizziness and Fatigue Lipitor [Atorvastatin]  05/20/2014    Nausea and Vomiting, Vertigo Current Immunizations  Never Reviewed No immunizations on file. Not reviewed this visit You Were Diagnosed With   
  
 Codes Comments Coronary artery disease involving native coronary artery of native heart without angina pectoris    -  Primary ICD-10-CM: I25.10 ICD-9-CM: 414.01 S/P CABG x 3     ICD-10-CM: Z95.1 ICD-9-CM: V45.81 Atrial fibrillation, unspecified type ([facility]     ICD-10-CM: I48.91 
ICD-9-CM: 427.31 Essential hypertension     ICD-10-CM: I10 
ICD-9-CM: 401.9 Mixed hyperlipidemia     ICD-10-CM: E78.2 ICD-9-CM: 272.2 Bilateral carotid artery stenosis     ICD-10-CM: I65.23 ICD-9-CM: 433.10, 433.30 SOB (shortness of breath)     ICD-10-CM: R06.02 
ICD-9-CM: 786.05 Mitral valve insufficiency, unspecified etiology     ICD-10-CM: I34.0 ICD-9-CM: 424.0 Vitals BP Pulse Height(growth percentile) Weight(growth percentile) SpO2 BMI  
 180/90 (BP 1 Location: Left arm, BP Patient Position: Sitting) 63 5' 4\" (1.626 m) 211 lb (95.7 kg) 99% 36.22 kg/m2 OB Status Smoking Status Postmenopausal Never Smoker Vitals History BMI and BSA Data Body Mass Index Body Surface Area  
 36.22 kg/m 2 2.08 m 2 Preferred Pharmacy Pharmacy Name Phone [id number] [place] [address] Your Updated Medication List  
  
   
 This list is accurate as of 5/8/18  9:26 AM.  Always use your most recent med list. amLODIPine 2.5 mg tablet Commonly known as:  Shabbir Martinez TAKE 1 TABLET BY MOUTH DAILY  
  
 aspirin 81 mg tablet Take 81 mg by mouth daily. carvedilol 25 mg tablet Commonly known as:  COREG  
TAKE 1 TABLET BY MOUTH (2) TIMES A DAY  
  
 diclofenac 1 % Gel Commonly known as:  VOLTAREN  
APPLY 4GRAMS/ TO KNEE 4 TIMES A DAY AS NEEDED TRANSDERMAL 90 DAYS  
  
 furosemide 40 mg tablet Commonly known as:  LASIX TAKE 1 TABLET BY MOUTH AS NEEDED  
  
 hydrocortisone 2.5 % ointment Commonly known as:  HYTONE  
APPLY LIGHTLY TO THE AFFECTED AREAS ON THE LEGS AND FEET TWICE A DAY. simvastatin 20 mg tablet Commonly known as:  ZOCOR Take 1 Tab by mouth nightly. We Performed the Following AMB POC EKG ROUTINE W/ 12 LEADS, INTER & REP [95121 CPT(R)] Follow-up Instructions Return in about 6 months (around 11/8/2018), or if symptoms worsen or fail to improve. To-Do List   
 05/08/2018 ECHO:  2D ECHO COMPLETE ADULT (TTE) W OR WO CONTR   
  
 05/08/2018 Lab:  METABOLIC PANEL, BASIC   
  
 05/08/2018 Lab:  NT-PRO BNP Patient Instructions Take Zocor in the morning Check BP a couple of times per week over the next few weeks - if remains over 802 systolic, follow up with Dr Pipo Tavares early Please provide this summary of care documentation to your next provider. Your primary care clinician is listed as Ronal Hodges. If you have any questions after today's visit, please call [phone].

## 2018-05-08 NOTE — PROGRESS NOTES
Review of Systems   Constitutional: Negative for chills, fever, malaise/fatigue and weight loss. Respiratory: Positive for shortness of breath and wheezing. Negative for cough and hemoptysis. Cardiovascular: Positive for palpitations, orthopnea and leg swelling. Negative for chest pain. Gastrointestinal: Negative. Musculoskeletal: Positive for joint pain and myalgias. Negative for falls. Neurological: Negative for dizziness.

## 2018-05-08 NOTE — PROGRESS NOTES
HPI:  I saw Shazia Flowers in my office today in cardiovascular evaluation regarding her ischemic cardiomyopathy. Ms. Nestor Lee is a very pleasant 67 year old obese  female with history of hypertension, hyperlipidemia, gastroesophageal reflux disease, and a CVA in the past. She also has history of coronary artery disease, status post coronary artery bypass grafting surgery x three in June of 2008 by Dr. Jhoan Lewis with the following bypasses:      1. Left internal mammary artery to the LAD. 2. Reverse saphenous vein graft to the first obtuse marginal branch of the circumflex. 3. Reverse saphenous vein graft to the distal right coronary artery.      She developed a deep vein thrombosis in her left arm after surgery and had to be on Coumadin for a number of months, but due to the fact that she was not having any paroxysmal atrial fibrillation and we were having trouble keeping her INRs controlled, we decided to take her off Coumadin a couple of years ago. She has had some problems with AV block and a dual chamber pacemaker was originally placed in October of 2008 with a generator change in February of 2016 by my associate Dr. Bettye Echevarria.      She comes in today relates that she is doing reasonably well. She still has two-pillow orthopnea and some mild peripheral edema as well as occasional palpitations which are poorly described but she denies any other cardiovascular symptomatology at this time. Encounter Diagnoses   Name Primary?  Coronary artery disease involving native coronary artery of native heart without angina pectoris Yes    S/P CABG x 3 in June of 2008     Atrial fibrillation, paroxysmal (HCC)     Essential hypertension     Mixed hyperlipidemia     Bilateral carotid artery stenosis        Discussion:  This lady continues to be short of breath with any significant exertion and today was shown to have significant elevation of her blood pressure which was somewhat elevated when taken by my staff but even higher when I checked it at 180/90. I am going to get a BMP and a BNP on her to see if there is signs of failure and I am also going to get an echocardiogram to reevaluate her LV function, mitral regurgitation, and pulmonary pressures. I will make further recommendations after reviewing these studies. I do not have a copy of her latest lipid profile but the most recent one I have in July 2017 showed total cholesterol of 215 with triglycerides of 67, HDL 93, LDL of 108.6, and VLDL of 13.4 which is fair control on Zocor 20 mg daily, however she tells me she really does not take it daily because she keeps forgetting to take it at night so I have told her to start to take it with her morning medications so she can remember to take it daily. We did check her dual-chamber Medtronic AICD today and has 7.1 years remaining on the battery with no episodes of ventricular tachycardia and her Optivol appeared to be in the normal range which would suggest that her chronic heart failure is reasonably well compensated, but will wait to see what the BMP shows. PCP:  Edd Fu MD       Past Medical History:   Diagnosis Date    Abnormal ankle brachial index 11/13/2014    Mild arterial disease in right leg. R VICTOR MANUEL 0.88. L VICTOR MANUEL 1.00.  Anemia     Arm DVT (deep venous thromboembolism), acute ([facility])     s/p anticoagulation    Atherosclerotic heart disease     Atrial fibrillation ([facility])     AV block     CAD (coronary artery disease)     Cardiomyopathy, ischemic     Carotid artery stenosis     Carotid duplex 11/13/2014    Mild <50% bilateral ICA plaquing. Possible left vertebral occlusion.  Cerebral artery occlusion with cerebral infarction [facility])     Chest pain     CVA (cerebral infarction)     Echocardiogram 08/19/2015    EF 55%. Apical inferior, apical septal hypk (new since study of 12/19/11), likely due to chronic V-pacing. Mild LVH. RVSP 30 mmHg. Mild LAE. Mild MR. Mild TR.  Gastritis     GERD (gastroesophageal reflux disease)     Hiatal hernia     Hyperlipidemia     Hypertension     Hypertensive cardiovascular disease     Intracranial aneurysm     left cavernous ICA 2mm aneurysm from head/neck CTA in 2014    Long term (current) use of anticoagulants 3/10/2011    Lower extremity venous duplex 2015    No DVT bilaterally.  Nuclear cardiac imaging test 2015    Low risk. Sm apical infarction w/no ischemia vs apical thinning. Sm basal inferior defect, likely artifact. EF 56%. Inferoseptal, inferoapical WMA related to sternotomy or paced rhythm.  Pacemaker     [facility]AD [facility]peripheral artery disease) ([facility])     PVC's     chronic    S/P CABG x 3 2008    LIMA-LAD. SVG-OM. SVG-dRCA     S/P cardiac cath 2008    pRCA 100%. LM patent. Cx patent. OM1 100%. mLAD 95%. LVEDP 27-29mmHg. EF 20%. mod MR    Tilt table evaluation 1995    Positive for orthostatic hypotension    Vitamin D deficiency          Past Surgical History:   Procedure Laterality Date    CABG, ARTERY-VEIN, THREE  2008    CARDIAC SURG PROCEDURE UNLIST      medtronic dual-chamber cardioverted-defibrillator    HX  SECTION      x2    HX ENDOSCOPY  3/1/16    HX ENDOSCOPY  3/1/16    HX HEMORRHOIDECTOMY      1985    HX HYSTERECTOMY          HX ORTHOPAEDIC      knee surgery    HX OTHER SURGICAL  2/10/16    Pacemaker Gen Change    HX TUMOR REMOVAL      removed from arm.  benign         Current Outpatient Rx   Name  Route  Sig  Dispense  Refill    KLOR-CON M20 20 mEq tablet        TAKE 1 TABLET BY MOUTH EVERY DAY    90 Tab    3      simvastatin (ZOCOR) 40 mg tablet    Oral    Take 40 mg by mouth two (2) times a week.  esomeprazole (NEXIUM) 40 mg capsule    Oral    Take 40 mg by mouth daily.  carvedilol (COREG) 25 mg tablet    Oral    Take 1 Tab by mouth two (2) times a day.     60 Tab    3      BENICAR 20 mg tablet        TAKE 1 TABLET BY MOUTH EVERY DAY    90 Tab    3      furosemide (LASIX) 40 mg tablet    Oral    Take 40 mg by mouth as needed.  aspirin 81 mg tablet    Oral    Take 81 mg by mouth daily. Allergies   Allergen Reactions    Nifedipine Nausea and Vomiting and Other (comments)     Dizzy    Ace Inhibitors Cough    Codeine Unknown (comments), Nausea Only and Nausea and Vomiting    Hydralazine Other (comments)     Dizziness and Fatigue    Lipitor [Atorvastatin] Nausea and Vomiting and Vertigo         Social History     Social History    Marital status:      Spouse name: N/A    Number of children: N/A    Years of education: N/A     Social History Main Topics    Smoking status: Never Smoker    Smokeless tobacco: Never Used      Comment: just smoked 2 weeks in college    Alcohol use No    Drug use: No    Sexual activity: No     Other Topics Concern    None     Social History Narrative       No family history on file. Review of Systems:   Constitutional: Negative for chills, fever, malaise/fatigue and weight loss. Respiratory: Positive for shortness of breath and wheezing. Negative for cough and hemoptysis. Cardiovascular: Positive for palpitations, orthopnea and leg swelling. Negative for chest pain. Gastrointestinal: Negative. Musculoskeletal: Positive for joint pain and myalgias. Negative for falls. Neurological: Negative for dizziness. Physical Exam:   The patient is an alert, oriented, well developed, well nourished 70 y.o.  female who was in no acute distress at the time of my examination.   Visit Vitals    /86    Pulse 63    Ht 5' 4\" (1.626 m)    Wt 95.7 kg (211 lb)    SpO2 99%    BMI 36.22 kg/m2      BP Readings from Last 3 Encounters:   05/08/18 142/86   02/27/18 173/74   02/01/18 164/86        Wt Readings from Last 3 Encounters:   05/08/18 95.7 kg (211 lb)   02/27/18 96.3 kg (212 lb 3.2 oz)   02/01/18 98.4 kg (217 lb)       HEENT: Conjunctivae white, mucosa moist, no pallor or cyanosis. Neck: Supple without masses, tenderness or thyromegaly. No jugular venous distention. Carotid upstrokes are full bilaterally, without bruits. Chest: symmetrical with good excursion. Cardiovascular: Well-healed incision in left upper chest over pacer-defibrillator insertion site with some keloid and some tenderness over the site. Mid-sternotomy scar with some keloid. [place] is displaced between the MCL and AAL. No lifts, heaves, or thrills felt on palpation. Normal S1 and S2, with a grade I-II/VI apical systolic murmur without radiation and without appreciable diastolic murmur, rubs, clicks or gallops   Lungs: Clear to auscultation in all fields. Abdomen: Soft, with no masses, tenderness or organomegaly. Extremities: Thick lower legs with 1 + edema in lower extremities to mid calves and some decrease in peripheral pulses. Review of Data: Please refer to past medical history for most recent cardiac testing. Lab Results   Component Value Date/Time    Cholesterol, total 215 (H) 07/10/2017 11:17 AM    HDL Cholesterol 93 (H) 07/10/2017 11:17 AM    LDL, calculated 108.6 (H) 07/10/2017 11:17 AM    VLDL, calculated 13.4 07/10/2017 11:17 AM    Triglyceride 67 07/10/2017 11:17 AM    CHOL/HDL Ratio 2.3 07/10/2017 11:17 AM       Results for orders placed or performed in visit on 05/08/18   AMB POC EKG ROUTINE W/ 12 LEADS, INTER & REP     Status: None    Narrative    Normal sinus mechanism with atrial sensing and ventricular pacing and occasional PAC's. Compared to the EKG of October 24, 2017 there was a little interval change. Mk Haley D.O., F.A.C.C. [facility]  [facility]  [address] [place] [address]. [id number]    PLEASE NOTE:  This document has been produced using voice recognition software. Unrecognized errors in transcription may be present.

## 2018-05-08 NOTE — PROGRESS NOTES
I have personally seen and evaluated the device findings. Interrogation reviewed and I agree with assessment.     Azra Christian

## 2018-05-13 NOTE — PROGRESS NOTES
I did this to see if there is any signs of heart failure and in fact there are. Her BNP is much higher than it was 6 months ago when there was no heart failure at over 2000 currently. I would increase her Lasix from 40 mg a day to 60 mg a day if in fact she was taking the Lasix daily. If she was not taking her Lasix daily she needs to do that and then I would repeat both tests in 2-3 weeks.  ES

## 2018-05-17 ENCOUNTER — TELEPHONE (OUTPATIENT)
Dept: CARDIOLOGY CLINIC | Age: 78
End: 2018-05-17

## 2018-05-17 ENCOUNTER — HOSPITAL ENCOUNTER (OUTPATIENT)
Dept: NON INVASIVE DIAGNOSTICS | Age: 78
Discharge: HOME | End: 2018-05-17
Attending: INTERNAL MEDICINE
Payer: MEDICARE

## 2018-05-17 DIAGNOSIS — I10 ESSENTIAL HYPERTENSION: ICD-10-CM

## 2018-05-17 DIAGNOSIS — I34.0 MITRAL VALVE INSUFFICIENCY, UNSPECIFIED ETIOLOGY: ICD-10-CM

## 2018-05-17 DIAGNOSIS — I48.91 ATRIAL FIBRILLATION, UNSPECIFIED TYPE (HCC): ICD-10-CM

## 2018-05-17 DIAGNOSIS — I50.9 CONGESTIVE HEART FAILURE, UNSPECIFIED HF CHRONICITY, UNSPECIFIED HEART FAILURE TYPE (HCC): ICD-10-CM

## 2018-05-17 DIAGNOSIS — I10 ESSENTIAL HYPERTENSION: Primary | ICD-10-CM

## 2018-05-17 DIAGNOSIS — R06.02 SOB (SHORTNESS OF BREATH): ICD-10-CM

## 2018-05-17 PROCEDURE — 93306 TTE W/DOPPLER COMPLETE: CPT

## 2018-05-17 NOTE — TELEPHONE ENCOUNTER
LMOM for patient to call back in reference to below results and medication changes.       Verbal order and read back per June Alford, DO

## 2018-05-17 NOTE — TELEPHONE ENCOUNTER
----- Message from Ritu Mcintyre DO sent at 5/13/2018  4:48 PM EDT -----  I did this to see if there is any signs of heart failure and in fact there are. Her BNP is much higher than it was 6 months ago when there was no heart failure at over 2000 currently. I would increase her Lasix from 40 mg a day to 60 mg a day if in fact she was taking the Lasix daily. If she was not taking her Lasix daily she needs to do that and then I would repeat both tests in 2-3 weeks.  ES

## 2018-05-17 NOTE — PROGRESS NOTES
Completed echocardiogram. Report to follow. I removed the band off and placed in shred box.        Jack Gusman, NIYAH, RDCS

## 2018-05-18 NOTE — PROGRESS NOTES
Per your last office note, \"I am going to get a BMP and a BNP on her to see if there is signs of failure and I am also going to get an echocardiogram to reevaluate her LV function, mitral regurgitation, and pulmonary pressures. I will make further recommendations after reviewing these studies. \"

## 2018-05-19 NOTE — PROGRESS NOTES
This lady's heart function is very slightly reduced related to her pacemaker but she does not have any elevation of her pulmonary pressure in the leak of her mitral valve is only mild, so everything looks good and as expected. Please let her know.  ES

## 2018-05-23 NOTE — TELEPHONE ENCOUNTER
Patient called back and made aware of results. Order placed for BMP and BNP for a couple of weeks. Patient verbalized understanding to take Lasix 40mg daily, as she was only taking it every other day.       Verbal order and read back per Dominga Hawthorne DO

## 2018-06-04 ENCOUNTER — HOSPITAL ENCOUNTER (OUTPATIENT)
Dept: LAB | Age: 78
Discharge: HOME | End: 2018-06-04
Payer: MEDICARE

## 2018-06-04 DIAGNOSIS — I48.91 ATRIAL FIBRILLATION, UNSPECIFIED TYPE (HCC): ICD-10-CM

## 2018-06-04 DIAGNOSIS — I50.9 CONGESTIVE HEART FAILURE, UNSPECIFIED HF CHRONICITY, UNSPECIFIED HEART FAILURE TYPE (HCC): ICD-10-CM

## 2018-06-04 DIAGNOSIS — I10 ESSENTIAL HYPERTENSION: ICD-10-CM

## 2018-06-04 LAB
ANION GAP SERPL CALC-SCNC: 9 MMOL/L (ref 3–18)
BNP SERPL-MCNC: 1233 PG/ML (ref 0–1800)
BUN SERPL-MCNC: 38 MG/DL (ref 7–18)
BUN/CREAT SERPL: 26 (ref 12–20)
CALCIUM SERPL-MCNC: 8.8 MG/DL (ref 8.5–10.1)
CHLORIDE SERPL-SCNC: 107 MMOL/L (ref 100–108)
CO2 SERPL-SCNC: 25 MMOL/L (ref 21–32)
CREAT SERPL-MCNC: 1.48 MG/DL (ref 0.6–1.3)
GLUCOSE SERPL-MCNC: 81 MG/DL (ref 74–99)
POTASSIUM SERPL-SCNC: 4 MMOL/L (ref 3.5–5.5)
SODIUM SERPL-SCNC: 141 MMOL/L (ref 136–145)

## 2018-06-04 PROCEDURE — 83880 ASSAY OF NATRIURETIC PEPTIDE: CPT | Performed by: INTERNAL MEDICINE

## 2018-06-04 PROCEDURE — 80048 BASIC METABOLIC PNL TOTAL CA: CPT | Performed by: INTERNAL MEDICINE

## 2018-06-04 PROCEDURE — 36415 COLL VENOUS BLD VENIPUNCTURE: CPT | Performed by: INTERNAL MEDICINE

## 2018-06-04 NOTE — PROGRESS NOTES
Per the last telephone encounter, \"I did this to see if there is any signs of heart failure and in fact there are. Her BNP is much higher than it was 6 months ago when there was no heart failure at over 2000 currently. I would increase her Lasix from 40 mg a day to 60 mg a day if in fact she was taking the Lasix daily. If she was not taking her Lasix daily she needs to do that and then I would repeat both tests in 2-3 weeks.  ES\"

## 2018-06-07 NOTE — PROGRESS NOTES
Her BNP is much better in the normal range. Her renal function is slightly depressed due to excessive diuresis. See how she is doing and what her weight is doing. I believe she is on Lasix 60 mg a day and if her weight is come down by 5 or 6 pounds below what it was when we started I would probably decrease the Lasix to 40 mg daily and have her take 60 mg if her weight goes up by 3 pounds or more. Please let her know.  ES

## 2018-06-12 NOTE — TELEPHONE ENCOUNTER
Patient was called and made aware of below. Patient states that she is taking Lasix 40mg daily now, and her weight is maintaining about the same. I gave her instructions on when to increase the dose. She states also that she is having problems with her simvastatin. She states that she vomited with the medication and she doesn't think she will be able to tolerate it. She states that the same thing happened when she was on Lipitor. Advised her that I would forward to Dr Tomasa Buck to make him aware.

## 2018-06-12 NOTE — TELEPHONE ENCOUNTER
----- Message from Kimball Cranker, DO sent at 6/7/2018  6:03 PM EDT -----  Her BNP is much better in the normal range. Her renal function is slightly depressed due to excessive diuresis. See how she is doing and what her weight is doing. I believe she is on Lasix 60 mg a day and if her weight is come down by 5 or 6 pounds below what it was when we started I would probably decrease the Lasix to 40 mg daily and have her take 60 mg if her weight goes up by 3 pounds or more. Please let her know.  ES

## 2018-06-13 RX ORDER — AMLODIPINE BESYLATE 2.5 MG/1
TABLET ORAL
Qty: 90 TAB | Refills: 3 | Status: SHIPPED | OUTPATIENT
Start: 2018-06-13 | End: 2018-08-23 | Stop reason: DRUGHIGH

## 2018-08-09 ENCOUNTER — CLINICAL SUPPORT (OUTPATIENT)
Dept: CARDIOLOGY CLINIC | Age: 78
End: 2018-08-09

## 2018-08-09 DIAGNOSIS — Z95.810 AICD (AUTOMATIC CARDIOVERTER/DEFIBRILLATOR) PRESENT: ICD-10-CM

## 2018-08-09 DIAGNOSIS — I42.9 CARDIOMYOPATHY, UNSPECIFIED TYPE (HCC): Primary | ICD-10-CM

## 2018-08-09 NOTE — PROGRESS NOTES
I have personally seen and evaluated the device findings. Interrogation reviewed and I agree with assessment.     Carmela Flor

## 2018-08-23 ENCOUNTER — HOSPITAL ENCOUNTER (OUTPATIENT)
Dept: LAB | Age: 78
Discharge: HOME | End: 2018-08-23
Payer: MEDICARE

## 2018-08-23 ENCOUNTER — OFFICE VISIT (OUTPATIENT)
Dept: INTERNAL MEDICINE CLINIC | Age: 78
End: 2018-08-23

## 2018-08-23 VITALS
HEART RATE: 64 BPM | SYSTOLIC BLOOD PRESSURE: 169 MMHG | HEIGHT: 64 IN | RESPIRATION RATE: 16 BRPM | DIASTOLIC BLOOD PRESSURE: 98 MMHG | BODY MASS INDEX: 37.97 KG/M2 | OXYGEN SATURATION: 100 % | WEIGHT: 222.4 LBS | TEMPERATURE: 97.8 F

## 2018-08-23 DIAGNOSIS — N18.30 CKD (CHRONIC KIDNEY DISEASE) STAGE 3, GFR 30-59 ML/MIN (HCC): ICD-10-CM

## 2018-08-23 DIAGNOSIS — E66.9 OBESITY: ICD-10-CM

## 2018-08-23 DIAGNOSIS — N25.81 HYPERPARATHYROIDISM DUE TO RENAL INSUFFICIENCY (HCC): ICD-10-CM

## 2018-08-23 DIAGNOSIS — M10.9 GOUT: ICD-10-CM

## 2018-08-23 DIAGNOSIS — I25.10 TRIPLE VESSEL DISEASE OF THE HEART: ICD-10-CM

## 2018-08-23 DIAGNOSIS — R80.9 MICROALBUMINURIA: ICD-10-CM

## 2018-08-23 DIAGNOSIS — N18.30 CHRONIC KIDNEY DISEASE, STAGE 3 (HCC): ICD-10-CM

## 2018-08-23 DIAGNOSIS — I12.9 BENIGN HYPERTENSIVE KIDNEY DISEASE WITH CHRONIC KIDNEY DISEASE STAGE I THROUGH STAGE IV, OR UNSPECIFIED(403.10): ICD-10-CM

## 2018-08-23 DIAGNOSIS — Z12.11 SCREENING FOR COLON CANCER: ICD-10-CM

## 2018-08-23 DIAGNOSIS — I25.10 ATHEROSCLEROSIS OF NATIVE CORONARY ARTERY OF NATIVE HEART WITHOUT ANGINA PECTORIS: Primary | ICD-10-CM

## 2018-08-23 DIAGNOSIS — Z71.89 ADVANCE CARE PLANNING: ICD-10-CM

## 2018-08-23 DIAGNOSIS — D50.9 ANEMIA, IRON DEFICIENCY: ICD-10-CM

## 2018-08-23 DIAGNOSIS — D64.9 CHRONIC ANEMIA: ICD-10-CM

## 2018-08-23 DIAGNOSIS — I10 ESSENTIAL HYPERTENSION: ICD-10-CM

## 2018-08-23 DIAGNOSIS — R19.5 POSITIVE OCCULT STOOL BLOOD TEST: ICD-10-CM

## 2018-08-23 DIAGNOSIS — N39.0 ACUTE URINARY TRACT INFECTION: ICD-10-CM

## 2018-08-23 DIAGNOSIS — Z12.31 ENCOUNTER FOR SCREENING MAMMOGRAM FOR BREAST CANCER: ICD-10-CM

## 2018-08-23 LAB
25(OH)D3 SERPL-MCNC: 6 NG/ML (ref 30–100)
ALBUMIN SERPL-MCNC: 3 G/DL (ref 3.4–5)
ANION GAP SERPL CALC-SCNC: 6 MMOL/L (ref 3–18)
BUN SERPL-MCNC: 26 MG/DL (ref 7–18)
BUN/CREAT SERPL: 20 (ref 12–20)
CALCIUM SERPL-MCNC: 8.6 MG/DL (ref 8.5–10.1)
CALCIUM SERPL-MCNC: 8.6 MG/DL (ref 8.5–10.1)
CHLORIDE SERPL-SCNC: 110 MMOL/L (ref 100–108)
CO2 SERPL-SCNC: 28 MMOL/L (ref 21–32)
CREAT SERPL-MCNC: 1.32 MG/DL (ref 0.6–1.3)
CREAT UR-MCNC: 93.4 MG/DL (ref 30–125)
FERRITIN SERPL-MCNC: 216 NG/ML (ref 8–388)
GLUCOSE SERPL-MCNC: 88 MG/DL (ref 74–99)
IRON SATN MFR SERPL: 15 %
IRON SERPL-MCNC: 33 UG/DL (ref 50–175)
MICROALBUMIN UR-MCNC: 77.5 MG/DL (ref 0–3)
MICROALBUMIN/CREAT UR-RTO: 830 MG/G (ref 0–30)
PHOSPHATE SERPL-MCNC: 3.2 MG/DL (ref 2.5–4.9)
POTASSIUM SERPL-SCNC: 4.2 MMOL/L (ref 3.5–5.5)
PTH-INTACT SERPL-MCNC: 278.5 PG/ML (ref 18.4–88)
SODIUM SERPL-SCNC: 144 MMOL/L (ref 136–145)
TIBC SERPL-MCNC: 227 UG/DL (ref 250–450)

## 2018-08-23 PROCEDURE — 80069 RENAL FUNCTION PANEL: CPT | Performed by: INTERNAL MEDICINE

## 2018-08-23 PROCEDURE — 36415 COLL VENOUS BLD VENIPUNCTURE: CPT | Performed by: INTERNAL MEDICINE

## 2018-08-23 PROCEDURE — 82043 UR ALBUMIN QUANTITATIVE: CPT | Performed by: INTERNAL MEDICINE

## 2018-08-23 PROCEDURE — 82306 VITAMIN D 25 HYDROXY: CPT | Performed by: INTERNAL MEDICINE

## 2018-08-23 PROCEDURE — 83550 IRON BINDING TEST: CPT | Performed by: INTERNAL MEDICINE

## 2018-08-23 PROCEDURE — 83970 ASSAY OF PARATHORMONE: CPT | Performed by: INTERNAL MEDICINE

## 2018-08-23 PROCEDURE — 82728 ASSAY OF FERRITIN: CPT | Performed by: INTERNAL MEDICINE

## 2018-08-23 RX ORDER — AMLODIPINE BESYLATE 5 MG/1
5 TABLET ORAL DAILY
COMMUNITY
Start: 2018-07-20 | End: 2018-12-03 | Stop reason: SDUPTHER

## 2018-08-23 RX ORDER — SIMVASTATIN 20 MG/1
20 TABLET, FILM COATED ORAL
Qty: 90 TAB | Refills: 3 | Status: SHIPPED | OUTPATIENT
Start: 2018-08-23 | End: 2018-11-20 | Stop reason: SINTOL

## 2018-08-23 NOTE — PROGRESS NOTES
INTERNISTS OF [facility]:  8/29/2018, MRN: [id number]      Yael Grant is a 70 y.o. female and presents to clinic for Hypertension (follow up); Chronic Kidney Disease (follow up); Vitamin D Deficiency (follow up and patient is stating she is so tired); and Annual Wellness Visit (Medicare)    Subjective:   Patient is a 53-year-old -American female with history of allergic rhinitis, gout, cataract, overactive bladder, hyperlipidemia, GERD, mixed connective tissue disease (positive FROEST, RNP Ab, Sjogren's Ab, anti-chromatin Ab, and Anti-Smith Ab) - followed by , chronic gastritis with bleeding in March 2016, history of stroke, vitamin D deficiency, dysphasia status post dilation, anemia, pacemaker for history of AV block, left cavernous ICA aneurysm, hypertension, atrial fibrillation, carotid stenosis, peripheral artery disease, PVCs, DVT hx (LUE - postoperatively), mild systolic CHF with mild-moderate tricuspid regurgitation, and CAD status post CABG. 1.  CKD: Her most recent creatinine is 1.48. She is followed by a nephrologist.  Note that she has a history of positive FOREST, RNP antibody, Sjogren's antibody, anti-smooth antibody, and anti-chromatin antibody serologies. She is also followed by  (Rheumatology). She also has a h/o urinary frequency and pyuria. She was evaluated by the Urology team and it is thought that her UAs are 2/2 contamination, not true pyuria. 2. HTN/CAD/CHF: Present >6 months. S/p CABG yrs ago. She stopped taking Simvastatin in 12/17 for no particular reason. She denies adverse side effects to this medication. Her blood pressure today is 159/98. She is not regularly exercising or dieting. She takes amlodipine, carvedilol, and Lasix. She is also on aspirin. She admits that she does not take her medications as scheduled, taking once a day and at various times each day. No shortness of breath or chest pain.     Wt Readings from Last 3 Encounters:   08/23/18 222 lb 6.4 oz (100.9 kg)   05/08/18 211 lb (95.7 kg)   02/27/18 212 lb 3.2 oz (96.3 kg)     BP Readings from Last 3 Encounters:   08/23/18 (!) 169/98   05/08/18 180/90   02/27/18 173/74         Patient Active Problem List    Diagnosis Date Noted    Severe obesity (BMI 35.0-39. 9) with comorbidity ([facility] 05/08/2018    Pyuria 08/30/2017    Mixed connective tissue disease ([facility] 08/22/2017    Microalbuminuria 07/10/2017    CKD (chronic kidney disease) stage 3, GFR 30-59 ml/min 07/10/2017    Seasonal allergic rhinitis 11/23/2016    Gout of left foot 11/22/2016    Cataract 08/09/2016    OAB (overactive bladder) 07/06/2016    Mixed hyperlipidemia 07/05/2016    Gastroesophageal reflux disease without esophagitis 07/05/2016    Chronic gastritis with bleeding - in February/March of 2016 per FOBT and EGD results 07/05/2016    History of CVA (cerebrovascular accident) 07/05/2016    Vitamin D deficiency 07/05/2016    Dysphagia s/p dilation 07/05/2016    Anemia 07/05/2016    Pacemaker (for h/o AV block) 07/05/2016    Intracranial aneurysm - left cavernous ICA on 2014 head/neck CTA 12/01/2014    HTN (hypertension) 10/13/2014    Atrial fibrillation ([facility] 10/13/2014    Carotid stenosis 10/13/2014    PAD (peripheral artery disease) ([facility] 10/13/2014    PVC's (premature ventricular contractions) 12/09/2011    Atherosclerotic heart disease, s/p CABG X3 in 6/08, in the setting of severe left ventricular dysfunction, and s/p a dual-chamber     S/P CABG x 3        Current Outpatient Prescriptions   Medication Sig Dispense Refill    amLODIPine (NORVASC) 5 mg tablet       simvastatin (ZOCOR) 20 mg tablet Take 1 Tab by mouth nightly. 90 Tab 3    diclofenac (VOLTAREN) 1 % gel APPLY 4GRAMS/ TO KNEE 4 TIMES A DAY AS NEEDED TRANSDERMAL 90 DAYS  2    hydrocortisone (HYTONE) 2.5 % ointment APPLY LIGHTLY TO THE AFFECTED AREAS ON THE LEGS AND FEET TWICE A DAY.   2    carvedilol (COREG) 25 mg tablet TAKE 1 TABLET BY MOUTH (2) TIMES A  Tab 3    furosemide (LASIX) 40 mg tablet TAKE 1 TABLET BY MOUTH AS NEEDED 30 Tab 3    aspirin 81 mg tablet Take 81 mg by mouth daily. Allergies   Allergen Reactions    Nifedipine Nausea and Vomiting and Other (comments)     Dizzy    Ace Inhibitors Cough    Codeine Unknown (comments), Nausea Only and Nausea and Vomiting    Hydralazine Other (comments)     Dizziness and Fatigue    Lipitor [Atorvastatin] Nausea and Vomiting and Vertigo       Past Medical History:   Diagnosis Date    Abnormal ankle brachial index 11/13/2014    Mild arterial disease in right leg. R VICTOR MANUEL 0.88. L VICTOR MANUEL 1.00.  Anemia     Arm DVT (deep venous thromboembolism), acute ([facility])     s/p anticoagulation    Atherosclerotic heart disease     Atrial fibrillation ([facility])     AV block     CAD (coronary artery disease)     Cardiomyopathy, ischemic     Carotid artery stenosis     Carotid duplex 11/13/2014    Mild <50% bilateral ICA plaquing. Possible left vertebral occlusion.  Cerebral artery occlusion with cerebral infarction [facility])     Chest pain     CVA (cerebral infarction)     Echocardiogram 08/19/2015    EF 55%. Apical inferior, apical septal hypk (new since study of 12/19/11), likely due to chronic V-pacing. Mild LVH. RVSP 30 mmHg. Mild LAE. Mild MR. Mild TR.  Gastritis     GERD (gastroesophageal reflux disease)     Hiatal hernia     Hyperlipidemia     Hypertension     Hypertensive cardiovascular disease     Intracranial aneurysm     left cavernous ICA 2mm aneurysm from head/neck CTA in 2014    Long term (current) use of anticoagulants 3/10/2011    Lower extremity venous duplex 01/26/2015    No DVT bilaterally.  Nuclear cardiac imaging test 09/24/2015    Low risk. Sm apical infarction w/no ischemia vs apical thinning. Sm basal inferior defect, likely artifact. EF 56%. Inferoseptal, inferoapical WMA related to sternotomy or paced rhythm.     Pacemaker     PAD (peripheral artery disease) ([facility]     PVC's     chronic    S/P CABG x 3 2008    LIMA-LAD. SVG-OM. SVG-dRCA     S/P cardiac cath 2008    pRCA 100%. LM patent. Cx patent. OM1 100%. mLAD 95%. LVEDP 27-29mmHg. EF 20%. mod MR    Tilt table evaluation 1995    Positive for orthostatic hypotension    Vitamin D deficiency        Past Surgical History:   Procedure Laterality Date    CABG, ARTERY-VEIN, THREE  2008    CARDIAC SURG PROCEDURE UNLIST      medtronic dual-chamber cardioverted-defibrillator    HX  SECTION      x2    HX ENDOSCOPY  3/1/16    HX ENDOSCOPY  3/1/16    HX HEMORRHOIDECTOMY      1985    HX HYSTERECTOMY          HX ORTHOPAEDIC      knee surgery    HX OTHER SURGICAL  2/10/16    Pacemaker Gen Change    HX TUMOR REMOVAL      removed from arm.  benign       History reviewed. No pertinent family history. Social History   Substance Use Topics    Smoking status: Never Smoker    Smokeless tobacco: Never Used      Comment: just smoked 2 weeks in college    Alcohol use No       ROS   Review of Systems   Constitutional: Negative for chills, fever and weight loss. HENT: Negative for ear pain and sore throat. Eyes: Negative for blurred vision and pain. Respiratory: Negative for cough and shortness of breath. Cardiovascular: Negative for chest pain. Gastrointestinal: Negative for abdominal pain, blood in stool and melena. Genitourinary: Negative for dysuria and hematuria. Musculoskeletal: Negative for joint pain and myalgias. Skin: Negative for rash. Neurological: Negative for tingling, focal weakness and headaches. Endo/Heme/Allergies: Does not bruise/bleed easily. Psychiatric/Behavioral: Negative for substance abuse.        Objective     Vitals:    18 0839   BP: (!) 169/98   Pulse: 64   Resp: 16   Temp: 97.8 °F (36.6 °C)   TempSrc: Oral   SpO2: 100%   Weight: 222 lb 6.4 oz (100.9 kg)   Height: 5' 4\" (1.626 m)   PainSc:   0 - No pain       Physical Exam   Constitutional: She is oriented to person, place, and time and well-developed, well-nourished, and in no distress. HENT:   Head: Normocephalic and atraumatic. Right Ear: External ear normal.   Left Ear: External ear normal.   Nose: Nose normal.   Mouth/Throat: Oropharynx is clear and moist. No oropharyngeal exudate. Eyes: Conjunctivae and EOM are normal. Pupils are equal, round, and reactive to light. Right eye exhibits no discharge. Left eye exhibits no discharge. No scleral icterus. Neck: Neck supple. Cardiovascular: Normal rate, normal heart sounds and intact distal pulses. Exam reveals no gallop and no friction rub. No murmur heard. Pulmonary/Chest: Effort normal and breath sounds normal. No respiratory distress. She has no wheezes. She has no rales. Abdominal: Soft. Bowel sounds are normal. She exhibits no distension. There is no tenderness. There is no rebound and no guarding. Musculoskeletal: She exhibits no edema or tenderness (BUE). Lymphadenopathy:     She has no cervical adenopathy. Neurological: She is alert and oriented to person, place, and time. She exhibits normal muscle tone. Gait normal.   Skin: Skin is warm and dry. No erythema. Psychiatric: Affect normal.   Nursing note and vitals reviewed.       LABS   Data Review:   Lab Results   Component Value Date/Time    WBC 4.7 07/10/2017 11:17 AM    HGB 9.6 (L) 04/10/2018 10:04 AM    HCT 30.7 (L) 04/10/2018 10:04 AM    PLATELET 940 70/70/4095 11:17 AM    MCV 86.2 07/10/2017 11:17 AM       Lab Results   Component Value Date/Time    Sodium 144 08/23/2018 02:34 PM    Potassium 4.2 08/23/2018 02:34 PM    Chloride 110 (H) 08/23/2018 02:34 PM    CO2 28 08/23/2018 02:34 PM    Anion gap 6 08/23/2018 02:34 PM    Glucose 88 08/23/2018 02:34 PM    BUN 26 (H) 08/23/2018 02:34 PM    Creatinine 1.32 (H) 08/23/2018 02:34 PM    BUN/Creatinine ratio 20 08/23/2018 02:34 PM    GFR est AA 47 (L) 08/23/2018 02:34 PM    GFR est non-AA 39 (L) 08/23/2018 02:34 PM    Calcium 8.6 08/23/2018 02:34 PM    Calcium 8.6 08/23/2018 02:34 PM       Lab Results   Component Value Date/Time    Cholesterol, total 215 (H) 07/10/2017 11:17 AM    HDL Cholesterol 93 (H) 07/10/2017 11:17 AM    LDL, calculated 108.6 (H) 07/10/2017 11:17 AM    VLDL, calculated 13.4 07/10/2017 11:17 AM    Triglyceride 67 07/10/2017 11:17 AM    CHOL/HDL Ratio 2.3 07/10/2017 11:17 AM       No results found for: HBA1C, HGBE8, DDD9CSFG, DSD3PFFB, CCA1STXX    Assessment/Plan:   1. CKD: Serologies are positive for mixed connective tissue disease. I encouraged her to continue following up with the Rheumatology and Nephrology team for long-term surveillance/management. I discussed the importance of controlling her blood pressure. Please see below. 2. CAD/HTN/CHF: BP is poorly controlled but she is taking her rx irregularly. She also stopped her statin in between apts. I encouraged her to take her blood pressure medications at the same time each day. If her pressure is still above 140/90 at her follow-up appointment, I will add additional medication. I encouraged her to follow-up with her cardiologist.  Tracy Mansfield am refilling her Zocor. I discussed the importance of taking this medication having had a history of CAD. We discussed the potential side effects of not taking his medication including a significantly higher risk of worsening heart disease. I encouraged her to reduce her weight, limiting her processed food intake. I also encouraged her to walk regularly as a means of reducing her weight. I will recheck her weight at her follow-up appointment. I discussed the importance of proper blood pressure control. We discussed how poorly controlled blood pressure can worsen chronic kidney disease. All questions were answered. ORDERS:  - simvastatin (ZOCOR) 20 mg tablet; Take 1 Tab by mouth nightly. Dispense: 90 Tab;  Refill: 3      Health Maintenance Due   Topic Date Due    Pneumococcal 65+ Low/Medium Risk (2 of 2 - PPSV23) 07/06/2017    COLONOSCOPY  01/25/2018    Influenza Age 9 to Adult  08/01/2018     Lab review: labs are reviewed in the EHR    I have discussed the diagnosis with the patient and the intended plan as seen in the above orders. The patient has received an after-visit summary and questions were answered concerning future plans. I have discussed medication side effects and warnings with the patient as well. I have reviewed the plan of care with the patient, accepted their input and they are in agreement with the treatment goals. All questions were answered. The patient understands the plan of care. Handouts provided today with above information. Pt instructed if symptoms worsen to call the office or report to the ED for continued care. Greater than 50% of the visit time was spent in counseling and/or coordination of care. I spent 25 minutes with the patient this encounter, 15 of which were spent counseling her as described above. Voice recognition was used to generate this report, which may have resulted in some phonetic based errors in grammar and contents. Even though attempts were made to correct all the mistakes, some may have been missed, and remained in the body of the document. Follow-up Disposition:  Return in about 5 months (around 1/16/2019) for BP check, weight check, lab results.  Amber Zayas MD

## 2018-08-23 NOTE — PATIENT INSTRUCTIONS
Health Maintenance Due   Topic Date Due    Pneumococcal 65+ Low/Medium Risk (2 of 2 - PPSV23) 07/06/2017    COLONOSCOPY  01/25/2018    MEDICARE YEARLY EXAM  07/11/2018    Influenza Age 9 to Adult  08/01/2018       Medicare Part B Preventive Services Limitations Recommendation Scheduled   Bone Mass Measurement  (age 72 & older, biennial) Requires diagnosis related to osteoporosis or estrogen deficiency. Biennial benefit unless patient has history of long-term glucocorticoid tx or baseline is needed because initial test was by other method This should be done at age 72 and again if on osteoporosis medication at 2-3 year intervals. Overdue   Cardiovascular Screening Blood Tests (every 5 years)  Total cholesterol, HDL, Triglycerides Order as a panel if possible We should check your cholesterol panel at least once every 5 years. Up to date   Colorectal Cancer Screening  -Fecal occult blood test (annual)  -Flexible sigmoidoscopy (5y)  -Screening colonoscopy (10y)  -Barium Enema  Due per your Gastroenterologist's recommendations. Overdue   Counseling to Prevent Tobacco Use (up to 8 sessions per year)  - Counseling greater than 3 and up to 10 minutes  - Counseling greater than 10 minutes Patients must be asymptomatic of tobacco-related conditions to receive as preventive service Continue with smoking cessation    Diabetes Screening Tests (at least every 3 years, Medicare covers annually or at 6-month intervals for prediabetic patients)    Fasting blood sugar (FBS) or glucose tolerance test (GTT) Patient must be diagnosed with one of the following:  -Hypertension, Dyslipidemia, obesity, previous impaired FBS or GTT  Or any two of the following: overweight, FH of diabetes, age ? 72, history of gestational diabetes, birth of baby weighing more than 9 pounds Should be done yearly Up to date   Diabetes Self-Management Training (DSMT) (no USPSTF recommendation) Requires referral by treating physician for patient with diabetes or renal disease. 10 hours of initial DSMT session of no less than 30 minutes each in a continuous 12-month period. 2 hours of follow-up DSMT in subsequent years. Not applicable Not applicable   Glaucoma Screening (no USPSTF recommendation) Diabetes mellitus, family history, , age 48 or over,  American, age 72 or over Continue with annual eye exams. Up to date   Human Immunodeficiency Virus (HIV) Screening (annually for increased risk patients)  HIV-1 and HIV-2 by EIA, ROSE MARIE, rapid antibody test, or oral mucosa transudate Patient must be at increased risk for HIV infection per USPSTF guidelines or pregnant. Tests covered annually for patients at increased risk. Pregnant patients may receive up to 3 test during pregnancy. Not applicable Not applicable   Medical Nutrition Therapy (MNT) (for diabetes or renal disease not recommended schedule) Requires referral by treating physician for patient with diabetes or renal disease. Can be provided in same year as diabetes self-management training (DSMT), and CMS recommends medical nutrition therapy take place after DSMT. Up to 3 hours for initial year and 2 hours in subsequent years. Not applicable Not applicable   Shingles Vaccination A shingles vaccine is also recommended once in a lifetime after age 61 Vaccination recommended for shingles vaccination. Overdue   Seasonal Influenza Vaccination (annually)  Continue with yearly \"flu\" shot annually Overdue   Pneumococcal Vaccination (once after 65)  Please receive this vaccination at age 72. Overdue   Hepatitis B Vaccinations (if medium/high risk) Medium/high risk factors:  End-stage renal disease,  Hemophiliacs who received Factor VIII or IX concentrates, Clients of institutions for the mentally retarded, Persons who live in the same house as a HepB virus carrier, Homosexual men, Illicit injectable drug abusers.  Not applicable Not applicable   Screening Mammography (biennial age 54-69) Annually (age 36 or over) You need a mammogram yearly to screen for breast cancer. Discussed today   Screening Pap Tests and Pelvic Examination (up to age 79 and after 79 if unknown history or abnormal study last 10 years) Every 24 months except high risk You need no Pap smear at this time. Not warranted at this time. Ultrasound Screening for Abdominal Aortic Aneurysm (AAA) (once) Patient must be referred through IPPE and not have had a screening for abdominal aortic aneurysm before under Medicare. Limited to patients who meet one of the following criteria:  - Men who are 73-68 years old and have smoked more than 100 cigarettes in their lifetime.  -Anyone with a FH of AAA  -Anyone recommended for screening by USPSTF Not applicable Not applicable          Body Mass Index: Care Instructions  Your Care Instructions    Body mass index (BMI) can help you see if your weight is raising your risk for health problems. It uses a formula to compare how much you weigh with how tall you are. · A BMI lower than 18.5 is considered underweight. · A BMI between 18.5 and 24.9 is considered healthy. · A BMI between 25 and 29.9 is considered overweight. A BMI of 30 or higher is considered obese. If your BMI is in the normal range, it means that you have a lower risk for weight-related health problems. If your BMI is in the overweight or obese range, you may be at increased risk for weight-related health problems, such as high blood pressure, heart disease, stroke, arthritis or joint pain, and diabetes. If your BMI is in the underweight range, you may be at increased risk for health problems such as fatigue, lower protection (immunity) against illness, muscle loss, bone loss, hair loss, and hormone problems. BMI is just one measure of your risk for weight-related health problems.  You may be at higher risk for health problems if you are not active, you eat an unhealthy diet, or you drink too much alcohol or use tobacco products. Follow-up care is a key part of your treatment and safety. Be sure to make and go to all appointments, and call your doctor if you are having problems. It's also a good idea to know your test results and keep a list of the medicines you take. How can you care for yourself at home? · Practice healthy eating habits. This includes eating plenty of fruits, vegetables, whole grains, lean protein, and low-fat dairy. · If your doctor recommends it, get more exercise. Walking is a good choice. Bit by bit, increase the amount you walk every day. Try for at least 30 minutes on most days of the week. · Do not smoke. Smoking can increase your risk for health problems. If you need help quitting, talk to your doctor about stop-smoking programs and medicines. These can increase your chances of quitting for good. · Limit alcohol to 2 drinks a day for men and 1 drink a day for women. Too much alcohol can cause health problems. If you have a BMI higher than 25  · Your doctor may do other tests to check your risk for weight-related health problems. This may include measuring the distance around your waist. A waist measurement of more than 40 inches in men or 35 inches in women can increase the risk of weight-related health problems. · Talk with your doctor about steps you can take to stay healthy or improve your health. You may need to make lifestyle changes to lose weight and stay healthy, such as changing your diet and getting regular exercise. If you have a BMI lower than 18.5  · Your doctor may do other tests to check your risk for health problems. · Talk with your doctor about steps you can take to stay healthy or improve your health. You may need to make lifestyle changes to gain or maintain weight and stay healthy, such as getting more healthy foods in your diet and doing exercises to build muscle. Where can you learn more? Go to http://jacquelyn-alberto.info/.   Enter S176 in the search box to learn more about \"Body Mass Index: Care Instructions. \"  Current as of: October 13, 2016  Content Version: 11.4  © 0131-8639 Healthwise, [employer]. Care instructions adapted under license by [employer] (which disclaims liability or warranty for this information). If you have questions about a medical condition or this instruction, always ask your healthcare professional. [address] any warranty or liability for your use of this information. Medicare Wellness Visit, Female     The best way to live healthy is to have a lifestyle where you eat a well-balanced diet, exercise regularly, limit alcohol use, and quit all forms of tobacco/nicotine, if applicable. Regular preventive services are another way to keep healthy. Preventive services (vaccines, screening tests, monitoring & exams) can help personalize your care plan, which helps you manage your own care. Screening tests can find health problems at the earliest stages, when they are easiest to treat. [facility] follows the current, evidence-based guidelines published by the [facility] [place] [place] (USPSTF) when recommending preventive services for our patients. Because we follow these guidelines, sometimes recommendations change over time as research supports it. (For example, mammograms used to be recommended annually. Even though Medicare will still pay for an annual mammogram, the newer guidelines recommend a mammogram every two years for women of average risk.)  Of course, you and your doctor may decide to screen more often for some diseases, based on your risk and your health status. Preventive services for you include:  - Medicare offers their members a free annual wellness visit, which is time for you and your primary care provider to discuss and plan for your preventive service needs.  Take advantage of this benefit every year!  -All adults over the age of 72 should receive the recommended pneumonia vaccines. Current USPSTF guidelines recommend a series of two vaccines for the best pneumonia protection.   -All adults should have a flu vaccine yearly and a tetanus vaccine every 10 years. All adults age 61 and older should receive a shingles vaccine once in their lifetime.    -A bone mass density test is recommended when a woman turns 65 to screen for osteoporosis. This test is only recommended one time, as a screening. Some providers will use this same test as a disease monitoring tool if you already have osteoporosis. -All adults age 38-68 who are overweight should have a diabetes screening test once every three years.   -Other screening tests and preventive services for persons with diabetes include: an eye exam to screen for diabetic retinopathy, a kidney function test, a foot exam, and stricter control over your cholesterol.   -Cardiovascular screening for adults with routine risk involves an electrocardiogram (ECG) at intervals determined by your doctor.   -Colorectal cancer screenings should be done for adults age 54-65 with no increased risk factors for colorectal cancer. There are a number of acceptable methods of screening for this type of cancer. Each test has its own benefits and drawbacks. Discuss with your doctor what is most appropriate for you during your annual wellness visit. The different tests include: colonoscopy (considered the best screening method), a fecal occult blood test, a fecal DNA test, and sigmoidoscopy. -Breast cancer screenings are recommended every other year for women of normal risk, age 54-69.  -Cervical cancer screenings for women over age 72 are only recommended with certain risk factors.   -All adults born between [facility] should be screened once for Hepatitis C.      Here is a list of your current Health Maintenance items (your personalized list of preventive services) with a due date:  Health Maintenance Due   Topic Date Due    Pneumococcal 65+ Low/Medium Risk (2 of 2 - PPSV23) 07/06/2017    COLONOSCOPY  01/25/2018    Influenza Age 9 to Adult  08/01/2018

## 2018-08-23 NOTE — PROGRESS NOTES
Chief Complaint   Patient presents with    Hypertension     follow up    Chronic Kidney Disease     follow up    Vitamin D Deficiency     follow up and patient is stating she is so tired   [employer] Visit     Medicare     1. Have you been to the ER, urgent care clinic since your last visit? Hospitalized since your last visit? No    2. Have you seen or consulted any other health care providers outside of the [address] [place] [address] since your last visit? Include any pap smears or colon screening. No      This is the Subsequent Medicare Annual Wellness Exam, performed 12 months or more after the Initial AWV or the last Subsequent AWV    I have reviewed the patient's medical history in detail and updated the computerized patient record. History   Patient is a 51-year-old -American female with history of allergic rhinitis, gout, cataract, overactive bladder, hyperlipidemia, GERD, mixed connective tissue disease (positive FOREST, RNP Ab, Sjogren's Ab, anti-chromatin Ab, and Anti-Parnell Ab), chronic gastritis with bleeding in March 2016, history of stroke, vitamin D deficiency, dysphasia status post dilation, anemia, pacemaker for history of AV block, left cavernous ICA aneurysm, hypertension, atrial fibrillation, carotid stenosis, peripheral artery disease, PVCs, DVT hx (LUE - postoperatively), and CAD status post CABG. Health Maintenance History  Immunizations reviewed: Tdap over-due, pt declines this vaccination today   Pneumovax: over-due, pt declines this vaccination today   Flu: over-due, pt declines this vaccination today  Zoster: over-due, pt declines this vaccination today      There is no immunization history on file for this patient. Health Maintenance Due   Topic Date Due    Pneumococcal 65+ Low/Medium Risk (2 of 2 - PPSV23) 07/06/2017    COLONOSCOPY  01/25/2018    Influenza Age 9 to Adult  08/01/2018     Colonoscopy: Overdue.  No hematochezia/melena     Eye exam: Up to date.    Mammo: Up to date. No breast pain/masses    Dexascan: Overdue. She declines this testing. Pelvic/Pap: No vaginal bleeding. Past Medical History:   Diagnosis Date    Abnormal ankle brachial index 11/13/2014    Mild arterial disease in right leg. R VICTOR MANUEL 0.88. L VICTOR MANUEL 1.00.  Anemia     Arm DVT (deep venous thromboembolism), acute ([facility])     s/p anticoagulation    Atherosclerotic heart disease     Atrial fibrillation ([facility])     AV block     CAD (coronary artery disease)     Cardiomyopathy, ischemic     Carotid artery stenosis     Carotid duplex 11/13/2014    Mild <50% bilateral ICA plaquing. Possible left vertebral occlusion.  Cerebral artery occlusion with cerebral infarction [facility])     Chest pain     CVA (cerebral infarction)     Echocardiogram 08/19/2015    EF 55%. Apical inferior, apical septal hypk (new since study of 12/19/11), likely due to chronic V-pacing. Mild LVH. RVSP 30 mmHg. Mild LAE. Mild MR. Mild TR.  Gastritis     GERD (gastroesophageal reflux disease)     Hiatal hernia     Hyperlipidemia     Hypertension     Hypertensive cardiovascular disease     Intracranial aneurysm     left cavernous ICA 2mm aneurysm from head/neck CTA in 2014    Long term (current) use of anticoagulants 3/10/2011    Lower extremity venous duplex 01/26/2015    No DVT bilaterally.  Nuclear cardiac imaging test 09/24/2015    Low risk. Sm apical infarction w/no ischemia vs apical thinning. Sm basal inferior defect, likely artifact. EF 56%. Inferoseptal, inferoapical WMA related to sternotomy or paced rhythm.  Pacemaker     PAD (peripheral artery disease) ([facility])     PVC's     chronic    S/P CABG x 3 06/08/2008    LIMA-LAD. SVG-OM. SVG-dRCA     S/P cardiac cath 06/08/2008    pRCA 100%. LM patent. Cx patent. OM1 100%. mLAD 95%. LVEDP 27-29mmHg. EF 20%.  mod MR    Tilt table evaluation 06/01/1995    Positive for orthostatic hypotension    Vitamin D deficiency       Past Surgical History:   Procedure Laterality Date    CABG, ARTERY-VEIN, THREE  2008    CARDIAC SURG PROCEDURE UNLIST      medtronic dual-chamber cardioverted-defibrillator    HX  SECTION      x2    HX ENDOSCOPY  3/1/16    HX ENDOSCOPY  3/1/16    HX HEMORRHOIDECTOMY      1985    HX HYSTERECTOMY          HX ORTHOPAEDIC      knee surgery    HX OTHER SURGICAL  2/10/16    Pacemaker Gen Change    HX TUMOR REMOVAL      removed from arm.  benign     Current Outpatient Prescriptions   Medication Sig Dispense Refill    amLODIPine (NORVASC) 5 mg tablet       simvastatin (ZOCOR) 20 mg tablet Take 1 Tab by mouth nightly. 90 Tab 3    diclofenac (VOLTAREN) 1 % gel APPLY 4GRAMS/ TO KNEE 4 TIMES A DAY AS NEEDED TRANSDERMAL 90 DAYS  2    hydrocortisone (HYTONE) 2.5 % ointment APPLY LIGHTLY TO THE AFFECTED AREAS ON THE LEGS AND FEET TWICE A DAY. 2    carvedilol (COREG) 25 mg tablet TAKE 1 TABLET BY MOUTH (2) TIMES A  Tab 3    furosemide (LASIX) 40 mg tablet TAKE 1 TABLET BY MOUTH AS NEEDED 30 Tab 3    aspirin 81 mg tablet Take 81 mg by mouth daily. Allergies   Allergen Reactions    Nifedipine Nausea and Vomiting and Other (comments)     Dizzy    Ace Inhibitors Cough    Codeine Unknown (comments), Nausea Only and Nausea and Vomiting    Hydralazine Other (comments)     Dizziness and Fatigue    Lipitor [Atorvastatin] Nausea and Vomiting and Vertigo     History reviewed. No pertinent family history.   Social History   Substance Use Topics    Smoking status: Never Smoker    Smokeless tobacco: Never Used      Comment: just smoked 2 weeks in college    Alcohol use No     Patient Active Problem List   Diagnosis Code    Atherosclerotic heart disease, s/p CABG X3 in , in the setting of severe left ventricular dysfunction, and s/p a dual-chamber I25.10    S/P CABG x 3 Z95.1    PVC's (premature ventricular contractions) I49.3    HTN (hypertension) I10    Atrial fibrillation ([facility] [address] I48.91    Carotid stenosis I65.29    PAD (peripheral artery disease) ([facility]) I73.9    Intracranial aneurysm - left cavernous ICA on 2014 head/neck CTA I67.1    Mixed hyperlipidemia E78.2    Gastroesophageal reflux disease without esophagitis K21.9    Chronic gastritis with bleeding - in February/March of 2016 per FOBT and EGD results K29.51    History of CVA (cerebrovascular accident) Z80.78    Vitamin D deficiency E55.9    Dysphagia s/p dilation R13.10    Anemia D64.9    Pacemaker (for h/o AV block) Z95.0    OAB (overactive bladder) N32.81    Cataract H26.9    Gout of left foot M10.9    Seasonal allergic rhinitis J30.2    Microalbuminuria R80.9    CKD (chronic kidney disease) stage 3, GFR 30-59 ml/min N18.3    Mixed connective tissue disease ([facility]) M35.1    Pyuria N39.0    Severe obesity (BMI 35.0-39. 9) with comorbidity ([address] E66.01       Depression Risk Factor Screening:     PHQ over the last two weeks 8/23/2018   Little interest or pleasure in doing things Not at all   Feeling down, depressed, irritable, or hopeless Not at all   Total Score PHQ 2 0     Alcohol Risk Factor Screening:   Does not drink Alcohol    Functional Ability and Level of Safety:   Hearing Loss  Hearing is good. Activities of Daily Living  The home contains: no safety equipment. Patient does total self care   She does not need assistance with ADLs/IADLs. Fall Risk  Fall Risk Assessment, last 12 mths 8/23/2018   Able to walk? Yes   Fall in past 12 months? Yes   Fall with injury? Yes   Number of falls in past 12 months 1   Fall Risk Score 2     She had a fall a month ago while going up some steps and injured her right calf/foot. She saw Podiatry after that. Pain sx eventually resolved. Per her podiatry team (per pt hx), she did not fx anything. Abuse Screen  Patient is not abused     ROS:   Gen: No fever/chlls  HEENT: No sore throat, eye pain, ear pain, or congestion.  No HA  CV: No CP  Resp: No cough/SOB  GI: No abdominal pain  : No hematuria/dysuria  Derm: No rash  Neuro: No new paresthesias/weakness  Musc: she denies jt pain today. Psych: No depression sx      Cognitive Screening   Evaluation of Cognitive Function:  Has your family/caregiver stated any concerns about your memory: no  Normal     Visit Vitals    BP (!) 169/98 (BP 1 Location: Left arm, BP Patient Position: Sitting)    Pulse 64    Temp 97.8 °F (36.6 °C) (Oral)    Resp 16    Ht 5' 4\" (1.626 m)    Wt 222 lb 6.4 oz (100.9 kg)    SpO2 100%    BMI 38.17 kg/m2     General:  Alert, cooperative, no distress   Head:  Normocephalic, without obvious abnormality, atraumatic. Eyes:  Conjunctivae clear   Ears:  Clear external auditory canals   Nose: Nares normal. Septum midline. Mucosa normal. No drainage or sinus tenderness. Throat: Lips, mucosa, and tongue normal. Unremarkable oropharynx   Neck: Supple, symmetrical, trachea midline, no adenopathy. Lungs:   Clear to auscultation bilaterally. Heart:  Regular rate and rhythm, S1, S2 normal, no murmur, click, rub or gallop. Abdomen:   Soft, non-tender. Bowel sounds normal. No masses. Extremities: Extremities normal no edema. Pulses: +2 radial pulses b/l   Skin:  No rashes or lesions. Lymph nodes: Cervical LN normal.   Neurologic:  Psych: Stable gait  Euthymic     3/3 short item recall  Unremarkable clock drawing exercise    Patient Care Team   Patient Care Team:  Clare Lang MD as PCP - General (Family Practice)  Clinton Mena MD (Vascular Surgery)  Patrick Girard DO (Cardiology)  [facility], [place] (Podiatry)  Kathy Lam RN as Ambulatory Care Navigator (Internal Medicine)  Nadeem Morin MD as Physician (Urology)  [address], [place] (Physician Assistant)    Assessment/Plan   Education and counseling provided:  Are appropriate based on today's review and evaluation    Diagnoses and all orders for this visit:    1.  Atherosclerosis of native coronary artery of native heart without angina pectoris  -     simvastatin (ZOCOR) 20 mg tablet; Take 1 Tab by mouth nightly. 2. Screening for colon cancer  -     REFERRAL TO GASTROENTEROLOGY    3. Positive occult stool blood test  -     REFERRAL TO GASTROENTEROLOGY    4. Encounter for screening mammogram for breast cancer  -     SHIVA MAMMO BI SCREENING INCL CAD; Future      Health Maintenance Due   Topic Date Due    Pneumococcal 65+ Low/Medium Risk (2 of 2 - PPSV23) 07/06/2017    COLONOSCOPY  01/25/2018    Influenza Age 9 to Adult  08/01/2018     Health Maintenance:  I ordered a mammogram for breast cancer screening. Placing a referral for colon cancer screening. Fall risk reduction measures were discussed with her today. I encouraged her to get all of her recommended vaccinations. She declines. I encouraged her to reduce her weight. I will recheck this when she comes back. Advance Care Planning (ACP) Provider Conversation Snapshot    Date of ACP Conversation: 8/23/18  Persons included in Conversation:  patient  Length of ACP Conversation in minutes:  <16 minutes (Non-Billable)    Authorized Decision Maker (if patient is incapable of making informed decisions): This person is:   Healthcare Agent/Medical Power of  under Advance Directive - see below. For Patients with Decision Making Capacity:   Values/Goals: Exploration of values, goals, and preferences if recovery is not expected, even with continued medical treatment in the event of:  Imminent death  Severe, permanent brain injury    Conversation Outcomes / Follow-Up Plan:   Reviewed existing Advance Directive . I reviewed her pre-existing advanced directive. She has no changes to make to this document at this time. I discussed how she may want to include more details regarding the 2 scenarios listed in the documentation.   Per our discussion today, she seems to not want life support measures if there is no chance of recovery or improvement with such life support measures. She hand wrote on her preexisting advance directive that she does not want life support measures if she has a brain injury or is [place] dead. \"    Dr. Annie Garcia  [facility] [address], [facility] [address]  Phone: [phone]  Fax: [phone]

## 2018-08-23 NOTE — MR AVS SNAPSHOT
[address] [facility] 
 
 
 [address] [facility], [facility] [address] [facility] [address] 
[phone] Patient: Aditi De Santiago MRN: [id number] [facility]:3[birth year]326 Visit Information Date & Time Provider Department Dept. Phone Encounter #  
 8/23/2018  8:30 AM Katie Huitron MD [facility] [phone] Follow-up Instructions Return in about 5 months (around 1/16/2019) for BP check, weight check, lab results. .  
  
Your Appointments 11/20/2018  8:40 AM  
Follow Up with Jerry Murdock DO [facility] Appt Note: 6 month follow up [facility] [id number] [place] [id number] [phone] Bib Ramirez  
  
    
 11/20/2018  8:40 AM  
PROCEDURE with Pacer Lester Csi [facility] Appt Note: w/alfred appt. [facility] [id number] [place] [id number] [phone] [facility]  
  
    
 1/25/2019  8:30 AM  
Office Visit with Katie Huitron MD  
[facility]) Appt Note: 5 month f/u  
 [address] [facility] 7[address] [facility] [address] [place]  
  
   
 [address] [facility] - [facility], [address]  
  
    
 1/30/2019  9:00 AM  
PROCEDURE with [facility] 2 [facility] ([facility]) Appt Note: CV 2 YRS/ KNAAK; CV [place] [address], [place] [address] [address] [facility] [address]  
[phone] [address] [place], [place] [id number]  
  
    
 1/30/2019 10:00 AM  
PROCEDURE with [facility] NONIMAGING [facility] ([facility]) Appt Note: LEG ART 2 [facility]  
 [facility] 177, [place] [address] 200 [facility] [address]  
[phone] 2300 [facility], [address] 2/13/2020  9:00 AM  
Follow Up with HEN[facility] ([facility]) Appt Note: 2 years fup after studies [address] [facility] [address] 947 200 [facility] [address]  
[phone] [address] [facility] [place] 200 [facility] [address] Upcoming Health Maintenance Date Due Pneumococcal 65+ Low/Medium Risk (2 of 2 - PPSV23) 7/6/2017 COLONOSCOPY 1/25/2018 Influenza Age 5 to Adult 8/1/2018 MEDICARE YEARLY EXAM 8/24/2019 GLAUCOMA SCREENING Q2Y 9/6/2019 DTaP/Tdap/Td series (2 - Td) 11/22/2026 Allergies as of 8/23/2018  Review Complete On: 8/23/2018 By: Perla Benjamin Severity Noted Reaction Type Reactions Nifedipine High 08/29/2017    Nausea and Vomiting, Other (comments) Dizzy Ace Inhibitors    Cough Codeine  11/12/2008    Unknown (comments), Nausea Only, Nausea and Vomiting Hydralazine  03/29/2017   Intolerance Other (comments) Dizziness and Fatigue Lipitor [Atorvastatin]  05/20/2014    Nausea and Vomiting, Vertigo Current Immunizations  Never Reviewed No immunizations on file. Not reviewed this visit You Were Diagnosed With   
  
 Codes Comments Screening for colon cancer    -  Primary ICD-10-CM: Z12.11 ICD-9-CM: V76.51 Positive occult stool blood test     ICD-10-CM: R19.5 ICD-9-CM: 792.1 Encounter for screening mammogram for breast cancer     ICD-10-CM: Z12.31 
ICD-9-CM: V76.12 Atherosclerosis of native coronary artery of native heart without angina pectoris     ICD-10-CM: I25.10 ICD-9-CM: 414.01 Vitals BP Pulse Temp Resp Height(growth percentile) Weight(growth percentile) (!) 169/98 (BP 1 Location: Left arm, BP Patient Position: Sitting) 64 97.8 °F (36.6 °C) (Oral) 16 5' 4\" (1.626 m) 222 lb 6.4 oz (100.9 kg) SpO2 BMI OB Status Smoking Status 100% 38.17 kg/m2 Postmenopausal Never Smoker Vitals History BMI and BSA Data Body Mass Index Body Surface Area  
 38.17 kg/m 2 2.13 m 2 Preferred Pharmacy Pharmacy Name Phone [facility] #[id number]- Prabhakar Florian, [address] [place] [address] Your Updated Medication List  
  
   
This list is accurate as of 8/23/18  9:45 AM.  Always use your most recent med list. amLODIPine 5 mg tablet Commonly known as:  NORVASC  
  
 aspirin 81 mg tablet Take 81 mg by mouth daily. carvedilol 25 mg tablet Commonly known as:  COREG  
TAKE 1 TABLET BY MOUTH (2) TIMES A DAY  
  
 diclofenac 1 % Gel Commonly known as:  VOLTAREN  
APPLY 4GRAMS/ TO KNEE 4 TIMES A DAY AS NEEDED TRANSDERMAL 90 DAYS  
  
 furosemide 40 mg tablet Commonly known as:  LASIX TAKE 1 TABLET BY MOUTH AS NEEDED  
  
 hydrocortisone 2.5 % ointment Commonly known as:  HYTONE  
APPLY LIGHTLY TO THE AFFECTED AREAS ON THE LEGS AND FEET TWICE A DAY. simvastatin 20 mg tablet Commonly known as:  ZOCOR Take 1 Tab by mouth nightly. Prescriptions Sent to Pharmacy Refills  
 simvastatin (ZOCOR) 20 mg tablet 3 Sig: Take 1 Tab by mouth nightly. Class: Normal  
 Pharmacy: [facility]/pharmacy #[id number]- Prabhakar Florian, [address] [place] [address] Ph #: [phone] Route: Oral  
  
We Performed the Following REFERRAL TO GASTROENTEROLOGY [TJD40 Custom] Follow-up Instructions Return in about 5 months (around 1/16/2019) for BP check, weight check, lab results. Melissa Kidd To-Do List   
 08/23/2018 Imaging:  [facility] MAMMO BI SCREENING INCL CAD Referral Information Referral ID Referred By Referred To  
  
 [id number] Milton Camacho Not Available Visits Status Start Date End Date 1 New Request 8/23/18 8/23/19 If your referral has a status of pending review or denied, additional information will be sent to support the outcome of this decision. Patient Instructions Health Maintenance Due Topic Date Due  
  Pneumococcal 65+ Low/Medium Risk (2 of 2 - PPSV23) 07/06/2017  COLONOSCOPY  01/25/2018  MEDICARE YEARLY EXAM  07/11/2018  Influenza Age 5 to Adult  08/01/2018 Medicare Part B Preventive Services Limitations Recommendation Scheduled Bone Mass Measurement 
(age 72 & older, biennial) Requires diagnosis related to osteoporosis or estrogen deficiency. Biennial benefit unless patient has history of long-term glucocorticoid tx or baseline is needed because initial test was by other method This should be done at age 72 and again if on osteoporosis medication at 2-3 year intervals. Overdue Cardiovascular Screening Blood Tests (every 5 years) Total cholesterol, HDL, Triglycerides Order as a panel if possible We should check your cholesterol panel at least once every 5 years. Up to date Colorectal Cancer Screening 
-Fecal occult blood test (annual) -Flexible sigmoidoscopy (5y) 
-Screening colonoscopy (10y) -Barium Enema  Due per your Gastroenterologist's recommendations. Overdue Counseling to Prevent Tobacco Use (up to 8 sessions per year) - Counseling greater than 3 and up to 10 minutes - Counseling greater than 10 minutes Patients must be asymptomatic of tobacco-related conditions to receive as preventive service Continue with smoking cessation Diabetes Screening Tests (at least every 3 years, Medicare covers annually or at 6-month intervals for prediabetic patients) Fasting blood sugar (FBS) or glucose tolerance test (GTT) Patient must be diagnosed with one of the following: 
-Hypertension, Dyslipidemia, obesity, previous impaired FBS or GTT 
Or any two of the following: overweight, FH of diabetes, age ? 72, history of gestational diabetes, birth of baby weighing more than 9 pounds Should be done yearly Up to date Diabetes Self-Management Training (DSMT) (no USPSTF recommendation) Requires referral by treating physician for patient with diabetes or renal disease. 10 hours of initial DSMT session of no less than 30 minutes each in a continuous 12-month period. 2 hours of follow-up DSMT in subsequent years. Not applicable Not applicable Glaucoma Screening (no USPSTF recommendation) Diabetes mellitus, family history, , age 48 or over,  American, age 72 or over Continue with annual eye exams. Up to date Human Immunodeficiency Virus (HIV) Screening (annually for increased risk patients) HIV-1 and HIV-2 by EIA, ROSE MARIE, rapid antibody test, or oral mucosa transudate Patient must be at increased risk for HIV infection per USPSTF guidelines or pregnant. Tests covered annually for patients at increased risk. Pregnant patients may receive up to 3 test during pregnancy. Not applicable Not applicable Medical Nutrition Therapy (MNT) (for diabetes or renal disease not recommended schedule) Requires referral by treating physician for patient with diabetes or renal disease. Can be provided in same year as diabetes self-management training (DSMT), and CMS recommends medical nutrition therapy take place after DSMT. Up to 3 hours for initial year and 2 hours in subsequent years. Not applicable Not applicable Shingles Vaccination A shingles vaccine is also recommended once in a lifetime after age 61 Vaccination recommended for shingles vaccination. Overdue Seasonal Influenza Vaccination (annually)  Continue with yearly \"flu\" shot annually Overdue Pneumococcal Vaccination (once after 65)  Please receive this vaccination at age 72. Overdue Hepatitis B Vaccinations (if medium/high risk) Medium/high risk factors:  End-stage renal disease, Hemophiliacs who received Factor VIII or IX concentrates, Clients of institutions for the mentally retarded, Persons who live in the same house as a HepB virus carrier, Homosexual men, Illicit injectable drug abusers. Not applicable Not applicable Screening Mammography (biennial age 54-69) Annually (age 36 or over) You need a mammogram yearly to screen for breast cancer. Discussed today Screening Pap Tests and Pelvic Examination (up to age 79 and after 79 if unknown history or abnormal study last 10 years) Every 24 months except high risk You need no Pap smear at this time. Not warranted at this time. Ultrasound Screening for Abdominal Aortic Aneurysm (AAA) (once) Patient must be referred through IPPE and not have had a screening for abdominal aortic aneurysm before under Medicare. Limited to patients who meet one of the following criteria: 
- Men who are 73-68 years old and have smoked more than 100 cigarettes in their lifetime. 
-Anyone with a FH of AAA 
-Anyone recommended for screening by USPSTF Not applicable Not applicable Body Mass Index: Care Instructions Your Care Instructions Body mass index (BMI) can help you see if your weight is raising your risk for health problems. It uses a formula to compare how much you weigh with how tall you are. · A BMI lower than 18.5 is considered underweight. · A BMI between 18.5 and 24.9 is considered healthy. · A BMI between 25 and 29.9 is considered overweight. A BMI of 30 or higher is considered obese. If your BMI is in the normal range, it means that you have a lower risk for weight-related health problems. If your BMI is in the overweight or obese range, you may be at increased risk for weight-related health problems, such as high blood pressure, heart disease, stroke, arthritis or joint pain, and diabetes. If your BMI is in the underweight range, you may be at increased risk for health problems such as fatigue, lower protection (immunity) against illness, muscle loss, bone loss, hair loss, and hormone problems. BMI is just one measure of your risk for weight-related health problems.  You may be at higher risk for health problems if you are not active, you eat an unhealthy diet, or you drink too much alcohol or use tobacco products. Follow-up care is a key part of your treatment and safety. Be sure to make and go to all appointments, and call your doctor if you are having problems. It's also a good idea to know your test results and keep a list of the medicines you take. How can you care for yourself at home? · Practice healthy eating habits. This includes eating plenty of fruits, vegetables, whole grains, lean protein, and low-fat dairy. · If your doctor recommends it, get more exercise. Walking is a good choice. Bit by bit, increase the amount you walk every day. Try for at least 30 minutes on most days of the week. · Do not smoke. Smoking can increase your risk for health problems. If you need help quitting, talk to your doctor about stop-smoking programs and medicines. These can increase your chances of quitting for good. · Limit alcohol to 2 drinks a day for men and 1 drink a day for women. Too much alcohol can cause health problems. If you have a BMI higher than 25 · Your doctor may do other tests to check your risk for weight-related health problems. This may include measuring the distance around your waist. A waist measurement of more than 40 inches in men or 35 inches in women can increase the risk of weight-related health problems. · Talk with your doctor about steps you can take to stay healthy or improve your health. You may need to make lifestyle changes to lose weight and stay healthy, such as changing your diet and getting regular exercise. If you have a BMI lower than 18.5 · Your doctor may do other tests to check your risk for health problems. · Talk with your doctor about steps you can take to stay healthy or improve your health. You may need to make lifestyle changes to gain or maintain weight and stay healthy, such as getting more healthy foods in your diet and doing exercises to build muscle. Where can you learn more? Go to http://jacquelyn-alberto.info/. Enter S176 in the search box to learn more about \"Body Mass Index: Care Instructions. \" Current as of: October 13, 2016 Content Version: 11.4 © 8082-9787 [employer]. Care instructions adapted under license by [employer] (which disclaims liability or warranty for this information). If you have questions about a medical condition or this instruction, always ask your healthcare professional. [facility] any warranty or liability for your use of this information. Medicare Wellness Visit, Female The best way to live healthy is to have a lifestyle where you eat a well-balanced diet, exercise regularly, limit alcohol use, and quit all forms of tobacco/nicotine, if applicable. Regular preventive services are another way to keep healthy. Preventive services (vaccines, screening tests, monitoring & exams) can help personalize your care plan, which helps you manage your own care. Screening tests can find health problems at the earliest stages, when they are easiest to treat. [facility] follows the current, evidence-based guidelines published by the [facility] Jero Reich (USPSTF) when recommending preventive services for our patients. Because we follow these guidelines, sometimes recommendations change over time as research supports it. (For example, mammograms used to be recommended annually. Even though Medicare will still pay for an annual mammogram, the newer guidelines recommend a mammogram every two years for women of average risk.) Of course, you and your doctor may decide to screen more often for some diseases, based on your risk and your health status. Preventive services for you include: - Medicare offers their members a free annual wellness visit, which is time for you and your primary care provider to discuss and plan for your preventive service needs. Take advantage of this benefit every year! 
-All adults over the age of 72 should receive the recommended pneumonia vaccines. Current USPSTF guidelines recommend a series of two vaccines for the best pneumonia protection.  
-All adults should have a flu vaccine yearly and a tetanus vaccine every 10 years. All adults age 61 and older should receive a shingles vaccine once in their lifetime.   
-A bone mass density test is recommended when a woman turns 65 to screen for osteoporosis. This test is only recommended one time, as a screening. Some providers will use this same test as a disease monitoring tool if you already have osteoporosis. -All adults age 38-68 who are overweight should have a diabetes screening test once every three years.  
-Other screening tests and preventive services for persons with diabetes include: an eye exam to screen for diabetic retinopathy, a kidney function test, a foot exam, and stricter control over your cholesterol.  
-Cardiovascular screening for adults with routine risk involves an electrocardiogram (ECG) at intervals determined by your doctor.  
-Colorectal cancer screenings should be done for adults age 54-65 with no increased risk factors for colorectal cancer. There are a number of acceptable methods of screening for this type of cancer. Each test has its own benefits and drawbacks. Discuss with your doctor what is most appropriate for you during your annual wellness visit. The different tests include: colonoscopy (considered the best screening method), a fecal occult blood test, a fecal DNA test, and sigmoidoscopy.  
-Breast cancer screenings are recommended every other year for women of normal risk, age 54-69. 
-Cervical cancer screenings for women over age 72 are only recommended with certain risk factors.  
-All adults born between [facility] should be screened once for Hepatitis C.  
 
 Here is a list of your current Health Maintenance items (your personalized list of preventive services) with a due date: 
Health Maintenance Due Topic Date Due  Pneumococcal 65+ Low/Medium Risk (2 of 2 - PPSV23) 07/06/2017  COLONOSCOPY  01/25/2018  Influenza Age 5 to Adult  08/01/2018 Please provide this summary of care documentation to your next provider. Your primary care clinician is listed as Wilder Blood. If you have any questions after today's visit, please call [phone].

## 2018-08-29 PROBLEM — R82.81 PYURIA: Status: RESOLVED | Noted: 2017-08-30 | Resolved: 2018-08-29

## 2018-08-30 NOTE — ACP (ADVANCE CARE PLANNING)
Advance Care Planning (ACP) Provider [facility] Date of ACP Conversation: 8/23/18 Persons included in Conversation:  patient Length of ACP Conversation in minutes:  <16 minutes (Non-Billable) Authorized Decision Maker (if patient is incapable of making informed decisions): This person is:  
Healthcare Agent/Medical Power of  under Advance Directive - see below. For Patients with Decision Making Capacity:  
Values/Goals: Exploration of values, goals, and preferences if recovery is not expected, even with continued medical treatment in the event of:  Imminent death Severe, permanent brain injury Conversation Outcomes / Follow-Up Plan:  
Reviewed existing Advance Directive . I reviewed her pre-existing advanced directive. She has no changes to make to this document at this time. I discussed how she may want to include more details regarding the 2 scenarios listed in the documentation. Per our discussion today, she seems to not want life support measures if there is no chance of recovery or improvement with such life support measures. She hand wrote on her preexisting advance directive that she does not want life support measures if she has a brain injury or is [place] dead. \" 
 
Dr. Watkins President [facility] [address], [address] [facility], [facility] [address] Phone: [phone] Fax: [phone]

## 2018-09-06 ENCOUNTER — HOSPITAL ENCOUNTER (OUTPATIENT)
Dept: MAMMOGRAPHY | Age: 78
Discharge: HOME | End: 2018-09-06
Attending: INTERNAL MEDICINE
Payer: MEDICARE

## 2018-09-06 DIAGNOSIS — Z12.31 ENCOUNTER FOR SCREENING MAMMOGRAM FOR BREAST CANCER: ICD-10-CM

## 2018-09-06 PROCEDURE — 77067 SCR MAMMO BI INCL CAD: CPT

## 2018-09-10 DIAGNOSIS — R92.8 ABNORMAL MAMMOGRAM OF LEFT BREAST: Primary | ICD-10-CM

## 2018-09-10 NOTE — PROGRESS NOTES
Please let her know that there was an area of concern along her left breast. I am ordering more pictures (ultrasound and left breast diagnostic mammogram) to rule out breast cancer.      Dr. Delicia Sawyer  [facility] of [address], [address] [place] [address] Str.  Phone: [phone]  Fax: [phone]

## 2018-09-11 ENCOUNTER — TELEPHONE (OUTPATIENT)
Dept: INTERNAL MEDICINE CLINIC | Age: 78
End: 2018-09-11

## 2018-09-11 NOTE — TELEPHONE ENCOUNTER
Pt aware of message below and verbalized understanding. No further questions or concerns from pt at this time.      Pt is schedule Sept 18 and 830am

## 2018-09-11 NOTE — TELEPHONE ENCOUNTER
----- Message from Aminta Hutchins MD sent at 9/10/2018  4:18 PM EDT -----  Please let her know that there was an area of concern along her left breast. I am ordering more pictures (ultrasound and left breast diagnostic mammogram) to rule out breast cancer.      Dr. Vernette Severin  [facility] of [address], [address] [place] [address] Str.  Phone: [phone]  Fax: [phone]

## 2018-09-18 ENCOUNTER — HOSPITAL ENCOUNTER (OUTPATIENT)
Dept: MAMMOGRAPHY | Age: 78
Discharge: HOME | End: 2018-09-18
Attending: INTERNAL MEDICINE
Payer: MEDICARE

## 2018-09-18 ENCOUNTER — HOSPITAL ENCOUNTER (OUTPATIENT)
Dept: ULTRASOUND IMAGING | Age: 78
Discharge: HOME | End: 2018-09-18
Attending: INTERNAL MEDICINE
Payer: MEDICARE

## 2018-09-18 ENCOUNTER — TELEPHONE (OUTPATIENT)
Dept: INTERNAL MEDICINE CLINIC | Age: 78
End: 2018-09-18

## 2018-09-18 DIAGNOSIS — R92.8 ABNORMAL MAMMOGRAM OF LEFT BREAST: ICD-10-CM

## 2018-09-18 DIAGNOSIS — R92.8 ABNORMALITY OF LEFT BREAST ON SCREENING MAMMOGRAM: Primary | ICD-10-CM

## 2018-09-18 PROCEDURE — 77065 DX MAMMO INCL CAD UNI: CPT

## 2018-09-18 NOTE — PROGRESS NOTES
Please let her know that the mammogram shows likely benign findings but the Radiologist is recommending a similar study in 6 months just to make sure there is no breast cancer. I am ordering this study for 6 months from now.     Dr. Jane Coronado  [facility] of [address], [address] [place] [address] Str.  Phone: [phone]  Fax: [phone]

## 2018-09-18 NOTE — TELEPHONE ENCOUNTER
Chief Complaint   Patient presents with    Results     done 9-18-18 Mammogram Left DX Include CAD per Dr Leslie Santana     Please let her know that the mammogram shows likely benign findings but the Radiologist is recommending a similar study in 6 months just to make sure there is no breast cancer. I am ordering this study for 6 months from now. Left voice message to return my call to discuss. Patient did return my call and informed, and states she understands all.

## 2018-10-25 ENCOUNTER — OFFICE VISIT (OUTPATIENT)
Dept: INTERNAL MEDICINE CLINIC | Age: 78
End: 2018-10-25

## 2018-10-25 VITALS
WEIGHT: 222.6 LBS | DIASTOLIC BLOOD PRESSURE: 63 MMHG | HEIGHT: 64 IN | RESPIRATION RATE: 14 BRPM | BODY MASS INDEX: 38 KG/M2 | OXYGEN SATURATION: 100 % | HEART RATE: 60 BPM | TEMPERATURE: 96.3 F | SYSTOLIC BLOOD PRESSURE: 170 MMHG

## 2018-10-25 DIAGNOSIS — L03.116 LEFT LEG CELLULITIS: Primary | ICD-10-CM

## 2018-10-25 DIAGNOSIS — E78.2 MIXED HYPERLIPIDEMIA: ICD-10-CM

## 2018-10-25 DIAGNOSIS — S81.801A WOUND OF RIGHT LOWER EXTREMITY, INITIAL ENCOUNTER: ICD-10-CM

## 2018-10-25 DIAGNOSIS — I10 ESSENTIAL HYPERTENSION: ICD-10-CM

## 2018-10-25 RX ORDER — CEPHALEXIN 500 MG/1
500 CAPSULE ORAL 4 TIMES DAILY
Qty: 28 CAP | Refills: 0 | Status: SHIPPED | OUTPATIENT
Start: 2018-10-25 | End: 2018-11-01

## 2018-10-25 RX ORDER — OLMESARTAN MEDOXOMIL 5 MG/1
TABLET ORAL
Refills: 3 | COMMUNITY
Start: 2018-08-30 | End: 2019-05-23

## 2018-10-25 NOTE — PATIENT INSTRUCTIONS
Preventing Falls: Care Instructions Your Care Instructions Getting around your home safely can be a challenge if you have injuries or health problems that make it easy for you to fall. Loose rugs and furniture in walkways are among the dangers for many older people who have problems walking or who have poor eyesight. People who have conditions such as arthritis, osteoporosis, or dementia also have to be careful not to fall. You can make your home safer with a few simple measures. Follow-up care is a key part of your treatment and safety. Be sure to make and go to all appointments, and call your doctor if you are having problems. It's also a good idea to know your test results and keep a list of the medicines you take. How can you care for yourself at home? Taking care of yourself · You may get dizzy if you do not drink enough water. To prevent dehydration, drink plenty of fluids, enough so that your urine is light yellow or clear like water. Choose water and other caffeine-free clear liquids. If you have kidney, heart, or liver disease and have to limit fluids, talk with your doctor before you increase the amount of fluids you drink. · Exercise regularly to improve your strength, muscle tone, and balance. Walk if you can. Swimming may be a good choice if you cannot walk easily. · Have your vision and hearing checked each year or any time you notice a change. If you have trouble seeing and hearing, you might not be able to avoid objects and could lose your balance. · Know the side effects of the medicines you take. Ask your doctor or pharmacist whether the medicines you take can affect your balance. Sleeping pills or sedatives can affect your balance. · Limit the amount of alcohol you drink. Alcohol can impair your balance and other senses. · Ask your doctor whether calluses or corns on your feet need to be removed.  If you wear loose-fitting shoes because of calluses or corns, you can lose your balance and fall. · Talk to your doctor if you have numbness in your feet. Preventing falls at home · Remove raised doorway thresholds, throw rugs, and clutter. Repair loose carpet or raised areas in the floor. · Move furniture and electrical cords to keep them out of walking paths. · Use nonskid floor wax, and wipe up spills right away, especially on ceramic tile floors. · If you use a walker or cane, put rubber tips on it. If you use crutches, clean the bottoms of them regularly with an abrasive pad, such as steel wool. · Keep your house well lit, especially Julious Sweeney, and outside walkways. Use night-lights in areas such as hallways and bathrooms. Add extra light switches or use remote switches (such as switches that go on or off when you clap your hands) to make it easier to turn lights on if you have to get up during the night. · Install sturdy handrails on stairways. · Move items in your cabinets so that the things you use a lot are on the lower shelves (about waist level). · Keep a cordless phone and a flashlight with new batteries by your bed. If possible, put a phone in each of the main rooms of your house, or carry a cell phone in case you fall and cannot reach a phone. Or, you can wear a device around your neck or wrist. You push a button that sends a signal for help. · Wear low-heeled shoes that fit well and give your feet good support. Use footwear with nonskid soles. Check the heels and soles of your shoes for wear. Repair or replace worn heels or soles. · Do not wear socks without shoes on wood floors. · Walk on the grass when the sidewalks are slippery. If you live in an area that gets snow and ice in the winter, sprinkle salt on slippery steps and sidewalks. Preventing falls in the bath · Install grab bars and nonskid mats inside and outside your shower or tub and near the toilet and sinks. · Use shower chairs and bath benches. · Use a hand-held shower head that will allow you to sit while showering. · Get into a tub or shower by putting the weaker leg in first. Get out of a tub or shower with your strong side first. 
· Repair loose toilet seats and consider installing a raised toilet seat to make getting on and off the toilet easier. · Keep your bathroom door unlocked while you are in the shower. Where can you learn more? Go to http://jacquelyn-alberto.info/. Enter [id number] in the search box to learn more about \"Preventing Falls: Care Instructions. \" Current as of: March 16, 2018 Content Version: 11.8 © 1494-9917 [employer]. Care instructions adapted under license by [employer] (which disclaims liability or warranty for this information). If you have questions about a medical condition or this instruction, always ask your healthcare professional. [place] [address] any warranty or liability for your use of this information. Health Maintenance Due Topic Date Due  Shingrix Vaccine Age 50> (1 of 2) 04/02/1992  Pneumococcal 65+ Low/Medium Risk (2 of 2 - PPSV23) 07/06/2017  COLONOSCOPY  01/25/2018  Influenza Age 5 to Adult  08/01/2018 Cellulitis: Care Instructions Your Care Instructions Cellulitis is a skin infection caused by bacteria, most often strep or staph. It often occurs after a break in the skin from a scrape, cut, bite, or puncture, or after a rash. Cellulitis may be treated without doing tests to find out what caused it. But your doctor may do tests, if needed, to look for a specific bacteria, like methicillin-resistant Staphylococcus aureus (MRSA). The doctor has checked you carefully, but problems can develop later. If you notice any problems or new symptoms, get medical treatment right away. Follow-up care is a key part of your treatment and safety.  Be sure to make and go to all appointments, and call your doctor if you are having problems. It's also a good idea to know your test results and keep a list of the medicines you take. How can you care for yourself at home? · Take your antibiotics as directed. Do not stop taking them just because you feel better. You need to take the full course of antibiotics. · Prop up the infected area on pillows to reduce pain and swelling. Try to keep the area above the level of your heart as often as you can. · If your doctor told you how to care for your wound, follow your doctor's instructions. If you did not get instructions, follow this general advice: 
? Wash the wound with clean water 2 times a day. Don't use hydrogen peroxide or alcohol, which can slow healing. ? You may cover the wound with a thin layer of petroleum jelly, such as Vaseline, and a nonstick bandage. ? Apply more petroleum jelly and replace the bandage as needed. · Be safe with medicines. Take pain medicines exactly as directed. ? If the doctor gave you a prescription medicine for pain, take it as prescribed. ? If you are not taking a prescription pain medicine, ask your doctor if you can take an over-the-counter medicine. To prevent cellulitis in the future · Try to prevent cuts, scrapes, or other injuries to your skin. Cellulitis most often occurs where there is a break in the skin. · If you get a scrape, cut, mild burn, or bite, wash the wound with clean water as soon as you can to help avoid infection. Don't use hydrogen peroxide or alcohol, which can slow healing. · If you have swelling in your legs (edema), support stockings and good skin care may help prevent leg sores and cellulitis. · Take care of your feet, especially if you have diabetes or other conditions that increase the risk of infection. Wear shoes and socks. Do not go barefoot. If you have athlete's foot or other skin problems on your feet, talk to your doctor about how to treat them. When should you call for help? Call your doctor now or seek immediate medical care if: 
  · You have signs that your infection is getting worse, such as: 
? Increased pain, swelling, warmth, or redness. ? Red streaks leading from the area. ? Pus draining from the area. ? A fever.  
  · You get a rash.  
 Watch closely for changes in your health, and be sure to contact your doctor if: 
  · You do not get better as expected. Where can you learn more? Go to http://jacquelyn-alberto.info/. Johan Livingston in the search box to learn more about \"Cellulitis: Care Instructions. \" Current as of: April 18, 2018 Content Version: 11.8 © 4876-0691 [employer]. Care instructions adapted under license by [employer] (which disclaims liability or warranty for this information). If you have questions about a medical condition or this instruction, always ask your healthcare professional. [place] [address] any warranty or liability for your use of this information.

## 2018-10-25 NOTE — PROGRESS NOTES
Chief Complaint Patient presents with  Fall  
  about 3 weeks ago while going up her Steps into the home she fell into one of the steps scraping the shin of the Right Leg 1. Have you been to the ER, urgent care clinic since your last visit? Hospitalized since your last visit? No 
 
2. Have you seen or consulted any other health care providers outside of the [address] [place] [address] since your last visit? Include any pap smears or colon screening. No 
 
Patient was given a copy of the Advanced Directive and understands to bring it in once completed. Health Maintenance Due Topic Date Due  Shingrix Vaccine Age 50> (1 of 2) 04/02/1992  Pneumococcal 65+ Low/Medium Risk (2 of 2 - PPSV23) 07/06/2017  COLONOSCOPY  01/25/2018  Influenza Age 5 to Adult  08/01/2018

## 2018-10-25 NOTE — PROGRESS NOTES
INTERNISTS OF [facility]: 
10/25/2018, MRN: [id number] Quinton Byrd is a 70 y.o. female and presents to clinic for Fall (about 3 weeks ago while going up her Steps into the home she fell into one of the steps scraping the shin of the Right Leg ) Subjective:  
Patient is a 62-year-old -American female with history of allergic rhinitis, gout, cataract, overactive bladder, hyperlipidemia, GERD, mixed connective tissue disease (positive FOREST, RNP Ab, Sjogren's Ab, anti-chromatin Ab, and Anti-Parnell Ab), chronic gastritis with bleeding in March 2016, history of stroke, vitamin D deficiency, dysphasia status post dilation, anemia, pacemaker for history of AV block, left cavernous ICA aneurysm, hypertension, atrial fibrillation, carotid stenosis, peripheral artery disease, PVCs, DVT hx (LUE - postoperatively), and CAD status post CABG. 1. Fall: Occurred 3 wks ago while going up a flight of concrete stairs. She skinned her RLE. Since then, she has had a right lower extremity wound along her tibia that is poorly healing site where her leg hit the concrete she has some pain along the affected area. No fever or chills. No paresthesias. Initially, the area drained purulent drainage. She has been cleaning it with just water. 2. HTN/HLD: Present for over 6 months. On amlodipine, carvedilol, and Lasix. She reports no adverse side effects with taking his medication. Her blood pressure today is 186/79. She took her medications this morning. She stopped taking simvastatin for the past 2 months secondary to adverse GI side effects such as nausea she associates with taking this medication. Her weight today is 222 pounds. Patient Active Problem List  
 Diagnosis Date Noted  Severe obesity (BMI 35.0-39. 9) with comorbidity ([facility] 05/08/2018  [facility] disease [facility] 08/22/2017  Microalbuminuria 07/10/2017  CKD (chronic kidney disease) stage 3, GFR 30-59 ml/min ([facility]) 07/10/2017  Seasonal allergic rhinitis 11/23/2016  Gout of left foot 11/22/2016  Cataract 08/09/2016  
 OAB (overactive bladder) 07/06/2016  Mixed hyperlipidemia 07/05/2016  Gastroesophageal reflux disease without esophagitis 07/05/2016  Chronic gastritis with bleeding - in February/March of 2016 per FOBT and EGD results 07/05/2016  History of CVA (cerebrovascular accident) 07/05/2016  Vitamin D deficiency 07/05/2016  Dysphagia s/p dilation 07/05/2016  Anemia 07/05/2016  Pacemaker (for h/o AV block) 07/05/2016  Intracranial aneurysm - left cavernous ICA on 2014 head/neck CTA 12/01/2014  
 HTN (hypertension) 10/13/2014  Atrial fibrillation ([facility] 10/13/2014  Carotid stenosis 10/13/2014  PAD (peripheral artery disease) ([facility] 10/13/2014  PVC's (premature ventricular contractions) 12/09/2011  Atherosclerotic heart disease, s/p CABG X3 in 6/08, in the setting of severe left ventricular dysfunction, and s/p a dual-chamber  S/P CABG x 3 Current Outpatient Medications Medication Sig Dispense Refill  olmesartan (BENICAR) 5 mg tablet TAKE 1 TABLET BY MOUTH EVERY DAY  3  
 cephALEXin (KEFLEX) 500 mg capsule Take 1 Cap by mouth four (4) times daily for 7 days. 28 Cap 0  
 amLODIPine (NORVASC) 5 mg tablet Take 5 mg by mouth daily.  diclofenac (VOLTAREN) 1 % gel APPLY 4GRAMS/ TO KNEE 4 TIMES A DAY AS NEEDED TRANSDERMAL 90 DAYS  2  
 hydrocortisone (HYTONE) 2.5 % ointment APPLY LIGHTLY TO THE AFFECTED AREAS ON THE LEGS AND FEET TWICE A DAY. 2  
 carvedilol (COREG) 25 mg tablet TAKE 1 TABLET BY MOUTH (2) TIMES A  Tab 3  furosemide (LASIX) 40 mg tablet TAKE 1 TABLET BY MOUTH AS NEEDED 30 Tab 3  
 aspirin 81 mg tablet Take 81 mg by mouth daily.  simvastatin (ZOCOR) 20 mg tablet Take 1 Tab by mouth nightly. 90 Tab 3 Allergies Allergen Reactions  Nifedipine Nausea and Vomiting and Other (comments) Dizzy  Ace Inhibitors Cough  Codeine Unknown (comments), Nausea Only and Nausea and Vomiting  Hydralazine Other (comments) Dizziness and Fatigue  Lipitor [Atorvastatin] Nausea and Vomiting and Vertigo Past Medical History:  
Diagnosis Date  Abnormal ankle brachial index 11/13/2014 Mild arterial disease in right leg. R VICTOR MANUEL 0.88. L VICTOR MANUEL 1.00.  Anemia  Arm DVT (deep venous thromboembolism), acute (HCC)   
 s/p anticoagulation  Atherosclerotic heart disease  Atrial fibrillation ([address]  AV block  CAD (coronary artery disease)  Cardiomyopathy, ischemic  Carotid artery stenosis  Carotid duplex 11/13/2014 Mild <50% bilateral ICA plaquing. Possible left vertebral occlusion.  Cerebral artery occlusion with cerebral infarction ([address]  Chest pain  CVA (cerebral infarction)  Echocardiogram 08/19/2015 EF 55%. Apical inferior, apical septal hypk (new since study of 12/19/11), likely due to chronic V-pacing. Mild LVH. RVSP 30 mmHg. Mild LAE. Mild MR. Mild TR.  Gastritis  GERD (gastroesophageal reflux disease)  Hiatal hernia  Hyperlipidemia  Hypertension  Hypertensive cardiovascular disease  Intracranial aneurysm   
 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  Nuclear cardiac imaging test 09/24/2015 Low risk. Sm apical infarction w/no ischemia vs apical thinning. Sm basal inferior defect, likely artifact. EF 56%. Inferoseptal, inferoapical WMA related to sternotomy or paced rhythm.  Pacemaker  PAD (peripheral artery disease) ([address]  PVC's   
 chronic  S/P CABG x 3 06/08/2008 LIMA-LAD. SVG-OM. SVG-dRCA  S/P cardiac cath 06/08/2008 pRCA 100%. LM patent. Cx patent. OM1 100%. mLAD 95%. LVEDP 27-29mmHg. EF 20%. mod MR  
 Tilt table evaluation 06/01/1995 Positive for orthostatic hypotension  Vitamin D deficiency Past Surgical History:  
Procedure Laterality Date  CABG, ARTERY-VEIN, THREE  2008  CARDIAC SURG PROCEDURE UNLIST    
 medtronic dual-chamber cardioverted-defibrillator  HX  SECTION    
 x2  
 HX ENDOSCOPY  3/1/16  HX ENDOSCOPY  3/1/16 [address]  HX HYSTERECTOMY 1980  HX ORTHOPAEDIC    
 knee surgery  HX OTHER SURGICAL  2/10/16 Pacemaker Gen Change  HX TUMOR REMOVAL    
 removed from arm.  benign No family history on file. Social History Tobacco Use  Smoking status: Never Smoker  Smokeless tobacco: Never Used  Tobacco comment: just smoked 2 weeks in college Substance Use Topics  Alcohol use: No  
 
 
ROS Review of Systems Constitutional: Negative for chills and fever. HENT: Negative for ear pain and sore throat. Eyes: Negative for blurred vision and pain. Respiratory: Positive for cough (present for 1 wk). Negative for shortness of breath. Cardiovascular: Negative for chest pain. Gastrointestinal: Negative for abdominal pain, blood in stool and melena. Genitourinary: Negative for dysuria and hematuria. Musculoskeletal: Positive for falls (see HPI) and joint pain (see HPI). Negative for myalgias. Skin: Negative for itching. Neurological: Negative for tingling, focal weakness and headaches. Endo/Heme/Allergies: Does not bruise/bleed easily. Psychiatric/Behavioral: Negative for substance abuse. Objective Vitals:  
 10/25/18 7941 10/25/18 0545 BP: 186/79 170/63 Pulse: 60 60 Resp: 14 Temp: 96.3 °F (35.7 °C) TempSrc: Oral   
SpO2: 100% Weight: 222 lb 9.6 oz (101 kg) Height: 5' 4\" (1.626 m) PainSc:   4 PainLoc: Leg Physical Exam  
Constitutional: She is oriented to person, place, and time and well-developed, well-nourished, and in no distress. HENT:  
Head: Normocephalic and atraumatic. Right Ear: External ear normal.  
Left Ear: External ear normal.  
Nose: Nose normal.  
Mouth/Throat: Oropharynx is clear and moist. No oropharyngeal exudate. Eyes: Conjunctivae and EOM are normal. Pupils are equal, round, and reactive to light. Right eye exhibits no discharge. Left eye exhibits no discharge. No scleral icterus. Neck: Neck supple. Cardiovascular: Normal rate and intact distal pulses. Exam reveals no gallop and no friction rub. No murmur heard. +Palpable RLE pt pulse Pulmonary/Chest: Effort normal. No respiratory distress. She has no wheezes. RUL coarse breath sounds Abdominal: Soft. Bowel sounds are normal. She exhibits no distension. There is no tenderness. There is no rebound and no guarding. Musculoskeletal: She exhibits no edema or tenderness (Bue). Lymphadenopathy:  
  She has no cervical adenopathy. Neurological: She is alert and oriented to person, place, and time. She exhibits normal muscle tone. Gait normal.  
Skin: Skin is warm and dry. RLE stage 2 ulcer with minimal surrounding erythema, TTP along surrounding tissue Psychiatric: Affect normal.  
Nursing note and vitals reviewed. LABS Data Review:  
Lab Results Component Value Date/Time WBC 4.7 07/10/2017 11:17 AM  
 HGB 9.6 (L) 04/10/2018 10:04 AM  
 HCT 30.7 (L) 04/10/2018 10:04 AM  
 PLATELET 640 52/74/8764 11:17 AM  
 MCV 86.2 07/10/2017 11:17 AM  
 
 
Lab Results Component Value Date/Time Sodium 144 08/23/2018 02:34 PM  
 Potassium 4.2 08/23/2018 02:34 PM  
 Chloride 110 (H) 08/23/2018 02:34 PM  
 CO2 28 08/23/2018 02:34 PM  
 Anion gap 6 08/23/2018 02:34 PM  
 Glucose 88 08/23/2018 02:34 PM  
 BUN 26 (H) 08/23/2018 02:34 PM  
 Creatinine 1.32 (H) 08/23/2018 02:34 PM  
 BUN/Creatinine ratio 20 08/23/2018 02:34 PM  
 GFR est AA 47 (L) 08/23/2018 02:34 PM  
 GFR est non-AA 39 (L) 08/23/2018 02:34 PM  
 Calcium 8.6 08/23/2018 02:34 PM  
 Calcium 8.6 08/23/2018 02:34 PM  
 
 
Lab Results Component Value Date/Time Cholesterol, total 215 (H) 07/10/2017 11:17 AM  
 HDL Cholesterol 93 (H) 07/10/2017 11:17 AM  
 LDL, calculated 108.6 (H) 07/10/2017 11:17 AM  
 VLDL, calculated 13.4 07/10/2017 11:17 AM  
 Triglyceride 67 07/10/2017 11:17 AM  
 CHOL/HDL Ratio 2.3 07/10/2017 11:17 AM  
 
 
No results found for: HBA1C, HGBE8, [id number], KLE1QVOB, [id number] Assessment/Plan: 1. Fall Hx: +RLE stage 2 ulcer with some cellulitis present per PE findings. - Fall risk reduction measures were discussed. - Wound Care consult placed. - Keflex prescribed. I instructed her to notify me if her sx worsen. 
- RTC in 2 wks to reassess 
- Holding off on referral to Vascular Surgery since she has a palpable RLE pt pulse 
-She can use hydrogen peroxide along the affected area along with Neosporin over-the-counter and a bandage daily pending her evaluation by the Wound Care team. 
 
2. HTN/HLD:  Poorly controlled. +She stopped taking her statin 2/2 GI side effects. 
- C/w rx as prescribed. RTC for a BP check. If her BP is still elevated, I will need to adjust her medications. Nevertheless, I encouraged her to follow-up with cardiology especially since she is not taking her statin medication. I discussed the importance of weight reduction by limiting her processed food and fatty food intake especially since she has not been taking her statin for 2 months. I also encouraged her to try taking her statin with food to improve her medication tolerance. 3. General: - Will order a CXR if her cough worsens or if I still hear coarse breath sounds along her RUL Health Maintenance Due Topic Date Due  Shingrix Vaccine Age 50> (1 of 2) 04/02/1992  Pneumococcal 65+ Low/Medium Risk (2 of 2 - PPSV23) 07/06/2017  COLONOSCOPY  01/25/2018  Influenza Age 5 to Adult  08/01/2018 Lab review: labs are reviewed in the EHR I have discussed the diagnosis with the patient and the intended plan as seen in the above orders. The patient has received an after-visit summary and questions were answered concerning future plans. I have discussed medication side effects and warnings with the patient as well. I have reviewed the plan of care with the patient, accepted their input and they are in agreement with the treatment goals. All questions were answered. The patient understands the plan of care. Handouts provided today with above information. Pt instructed if symptoms worsen to call the office or report to the ED for continued care. Greater than 50% of the visit time was spent in counseling and/or coordination of care. Voice recognition was used to generate this report, which may have resulted in some phonetic based errors in grammar and contents. Even though attempts were made to correct all the mistakes, some may have been missed, and remained in the body of the document. Follow-up Disposition: 
Return in about 2 weeks (around 11/8/2018) for RLE wound/cellulitis, BP check, HLD. Efrain Mccartney MD

## 2018-11-01 ENCOUNTER — HOSPITAL ENCOUNTER (OUTPATIENT)
Dept: WOUND CARE | Age: 78
Discharge: HOME | End: 2018-11-01
Payer: MEDICARE

## 2018-11-01 VITALS
TEMPERATURE: 98.4 F | SYSTOLIC BLOOD PRESSURE: 177 MMHG | OXYGEN SATURATION: 97 % | RESPIRATION RATE: 18 BRPM | HEART RATE: 67 BPM | DIASTOLIC BLOOD PRESSURE: 82 MMHG

## 2018-11-01 DIAGNOSIS — R60.9 IDIOPATHIC EDEMA: ICD-10-CM

## 2018-11-01 DIAGNOSIS — I73.9 PAD (PERIPHERAL ARTERY DISEASE) (HCC): Primary | ICD-10-CM

## 2018-11-01 PROBLEM — R60.1 GENERALIZED EDEMA: Status: ACTIVE | Noted: 2018-11-01

## 2018-11-01 PROBLEM — L97.212 LOWER LIMB ULCER, CALF, RIGHT, WITH FAT LAYER EXPOSED (HCC): Status: ACTIVE | Noted: 2018-11-01

## 2018-11-01 PROCEDURE — 87070 CULTURE OTHR SPECIMN AEROBIC: CPT | Performed by: PODIATRIST

## 2018-11-01 PROCEDURE — 87186 SC STD MICRODIL/AGAR DIL: CPT | Performed by: PODIATRIST

## 2018-11-01 PROCEDURE — 74011250637 HC RX REV CODE- 250/637

## 2018-11-01 PROCEDURE — 87077 CULTURE AEROBIC IDENTIFY: CPT | Performed by: PODIATRIST

## 2018-11-01 RX ORDER — DOXYCYCLINE 100 MG/1
100 CAPSULE ORAL 2 TIMES DAILY
Qty: 20 CAP | Refills: 0 | Status: SHIPPED | OUTPATIENT
Start: 2018-11-01 | End: 2018-11-15

## 2018-11-01 RX ORDER — CARVEDILOL 25 MG/1
TABLET ORAL
Qty: 180 TAB | Refills: 3 | Status: SHIPPED | OUTPATIENT
Start: 2018-11-01 | End: 2019-08-12 | Stop reason: SDUPTHER

## 2018-11-01 RX ADMIN — COLLAGENASE SANTYL: 250 OINTMENT TOPICAL at 09:57

## 2018-11-01 NOTE — PROGRESS NOTES
Podiatry Consultation Note Assessment:  
 
 
Patient Active Problem List  
Diagnosis Code  Atherosclerotic heart disease, s/p CABG X3 in 6/08, in the setting of severe left ventricular dysfunction, and s/p a dual-chamber I25.10  
 S/P CABG x 3 Z95.1  PVC's (premature ventricular contractions) I49.3  
 HTN (hypertension) I10  
 Atrial fibrillation ([facility]) I48.91  
 Carotid stenosis I65.29  
 PAD (peripheral artery disease) ([facility]) I73.9  Intracranial aneurysm - left cavernous ICA on 2014 head/neck CTA I67.1  Mixed hyperlipidemia E78.2  Gastroesophageal reflux disease without esophagitis K21.9  Chronic gastritis with bleeding - in February/March of 2016 per FOBT and EGD results K29.51  
 History of CVA (cerebrovascular accident) Z80.78  
 Vitamin D deficiency E55.9  Dysphagia s/p dilation R13.10  Anemia D64.9  
 Pacemaker (for h/o AV block) Z95.0  
 OAB (overactive bladder) N32.81  
 Cataract H26.9  Gout of left foot M10.9  Seasonal allergic rhinitis J30.2  Microalbuminuria R80.9  CKD (chronic kidney disease) stage 3, GFR 30-59 ml/min ([facility]) N18.3  Mixed connective tissue disease ([address] M35.1  Severe obesity (BMI 35.0-39. 9) with comorbidity ([address] E66.01  
 Lower limb ulcer, calf, right, with fat layer exposed ([address] L09.103  Generalized edema R60.1 Plan:  
 
Non selective debridement with cross hatching with #15 blade. With Rx Santyl ointment with DSD RLE. C &S gram stain performed today, empirically with place patient eRx Doxycyline 100 mg BID x 10 days. Reviewed recent arterial studies from 1/2018. Will order venous vascular studies. Pending results the patient will possibly benefit from profore lite dressing RLE Subjective:  
 
I was asked to evaluate Teresita Peace for chronic ulcer RLE after injury while walking up steps x 4 weeks ago.   Patient states she was placed on Keflex but stopped taking secondary to nausea Patient is a poor historian and not sure if she has had recent vascular studies, venous or arterial. She states she has compression but doesn't use them. She  is a 70 y.o. female admitted on 11/1/2018 for wound care. Allergies Allergen Reactions  Nifedipine Nausea and Vomiting and Other (comments) Dizzy  Ace Inhibitors Cough  Codeine Unknown (comments), Nausea Only and Nausea and Vomiting  Hydralazine Other (comments) Dizziness and Fatigue  Lipitor [Atorvastatin] Nausea and Vomiting and Vertigo Current Outpatient Medications Medication Sig  
 doxycycline (VIBRAMYCIN) 100 mg capsule Take 1 Cap by mouth two (2) times a day.  olmesartan (BENICAR) 5 mg tablet TAKE 1 TABLET BY MOUTH EVERY DAY  amLODIPine (NORVASC) 5 mg tablet Take 5 mg by mouth daily.  hydrocortisone (HYTONE) 2.5 % ointment APPLY LIGHTLY TO THE AFFECTED AREAS ON THE LEGS AND FEET TWICE A DAY.  carvedilol (COREG) 25 mg tablet TAKE 1 TABLET BY MOUTH (2) TIMES A DAY  furosemide (LASIX) 40 mg tablet TAKE 1 TABLET BY MOUTH AS NEEDED  aspirin 81 mg tablet Take 81 mg by mouth daily.  cephALEXin (KEFLEX) 500 mg capsule Take 1 Cap by mouth four (4) times daily for 7 days.  simvastatin (ZOCOR) 20 mg tablet Take 1 Tab by mouth nightly.  diclofenac (VOLTAREN) 1 % gel APPLY 4GRAMS/ TO KNEE 4 TIMES A DAY AS NEEDED TRANSDERMAL 90 DAYS No current facility-administered medications for this encounter. Past Medical History:  
Diagnosis Date  Abnormal ankle brachial index 11/13/2014 Mild arterial disease in right leg. R VICTOR MANUEL 0.88. L VICTOR MANUEL 1.00.  Anemia  Arm DVT (deep venous thromboembolism), acute (HCC)   
 s/p anticoagulation  Atherosclerotic heart disease  Atrial fibrillation [facility]  AV block  CAD (coronary artery disease)  Cardiomyopathy, ischemic  Carotid artery stenosis  Carotid duplex 11/13/2014 Mild <50% bilateral ICA plaquing. Possible left vertebral occlusion.  Cerebral artery occlusion with cerebral infarction ([facility] stroke x 2  Chest pain  CVA (cerebral infarction)  Echocardiogram 2015 EF 55%. Apical inferior, apical septal hypk (new since study of 11), likely due to chronic V-pacing. Mild LVH. RVSP 30 mmHg. Mild LAE. Mild MR. Mild TR.  Gastritis  GERD (gastroesophageal reflux disease)  Heart failure ([address]  Hiatal hernia  Hyperlipidemia  Hypertension  Hypertensive cardiovascular disease  Intracranial aneurysm   
 left cavernous ICA 2mm aneurysm from head/neck CTA in   Long term (current) use of anticoagulants 3/10/2011  Lower extremity venous duplex 2015 No DVT bilaterally.  Nuclear cardiac imaging test 2015 Low risk. Sm apical infarction w/no ischemia vs apical thinning. Sm basal inferior defect, likely artifact. EF 56%. Inferoseptal, inferoapical WMA related to sternotomy or paced rhythm.  Pacemaker  PAD (peripheral artery disease) ([facility]  PVC's   
 chronic  S/P CABG x 3 2008 LIMA-LAD. SVG-OM. SVG-dRCA  S/P cardiac cath 2008 pRCA 100%. LM patent. Cx patent. OM1 100%. mLAD 95%. LVEDP 27-29mmHg. EF 20%. mod MR  
 Tilt table evaluation 1995 Positive for orthostatic hypotension  Vitamin D deficiency Past Surgical History:  
Procedure Laterality Date  CABG, ARTERY-VEIN, THREE  2008  CARDIAC SURG PROCEDURE UNLIST    
 medtronic dual-chamber cardioverted-defibrillator  HX  SECTION    
 x2  
 HX ENDOSCOPY  3/1/16  HX ENDOSCOPY  3/1/16 [address]  HX HYSTERECTOMY   HX ORTHOPAEDIC    
 knee surgery  HX OTHER SURGICAL  2/10/16 Pacemaker Gen Change  HX PACEMAKER Defibrillator   HX TUMOR REMOVAL    
 removed from arm.  benign No family history on file. Social History Socioeconomic History  Marital status:  Spouse name: Not on file  Number of children: Not on file  Years of education: Not on file  Highest education level: Not on file Social Needs  Financial resource strain: Not on file  Food insecurity - worry: Not on file  Food insecurity - inability: Not on file  Transportation needs - medical: Not on file  Transportation needs - non-medical: Not on file Occupational History  Not on file Tobacco Use  Smoking status: Never Smoker  Smokeless tobacco: Never Used  Tobacco comment: just smoked 2 weeks in college Substance and Sexual Activity  Alcohol use: No  
 Drug use: No  
 Sexual activity: No  
Other Topics Concern [address] Service Not Asked  Blood Transfusions Not Asked  Caffeine Concern Not Asked  Occupational Exposure Not Asked [facility] Hazards Not Asked  Sleep Concern Not Asked  Stress Concern Not Asked  Weight Concern Not Asked  Special Diet Not Asked  Back Care Not Asked  Exercise Not Asked  Bike Helmet Not Asked  Seat Belt Not Asked  Self-Exams Not Asked Social History Narrative  Not on file Physical Exam: 
GENERAL: alert, cooperative, no distress, appears stated age REVIEW OF SYSTEMS: 
General: denies chronic fatigue, weight loss, fever, anemia, bruising, depression, nervousness, panic attacks HEENT: denies ringing in ears, ear infections, dizzy spells, poor vision, glaucoma, sinus trouble, hoarseness, eye infections GI: denies diarrhea, gas, bloating, heartburn, regurgitation, difficulty swallowing, painful swallowing, nausea, vomiting, constipation, abdominal pain, decreased appetite, blood in stools, black stools, jaundice, dark urine Lungs: denies pneumonia, asthma, cough, SOB, hemoptysis Heart: denies chest pain, irregular heart beat, ankle swelling, HTN Skin: denies rashes, hives, allergic reaction Urinary: denies UTI, kidney stones, decreased urine force and flow, urination at night, blood in urine, painful urination Bones and Joints: denies arthritis, rheumatism, back pain, gout, osteoporosis Neurologic: denies stroke, seizures, headaches, numbness, tingling Vitals:  
 11/01/18 0930 BP: 177/82 Pulse: 67 Resp: 18 Temp: 98.4 °F (36.9 °C) SpO2: 97% OBJECTIVE: 
 
Patient is alert, well nourished, cooperative, pleasant and in no apparent distress. Lower Extremity Exam:  
 
VASCULAR EXAM:. Pedal pulses are palpable 1/4 DP 0/4 PT right and left foot, faint monophasic DP/PT right and stronger monophasic DP/PT left Skin temperature is warm to warm right and left foot. Digital capillary fill time is 4 seconds right and left foot. There is edema of the right and left foot and ankle. NEUROLOGICAL EXAM:. Sensation Intact with 5.07g monofilament wire right and left foot. Deep tendon reflexes intact and symmetrical on the right and left foot. Babinski is age appropriate bilateral 
 
MUSCULOSKELETAL EXAM:. Muscle tone is normal.  Muscle strength of the flexor and extensor group inversion and eversion Bilateral. 5/5. DERMATOLOGICAL EXAM:. Skin is of abnormal texture and turgor with atrophic skin changes noting hair growth, nail changes (thickening), Bilateral. There is a ulcer full thickness noted lateral aspect of the RLE/calf. There is noted undermining at 9'oclock of 1.2cm. measurements 2.1 x 2.1 x 1.2 cm with slough and necrotic tissue noted 12 o'clock to 12 o'clock. There is serous exudate without odor and induration of the periwound. No asc lymphagitis noted No results found for this or any previous visit (from the past 24 hour(s)). INTERPRETATION/FINDINGS Physiologic testing was performed using continuous wave doppler and 
segmental pressures. 1. Mild peripheral arterial disease at rest in both legs with tibial 
artery disease. 2. The right ankle/brachial index is 0.93 and the left ankle/brachial 
index is 0.92. 3. The right digit/brachial index is 0.50 and the left digit/brachial 
index is 0.51. 
4. Compared to the previous study on 12-1-15 there is no significant 
change on the right and there has been slight progression of the 
disease on the left from normal to mild. 
  
ADDITIONAL COMMENTS 
  
I have personally reviewed the data relevant to the interpretation of 
this 
study. 
  
TECHNOLOGIST: Barbie Strickland. Brad Dee RVT, RDMS Signed: 01/10/2018 08:58 AM 
 
Imaging: deferred EXCISIONAL DEBRIDEMENT NOTE Selective sharp instrument debridement of slough and devitilized tissue After the benefits/risks/SE were discussed, the patient agreed to proceed. Time out was done: * Patient was identified by name and date of birth * Agreement on procedure being performed was verified * Procedure site verified and marked as necessary * Patient was positioned for comfort * Consent was signed and verified. Site: RLE/calf Instruments used:  
 []  Dermal curette  [x] Blade [x] #15  [] #10  [] Forceps  [] Tissue nippers  [] sterile scissors  [] Other Anesthesia:  
 []  EMLA 2.5% cream: applied to wound beds for approximately 15minutes. []   Lidocaine 2% Topical Gel    
 []  Lidocaine injectable 1% with epinephrine 1:100,000 []  Lidocaine injectable 1% without epinephrine  
 []  Other:   
 [x]  None  
      [] patient is insensate due to neuropathy  
      [] patient declines 
      [] allergy to anesthetic [x] tissue for debridement is either superficial, loosely adherent and/or necrotic and denervated After satisfactory anesthesia achieved, the wound/s was/were sharply  Non selective excisional debrided necrotic, devitalized and granulation tissue down to the sub Q layer, revealing a clean and viable wound bed. Post debridement measurement was 2.1_x2.1_x1.2_cm . Bleeding: <5mL Resolved with light focal pressure. Wounds were cleaned and irrigated with saline. Wound care applied: 
 []   Hydrogell   []  Hypergel   []   Hydrofiber/Aquacel    
 []   Cadexomer Iodine (Iodosorb)   []  Silver Alginate    []   Medihoney:  
 [x]   Collagenase:Santyl   []  Calcium Alginate   []   Collagen:  
 []   Foam   []  Non-Adherent Contact Layer   []   Xeroform  
 []   Adaptec:   []   Hydrocolloid   []   Transparent Film  
 []   Promogran   []   Deepti   []   Promogran  
  
 []  Antibiotic ointment/cream  []   Wound VAC   []   Other (see below) Other: 
 
 []   Dry Gauze and Roll Gauze  
 []   Foam and Roll Gauze  
 []   Dry Gauze  
 []    Bordered gauze:   
 []   Secure with Tape  
 [x]   Other    
 [x]   Compression Wrap: 
        []   Unna Boot    []Multi-Layer    [x]Tubular Bandages Teri-Ulcer Care 
  []   Cream  
  []   Lotion []   Ointment  
  []   Barrier  
  []   Other:  
 
 
The patient tolerated the procedure well with no complications. The patient left the exam room in satisfactory and stable condition. ANGELA Marshall DPM 
[facility] [address] [facility] [phone] 
11/1/2018, 10:39 AM

## 2018-11-04 ENCOUNTER — APPOINTMENT (OUTPATIENT)
Dept: VASCULAR SURGERY | Age: 78
End: 2018-11-04
Attending: STUDENT IN AN ORGANIZED HEALTH CARE EDUCATION/TRAINING PROGRAM
Payer: MEDICARE

## 2018-11-04 ENCOUNTER — APPOINTMENT (OUTPATIENT)
Dept: GENERAL RADIOLOGY | Age: 78
End: 2018-11-04
Attending: PHYSICIAN ASSISTANT
Payer: MEDICARE

## 2018-11-04 ENCOUNTER — HOSPITAL ENCOUNTER (EMERGENCY)
Age: 78
Discharge: HOME | End: 2018-11-05
Attending: EMERGENCY MEDICINE | Admitting: EMERGENCY MEDICINE
Payer: MEDICARE

## 2018-11-04 VITALS
DIASTOLIC BLOOD PRESSURE: 81 MMHG | RESPIRATION RATE: 23 BRPM | OXYGEN SATURATION: 95 % | SYSTOLIC BLOOD PRESSURE: 192 MMHG | TEMPERATURE: 98.8 F | HEART RATE: 75 BPM

## 2018-11-04 DIAGNOSIS — R07.9 ACUTE CHEST PAIN: ICD-10-CM

## 2018-11-04 DIAGNOSIS — R06.00 DYSPNEA, UNSPECIFIED TYPE: Primary | ICD-10-CM

## 2018-11-04 LAB
ANION GAP SERPL CALC-SCNC: 9 MMOL/L (ref 3–18)
BASOPHILS # BLD: 0 K/UL (ref 0–0.1)
BASOPHILS NFR BLD: 0 % (ref 0–2)
BNP SERPL-MCNC: 1954 PG/ML (ref 0–1800)
BUN SERPL-MCNC: 28 MG/DL (ref 7–18)
BUN/CREAT SERPL: 21 (ref 12–20)
CALCIUM SERPL-MCNC: 9.2 MG/DL (ref 8.5–10.1)
CHLORIDE SERPL-SCNC: 108 MMOL/L (ref 100–108)
CK MB CFR SERPL CALC: 1.2 % (ref 0–4)
CK MB SERPL-MCNC: 1.2 NG/ML (ref 5–25)
CK SERPL-CCNC: 103 U/L (ref 26–192)
CO2 SERPL-SCNC: 25 MMOL/L (ref 21–32)
CREAT SERPL-MCNC: 1.31 MG/DL (ref 0.6–1.3)
DIFFERENTIAL METHOD BLD: ABNORMAL
EOSINOPHIL # BLD: 0.3 K/UL (ref 0–0.4)
EOSINOPHIL NFR BLD: 4 % (ref 0–5)
ERYTHROCYTE [DISTWIDTH] IN BLOOD BY AUTOMATED COUNT: 14 % (ref 11.6–14.5)
GLUCOSE SERPL-MCNC: 102 MG/DL (ref 74–99)
HCT VFR BLD AUTO: 30.3 % (ref 35–45)
HGB BLD-MCNC: 9.6 G/DL (ref 12–16)
LYMPHOCYTES # BLD: 0.5 K/UL (ref 0.9–3.6)
LYMPHOCYTES NFR BLD: 8 % (ref 21–52)
MAGNESIUM SERPL-MCNC: 1.6 MG/DL (ref 1.6–2.6)
MCH RBC QN AUTO: 27.6 PG (ref 24–34)
MCHC RBC AUTO-ENTMCNC: 31.7 G/DL (ref 31–37)
MCV RBC AUTO: 87.1 FL (ref 74–97)
MONOCYTES # BLD: 0.6 K/UL (ref 0.05–1.2)
MONOCYTES NFR BLD: 8 % (ref 3–10)
NEUTS SEG # BLD: 5.7 K/UL (ref 1.8–8)
NEUTS SEG NFR BLD: 80 % (ref 40–73)
PLATELET # BLD AUTO: 262 K/UL (ref 135–420)
PMV BLD AUTO: 9.2 FL (ref 9.2–11.8)
POTASSIUM SERPL-SCNC: 4.5 MMOL/L (ref 3.5–5.5)
RBC # BLD AUTO: 3.48 M/UL (ref 4.2–5.3)
SODIUM SERPL-SCNC: 142 MMOL/L (ref 136–145)
TROPONIN I SERPL-MCNC: 0.02 NG/ML (ref 0–0.04)
TROPONIN I SERPL-MCNC: <0.02 NG/ML (ref 0–0.04)
WBC # BLD AUTO: 7.1 K/UL (ref 4.6–13.2)

## 2018-11-04 PROCEDURE — 82553 CREATINE MB FRACTION: CPT | Performed by: PHYSICIAN ASSISTANT

## 2018-11-04 PROCEDURE — 71046 X-RAY EXAM CHEST 2 VIEWS: CPT

## 2018-11-04 PROCEDURE — 83735 ASSAY OF MAGNESIUM: CPT | Performed by: PHYSICIAN ASSISTANT

## 2018-11-04 PROCEDURE — 85025 COMPLETE CBC W/AUTO DIFF WBC: CPT | Performed by: PHYSICIAN ASSISTANT

## 2018-11-04 PROCEDURE — 93970 EXTREMITY STUDY: CPT

## 2018-11-04 PROCEDURE — 93005 ELECTROCARDIOGRAM TRACING: CPT

## 2018-11-04 PROCEDURE — 93971 EXTREMITY STUDY: CPT

## 2018-11-04 PROCEDURE — 80048 BASIC METABOLIC PNL TOTAL CA: CPT | Performed by: PHYSICIAN ASSISTANT

## 2018-11-04 PROCEDURE — 99284 EMERGENCY DEPT VISIT MOD MDM: CPT

## 2018-11-04 PROCEDURE — 83880 ASSAY OF NATRIURETIC PEPTIDE: CPT

## 2018-11-05 LAB
ATRIAL RATE: 88 BPM
CALCULATED P AXIS, ECG09: 114 DEGREES
CALCULATED R AXIS, ECG10: -121 DEGREES
CALCULATED T AXIS, ECG11: 62 DEGREES
DIAGNOSIS, 93000: NORMAL
P-R INTERVAL, ECG05: 192 MS
Q-T INTERVAL, ECG07: 426 MS
QRS DURATION, ECG06: 162 MS
QTC CALCULATION (BEZET), ECG08: 515 MS
VENTRICULAR RATE, ECG03: 88 BPM

## 2018-11-05 NOTE — ED NOTES
69 y/o  female who presents to the ED with family c/o left arm swelling for the past week and SOB that became suddenly SOB today. Had CP earlier today but since being in the ED her CP has resolved. Her SOB is worse with exertion. Pt claims to have had blood clot after having heart surgery; had her surgery here at [facility] in 2008. Had defibrillator/pacemaker placed at [facility]. No more recent thrombotic disease. See resident note fore further details. Physical Exam: 
. Patient Vitals for the past 12 hrs: 
 Temp Pulse Resp BP SpO2  
11/04/18 1858 98.8 °F (37.1 °C) 82 20 (!) 220/88 98 % Gen: Well developed, well nourished 70 y.o. female HEENT: Normocephalic, atraumatic Respiratory: No accessory muscle use No wheeze, No rales, No rhonchi. Normal chest wall excursion. No subcutaneous air, no rib crepitus Cardiovascular: Regular rhythm and rate, Normal pulses, Normal perfusion. No edema Gastrointestinal: Non distended, Non tender, No masses. No ascites. No organomegaly. No evidence of trauma Musculoskeletal: Full range of motion at all other tested joints. No joint effusions. +3 plus bilateral LE edema. No pitting edema to LUE. Neuro: Normal strength, Normal sensation. Normal speech. No ataxia. Cranial Nerves II-XII normal as tested. Skin: No rash, petechia or purpura. Warm and dry Psyche: No suicidal ideation, No homicidal ideation. No hallucinations. Organized thoughts. Heme: Normal 
: Deferred CXR: 
IMPRESSION: 
No clearly acute findings. Perhaps minimal perihilar vascular congestion versus 
infiltrate. Left basilar streaky density, likely atelectasis or scar. Scribe Attestation Marisol Kang acting as a scribe for and in the presence of Dates, Isabel Rubalcava MD     
November 04, 2018 at 8:09 PM 
    
Provider Attestation:     
I personally performed the services described in the documentation, reviewed the documentation, as recorded by the scribe in my presence, and it accurately and completely records my words and actions.  November 04, 2018 at 8:09 PM - Dates, Leif Huber MD

## 2018-11-05 NOTE — ED PROVIDER NOTES
EMERGENCY DEPARTMENT HISTORY AND PHYSICAL EXAM 
 
7:51 PM 
 
 
Date: 11/4/2018 Patient Name: Jj Bo History of Presenting Illness Chief Complaint Patient presents with  Shortness of Breath History Provided By: Patient, Patient's Son and Patient's Daughter Chief Complaint: SOB Duration:  Hours Timing:  Gradual 
Location: Chest 
Quality: Tightness Severity: Mild Modifying Factors: No alleviating/aggravating factors Associated Symptoms: Endorses mild sub-sternal CP Additional History (Context): Jj oB is a 70 y.o. female with significant medical history (including but not limited to MI x2  with CABG and implanted pacer/AICD, DVTs, venous stasis with chronic RLE ulcer) who presents with SOB since approx 1600 today. H/o MIs in past, but states both presented as N/V. No LOC, abdominal pain, lightheadedness, palpitations, fevers. Has implanted pacemaker/defib but states it has not fired. Endorses swelling of BLE which are at baseline - being treated for venous stasis cellulitis by PCM. Also with swelling of LUE which she states is new. PCP: Iris Aguilar MD 
 
Current Outpatient Medications Medication Sig Dispense Refill  carvedilol (COREG) 25 mg tablet TAKE 1 TABLET BY MOUTH (2) TIMES A  Tab 3  
 doxycycline (VIBRAMYCIN) 100 mg capsule Take 1 Cap by mouth two (2) times a day. 20 Cap 0  
 olmesartan (BENICAR) 5 mg tablet TAKE 1 TABLET BY MOUTH EVERY DAY  3  
 amLODIPine (NORVASC) 5 mg tablet Take 5 mg by mouth daily.  simvastatin (ZOCOR) 20 mg tablet Take 1 Tab by mouth nightly. 90 Tab 3  
 diclofenac (VOLTAREN) 1 % gel APPLY 4GRAMS/ TO KNEE 4 TIMES A DAY AS NEEDED TRANSDERMAL 90 DAYS  2  
 hydrocortisone (HYTONE) 2.5 % ointment APPLY LIGHTLY TO THE AFFECTED AREAS ON THE LEGS AND FEET TWICE A DAY. 2  
 furosemide (LASIX) 40 mg tablet TAKE 1 TABLET BY MOUTH AS NEEDED 30 Tab 3  
 aspirin 81 mg tablet Take 81 mg by mouth daily. Past History Past Medical History: 
Past Medical History:  
Diagnosis Date  Abnormal ankle brachial index 11/13/2014 Mild arterial disease in right leg. R VICTOR MANUEL 0.88. L VICTOR MANUEL 1.00.  Anemia  Arm DVT (deep venous thromboembolism), acute (HCC)   
 s/p anticoagulation  Atherosclerotic heart disease  Atrial fibrillation ([address]  AV block  CAD (coronary artery disease)  Cardiomyopathy, ischemic  Carotid artery stenosis  Carotid duplex 11/13/2014 Mild <50% bilateral ICA plaquing. Possible left vertebral occlusion.  Cerebral artery occlusion with cerebral infarction ([address] stroke x 2  Chest pain  CVA (cerebral infarction)  Echocardiogram 08/19/2015 EF 55%. Apical inferior, apical septal hypk (new since study of 12/19/11), likely due to chronic V-pacing. Mild LVH. RVSP 30 mmHg. Mild LAE. Mild MR. Mild TR.  Gastritis  GERD (gastroesophageal reflux disease)  Heart failure ([address]  Hiatal hernia  Hyperlipidemia  Hypertension  Hypertensive cardiovascular disease  Intracranial aneurysm   
 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  Nuclear cardiac imaging test 09/24/2015 Low risk. Sm apical infarction w/no ischemia vs apical thinning. Sm basal inferior defect, likely artifact. EF 56%. Inferoseptal, inferoapical WMA related to sternotomy or paced rhythm.  Pacemaker  PAD (peripheral artery disease) [address]  PVC's   
 chronic  S/P CABG x 3 06/08/2008 LIMA-LAD. SVG-OM. SVG-dRCA  S/P cardiac cath 06/08/2008 pRCA 100%. LM patent. Cx patent. OM1 100%. mLAD 95%. LVEDP 27-29mmHg. EF 20%. mod MR  
 Tilt table evaluation 06/01/1995 Positive for orthostatic hypotension  Vitamin D deficiency Past Surgical History: 
Past Surgical History: Procedure Laterality Date  CABG, ARTERY-VEIN, THREE  2008  CARDIAC SURG PROCEDURE UNLIST    
 medtronic dual-chamber cardioverted-defibrillator  HX  SECTION    
 x2  
 HX ENDOSCOPY  3/1/16  HX ENDOSCOPY  3/1/16 [address]  HX HYSTERECTOMY 1980  HX ORTHOPAEDIC    
 knee surgery  HX OTHER SURGICAL  2/10/16 Pacemaker Gen Change  HX PACEMAKER Defibrillator   HX TUMOR REMOVAL    
 removed from arm.  benign Family History: No family history on file. Social History: 
Social History Tobacco Use  Smoking status: Never Smoker  Smokeless tobacco: Never Used  Tobacco comment: just smoked 2 weeks in [employer] Substance Use Topics  Alcohol use: No  
 Drug use: No  
 
 
Allergies: Allergies Allergen Reactions  Nifedipine Nausea and Vomiting and Other (comments) Dizzy  Ace Inhibitors Cough  Codeine Unknown (comments), Nausea Only and Nausea and Vomiting  Hydralazine Other (comments) Dizziness and Fatigue  Lipitor [Atorvastatin] Nausea and Vomiting and Vertigo Review of Systems Review of Systems Constitutional: Negative for chills and fever. HENT: Negative for congestion and rhinorrhea. Eyes: Negative for photophobia and visual disturbance. Respiratory: Positive for chest tightness, shortness of breath and wheezing (Mild). Cardiovascular: Positive for chest pain and leg swelling. Negative for palpitations. Gastrointestinal: Negative for abdominal pain, constipation, nausea and vomiting. Genitourinary: Negative for dysuria and hematuria. Musculoskeletal: Negative for back pain and neck pain. Neurological: Negative for weakness, light-headedness and headaches. Physical Exam  
 
Visit Vitals /81 Pulse 75 Temp 98.8 °F (37.1 °C) Resp 23 SpO2 95% Physical Exam  
Constitutional: She is oriented to person, place, and time.  She appears well-developed and well-nourished. No distress. HENT:  
Head: Normocephalic and atraumatic. Eyes: Pupils are equal, round, and reactive to light. Neck: No JVD present. No tracheal deviation present. Cardiovascular: Normal rate, regular rhythm and intact distal pulses. Exam reveals no friction rub. No murmur heard. Pulmonary/Chest: Effort normal. No respiratory distress. She has wheezes (Faint (B/L upper lobes) expiratory). She has no rales. She exhibits no tenderness. Abdominal: Soft. Bowel sounds are normal. She exhibits no distension and no mass. There is no tenderness. There is no rebound and no guarding. Musculoskeletal: Normal range of motion. She exhibits edema (B/L LEs, LUE. RUE without e/o edema). She exhibits no tenderness or deformity. Lymphadenopathy:  
  She has no cervical adenopathy. Neurological: She is alert and oriented to person, place, and time. Skin: Skin is warm and dry. No rash noted. She is not diaphoretic. No erythema. No pallor. Psychiatric: She has a normal mood and affect. Thought content normal.  
 
Diagnostic Study Results Labs - Recent Results (from the past 12 hour(s)) EKG, 12 LEAD, INITIAL Collection Time: 11/04/18  5:42 PM  
Result Value Ref Range Ventricular Rate 88 BPM  
 Atrial Rate 88 BPM  
 P-R Interval 192 ms QRS Duration 162 ms Q-T Interval 426 ms  
 QTC Calculation (Bezet) 515 ms Calculated P Axis 114 degrees Calculated R Axis -121 degrees Calculated T Axis 62 degrees Diagnosis Electronic ventricular pacemaker When compared with ECG of 24-FEB-2017 10:27, 
Vent. rate has increased BY  27 BPM 
  
CBC WITH AUTOMATED DIFF Collection Time: 11/04/18  6:00 PM  
Result Value Ref Range WBC 7.1 4.6 - 13.2 K/uL  
 RBC 3.48 (L) 4.20 - 5.30 M/uL HGB 9.6 (L) 12.0 - 16.0 g/dL HCT 30.3 (L) 35.0 - 45.0 % MCV 87.1 74.0 - 97.0 FL  
 MCH 27.6 24.0 - 34.0 PG  
 MCHC 31.7 31.0 - 37.0 g/dL  
 RDW 14.0 11.6 - 14.5 % PLATELET 563 394 - 714 K/uL MPV 9.2 9.2 - 11.8 FL  
 NEUTROPHILS 80 (H) 40 - 73 % LYMPHOCYTES 8 (L) 21 - 52 % MONOCYTES 8 3 - 10 % EOSINOPHILS 4 0 - 5 % BASOPHILS 0 0 - 2 %  
 ABS. NEUTROPHILS 5.7 1.8 - 8.0 K/UL  
 ABS. LYMPHOCYTES 0.5 (L) 0.9 - 3.6 K/UL  
 ABS. MONOCYTES 0.6 0.05 - 1.2 K/UL  
 ABS. EOSINOPHILS 0.3 0.0 - 0.4 K/UL  
 ABS. BASOPHILS 0.0 0.0 - 0.1 K/UL  
 DF AUTOMATED MAGNESIUM Collection Time: 11/04/18  6:00 PM  
Result Value Ref Range Magnesium 1.6 1.6 - 2.6 mg/dL METABOLIC PANEL, BASIC Collection Time: 11/04/18  6:00 PM  
Result Value Ref Range Sodium 142 136 - 145 mmol/L Potassium 4.5 3.5 - 5.5 mmol/L Chloride 108 100 - 108 mmol/L  
 CO2 25 21 - 32 mmol/L Anion gap 9 3.0 - 18 mmol/L Glucose 102 (H) 74 - 99 mg/dL BUN 28 (H) 7.0 - 18 MG/DL Creatinine 1.31 (H) 0.6 - 1.3 MG/DL  
 BUN/Creatinine ratio 21 (H) 12 - 20 GFR est AA 48 (L) >60 ml/min/1.73m2 GFR est non-AA 39 (L) >60 ml/min/1.73m2 Calcium 9.2 8.5 - 10.1 MG/DL  
CARDIAC PANEL,(CK, CKMB & TROPONIN) Collection Time: 11/04/18  6:00 PM  
Result Value Ref Range  26 - 192 U/L  
 CK - MB 1.2 <3.6 ng/ml CK-MB Index 1.2 0.0 - 4.0 % Troponin-I, Qt. <0.02 0.0 - 0.045 NG/ML  
NT-PRO BNP Collection Time: 11/04/18  6:00 PM  
Result Value Ref Range NT pro-BNP 1,954 (H) 0 - 1,800 PG/ML  
TROPONIN I Collection Time: 11/04/18  9:10 PM  
Result Value Ref Range Troponin-I, Qt. 0.02 0.0 - 0.045 NG/ML Radiologic Studies -  
XR CHEST PA LAT Final Result Dawnaranulfo Sam CXR Results  (Last 48 hours) 11/04/18 1818  XR CHEST PA LAT Final result Impression:  IMPRESSION:  
   
No clearly acute findings. Perhaps minimal perihilar vascular congestion versus  
infiltrate. Left basilar streaky density, likely atelectasis or scar. Narrative:  EXAMINATION: Chest 2 views INDICATION: Shortness of breath COMPARISON: 2/24/2017 FINDINGS: Frontal and lateral views of the chest obtained. No consolidation. Status post median sternotomy and surgical clips, and multilevel left subclavian  
pacer/AICD. Borderline prominent cardiac silhouette. Slightly prominent  
perihilar interstitium and left basilar streaky density. No evidence of  
pneumothorax. No acute osseous findings. Medical Decision Making I am the first provider for this patient. I reviewed the vital signs, available nursing notes, past medical history, past surgical history, family history and social history. Vital Signs-Reviewed the patient's vital signs. Pulse Oximetry Analysis -  98% on room air Cardiac Monitor: 
Rate: 82 Rhythm:  Paced EKG: Interpreted by the EP. Time Interpreted:  
 Rate: 88 Rhythm: Paced Interpretation: Paced rhythm with non-specific changes. No significant change from previous studies. Specifically, no findings concerning for acute ischemia. Comparison: 5/8/2018 Records Reviewed: Nursing Notes, Old Medical Records and Previous electrocardiograms (Time of Review: 7:51 PM) 
 
ED Course: Progress Notes, Reevaluation, and Consults: 
 
Provider Notes (Medical Decision Making): 69 yo female with significant past medical history with several hours of SOB and Sub-sternal CP. Complicated medical history including MI x 2 and DVT. EKG without acute findings of ischemia and initial troponin - will need repeat. B/L LE edema and LUE edema - h/o venous stasis, though UE is not typically swollen. History of LUE DVT in 2008 - concern for clot now - will obtain doppler ultrasounds. BP elevated, though not significantly above usual values. Vitals otherwise WNL. Mild, trace wheezes in upper lobes vs. Upper airway sounds - CXR with no acute findings. SpO2 98% or higher throughout time here thus far. No acute electrolyte abnormalities. CBC without acute changes from previous - notably no elevated white count. Plan: Serial Troponin (at 2200 - 6hrs after onset), BNP pending, PVLs ordered,  
 
ED Progress Notes: 
7:51 PM - Updated patient and her son on findings. Patient was resting in NAD. Vitals remained WNL. Awaiting PVLs and BNP. 9:22 PM - BNP elevated, though at baseline. Patient getting PVLs now. 10:00 PM - Patient's breathing noises more pronounced and clearly from upper airway. Examined mouth and oropharynx again and palpated neck with no remarkable findings. Noise improved drastically with removal of pillow to reduce flexion of neck (previously in somewhat flexed position) - suspect this was simply obstruction due to positioning - unclear whether this was the source of symptoms or not. 10:36 PM - Repeat troponin negative. PVLs negative - discussed with patient and daughter that the only way to definitively rule out a PE would be a CT, but that it may cause issues with her kidneys. Patient and family stated understanding and declined further eval. Discussed with patient that while there is no evidence of serious acute pathology at this time, her presentation may be an early indicator of an issue which has note yet declared it self. Gave strict return precautions for worsening of symptoms or development of new symptoms. They verbally convey understanding and agreement of the signs, symptoms, diagnosis, treatment and prognosis and additionally agree to follow up as discussed. All questions were answered, and we reviewed pertinent return precautions as seen in the discharge paperwork. Pt understood follow up instructions, and would return to the ED if any worsening or at all concerned. Patients vitals, and notably SpO2, are within normal limits (with exception of BP which has decreased to baseline value for this patient - 192/81) and patient is stable for discharge. Patient to follow up with PCP. Procedures: None Diagnosis Clinical Impression: ICD-10-CM ICD-9-CM 1. Dyspnea, unspecified type R06.00 786.09  
2. Acute chest pain R07.9 786.50 Disposition: Discharge Home Follow-up Information Follow up With Specialties Details Why Contact Info Vandana Conklin MD Family Practice Schedule an appointment as soon as possible for a visit  [address] [facility] 
[phone] [facility] EMERGENCY DEPT Emergency Medicine  As needed, If symptoms worsen [address] [id number] 
[phone] Medication List  
  
CONTINUE taking these medications   
amLODIPine 5 mg tablet Commonly known as:  NORVASC 
  
aspirin 81 mg tablet 
  
carvedilol 25 mg tablet Commonly known as:  COREG 
TAKE 1 TABLET BY MOUTH (2) TIMES A DAY 
  
diclofenac 1 % Gel Commonly known as:  VOLTAREN 
  
doxycycline 100 mg capsule Commonly known as:  VIBRAMYCIN Take 1 Cap by mouth two (2) times a day. furosemide 40 mg tablet Commonly known as:  LASIX TAKE 1 TABLET BY MOUTH AS NEEDED 
  
hydrocortisone 2.5 % ointment Commonly known as:  HYTONE 
  
olmesartan 5 mg tablet Commonly known as:  BENICAR 
  
simvastatin 20 mg tablet Commonly known as:  ZOCOR Take 1 Tab by mouth nightly. 
  
  
 
_______________________________ Marcos Mckay MD (PGY-2) Emergency Medicine Resident 
 
_______________________________

## 2018-11-05 NOTE — ED NOTES
Bedside shift change report given to Quin Johnson RN (oncoming nurse) by Kd Peters RN (offgoing nurse). Report included the following information SBAR, ED Summary, Intake/Output, MAR and Recent Results.

## 2018-11-06 LAB
BACTERIA SPEC CULT: ABNORMAL
GRAM STN SPEC: ABNORMAL
GRAM STN SPEC: ABNORMAL
SERVICE CMNT-IMP: ABNORMAL

## 2018-11-09 ENCOUNTER — OFFICE VISIT (OUTPATIENT)
Dept: INTERNAL MEDICINE CLINIC | Age: 78
End: 2018-11-09

## 2018-11-09 VITALS
DIASTOLIC BLOOD PRESSURE: 86 MMHG | HEIGHT: 64 IN | BODY MASS INDEX: 37.28 KG/M2 | OXYGEN SATURATION: 99 % | RESPIRATION RATE: 20 BRPM | SYSTOLIC BLOOD PRESSURE: 162 MMHG | HEART RATE: 72 BPM | WEIGHT: 218.4 LBS | TEMPERATURE: 97 F

## 2018-11-09 DIAGNOSIS — D64.9 ANEMIA, UNSPECIFIED TYPE: ICD-10-CM

## 2018-11-09 DIAGNOSIS — I10 ESSENTIAL HYPERTENSION: ICD-10-CM

## 2018-11-09 DIAGNOSIS — S81.801A WOUND OF RIGHT LOWER EXTREMITY, INITIAL ENCOUNTER: Primary | ICD-10-CM

## 2018-11-09 NOTE — PROGRESS NOTES
INTERNISTS OF [facility]: 
11/16/2018, MRN: [id number] Elena King is a 70 y.o. female and presents to clinic for ED Follow-up [facility] ER Acute Chest Pain and Shortness of Breath (Dyspnea)) Subjective:  
Patient is a 42-year-old -American female with history of allergic rhinitis, gout, cataract, overactive bladder, hyperlipidemia, GERD, mixed connective tissue disease (positive FOREST, RNP Ab, Sjogren's Ab, anti-chromatin Ab, and Anti-Parnell Ab), chronic gastritis with bleeding in March 2016, history of stroke, vitamin D deficiency, dysphasia status post dilation, anemia, pacemaker for history of AV block, left cavernous ICA aneurysm, hypertension, atrial fibrillation, carotid stenosis, peripheral artery disease, PVCs, DVT hx (LUE - postoperatively), and CAD status post CABG. 1. RLE Wound: At her last clinic, she reported a fall while going up a flight of concrete stairs. She subsequently scanned her right lower extremity along her tibia area. She was referred to wound care services given delayed wound healing. She was prescribed a course of Keflex at time. She saw the Wound Care team since her last apt. She dresses her wound daily. Her abx was changed from keflex to doxycyline per their recommendations. She only took one dose of keflex but \"it made me sick. I couldn't do anything. I felt dizzy and nauseated. \" She was eventually transitioned to doxycyline upon seeing the Wound Care team. She is tolerating this rx better. She is scheduled to see the Wound Care team next wk. Her wound along her RLE looks the same to the pt. No fever or chills. 2. HTN: Her BP is 176/74. Her home BPs are typically 130s-140s/80s. She takes coreg, benicar, amlodipine, and lasix. No adverse side effects from these rx. She is scheduled to see Cardiology later this month. BP Readings from Last 3 Encounters:  
11/15/18 178/84  
11/09/18 162/86  
11/04/18 192/81 3. Anemia: Her FIT test was positive 10/16. She is scheduled for colonoscopy next month. She has had a hemoglobin in the 9-10 range for the past 2 yrs. No SOB. Patient Active Problem List  
 Diagnosis Date Noted  Lower limb ulcer, calf, right, with fat layer exposed ([facility] 11/01/2018  Generalized edema 11/01/2018  Severe obesity (BMI 35.0-39. 9) with comorbidity ([facility] 05/08/2018  Mixed connective tissue disease ([facility] 08/22/2017  Microalbuminuria 07/10/2017  CKD (chronic kidney disease) stage 3, GFR 30-59 ml/min ([facility]) 07/10/2017  Seasonal allergic rhinitis 11/23/2016  Gout of left foot 11/22/2016  Cataract 08/09/2016  
 OAB (overactive bladder) 07/06/2016  Mixed hyperlipidemia 07/05/2016  Gastroesophageal reflux disease without esophagitis 07/05/2016  Chronic gastritis with bleeding - in February/March of 2016 per FOBT and EGD results 07/05/2016  History of CVA (cerebrovascular accident) 07/05/2016  Vitamin D deficiency 07/05/2016  Dysphagia s/p dilation 07/05/2016  Anemia 07/05/2016  Pacemaker (for h/o AV block) 07/05/2016  Intracranial aneurysm - left cavernous ICA on 2014 head/neck CTA 12/01/2014  
 HTN (hypertension) 10/13/2014  Atrial fibrillation ([facility] 10/13/2014  Carotid stenosis 10/13/2014  PAD (peripheral artery disease) ([facility] 10/13/2014  PVC's (premature ventricular contractions) 12/09/2011  Atherosclerotic heart disease, s/p CABG X3 in 6/08, in the setting of severe left ventricular dysfunction, and s/p a dual-chamber  S/P CABG x 3 Current Outpatient Medications Medication Sig Dispense Refill  carvedilol (COREG) 25 mg tablet TAKE 1 TABLET BY MOUTH (2) TIMES A  Tab 3  
 olmesartan (BENICAR) 5 mg tablet TAKE 1 TABLET BY MOUTH EVERY DAY  3  
 amLODIPine (NORVASC) 5 mg tablet Take 5 mg by mouth daily.     
 diclofenac (VOLTAREN) 1 % gel APPLY 4GRAMS/ TO KNEE 4 TIMES A DAY AS NEEDED TRANSDERMAL 90 DAYS  2  
  hydrocortisone (HYTONE) 2.5 % ointment APPLY LIGHTLY TO THE AFFECTED AREAS ON THE LEGS AND FEET TWICE A DAY. 2  
 furosemide (LASIX) 40 mg tablet TAKE 1 TABLET BY MOUTH AS NEEDED 30 Tab 3  
 aspirin 81 mg tablet Take 81 mg by mouth daily.  simvastatin (ZOCOR) 20 mg tablet Take 1 Tab by mouth nightly. 90 Tab 3 Allergies Allergen Reactions  Nifedipine Nausea and Vomiting and Other (comments) Dizzy  Ace Inhibitors Cough  Codeine Unknown (comments), Nausea Only and Nausea and Vomiting  Hydralazine Other (comments) Dizziness and Fatigue  Lipitor [Atorvastatin] Nausea and Vomiting and Vertigo Past Medical History:  
Diagnosis Date  Abnormal ankle brachial index 11/13/2014 Mild arterial disease in right leg. R VICTOR MANUEL 0.88. L VICTOR MANUEL 1.00.  Anemia  Arm DVT (deep venous thromboembolism), acute (HCC)   
 s/p anticoagulation  Atherosclerotic heart disease  Atrial fibrillation ([address]  AV block  CAD (coronary artery disease)  Cardiomyopathy, ischemic  Carotid artery stenosis  Carotid duplex 11/13/2014 Mild <50% bilateral ICA plaquing. Possible left vertebral occlusion.  Cerebral artery occlusion with cerebral infarction ([address] stroke x 2  Chest pain  CVA (cerebral infarction)  Echocardiogram 08/19/2015 EF 55%. Apical inferior, apical septal hypk (new since study of 12/19/11), likely due to chronic V-pacing. Mild LVH. RVSP 30 mmHg. Mild LAE. Mild MR. Mild TR.  Gastritis  GERD (gastroesophageal reflux disease)  Heart failure ([address]  Hiatal hernia  Hyperlipidemia  Hypertension  Hypertensive cardiovascular disease  Intracranial aneurysm   
 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  Nuclear cardiac imaging test 09/24/2015 Low risk. Sm apical infarction w/no ischemia vs apical thinning. Sm basal inferior defect, likely artifact. EF 56%. Inferoseptal, inferoapical WMA related to sternotomy or paced rhythm.  Pacemaker  PAD (peripheral artery disease) ([address]  PVC's   
 chronic  S/P CABG x 3 2008 [facility]-[facility]. SVG-OM. SVG-dRCA  S/P cardiac cath 2008 pRCA 100%. LM patent. Cx patent. OM1 100%. mLAD 95%. LVEDP 27-29mmHg. EF 20%. mod MR  
 Tilt table evaluation 1995 Positive for orthostatic hypotension  Vitamin D deficiency Past Surgical History:  
Procedure Laterality Date  CABG, ARTERY-VEIN, THREE  2008  CARDIAC SURG PROCEDURE UNLIST    
 medtronic dual-chamber cardioverted-defibrillator  HX  SECTION    
 x2  
 HX ENDOSCOPY  3/1/16  HX ENDOSCOPY  3/1/16 [facility]  HX HYSTERECTOMY   HX ORTHOPAEDIC    
 knee surgery  HX OTHER SURGICAL  2/10/16 Pacemaker Gen Change  HX PACEMAKER Defibrillator   HX TUMOR REMOVAL    
 removed from arm.  benign History reviewed. No pertinent family history. Social History Tobacco Use  Smoking status: Never Smoker  Smokeless tobacco: Never Used  Tobacco comment: just smoked 2 weeks in college Substance Use Topics  Alcohol use: No  
 
 
ROS Review of Systems Constitutional: Negative for chills and fever. HENT: Negative for ear pain and sore throat. Eyes: Negative for blurred vision and pain. Respiratory: Negative for cough and shortness of breath. Cardiovascular: Negative for chest pain. Gastrointestinal: Negative for abdominal pain, blood in stool and melena. Genitourinary: Negative for dysuria and hematuria. Musculoskeletal: Positive for joint pain (off/on). Negative for myalgias. Skin: Negative for itching. Neurological: Negative for tingling, focal weakness and headaches. Endo/Heme/Allergies: Does not bruise/bleed easily. Psychiatric/Behavioral: Negative for substance abuse. Objective Vitals:  
 11/09/18 1058 11/09/18 1148 BP: 176/74 162/86 Pulse: 72 Resp: 20 Temp: 97 °F (36.1 °C) TempSrc: Oral   
SpO2: 99% Weight: 218 lb 6.4 oz (99.1 kg) Height: 5' 4\" (1.626 m) PainSc:   3 PainLoc: Leg Physical Exam  
Constitutional: She is oriented to person, place, and time and well-developed, well-nourished, and in no distress. HENT:  
Head: Normocephalic and atraumatic. Right Ear: External ear normal.  
Left Ear: External ear normal.  
Nose: Nose normal.  
Mouth/Throat: Oropharynx is clear and moist. No oropharyngeal exudate. Eyes: Conjunctivae and EOM are normal. Pupils are equal, round, and reactive to light. Right eye exhibits no discharge. Left eye exhibits no discharge. No scleral icterus. Neck: Neck supple. Cardiovascular: Normal rate, regular rhythm, normal heart sounds and intact distal pulses. Exam reveals no gallop and no friction rub. No murmur heard. Pulmonary/Chest: Effort normal and breath sounds normal. No respiratory distress. She has no wheezes. She has no rales. Abdominal: Soft. Bowel sounds are normal. She exhibits no distension. There is no tenderness. There is no rebound and no guarding. Musculoskeletal: She exhibits no edema or tenderness (Bue). Lymphadenopathy:  
  She has no cervical adenopathy. Neurological: She is alert and oriented to person, place, and time. She exhibits normal muscle tone. Gait normal.  
Skin: Skin is warm and dry. No erythema. The wound along her RLE is still stage 2 but there is no surrounding erythema. No foul odor. Surrounding tissue is not TTP. No drainage. Psychiatric: Affect normal.  
Nursing note and vitals reviewed. LABS Data Review:  
Lab Results Component Value Date/Time  WBC 7.1 11/04/2018 06:00 PM  
 HGB 9.6 (L) 11/04/2018 06:00 PM  
 HCT 30.3 (L) 11/04/2018 06:00 PM  
 PLATELET 162 45/99/8414 06:00 PM  
 MCV 87.1 11/04/2018 06:00 PM  
 
 
Lab Results Component Value Date/Time Sodium 142 11/04/2018 06:00 PM  
 Potassium 4.5 11/04/2018 06:00 PM  
 Chloride 108 11/04/2018 06:00 PM  
 CO2 25 11/04/2018 06:00 PM  
 Anion gap 9 11/04/2018 06:00 PM  
 Glucose 102 (H) 11/04/2018 06:00 PM  
 BUN 28 (H) 11/04/2018 06:00 PM  
 Creatinine 1.31 (H) 11/04/2018 06:00 PM  
 BUN/Creatinine ratio 21 (H) 11/04/2018 06:00 PM  
 GFR est AA 48 (L) 11/04/2018 06:00 PM  
 GFR est non-AA 39 (L) 11/04/2018 06:00 PM  
 Calcium 9.2 11/04/2018 06:00 PM  
 
 
Lab Results Component Value Date/Time Cholesterol, total 215 (H) 07/10/2017 11:17 AM  
 HDL Cholesterol 93 (H) 07/10/2017 11:17 AM  
 LDL, calculated 108.6 (H) 07/10/2017 11:17 AM  
 VLDL, calculated 13.4 07/10/2017 11:17 AM  
 Triglyceride 67 07/10/2017 11:17 AM  
 CHOL/HDL Ratio 2.3 07/10/2017 11:17 AM  
 
 
No results found for: HBA1C, HGBE8, VPR5WAUY, WQA3AVDW, PUM3NMLV Assessment/Plan: 1. Right Lower Extremity Wound: Healing well. Continue to follow-up with wound care. Continue with their recommendations. Return to clinic if symptoms worsen. 2.  Hypertension: Blood pressure is elevated today. I encouraged her to take all her medications as prescribed. I encouraged her to check her blood pressure at home and notify me if it is consistently greater than 140/90. 
 
3. Anemia: Stable. Asymptomatic. I encouraged her to get her scheduled colonoscopy next month. 4.  Health Maintenance: She declined all vaccinations that were recommended today including the flu vaccine, shingles vaccine, and pneumococcal vaccine. Health Maintenance Due Topic Date Due  Shingrix Vaccine Age 50> (1 of 2) 04/02/1992  Pneumococcal 65+ Low/Medium Risk (2 of 2 - PPSV23) 07/06/2017  COLONOSCOPY  01/25/2018  Influenza Age 5 to Adult  08/01/2018 Lab review: labs are reviewed in the EHR 
 
 I have discussed the diagnosis with the patient and the intended plan as seen in the above orders. The patient has received an after-visit summary and questions were answered concerning future plans. I have discussed medication side effects and warnings with the patient as well. I have reviewed the plan of care with the patient, accepted their input and they are in agreement with the treatment goals. All questions were answered. The patient understands the plan of care. Handouts provided today with above information. Pt instructed if symptoms worsen to call the office or report to the ED for continued care. Greater than 50% of the visit time was spent in counseling and/or coordination of care. Voice recognition was used to generate this report, which may have resulted in some phonetic based errors in grammar and contents. Even though attempts were made to correct all the mistakes, some may have been missed, and remained in the body of the document. Follow-up Disposition: 
Return if symptoms worsen or fail to improve.  
 
Hazel Vann MD

## 2018-11-09 NOTE — PATIENT INSTRUCTIONS
Patient was given a copy of the Advanced Medical Directive Form, and understands to bring it in once completed. Health Maintenance Due Topic Date Due  Shingrix Vaccine Age 50> (1 of 2) 04/02/1992  Pneumococcal 65+ Low/Medium Risk (2 of 2 - PPSV23) 07/06/2017  COLONOSCOPY  01/25/2018  Influenza Age 5 to Adult  08/01/2018 Shortness of Breath: Care Instructions Your Care Instructions Shortness of breath has many causes. Sometimes conditions such as anxiety can lead to shortness of breath. Some people get mild shortness of breath when they exercise. Trouble breathing also can be a symptom of a serious problem, such as asthma, lung disease, emphysema, heart problems, and pneumonia. If your shortness of breath continues, you may need tests and treatment. Watch for any changes in your breathing and other symptoms. Follow-up care is a key part of your treatment and safety. Be sure to make and go to all appointments, and call your doctor if you are having problems. It's also a good idea to know your test results and keep a list of the medicines you take. How can you care for yourself at home? · Do not smoke or allow others to smoke around you. If you need help quitting, talk to your doctor about stop-smoking programs and medicines. These can increase your chances of quitting for good. · Get plenty of rest and sleep. · Take your medicines exactly as prescribed. Call your doctor if you think you are having a problem with your medicine. · Find healthy ways to deal with stress. ? Exercise daily. ? Get plenty of sleep. ? Eat regularly and well. When should you call for help? Call 911 anytime you think you may need emergency care. For example, call if: 
  · You have severe shortness of breath.  
  · You have symptoms of a heart attack. These may include: 
? Chest pain or pressure, or a strange feeling in the chest. 
? Sweating. ? Shortness of breath. ? Nausea or vomiting. ? Pain, pressure, or a strange feeling in the back, neck, jaw, or upper belly or in one or both shoulders or arms. ? Lightheadedness or sudden weakness. ? A fast or irregular heartbeat. After you call 911, the  may tell you to chew 1 adult-strength or 2 to 4 low-dose aspirin. Wait for an ambulance. Do not try to drive yourself.  
 Call your doctor now or seek immediate medical care if: 
  · Your shortness of breath gets worse or you start to wheeze. Wheezing is a high-pitched sound when you breathe.  
  · You wake up at night out of breath or have to prop your head up on several pillows to breathe.  
  · You are short of breath after only light activity or while at rest.  
 Watch closely for changes in your health, and be sure to contact your doctor if: 
  · You do not get better over the next 1 to 2 days. Where can you learn more? Go to http://jacquelyn-alberto.info/. Enter S780 in the search box to learn more about \"Shortness of Breath: Care Instructions. \" Current as of: December 6, 2017 Content Version: 11.8 © 7937-3041 [employer]. Care instructions adapted under license by [employer] (which disclaims liability or warranty for this information). If you have questions about a medical condition or this instruction, always ask your healthcare professional. [place] [address] any warranty or liability for your use of this information. Shortness of Breath: Care Instructions Your Care Instructions Shortness of breath has many causes. Sometimes conditions such as anxiety can lead to shortness of breath. Some people get mild shortness of breath when they exercise. Trouble breathing also can be a symptom of a serious problem, such as asthma, lung disease, emphysema, heart problems, and pneumonia. If your shortness of breath continues, you may need tests and treatment. Watch for any changes in your breathing and other symptoms. Follow-up care is a key part of your treatment and safety. Be sure to make and go to all appointments, and call your doctor if you are having problems. It's also a good idea to know your test results and keep a list of the medicines you take. How can you care for yourself at home? · Do not smoke or allow others to smoke around you. If you need help quitting, talk to your doctor about stop-smoking programs and medicines. These can increase your chances of quitting for good. · Get plenty of rest and sleep. · Take your medicines exactly as prescribed. Call your doctor if you think you are having a problem with your medicine. · Find healthy ways to deal with stress. ? Exercise daily. ? Get plenty of sleep. ? Eat regularly and well. When should you call for help? Call 911 anytime you think you may need emergency care. For example, call if: 
  · You have severe shortness of breath.  
  · You have symptoms of a heart attack. These may include: 
? Chest pain or pressure, or a strange feeling in the chest. 
? Sweating. ? Shortness of breath. ? Nausea or vomiting. ? Pain, pressure, or a strange feeling in the back, neck, jaw, or upper belly or in one or both shoulders or arms. ? Lightheadedness or sudden weakness. ? A fast or irregular heartbeat. After you call 911, the  may tell you to chew 1 adult-strength or 2 to 4 low-dose aspirin. Wait for an ambulance. Do not try to drive yourself.  
 Call your doctor now or seek immediate medical care if: 
  · Your shortness of breath gets worse or you start to wheeze. Wheezing is a high-pitched sound when you breathe.  
  · You wake up at night out of breath or have to prop your head up on several pillows to breathe.  
  · You are short of breath after only light activity or while at rest.  
 Watch closely for changes in your health, and be sure to contact your doctor if: 
  · You do not get better over the next 1 to 2 days. Where can you learn more? Go to http://jacquelyn-alberto.info/. Enter S780 in the search box to learn more about \"Shortness of Breath: Care Instructions. \" Current as of: December 6, 2017 Content Version: 11.8 © 0169-9286 [employer]. Care instructions adapted under license by [employer] (which disclaims liability or warranty for this information). If you have questions about a medical condition or this instruction, always ask your healthcare professional. [address] any warranty or liability for your use of this information.

## 2018-11-09 NOTE — PROGRESS NOTES
Chief Complaint Patient presents with  ED Follow-up [facility] ER Acute Chest Pain and Shortness of Breath (Dyspnea) 1. Have you been to the ER, urgent care clinic since your last visit? Hospitalized since your last visit? Yes When: 11-04-18 Where: [facility] ER Reason: Acute Chest Pain and Dyspnea. 2. Have you seen or consulted any other health care providers outside of the [address] [place] [address] since your last visit? Include any pap smears or colon screening. No 
 
Patient was given a copy of the Advanced Directive and understands to bring it in once completed. Health Maintenance Due Topic Date Due  Shingrix Vaccine Age 50> (1 of 2) 04/02/1992  Pneumococcal 65+ Low/Medium Risk (2 of 2 - PPSV23) 07/06/2017  COLONOSCOPY  01/25/2018  Influenza Age 5 to Adult  08/01/2018

## 2018-11-15 ENCOUNTER — HOSPITAL ENCOUNTER (OUTPATIENT)
Dept: WOUND CARE | Age: 78
Discharge: HOME | End: 2018-11-15
Payer: MEDICARE

## 2018-11-15 VITALS
SYSTOLIC BLOOD PRESSURE: 178 MMHG | HEART RATE: 67 BPM | RESPIRATION RATE: 15 BRPM | TEMPERATURE: 97.2 F | DIASTOLIC BLOOD PRESSURE: 84 MMHG | OXYGEN SATURATION: 97 %

## 2018-11-15 PROCEDURE — 11042 DBRDMT SUBQ TIS 1ST 20SQCM/<: CPT

## 2018-11-15 PROCEDURE — 77030011256 HC DRSG MEPILEX <16IN NO BORD MOLN -A: Performed by: PODIATRIST

## 2018-11-15 PROCEDURE — 77030028895 HC GEL MEDIH TU INLC -A: Performed by: PODIATRIST

## 2018-11-15 PROCEDURE — 74011000250 HC RX REV CODE- 250

## 2018-11-15 RX ORDER — LIDOCAINE AND PRILOCAINE 25; 25 MG/G; MG/G
CREAM TOPICAL
Status: COMPLETED
Start: 2018-11-15 | End: 2018-11-15

## 2018-11-15 RX ADMIN — LIDOCAINE AND PRILOCAINE: 25; 25 CREAM TOPICAL at 10:11

## 2018-11-15 NOTE — PROGRESS NOTES
Progress and Debridement Note Patient: Sammy Arevalo MRN: [id number]  SSN: [id number] YOB: 1940  Age: 70 y.o. Sex: female Assessment:  
 
Active Problems: 
  Lower limb ulcer, calf, right, with fat layer exposed ([address] (11/1/2018) Generalized edema (11/1/2018) Plan:  
  
Selective excisional debridement and change LWC with Medihoney and mepilex with tubigrip. Will hold profore lite secondary to holiday and patient is unable to come in to clinic in 5 -6 days for dressing changes. She is not homebound and doesn't qualify for [facility]. Continue compression stockings daily Subjective:  
 
71 y/o female presents for ulcer care RLE/calf. She states she has been changing her dressing with Santyl ointment daily as instructed. She did complete her antibiotic without adverse reactions. She did have her venous testing performed. Objective:  
 
Patient Vitals for the past 24 hrs: 
 Temp Pulse Resp BP SpO2  
11/15/18 1006 97.2 °F (36.2 °C) 67 15 178/84 97 % Physical Exam:  
 
General Exam 
alert, cooperative, no distress, appears stated age REVIEW OF SYSTEMS: 
General: denies chronic fatigue, weight loss, fever, anemia, bruising, depression, nervousness, panic attacks HEENT: denies ringing in ears, ear infections, dizzy spells, poor vision, glaucoma, sinus trouble, hoarseness, eye infections GI: denies diarrhea, gas, bloating, heartburn, regurgitation, difficulty swallowing, painful swallowing, nausea, vomiting, constipation, abdominal pain, decreased appetite, blood in stools, black stools, jaundice, dark urine Lungs: denies pneumonia, asthma, cough, SOB, hemoptysis Heart: denies chest pain, irregular heart beat, ankle swelling, HTN Skin: denies rashes, hives, allergic reaction Urinary: denies UTI, kidney stones, decreased urine force and flow, urination at night, blood in urine, painful urination Bones and Joints: denies arthritis, rheumatism, back pain, gout, osteoporosis Neurologic: denies stroke, seizures, headaches, numbness, tingling Lower Extremity Exam:  
  
VASCULAR EXAM:. Pedal pulses are palpable 1/4 DP 0/4 PT right and left foot, faint monophasic DP/PT right and stronger monophasic DP/PT left Skin temperature is warm to warm right and left foot. Digital capillary fill time is 4 seconds right and left foot. There is edema of the right and left foot and ankle.  
  
NEUROLOGICAL EXAM:. Sensation Intact with 5.07g monofilament wire right and left foot. Deep tendon reflexes intact and symmetrical on the right and left foot. Babinski is age appropriate bilateral 
  MUSCULOSKELETAL EXAM:. Muscle tone is normal.  Muscle strength of the flexor and extensor group inversion and eversion Bilateral. 5/5.  
  
DERMATOLOGICAL EXAM:. Skin is of abnormal texture and turgor with atrophic skin changes noting hair growth, nail changes (thickening), Bilateral. There is a ulcer full thickness noted lateral aspect of the RLE/calf. There is noted decrease undermining at 10'oclock 0.2cm. measurements 1.9 x 1.6x 0.5 cm with slough and necrotic tissue noted 12 o'clock to 12 o'clock. Wound Leg Lower Right;Lateral (Active) DRESSING STATUS Removed 11/15/2018 10:03 AM  
DRESSING TYPE Adhesive wound dressing (Band-Aid) 11/1/2018  9:29 AM  
Non-Pressure Injury Full thickness (subcut/muscle) 11/15/2018 10:03 AM  
Wound Length (cm) 1.9 cm 11/15/2018 10:03 AM  
Wound Width (cm) 1.6 cm 11/15/2018 10:03 AM  
Wound Depth (cm) 0.5 11/15/2018 10:03 AM  
Wound Surface area (cm^2) 3.04 cm^2 11/15/2018 10:03 AM  
Change in Wound Size % 31.07 11/15/2018 10:03 AM  
Condition of [address] 11/15/2018 10:03 AM  
Condition of Edges Open 11/15/2018 10:03 AM  
Tissue Type Yellow 11/15/2018 10:03 AM  
Tissue Type Percent Yellow 100 11/15/2018 10:03 AM  
Drainage Amount  Small  11/15/2018 10:03 AM  
 Drainage Color Serous 11/15/2018 10:03 AM  
Wound Odor None 11/15/2018 10:03 AM  
Periwound Skin Condition Edematous 11/15/2018 10:03 AM  
Cleansing and Cleansing Agents  Normal saline 11/15/2018 10:03 AM  
Wound Procedure Type Selective Debridement 11/15/2018 10:46 AM  
Procedure Time Out 1046 11/15/2018 10:46 AM  
Consent Obtained  Yes 11/15/2018 10:46 AM  
Procedure Instrument Currette 11/15/2018 10:46 AM  
Procedure Pain Scale Numeric 1/10 11/15/2018 10:46 AM  
Post-Procedure Length (cm) 2.1 cm 11/15/2018 10:50 AM  
Post-Procedure Width (cm) 2.2 cm 11/15/2018 10:50 AM  
Post-Procedure Depth (cm) 0.5 cm 11/15/2018 10:50 AM  
Post-Procedure Volume (cm^3) 2.31 cm^3 11/15/2018 10:50 AM  
Post-Procedure Surface Area (cm^2) 4.62 cm^2 11/15/2018 10:50 AM  
Post Procedure Procedure Pain Scale Numeric 0/10 11/15/2018 10:50 AM  
Procedure Tolerated Well 11/15/2018 10:50 AM  
Number of days: 14 Lab/Data Review: No results found for this or any previous visit (from the past 24 hour(s)). Interpretation Summary No evidence of DVT is identified within bilateral lower extremities Lower Extremity Venous Findings Right Lower Venous The common femoral, profunda femoral, proximal femoral, mid femoral, distal femoral, popliteal, posterior tibial, peroneal and saphenous femoral junction vein(s) were imaged in the transverse and longitudinal planes. The vessels showed normal color filling and compressibility. Doppler interrogation of the veins showed phasic and spontaneous flow. Left Lower Venous The common femoral, saphenous femoral junction, profunda femoral, proximal femoral, mid femoral, distal femoral, popliteal, posterior tibial and peroneal vein(s) were imaged in the transverse and longitudinal planes. The vessels showed normal color filling and compressibility. Doppler interrogation of the veins showed phasic and spontaneous flow. Procedure Staff Technologist/Clinician: Migue Brothers Supporting Staff: None Performing Physician/Midlevel: None PACS Images  
 
 Show images for DUPLEX LOWER EXT VENOUS BILAT Reviewed By List Neva Bevels, Darren Marykay Schirmer, PA on 11/5/2018 15:58 Signed Electronically signed by Eri Chaudhary MD on 11/5/18 at 1325 EST PROCEDURE Selective sharp instrument excisional debridement of slough and devitilized tissue After the benefits/risks/SE were discussed, the patient agreed to proceed. Time out was done: * Patient was identified by name and date of birth * Agreement on procedure being performed was verified * Procedure site verified and marked as necessary * Patient was positioned for comfort * Consent was signed and verified. Site: lateral calf RLE Instruments used:  
 [x]  Dermal curette  [] Blade 
      [] #15  [] #10  [] Forceps  [] Tissue nippers  [] sterile scissors  [] Other Anesthesia:  
 [x]  EMLA 2.5% cream: applied to wound beds for approximately 15minutes. []   Lidocaine 2% Topical Gel    
 []  Lidocaine injectable 1% with epinephrine 1:100,000 []  Lidocaine injectable 1% without epinephrine  
 []  Other:   
 []  None  
      [] patient is insensate due to neuropathy  
      [] patient declines 
      [] allergy to anesthetic 
      [] tissue for debridement is either superficial, loosely adherent and/or necrotic and denervated After satisfactory anesthesia achieved, the wound/s was/were sharply debrided necrotic, devitalized and granulation tissue down to the sub Q layer, revealing a clean and viable wound bed. Post debridement measurement was2.1_x2.1_x_0.5cm . Bleeding: <5mL Resolved with light focal pressure. Wounds were cleaned and irrigated with saline. Wound care applied: 
 []   Hydrogell   []  Hypergel   []   Hydrofiber/Aquacel    
 []   Cadexomer Iodine (Iodosorb)   []  Silver Alginate    [x]   Medihoney: []   Collagenase:Santyl   []  Calcium Alginate   []   Collagen:  
 []   Foam   []  Non-Adherent Contact Layer   []   Xeroform  
 []   Adaptec:   []   Hydrocolloid   []   Transparent Film  
 []  Antibiotic ointment/cream     []   Other (see below) Other: 
 
 []   Dry Gauze and Roll Gauze  
 []   Foam and Roll Gauze  
 []   Dry Gauze  
 []   Bordered gauze:   
 []   Secure with Tape  
 [x]   Bordered foam  
  
 []   Other    
 [x]   Compression Wrap: 
        []   [place]    []Multi-Layer    [x]Tubular Bandages Teri-Ulcer Care 
  []   Cream  
  []   Lotion []   Ointment  
  []   Barrier  
  []   Other:  
 
 
The patient tolerated the procedure well with no complications. The patient left the exam room in satisfactory and stable condition.   
 
Signed By: Alfonso Villa DPM   
 November 15, 2018 11:10 AM

## 2018-11-19 NOTE — PROGRESS NOTES
Neyda Nixon presents today for a post ER follow-up as well as a 6 month check-up. She presented to the emergency room on November 4, 2018 with complaints of shortness of breath. Her NT proBNP was noted to be 1952 and her troponins were less than 0.02. At that time, she was also being treated for venous stasis cellulitis. Mrs. Lisa Encarnacion is a 70year old  female with a history of CAD (s/p CABG x 3 in June 2008), ischemic cardiomyopathy, hypertension, hyperlipidemia, GERD, CVA, and significant AV block (s/p dual chamber pacemaker in Oct. 2008 and generator change in 2016), and she also has a history of DVT in the left arm for which she took Coumadin in the past which has been discontinued. She denies chest pain, tightness, heaviness, and palpitations. She denies shortness of breath at rest, admits to mild dyspnea on exertion, denies orthopnea and PND. She denies abdominal bloating. She denies lightheadedness, dizziness, and syncope. She admits to lower extremity edema. She denies claudication. Denies nausea, vomiting, diarrhea, fever, chills. She is going to the wound clinic at [facility] for ongoing treatment of her venous stasis ulcer. She is wearing mild compression, TubiGrip on both lower extremities. While reviewing her medications, she states that she stopped taking her simvastatin as she experienced nausea and vomiting each time she took the medication. A review of her labs show that she has not had a lipid panel done since early 2017. PMH:  Past Medical History:   Diagnosis Date    Abnormal ankle brachial index 11/13/2014    Mild arterial disease in right leg. R VICTOR MANUEL 0.88. L VICTOR MANUEL 1.00.     Anemia     Arm DVT (deep venous thromboembolism), acute (HCC)     s/p anticoagulation    Atherosclerotic heart disease     Atrial fibrillation (HCC)     AV block     CAD (coronary artery disease)     Cardiomyopathy, ischemic     Carotid artery stenosis     Carotid duplex 2014    Mild <50% bilateral ICA plaquing. Possible left vertebral occlusion.  Cerebral artery occlusion with cerebral infarction ([facility])     stroke x 2    Chest pain     CVA (cerebral infarction)     Echocardiogram 2015    EF 55%. Apical inferior, apical septal hypk (new since study of 11), likely due to chronic V-pacing. Mild LVH. RVSP 30 mmHg. Mild LAE. Mild MR. Mild TR.  Gastritis     GERD (gastroesophageal reflux disease)     Heart failure ([facility])     Hiatal hernia     Hyperlipidemia     Hypertension     Hypertensive cardiovascular disease     Intracranial aneurysm     left cavernous ICA 2mm aneurysm from head/neck CTA in     Long term (current) use of anticoagulants 3/10/2011    Lower extremity venous duplex 2015    No DVT bilaterally.  Nuclear cardiac imaging test 2015    Low risk. Sm apical infarction w/no ischemia vs apical thinning. Sm basal inferior defect, likely artifact. EF 56%. Inferoseptal, inferoapical WMA related to sternotomy or paced rhythm.  Pacemaker     PAD (peripheral artery disease) ([facility])     PVC's     chronic    S/P CABG x 3 2008    LIMA-LAD. SVG-OM. SVG-dRCA     S/P cardiac cath 2008    pRCA 100%. LM patent. Cx patent. OM1 100%. mLAD 95%. LVEDP 27-29mmHg. EF 20%.  mod MR    Tilt table evaluation 1995    Positive for orthostatic hypotension    Vitamin D deficiency        PSH:  Past Surgical History:   Procedure Laterality Date    CABG, ARTERY-VEIN, THREE  2008    CARDIAC SURG PROCEDURE UNLIST      medtronic dual-chamber cardioverted-defibrillator    HX  SECTION      x2    HX ENDOSCOPY  3/1/16    HX ENDOSCOPY  3/1/16    HX HEMORRHOIDECTOMY      1985    HX HYSTERECTOMY          HX ORTHOPAEDIC      knee surgery    HX OTHER SURGICAL  2/10/16    Pacemaker Gen Change    HX PACEMAKER      Defibrillator     HX TUMOR REMOVAL      removed from arm.  benign       MEDS:  Current Outpatient Medications   Medication Sig    furosemide (LASIX) 40 mg tablet Take 1 Tab by mouth every other day.  carvedilol (COREG) 25 mg tablet TAKE 1 TABLET BY MOUTH (2) TIMES A DAY    olmesartan (BENICAR) 5 mg tablet TAKE 1 TABLET BY MOUTH EVERY DAY    amLODIPine (NORVASC) 5 mg tablet Take 5 mg by mouth daily.  diclofenac (VOLTAREN) 1 % gel APPLY 4GRAMS/ TO KNEE 4 TIMES A DAY AS NEEDED TRANSDERMAL 90 DAYS    hydrocortisone (HYTONE) 2.5 % ointment APPLY LIGHTLY TO THE AFFECTED AREAS ON THE LEGS AND FEET TWICE A DAY.  aspirin 81 mg tablet Take 81 mg by mouth daily. No current facility-administered medications for this visit. Allergies and Sensitivities:  Allergies   Allergen Reactions    Nifedipine Nausea and Vomiting and Other (comments)     Dizzy    Ace Inhibitors Cough    Codeine Unknown (comments), Nausea Only and Nausea and Vomiting    Hydralazine Other (comments)     Dizziness and Fatigue    Lipitor [Atorvastatin] Nausea and Vomiting and Vertigo    Simvastatin Nausea and Vomiting       Family History:  No family history on file. Social History:  Social History     Tobacco Use    Smoking status: Never Smoker    Smokeless tobacco: Never Used    Tobacco comment: just smoked 2 weeks in [employer]   Substance Use Topics    Alcohol use: No    Drug use: No       Physical:  Visit Vitals  /86   Pulse (!) 58   Ht 5' 4\" (1.626 m)   Wt 98 kg (216 lb)   SpO2 98%   BMI 37.08 kg/m²         She is up 5 pounds since her last appointment. Exam:  Neck:  Supple, no JVD, no carotid bruits  CV:  Normal S1 and  S2, grade I-II apical systolic murmur. No diastolic murmur, rubs, or clicks noted. Lungs:  Clear to ausculation throughout, no wheezes or rales  Abd:  Soft, non-tender, non-distended with good bowel sounds. No hepatosplenomegaly  Extremities:  1+ edema bilaterally.       EKG:        LABS:    Lab Results   Component Value Date/Time    Sodium 142 11/04/2018 06:00 PM Potassium 4.5 11/04/2018 06:00 PM    Chloride 108 11/04/2018 06:00 PM    CO2 25 11/04/2018 06:00 PM    Glucose 102 (H) 11/04/2018 06:00 PM    BUN 28 (H) 11/04/2018 06:00 PM    Creatinine 1.31 (H) 11/04/2018 06:00 PM     Lab Results   Component Value Date/Time    Cholesterol, total 215 (H) 07/10/2017 11:17 AM    HDL Cholesterol 93 (H) 07/10/2017 11:17 AM    LDL, calculated 108.6 (H) 07/10/2017 11:17 AM    Triglyceride 67 07/10/2017 11:17 AM    CHOL/HDL Ratio 2.3 07/10/2017 11:17 AM     Lab Results   Component Value Date/Time    ALT (SGPT) 14 07/10/2017 11:17 AM       Impression / Plan:  1. Chronic diastolic CHF, appears compensated   2. Essential hypertension, blood pressure slightly elevated  3. CAD, s/p CABG x 3 in June 2008  4. History of DVT  5. Paroxysmal atrial fibrillation  6. History of CVA    Mrs. Rohan Nj presents today for follow-up after a recent ER visit and a 6 month check-up. She states that she was told that she was dehydrated in the ER. However, her NT pro-BNP was slightly elevated. She reports that about a week prior to presenting to the ER, she had not taken her Lasix as she had normally been taking it (every other day) as she had a lot of activities going on that week. She complained of edema in her left arm and chronic edema in her lower extremities. Duplex studies were done of the upper and lower extremities and they were negative for DVT. Her device was interrogated today and it showed that her OptiVol level surpassed threshold sometime in October and it is now back to within normal limits. She has 6.4 years left of battery life and she has not had an events. She states that no changes have been made to her medication regimen lately. Her blood pressure is elevated and she is being followed by nephrology. She has 1+ lower extremity edema which she states looks \"about normal.\"  No changes were made to her medications today.      She states that she stopped taking her simvastatin some time ago due to nausea and vomiting each time she took it and in general, she felt \"bad\" after taking the medication. A review of her labs show that she has not had a lipid panel done since early 2017. Will request that she have a lipid panel and LFTs done and will then consider another statin option after results are reviewed. She has had problems with atorvastatin and now, simvastatin. Congestive heart failure teaching reinforced today. Advised to limit sodium intake to no more than 2000mg per day and to also limit fluid intake to 48 ounces per day (unless otherwise specified). Advised to weigh daily every morning and record weights. Instructed to call our office if progressive weight gain is noted over a 2 to 3 day period of time, shortness of breath increases, or if abdominal bloating, nausea, fatigue, or increased lower extremity edema is noted. She will follow-up with Dr. Juana Lyman as scheduled and PRN. Josiane Starkey MSN, FNP-BC    Please note:  Portions of this chart were created with Dragon medical speech to text program.  Unrecognized errors may be present.

## 2018-11-20 ENCOUNTER — OFFICE VISIT (OUTPATIENT)
Dept: CARDIOLOGY CLINIC | Age: 78
End: 2018-11-20

## 2018-11-20 ENCOUNTER — CLINICAL SUPPORT (OUTPATIENT)
Dept: CARDIOLOGY CLINIC | Age: 78
End: 2018-11-20

## 2018-11-20 VITALS
BODY MASS INDEX: 36.88 KG/M2 | HEART RATE: 58 BPM | OXYGEN SATURATION: 98 % | DIASTOLIC BLOOD PRESSURE: 86 MMHG | WEIGHT: 216 LBS | SYSTOLIC BLOOD PRESSURE: 160 MMHG | HEIGHT: 64 IN

## 2018-11-20 DIAGNOSIS — Z95.810 CARDIAC DEFIBRILLATOR IN SITU: ICD-10-CM

## 2018-11-20 DIAGNOSIS — Z95.810 AICD (AUTOMATIC CARDIOVERTER/DEFIBRILLATOR) PRESENT: ICD-10-CM

## 2018-11-20 DIAGNOSIS — I25.10 ATHEROSCLEROSIS OF NATIVE CORONARY ARTERY OF NATIVE HEART WITHOUT ANGINA PECTORIS: ICD-10-CM

## 2018-11-20 DIAGNOSIS — I48.91 ATRIAL FIBRILLATION, UNSPECIFIED TYPE (HCC): Primary | ICD-10-CM

## 2018-11-20 DIAGNOSIS — I48.0 PAROXYSMAL ATRIAL FIBRILLATION (HCC): ICD-10-CM

## 2018-11-20 DIAGNOSIS — E78.2 MIXED HYPERLIPIDEMIA: ICD-10-CM

## 2018-11-20 DIAGNOSIS — R60.0 EDEMA OF BOTH LEGS: ICD-10-CM

## 2018-11-20 DIAGNOSIS — Z95.0 PACEMAKER: ICD-10-CM

## 2018-11-20 DIAGNOSIS — I10 ESSENTIAL HYPERTENSION: Primary | ICD-10-CM

## 2018-11-20 RX ORDER — FUROSEMIDE 40 MG/1
40 TABLET ORAL EVERY OTHER DAY
Qty: 1 TAB | Refills: 0
Start: 2018-11-20 | End: 2019-01-23

## 2018-11-20 NOTE — PATIENT INSTRUCTIONS
Lipid panel/LFTs to be done with labs for Dr. Kendrick Magaña.  Fast for 12 hours  Follow-up with Dr. Omero Hines as scheduled and as needed  Continue Lasix 40mg at least every other day (as you are currently taking)

## 2018-11-20 NOTE — PROGRESS NOTES
Inga Hidalgo presents today for   Chief Complaint   Patient presents with    Coronary Artery Disease     6 month follow up w/ device check       Rachael Eason preferred language for health care discussion is english/other. Is someone accompanying this pt? No    Is the patient using any DME equipment during OV? No    Depression Screening:  PHQ over the last two weeks 8/23/2018   Little interest or pleasure in doing things Not at all   Feeling down, depressed, irritable, or hopeless Not at all   Total Score PHQ 2 0       Learning Assessment:  Learning Assessment 8/23/2018   PRIMARY LEARNER Patient   HIGHEST LEVEL OF EDUCATION - PRIMARY LEARNER  > [facility] PRIMARY LEARNER NONE   CO-LEARNER CAREGIVER No   PRIMARY LANGUAGE ENGLISH   LEARNER PREFERENCE PRIMARY DEMONSTRATION     -     -   ANSWERED BY patient   RELATIONSHIP SELF       Abuse Screening:  Abuse Screening Questionnaire 8/23/2018   Do you ever feel afraid of your partner? N   Are you in a relationship with someone who physically or mentally threatens you? N   Is it safe for you to go home? Y       Fall Risk  Fall Risk Assessment, last 12 mths 10/25/2018   Able to walk? Yes   Fall in past 12 months? Yes   Fall with injury? Yes   Number of falls in past 12 months 2   Fall Risk Score 3       Pt currently taking Anticoagulant or Antiplatelet therapy? Yes  Coordination of Care:  1. Have you been to the ER, urgent care clinic since your last visit? Hospitalized since your last visit? No    2. Have you seen or consulted any other health care providers outside of the [address] [place] [address] since your last visit? Include any pap smears or colon screening.  No      Patient verified medications verbally

## 2018-11-29 ENCOUNTER — HOSPITAL ENCOUNTER (OUTPATIENT)
Dept: WOUND CARE | Age: 78
Discharge: HOME | End: 2018-11-29
Payer: MEDICARE

## 2018-11-29 PROCEDURE — 97597 DBRDMT OPN WND 1ST 20 CM/<: CPT

## 2018-11-29 PROCEDURE — 77030028895 HC GEL MEDIH TU INLC -A: Performed by: PODIATRIST

## 2018-11-29 PROCEDURE — 77030011256 HC DRSG MEPILEX <16IN NO BORD MOLN -A: Performed by: PODIATRIST

## 2018-11-29 NOTE — PROGRESS NOTES
Progress and Debridement Note Patient: Tesha Ware MRN: [id number]  SSN: [id number] YOB: 1940  Age: 70 y.o. Sex: female Assessment:  
 
Active Problems: 
  PAD (peripheral artery disease) ([facility] [address] (10/13/2014) Lower limb ulcer, calf, right, with fat layer exposed ([facility] [address] (11/1/2018) Plan:  
Selective excisional debridement with continued LWC with Medihoney and mepilex Q 48 hrs. SHe is to continue with compression stockings. Subjective:  
 
69 y/o female presents for ulcer care RLE/calf. She states she has been changing her dressing with medihoney daily as instructed. Objective:  
 
No data found. Physical Exam:  
 
General Exam 
alert, cooperative, no distress, appears stated age REVIEW OF SYSTEMS: 
General: denies chronic fatigue, weight loss, fever, anemia, bruising, depression, nervousness, panic attacks HEENT: denies ringing in ears, ear infections, dizzy spells, poor vision, glaucoma, sinus trouble, hoarseness, eye infections GI: denies diarrhea, gas, bloating, heartburn, regurgitation, difficulty swallowing, painful swallowing, nausea, vomiting, constipation, abdominal pain, decreased appetite, blood in stools, black stools, jaundice, dark urine Lungs: denies pneumonia, asthma, cough, SOB, hemoptysis Heart: denies chest pain, irregular heart beat, ankle swelling, HTN Skin: denies rashes, hives, allergic reaction Urinary: denies UTI, kidney stones, decreased urine force and flow, urination at night, blood in urine, painful urination Bones and Joints: denies arthritis, rheumatism, back pain, gout, osteoporosis Neurologic: denies stroke, seizures, headaches, numbness, tingling VASCULAR EXAM:. Pedal pulses are palpable 1/4 DP 0/4 PT right and left foot, faint monophasic DP/PT right and stronger monophasic DP/PT left Skin temperature is warm to warm right and left foot.  Digital capillary fill time is 4 seconds right and left foot. There is edema of the right and left foot and ankle.  
  
NEUROLOGICAL EXAM:. Sensation Intact with 5.07g monofilament wire right and left foot. Deep tendon reflexes intact and symmetrical on the right and left foot. Babinski is age appropriate bilateral 
  MUSCULOSKELETAL EXAM:. Muscle tone is normal.  Muscle strength of the flexor and extensor group inversion and eversion Bilateral. 5/5.  
  
DERMATOLOGICAL EXAM:. Skin is of abnormal texture and turgor with atrophic skin changes noting hair growth, nail changes (thickening), Bilateral. There is a ulcer full thickness noted lateral aspect of the RLE/calf.  There is noted decrease undermining at 10'oclock 0.2cm. measurements 1.8 x 1.6x 0.5 cm with decrease slough tissue noted 12 o'clock to 12 o'clock. Wound Leg Lower Right;Lateral (Active) DRESSING STATUS Removed 11/15/2018 10:03 AM  
DRESSING TYPE Adhesive wound dressing (Band-Aid) 11/1/2018  9:29 AM  
Non-Pressure Injury Full thickness (subcut/muscle) 11/15/2018 10:03 AM  
Wound Length (cm) 1.9 cm 11/15/2018 10:03 AM  
Wound Width (cm) 1.6 cm 11/15/2018 10:03 AM  
Wound Depth (cm) 0.5 11/15/2018 10:03 AM  
Wound Surface area (cm^2) 3.04 cm^2 11/15/2018 10:03 AM  
Change in Wound Size % 31.07 11/15/2018 10:03 AM  
Condition of Base Slough 11/15/2018 10:03 AM  
Condition of Edges Open 11/15/2018 10:03 AM  
Tissue Type Yellow 11/15/2018 10:03 AM  
Tissue Type Percent Yellow 100 11/15/2018 10:03 AM  
Drainage Amount  Small  11/15/2018 10:03 AM  
Drainage Color Serous 11/15/2018 10:03 AM  
Wound Odor None 11/15/2018 10:03 AM  
Periwound Skin Condition Edematous 11/15/2018 10:03 AM  
Cleansing and Cleansing Agents  Normal saline 11/15/2018 10:03 AM  
Dressing Type Applied Other (Comment) 11/15/2018 10:50 AM  
Wound Procedure Type Selective Debridement 11/15/2018 10:46 AM  
Procedure Time Out 1046 11/15/2018 10:46 AM  
Consent Obtained  Yes 11/15/2018 10:46 AM  
 Procedure Instrument [place] 11/15/2018 10:46 AM  
Procedure Pain Scale Numeric 1/10 11/15/2018 10:46 AM  
Post-Procedure Length (cm) 2.1 cm 11/15/2018 10:50 AM  
Post-Procedure Width (cm) 2.2 cm 11/15/2018 10:50 AM  
Post-Procedure Depth (cm) 0.5 cm 11/15/2018 10:50 AM  
Post-Procedure Volume (cm^3) 2.31 cm^3 11/15/2018 10:50 AM  
Post-Procedure Surface Area (cm^2) 4.62 cm^2 11/15/2018 10:50 AM  
Post Procedure Procedure Pain Scale Numeric 0/10 11/15/2018 10:50 AM  
Procedure Tolerated Well 11/15/2018 10:50 AM  
Number of days: 28 Lab/Data Review: No results found for this or any previous visit (from the past 24 hour(s)). none PROCEDURE Selective sharp instrument excisional debridement of slough and devitilized tissue After the benefits/risks/SE were discussed, the patient agreed to proceed. Time out was done: * Patient was identified by name and date of birth * Agreement on procedure being performed was verified * Procedure site verified and marked as necessary * Patient was positioned for comfort * Consent was signed and verified. Site: lateral calf RLE Instruments used:  
 [x]  Dermal curette  [] Blade 
      [] #15  [] #10  [] Forceps  [] Tissue nippers  [] sterile scissors  [] Other Anesthesia:  
 []  EMLA 2.5% cream: applied to wound beds for approximately 15minutes. []   Lidocaine 2% Topical Gel    
 []  Lidocaine injectable 1% with epinephrine 1:100,000 []  Lidocaine injectable 1% without epinephrine  
 []  Other:   
 [x]  None  
      [] patient is insensate due to neuropathy  
      [] patient declines 
      [] allergy to anesthetic [x] tissue for debridement is either superficial, loosely adherent and/or necrotic and denervated After satisfactory anesthesia achieved, the wound/s was/were sharply debrided necrotic, devitalized and granulation tissue down to the sub Q layer, revealing a clean and viable wound bed. Post debridement measurement was 1.9_x1.6_x_0.6_cm . Bleeding: <5mL Resolved with light focal pressure. Wounds were cleaned and irrigated with saline. Wound care applied: 
 []   Hydrogell   []  Hypergel   []   Hydrofiber/Aquacel    
 []   Cadexomer Iodine (Iodosorb)   []  Silver Alginate    [x]   Medihoney:  
 []   Collagenase:Santyl   []  Calcium Alginate   []   Collagen:  
 []   Foam   []  Non-Adherent Contact Layer   []   Xeroform  
 []   Adaptec:   []   Hydrocolloid   []   Transparent Film  
 []  Antibiotic ointment/cream     []   Other (see below) Other: 
 
 []   Dry Gauze and Roll Gauze  
 []   Foam and Roll Gauze  
 []   Dry Gauze  
 []   Bordered gauze:   
 []   Secure with Tape  
 []   Bordered foam  
  
 [x]   Other  mepilex [x]   Compression Wrap: 
        []   Dahlia Peoples    []Multi-Layer    [x]Tubular Bandages Teri-Ulcer Care 
  []   Cream  
  []   Lotion []   Ointment  
  []   Barrier  
  []   Other:  
 
 
The patient tolerated the procedure well with no complications. The patient left the exam room in satisfactory and stable condition.   
 
Signed By: Delia Bustos DPM   
 November 29, 2018 4:07 PM

## 2018-11-30 PROCEDURE — 77030011256 HC DRSG MEPILEX <16IN NO BORD MOLN -A: Performed by: PODIATRIST

## 2018-11-30 PROCEDURE — 77030028895 HC GEL MEDIH TU INLC -A: Performed by: PODIATRIST

## 2018-11-30 NOTE — WOUND CARE
Patient was seen today for follow up visit. She has been using Medihoney and gauze. She denies pain or other problems. Her wound was debrided today. She will continue with Medihoney and follow up in three weeks with Dr. Sheldon Ybarra. Supplies given to the patient. 11/30/18 0810 Wound Leg Lower Right;Lateral  
Date First Assessed: 11/01/18   POA: Yes  Wound Type: Vascular  Location: Leg Lower  Orientation: Right;Lateral  
DRESSING STATUS Removed DRESSING TYPE Gauze Non-Pressure Injury Full thickness (subcut/muscle) Wound Length (cm) 1.9 cm Wound Width (cm) 1.5 cm Wound Depth (cm) 0.05 Wound Surface area (cm^2) 2.85 cm^2 Change in Wound Size % 35.37 Condition of Base Slough;Granulation Tissue Type Red;Yellow Tissue Type Percent Red 50 Tissue Type Percent Yellow 50 Dressing Type Applied Other (Comment) (Medihoney, Mepilex foam) Wound Procedure Type Selective Debridement Procedure Time Out 5531 Consent Obtained  Yes Procedure Pain Scale Numeric 2/10

## 2018-12-03 RX ORDER — AMLODIPINE BESYLATE 5 MG/1
5 TABLET ORAL DAILY
Qty: 90 TAB | Refills: 6 | Status: SHIPPED | OUTPATIENT
Start: 2018-12-03 | End: 2019-08-12 | Stop reason: SDUPTHER

## 2018-12-20 ENCOUNTER — HOSPITAL ENCOUNTER (OUTPATIENT)
Dept: WOUND CARE | Age: 78
Discharge: HOME | End: 2018-12-20
Payer: MEDICARE

## 2018-12-20 VITALS
TEMPERATURE: 97.7 F | DIASTOLIC BLOOD PRESSURE: 77 MMHG | RESPIRATION RATE: 15 BRPM | SYSTOLIC BLOOD PRESSURE: 174 MMHG | HEART RATE: 59 BPM | OXYGEN SATURATION: 99 %

## 2018-12-20 PROCEDURE — 77030011256 HC DRSG MEPILEX <16IN NO BORD MOLN -A: Performed by: PODIATRIST

## 2018-12-20 PROCEDURE — 77030028895 HC GEL MEDIH TU INLC -A: Performed by: PODIATRIST

## 2018-12-20 PROCEDURE — 11042 DBRDMT SUBQ TIS 1ST 20SQCM/<: CPT

## 2018-12-20 NOTE — PROGRESS NOTES
Progress and Debridement Note    Patient: Jj Bo MRN: [id number]  SSN: [id number]    YOB: 1940  Age: 70 y.o. Sex: female      Assessment:     Active Problems:    PAD (peripheral artery disease) ([facility] [address] (10/13/2014)      Lower limb ulcer, calf, right, with fat layer exposed ([facility] [address] (11/1/2018)      Generalized edema (11/1/2018)        Plan:      Selective excisional debridement with continue [address] with Medihoney and mepilex Q 48 hrs. Continue compression stockings daily, may remove QHS RLE. Subjective:     71 y/o female present for chronic ulcer RLE/calf with a vascular componenet. She states there is no pain noted today. Objective:     Patient Vitals for the past 24 hrs:   Temp Pulse Resp BP SpO2   12/20/18 0939 97.7 °F (36.5 °C) (!) 59 15 174/77 99 %       Physical Exam:     General Exam  alert, cooperative, no distress, appears stated age    REVIEW OF SYSTEMS:  No changes    VASCULAR EXAM:. Pedal pulses are palpable 1/4 DP 0/4 PT right and left foot, faint monophasic DP/PT right and stronger monophasic DP/PT left  Skin temperature is warm to warm right and left foot. Digital capillary fill time is 4 seconds right and left foot. There is edema of the right and left foot and ankle.      NEUROLOGICAL EXAM:. Sensation Intact with 5.07g monofilament wire right and left foot. Deep tendon reflexes intact and symmetrical on the right and left foot. Babinski is age appropriate bilateral     MUSCULOSKELETAL EXAM:. Muscle tone is normal.  Muscle strength of the flexor and extensor group inversion and eversion Bilateral. 5/5.      DERMATOLOGICAL EXAM:. Skin is of abnormal texture and turgor with atrophic skin changes noting hair growth, nail changes (thickening), Bilateral. There is a ulcer full thickness noted lateral aspect of the RLE/calf.  There is noted complete epithelialization at the 1-3o'clock position.  measurements 1.9 x 1.6x 0.5 cm with decrease slough with increased granulation tissue noted 12 o'clock to 12 o'clock. Wound Leg Lower Right;Lateral (Active)   DRESSING STATUS Removed 12/20/2018  9:49 AM   DRESSING TYPE Other (Comment) 12/20/2018  9:49 AM   Non-Pressure Injury Full thickness (subcut/muscle) 12/20/2018  9:49 AM   Wound Length (cm) 1.9 cm 12/20/2018 10:14 AM   Wound Width (cm) 1.7 cm 12/20/2018 10:14 AM   Wound Depth (cm) 0.2 12/20/2018 10:14 AM   Wound Surface area (cm^2) 3.23 cm^2 12/20/2018 10:14 AM   Change in Wound Size % 26.76 12/20/2018 10:14 AM   Condition of Base Granulation;Slough 12/20/2018  9:49 AM   Condition of Edges Open 12/20/2018  9:49 AM   Tissue Type Red;Yellow 12/20/2018  9:49 AM   Tissue Type Percent Red 50 12/20/2018  9:49 AM   Tissue Type Percent Yellow 50 12/20/2018  9:49 AM   Drainage Amount  Small  12/20/2018  9:49 AM   Drainage Color Serous 12/20/2018  9:49 AM   Wound Odor None 12/20/2018  9:49 AM   Periwound Skin Condition Intact 12/20/2018  9:49 AM   Cleansing and Cleansing Agents  Dermal wound cleanser 12/20/2018  9:49 AM   Dressing Type Applied Other (Comment) 11/30/2018  8:10 AM   Wound Procedure Type Selective Debridement 11/30/2018  8:10 AM   Procedure Time Out 1145 11/30/2018  8:10 AM   Consent Obtained  Yes 11/30/2018  8:10 AM   Procedure Instrument [place] 11/15/2018 10:46 AM   Procedure Pain Scale Numeric 2/10 11/30/2018  8:10 AM   Post-Procedure Length (cm) 2.1 cm 11/15/2018 10:50 AM   Post-Procedure Width (cm) 2.2 cm 11/15/2018 10:50 AM   Post-Procedure Depth (cm) 0.5 cm 11/15/2018 10:50 AM   Post-Procedure Volume (cm^3) 2.31 cm^3 11/15/2018 10:50 AM   Post-Procedure Surface Area (cm^2) 4.62 cm^2 11/15/2018 10:50 AM   Post Procedure Procedure Pain Scale Numeric 0/10 11/15/2018 10:50 AM   Procedure Tolerated Well 12/20/2018 10:14 AM   Number of days: 52          Lab/Data Review:  No results found for this or any previous visit (from the past 24 hour(s)).       none      PROCEDURE    Selective sharp instrument excisional debridement of slough and devitilized tissue    After the benefits/risks/SE were discussed, the patient agreed to proceed. Time out was done:   * Patient was identified by name and date of birth   * Agreement on procedure being performed was verified   * Procedure site verified and marked as necessary   * Patient was positioned for comfort   * Consent was signed and verified. Site: lateral RLE/ calf    Instruments used:    [x]  Dermal curette  [] Blade        [] #15  [] #10  [] Forceps  [] Tissue nippers  [] sterile scissors  [] Other     Anesthesia:    []  EMLA 2.5% cream: applied to wound beds for approximately 15minutes. []   Lidocaine 2% Topical Gel      []  Lidocaine injectable 1% with epinephrine 1:100,000    []  Lidocaine injectable 1% without epinephrine    []  Other:     [x]  None         [] patient is insensate due to neuropathy         [] patient declines        [] allergy to anesthetic        [x] tissue for debridement is either superficial, loosely adherent and/or necrotic and denervated     After satisfactory anesthesia achieved, the wound/s was/were sharply debrided necrotic, devitalized and granulation tissue down to the sub Q layer, revealing a clean and viable wound bed. Post debridement measurement was 1.9_x_1.7_x_0.2_cm . Bleeding: <5mL Resolved with light focal pressure. Wounds were cleaned and irrigated with saline.      Wound care applied:   []   Hydrogell   []  Hypergel   []   Hydrofiber/Aquacel      []   Cadexomer Iodine (Iodosorb)   []  Silver Alginate    [x]   Medihoney:    []   Collagenase:Santyl   []  Calcium Alginate   []   Collagen:    []   Foam   []  Non-Adherent Contact Layer   []   Xeroform    []   Adaptec:   []   Hydrocolloid   []   Transparent Film    []  Antibiotic ointment/cream     []   Other (see below)     Other:     []   Dry Gauze and Roll Gauze    []   Foam and Roll Gauze    []   Dry Gauze    []   Bordered gauze:     []   Secure with Tape    [x] Bordered foam       []   Other      [x]   Compression Wrap:          []   Unna Boot    []Multi-Layer    [x]Tubular Bandages       Teri-Ulcer Care    []   Cream     []   Lotion     []   Ointment     []   Barrier     []   Other:       The patient tolerated the procedure well with no complications. The patient left the exam room in satisfactory and stable condition.      Signed By: Yocasta Pérez DPM     December 20, 2018 10:18 AM

## 2018-12-20 NOTE — WOUND CARE
Patient here for follow up appointment for right lateral lower leg ulcer. She has been performing her own dressing changes every other day with Medihoney,bordered gauze, and tubigrip F. Wound is improving with current treatment. Dr. Laura Corral performed selective sharp debridement today and patient will continue with same treatment, using bordered foam instead of gauze to cover. Supplies ordered through [employer].   She will return in 4 weeks for follow up.        12/20/18 0949 12/20/18 1014   Wound Leg Lower Right;Lateral   Date First Assessed: 11/01/18   POA: Yes  Wound Type: Vascular  Location: Leg Lower  Orientation: Right;Lateral   DRESSING STATUS Removed --    DRESSING TYPE Other (Comment)  (Tubigrip, Band-Aid, Medihoney) --    Non-Pressure Injury Full thickness (subcut/muscle) --    Wound Length (cm) 1.9 cm 1.9 cm   Wound Width (cm) 1.6 cm 1.7 cm   Wound Depth (cm) 0.2 0.2   Wound Surface area (cm^2) 3.04 cm^2 3.23 cm^2   Change in Wound Size % 31.07 26.76   Condition of Base Granulation;Slough --    Condition of Edges Open --    Tissue Type Red;Yellow --    Tissue Type Percent Red 50 --    Tissue Type Percent Yellow 50 --    Drainage Amount  Moderate --    Drainage Color Serous --    Wound Odor None --    Periwound Skin Condition Intact --    Cleansing and Cleansing Agents  Dermal wound cleanser --    Dressing Type Applied --  Other (Comment)  (Medihoneyl, Border Foam, Tubigrip F)   Wound Procedure Type --  Selective Debridement   Procedure Time Out --  1015   Consent Obtained  --  Yes   Procedure Bleeding --  Yes   Hemostasis --  (Pressure)   Procedure Tolerated --  Well

## 2019-01-14 ENCOUNTER — HOSPITAL ENCOUNTER (OUTPATIENT)
Dept: LAB | Age: 79
Discharge: HOME | End: 2019-01-14
Payer: MEDICARE

## 2019-01-14 DIAGNOSIS — D64.9 ANEMIA, UNSPECIFIED: ICD-10-CM

## 2019-01-14 DIAGNOSIS — I25.10 CORONARY ATHEROSCLEROSIS OF NATIVE CORONARY ARTERY: ICD-10-CM

## 2019-01-14 DIAGNOSIS — E55.9 VITAMIN D DEFICIENCY: ICD-10-CM

## 2019-01-14 DIAGNOSIS — N39.0 URINARY TRACT INFECTION, SITE NOT SPECIFIED: ICD-10-CM

## 2019-01-14 DIAGNOSIS — I12.9 BENIGN HYPERTENSIVE KIDNEY DISEASE WITH CHRONIC KIDNEY DISEASE STAGE I THROUGH STAGE IV, OR UNSPECIFIED(403.10): ICD-10-CM

## 2019-01-14 DIAGNOSIS — N25.81 SECONDARY HYPERPARATHYROIDISM OF RENAL ORIGIN (HCC): ICD-10-CM

## 2019-01-14 DIAGNOSIS — E66.9 OBESITY, UNSPECIFIED: ICD-10-CM

## 2019-01-14 DIAGNOSIS — M10.9 GOUT, UNSPECIFIED: ICD-10-CM

## 2019-01-14 DIAGNOSIS — D50.9 IRON DEFICIENCY ANEMIA, UNSPECIFIED: ICD-10-CM

## 2019-01-14 DIAGNOSIS — N18.30 CHRONIC KIDNEY DISEASE, STAGE III (MODERATE) (HCC): ICD-10-CM

## 2019-01-14 DIAGNOSIS — R80.9 PROTEINURIA: ICD-10-CM

## 2019-01-14 LAB
25(OH)D3 SERPL-MCNC: 11.1 NG/ML (ref 30–100)
ALBUMIN SERPL-MCNC: 3.3 G/DL (ref 3.4–5)
ANION GAP SERPL CALC-SCNC: 7 MMOL/L (ref 3–18)
BUN SERPL-MCNC: 21 MG/DL (ref 7–18)
BUN/CREAT SERPL: 18 (ref 12–20)
CALCIUM SERPL-MCNC: 8.9 MG/DL (ref 8.5–10.1)
CALCIUM SERPL-MCNC: 9.3 MG/DL (ref 8.5–10.1)
CHLORIDE SERPL-SCNC: 111 MMOL/L (ref 100–108)
CO2 SERPL-SCNC: 25 MMOL/L (ref 21–32)
CREAT SERPL-MCNC: 1.18 MG/DL (ref 0.6–1.3)
CREAT UR-MCNC: <13 MG/DL (ref 30–125)
GLUCOSE SERPL-MCNC: 85 MG/DL (ref 74–99)
HCT VFR BLD AUTO: 31.7 % (ref 35–45)
HGB BLD-MCNC: 9.8 G/DL (ref 12–16)
MICROALBUMIN UR-MCNC: 21 MG/DL (ref 0–3)
MICROALBUMIN/CREAT UR-RTO: ABNORMAL MG/G (ref 0–30)
PHOSPHATE SERPL-MCNC: 3.5 MG/DL (ref 2.5–4.9)
POTASSIUM SERPL-SCNC: 4.4 MMOL/L (ref 3.5–5.5)
PTH-INTACT SERPL-MCNC: 262.8 PG/ML (ref 18.4–88)
SODIUM SERPL-SCNC: 143 MMOL/L (ref 136–145)

## 2019-01-14 PROCEDURE — 82043 UR ALBUMIN QUANTITATIVE: CPT

## 2019-01-14 PROCEDURE — 83970 ASSAY OF PARATHORMONE: CPT

## 2019-01-14 PROCEDURE — 82306 VITAMIN D 25 HYDROXY: CPT

## 2019-01-14 PROCEDURE — 80069 RENAL FUNCTION PANEL: CPT

## 2019-01-14 PROCEDURE — 36415 COLL VENOUS BLD VENIPUNCTURE: CPT

## 2019-01-14 PROCEDURE — 85018 HEMOGLOBIN: CPT

## 2019-01-17 ENCOUNTER — HOSPITAL ENCOUNTER (OUTPATIENT)
Dept: WOUND CARE | Age: 79
Discharge: HOME | End: 2019-01-17
Payer: MEDICARE

## 2019-01-17 VITALS
HEART RATE: 74 BPM | SYSTOLIC BLOOD PRESSURE: 163 MMHG | TEMPERATURE: 97 F | RESPIRATION RATE: 15 BRPM | DIASTOLIC BLOOD PRESSURE: 71 MMHG | OXYGEN SATURATION: 96 %

## 2019-01-17 PROCEDURE — 97602 WOUND(S) CARE NON-SELECTIVE: CPT

## 2019-01-17 PROCEDURE — 77030011256 HC DRSG MEPILEX <16IN NO BORD MOLN -A: Performed by: PODIATRIST

## 2019-01-17 PROCEDURE — 77030028895 HC GEL MEDIH TU INLC -A: Performed by: PODIATRIST

## 2019-01-17 NOTE — PROGRESS NOTES
Podiatry Surgery Progress Note Patient: Maru Roblero MRN: [id number]  SSN: [id number] YOB: 1940  Age: 70 y.o. Sex: female Assessment:  
 
Patient Active Problem List  
Diagnosis Code  Atherosclerotic heart disease, s/p CABG X3 in 6/08, in the setting of severe left ventricular dysfunction, and s/p a dual-chamber I25.10  
 S/P CABG x 3 Z95.1  PVC's (premature ventricular contractions) I49.3  
 HTN (hypertension) I10  
 Atrial fibrillation ([facility]) I48.91  
 Carotid stenosis I65.29  
 PAD (peripheral artery disease) ([facility]) I73.9  Intracranial aneurysm - left cavernous ICA on 2014 head/neck CTA I67.1  Mixed hyperlipidemia E78.2  Gastroesophageal reflux disease without esophagitis K21.9  Chronic gastritis with bleeding - in February/March of 2016 per FOBT and EGD results K29.51  
 History of CVA (cerebrovascular accident) Z80.78  
 Vitamin D deficiency E55.9  Dysphagia s/p dilation R13.10  Anemia D64.9  
 Pacemaker (for h/o AV block) Z95.0  
 OAB (overactive bladder) N32.81  
 Cataract H26.9  Gout of left foot M10.9  Seasonal allergic rhinitis J30.2  Microalbuminuria R80.9  CKD (chronic kidney disease) stage 3, GFR 30-59 ml/min ([facility]) N18.3  Mixed connective tissue disease ([facility] M35.1  Severe obesity (BMI 35.0-39. 9) with comorbidity ([address] E66.01  
 Lower limb ulcer, calf, right, with fat layer exposed ([address] T98.977  Generalized edema R60.1 Plan:  
Manual debridement of the slough tissue. Will continue [address] with medihoney and mepilex Q 48 hrs. Will place double tubigrip RLE. She may benefit from Joanna application at her next visit. Total time spent with patient: 45 Minutes Care Plan discussed with: Patient and Nursing Staff Discussed:  Care Plan Disposition:  Stable Ms. Keyon Hay is a 70 y.o. female who was admitted for chronic ulcer RLE.   SHe states she has been changing the dressings with Finsphereney Q48 hrs. She relates she is unable to place the compression stockings on her legs as instructed. She doesn't have strength in her hands to pull them on her elevating her legs as much as possible. Subjective:  
Past Medical History Past Medical History:  
Diagnosis Date  Abnormal ankle brachial index 11/13/2014 Mild arterial disease in right leg. R VICTOR MANUEL 0.88. L VICTOR MANUEL 1.00.  Anemia  Arm DVT (deep venous thromboembolism), acute (HCC)   
 s/p anticoagulation  Atherosclerotic heart disease  Atrial fibrillation ([address]  AV block  CAD (coronary artery disease)  Cardiomyopathy, ischemic  Carotid artery stenosis  Carotid duplex 11/13/2014 Mild <50% bilateral ICA plaquing. Possible left vertebral occlusion.  Cerebral artery occlusion with cerebral infarction ([address] stroke x 2  Chest pain  CVA (cerebral infarction)  Echocardiogram 08/19/2015 EF 55%. Apical inferior, apical septal hypk (new since study of 12/19/11), likely due to chronic V-pacing. Mild LVH. RVSP 30 mmHg. Mild LAE. Mild MR. Mild TR.  Gastritis  GERD (gastroesophageal reflux disease)  Heart failure ([address]  Hiatal hernia  Hyperlipidemia  Hypertension  Hypertensive cardiovascular disease  Intracranial aneurysm   
 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  Nuclear cardiac imaging test 09/24/2015 Low risk. Sm apical infarction w/no ischemia vs apical thinning. Sm basal inferior defect, likely artifact. EF 56%. Inferoseptal, inferoapical WMA related to sternotomy or paced rhythm.  Pacemaker  PAD (peripheral artery disease) ([address]  PVC's   
 chronic  S/P CABG x 3 06/08/2008 LIMA-LAD. SVG-OM. SVG-dRCA  S/P cardiac cath 06/08/2008 pRCA 100%. LM patent. Cx patent. OM1 100%. mLAD 95%. LVEDP 27-29mmHg. EF 20%. mod MR  
 Tilt table evaluation 06/01/1995 Positive for orthostatic hypotension  Vitamin D deficiency Social History Socioeconomic History  Marital status:  Spouse name: Not on file  Number of children: Not on file  Years of education: Not on file  Highest education level: Not on file Social Needs  Financial resource strain: Not on file  Food insecurity - worry: Not on file  Food insecurity - inability: Not on file  Transportation needs - medical: Not on file  Transportation needs - non-medical: Not on file Occupational History  Not on file Tobacco Use  Smoking status: Never Smoker  Smokeless tobacco: Never Used  Tobacco comment: just smoked 2 weeks in college Substance and Sexual Activity  Alcohol use: No  
 Drug use: No  
 Sexual activity: No  
Other Topics Concern [address] Service Not Asked  Blood Transfusions Not Asked  Caffeine Concern Not Asked  Occupational Exposure Not Asked [employer] Hazards Not Asked  Sleep Concern Not Asked  Stress Concern Not Asked  Weight Concern Not Asked  Special Diet Not Asked  Back Care Not Asked  Exercise Not Asked  Bike Helmet Not Asked  Seat Belt Not Asked  Self-Exams Not Asked Social History Narrative  Not on file Current Medications Current Outpatient Medications Medication Sig Dispense Refill  amLODIPine (NORVASC) 5 mg tablet Take 1 Tab by mouth daily. 90 Tab 6  furosemide (LASIX) 40 mg tablet Take 1 Tab by mouth every other day. 1 Tab 0  carvedilol (COREG) 25 mg tablet TAKE 1 TABLET BY MOUTH (2) TIMES A  Tab 3  
 olmesartan (BENICAR) 5 mg tablet TAKE 1 TABLET BY MOUTH EVERY DAY  3  
 hydrocortisone (HYTONE) 2.5 % ointment APPLY LIGHTLY TO THE AFFECTED AREAS ON THE LEGS AND FEET TWICE A DAY. 2  
 aspirin 81 mg tablet Take 81 mg by mouth daily. Patient Allergies Allergies Allergen Reactions  Nifedipine Nausea and Vomiting and Other (comments) Dizzy  Ace Inhibitors Cough  Codeine Unknown (comments), Nausea Only and Nausea and Vomiting  Hydralazine Other (comments) Dizziness and Fatigue  Lipitor [Atorvastatin] Nausea and Vomiting and Vertigo  Simvastatin Nausea and Vomiting Objective:  
General Exam 
alert, cooperative, no distress, appears stated age Vitals Visit Vitals /71 (BP 1 Location: Left arm, BP Patient Position: Sitting) Pulse 74 Temp 97 °F (36.1 °C) Resp 15 SpO2 96% REVIEW OF SYSTEMS: 
General: denies chronic fatigue, weight loss, fever, anemia, bruising, depression, nervousness, panic attacks HEENT: denies ringing in ears, ear infections, dizzy spells, poor vision, glaucoma, sinus trouble, hoarseness, eye infections GI: denies diarrhea, gas, bloating, heartburn, regurgitation, difficulty swallowing, painful swallowing, nausea, vomiting, constipation, abdominal pain, decreased appetite, blood in stools, black stools, jaundice, dark urine Lungs: denies pneumonia, asthma, cough, SOB, hemoptysis Heart: denies chest pain, irregular heart beat, ankle swelling, HTN Skin: denies rashes, hives, allergic reaction Urinary: denies UTI, kidney stones, decreased urine force and flow, urination at night, blood in urine, painful urination Bones and Joints: denies arthritis, rheumatism, back pain, gout, osteoporosis Neurologic: denies stroke, seizures, headaches, numbness, tingling Foot Exam 
VASCULAR EXAM:. Pedal pulses are palpable 1/4 DP 0/4 PT right and left foot, faint monophasic DP/PT right and stronger monophasic DP/PT left Skin temperature is warm to warm right and left foot. Digital capillary fill time is 4 seconds right and left foot.  There is edema of the right and left foot and ankle.  
  
NEUROLOGICAL EXAM:. Sensation Intact with 5.07g monofilament wire right and left foot. Deep tendon reflexes intact and symmetrical on the right and left foot. Babinski is age appropriate bilateral 
  MUSCULOSKELETAL EXAM:. Muscle tone is normal.  Muscle strength of the flexor and extensor group inversion and eversion Bilateral. 5/5.  
  
DERMATOLOGICAL EXAM:. Skin is of abnormal texture and turgor with atrophic skin changes noting hair growth, nail changes (thickening), Bilateral. There is a ulcer full thickness noted lateral aspect of the RLE/calf.  There is noted increase epithelialization at the 1-3o'clock position. Measurements 1.5 x 1.2 x 0.2 cm down from 1.9 x 1.6x 0.5 cm with small area of slough at the 8-11 o'clock position with increased granulation tissue Ulcer Ulcer Location: R lower leg lateral, R lower leg anterior, Ulcer measurements: 1.5 x 1.2 x 0.2 and Ulcer base: Mixed Granular/Fibrotic Carlos Scale: Stage 2 Wound Leg Lower Right;Lateral (Active) DRESSING STATUS Removed 12/20/2018  9:49 AM  
DRESSING TYPE Other (Comment) 12/20/2018  9:49 AM  
Non-Pressure Injury Full thickness (subcut/muscle) 12/20/2018  9:49 AM  
Wound Length (cm) 1.9 cm 12/20/2018 10:14 AM  
Wound Width (cm) 1.7 cm 12/20/2018 10:14 AM  
Wound Depth (cm) 0.2 12/20/2018 10:14 AM  
Wound Surface area (cm^2) 3.23 cm^2 12/20/2018 10:14 AM  
Change in Wound Size % 26.76 12/20/2018 10:14 AM  
Condition of Base Granulation;Slough 12/20/2018  9:49 AM  
Condition of Edges Open 12/20/2018  9:49 AM  
Tissue Type Red;Yellow 12/20/2018  9:49 AM  
Tissue Type Percent Red 50 12/20/2018  9:49 AM  
Tissue Type Percent Yellow 50 12/20/2018  9:49 AM  
Drainage Amount  Moderate 12/20/2018  9:49 AM  
Drainage Color Serous 12/20/2018  9:49 AM  
Wound Odor None 12/20/2018  9:49 AM  
Periwound Skin Condition Intact 12/20/2018  9:49 AM  
Cleansing and Cleansing Agents  Dermal wound cleanser 12/20/2018  9:49 AM  
Dressing Type Applied Other (Comment) 12/20/2018 10:14 AM  
 Wound Procedure Type Selective Debridement 12/20/2018 10:14 AM  
Procedure Time Out 1015 12/20/2018 10:14 AM  
Consent Obtained  Yes 12/20/2018 10:14 AM  
Procedure Instrument Currette 11/15/2018 10:46 AM  
Procedure Bleeding Yes 12/20/2018 10:14 AM  
Procedure Pain Scale Numeric 2/10 11/30/2018  8:10 AM  
Post-Procedure Length (cm) 2.1 cm 11/15/2018 10:50 AM  
Post-Procedure Width (cm) 2.2 cm 11/15/2018 10:50 AM  
Post-Procedure Depth (cm) 0.5 cm 11/15/2018 10:50 AM  
Post-Procedure Volume (cm^3) 2.31 cm^3 11/15/2018 10:50 AM  
Post-Procedure Surface Area (cm^2) 4.62 cm^2 11/15/2018 10:50 AM  
Post Procedure Procedure Pain Scale Numeric 0/10 11/15/2018 10:50 AM  
Procedure Tolerated Well 12/20/2018 10:14 AM  
Number of days: 77 Labs No results found for this or any previous visit (from the past 24 hour(s)). X-Ray:  none Procedures:   none Kain Irizarry DPM 
January 17, 2019

## 2019-01-22 NOTE — WOUND CARE
Wound/Ostomy Nurse Progress Note Patient: Sherita Pearson TUK:[id number] MRN: [id number] Situation: Wound care follow up with Dr. Will Starks. Background: Slow healing venous ulcer of the right lower leg. Assessment: Her ulcer has improved. The wound is measuring smaller. She has been doing her own dressings of Medihoney and bordered foam. The same dressing was applied today. Recommendation: Continue with Medihoney and foam dressings 3x/week. Follow up in two weeks with Dr. Will Starks.

## 2019-01-23 ENCOUNTER — OFFICE VISIT (OUTPATIENT)
Dept: CARDIOLOGY CLINIC | Age: 79
End: 2019-01-23

## 2019-01-23 VITALS
SYSTOLIC BLOOD PRESSURE: 142 MMHG | BODY MASS INDEX: 36.19 KG/M2 | HEIGHT: 64 IN | HEART RATE: 61 BPM | OXYGEN SATURATION: 99 % | WEIGHT: 212 LBS | DIASTOLIC BLOOD PRESSURE: 80 MMHG

## 2019-01-23 DIAGNOSIS — I10 ESSENTIAL HYPERTENSION: ICD-10-CM

## 2019-01-23 DIAGNOSIS — E78.2 MIXED HYPERLIPIDEMIA: ICD-10-CM

## 2019-01-23 DIAGNOSIS — I25.10 ATHEROSCLEROSIS OF NATIVE CORONARY ARTERY OF NATIVE HEART WITHOUT ANGINA PECTORIS: Primary | ICD-10-CM

## 2019-01-23 DIAGNOSIS — I49.3 PVC'S (PREMATURE VENTRICULAR CONTRACTIONS): ICD-10-CM

## 2019-01-23 RX ORDER — FUROSEMIDE 20 MG/1
20 TABLET ORAL DAILY
COMMUNITY
End: 2019-05-23

## 2019-01-23 RX ORDER — CHOLECALCIFEROL (VITAMIN D3) 125 MCG
2000 CAPSULE ORAL DAILY
COMMUNITY
End: 2019-05-23

## 2019-01-23 RX ORDER — DICLOFENAC SODIUM 10 MG/G
GEL TOPICAL
Refills: 3 | COMMUNITY
Start: 2019-01-16 | End: 2020-01-01 | Stop reason: SDUPTHER

## 2019-01-23 NOTE — PATIENT INSTRUCTIONS
Change lasix to 20mg once daily   Weigh daily report weight change of 3lbs or more, (contact office in 2 weeks to report weights)   follow up 6 months

## 2019-01-23 NOTE — PROGRESS NOTES
HPI: I saw Shazia Flowers in my office today in cardiovascular evaluation regarding her ischemic cardiomyopathy. Ms. Nestor Lee is a very pleasant 67 year old obese  female with history of hypertension, hyperlipidemia, gastroesophageal reflux disease, and a CVA in the past. She also has history of coronary artery disease, status post coronary artery bypass grafting surgery x three in June of 2008 by Dr. Jhoan Lewis with the following bypasses:      1. Left internal mammary artery to the LAD. 2. Reverse saphenous vein graft to the first obtuse marginal branch of the circumflex. 3. Reverse saphenous vein graft to the distal right coronary artery.      She developed a deep vein thrombosis in her left arm after surgery and had to be on Coumadin for a number of months, but due to the fact that she was not having any paroxysmal atrial fibrillation and we were having trouble keeping her INRs controlled, we decided to take her off Coumadin a couple of years ago. She has had some problems with AV block and a dual chamber pacemaker was originally placed in October of 2008 with a generator change in February of 2016 by my associate Dr. Bettye Echevarria.       She comes in today and relates that she is doing reasonably well. She does have some shortness of breath with exertion which she thinks may be getting a little worse recently and has had some peripheral edema issues which she thinks is remaining about the same. He does have some borderline stage III chronic kidney disease, but her most recent creatinine is down from 1.31 to 1. 18. She denies any other cardiovascular symptomatology except for mild chronic orthopnea. .  She does have some chest pain issues but these clearly sound noncardiac. Encounter Diagnoses   Name Primary?     Atherosclerosis of native coronary artery of native heart without angina pectoris Yes    Essential hypertension     PVC's (premature ventricular contractions)     Mixed hyperlipidemia        Discussion: This patient appears to be doing about as well as we could expect except for the fact that she is showing some signs of fluid overload and I am going to change her Lasix from 40 mg every other day to 20 mg every day and have her weigh daily first thing in the morning and call us if her weight goes up or down by 3 pounds or call us in 2 weeks to let us know what her weight has been doing and then we will adjust her diuretics at that time. I do not have a copy of her most recent lipid profile, but historically her cholesterols been mildly elevated unable to take statins due to side effects. She does have a pacemaker and her most recent evaluation on November 30, 2018 revealed that she was A paced 41% of the time V paced 99.3 % of the time with an estimated longevity of 6.4 years on the battery. Her blood pressure was mildly elevated today, but I am not can make any changes in her medications follow-up with primary care team management to see her again in several months. PCP:  Geovanny Jones MD       Past Medical History:   Diagnosis Date    Abnormal ankle brachial index 11/13/2014    Mild arterial disease in right leg. R VICTOR MANUEL 0.88. L VICTOR MANUEL 1.00.  Anemia     Arm DVT (deep venous thromboembolism), acute ([facility])     s/p anticoagulation    Atherosclerotic heart disease     Atrial fibrillation ([facility])     AV block     CAD (coronary artery disease)     Cardiomyopathy, ischemic     Carotid artery stenosis     Carotid duplex 11/13/2014    Mild <50% bilateral ICA plaquing. Possible left vertebral occlusion.  Cerebral artery occlusion with cerebral infarction ([facility])     stroke x 2    Chest pain     CVA (cerebral infarction)     Echocardiogram 08/19/2015    EF 55%. Apical inferior, apical septal hypk (new since study of 12/19/11), likely due to chronic V-pacing. Mild LVH. RVSP 30 mmHg. Mild LAE. Mild MR. Mild TR.       Gastritis     GERD (gastroesophageal reflux disease)     [facility] failure [facility]     Hiatal hernia     Hyperlipidemia     Hypertension     Hypertensive cardiovascular disease     Intracranial aneurysm     left cavernous ICA 2mm aneurysm from head/neck CTA in 2014    Long term (current) use of anticoagulants 3/10/2011    Lower extremity venous duplex 2015    No DVT bilaterally.  Nuclear cardiac imaging test 2015    Low risk. Sm apical infarction w/no ischemia vs apical thinning. Sm basal inferior defect, likely artifact. EF 56%. Inferoseptal, inferoapical WMA related to sternotomy or paced rhythm.  Pacemaker     PAD (peripheral artery disease) (HCC)     PVC's     chronic    S/P CABG x 3 2008    LIMA-LAD. SVG-OM. SVG-dRCA     S/P cardiac cath 2008    pRCA 100%. LM patent. Cx patent. OM1 100%. mLAD 95%. LVEDP 27-29mmHg. EF 20%. mod MR    Tilt table evaluation 1995    Positive for orthostatic hypotension    Vitamin D deficiency          Past Surgical History:   Procedure Laterality Date    CABG, ARTERY-VEIN, THREE  2008    CARDIAC SURG PROCEDURE UNLIST      medtronic dual-chamber cardioverted-defibrillator    HX  SECTION      x2    HX ENDOSCOPY  3/1/16    HX ENDOSCOPY  3/1/16    HX HEMORRHOIDECTOMY          HX HYSTERECTOMY          HX ORTHOPAEDIC      knee surgery    HX OTHER SURGICAL  2/10/16    Pacemaker Gen Change    HX PACEMAKER      Defibrillator     HX TUMOR REMOVAL      removed from arm.  benign     Current Outpatient Medications   Medication Sig    diclofenac (VOLTAREN) 1 % gel USE 4 GRAMS ON KNEE FOUR TIMES A DAY AS NEEDED    cholecalciferol, vitamin D3, (VITAMIN D3) 2,000 unit tab Take  by mouth.  amLODIPine (NORVASC) 5 mg tablet Take 1 Tab by mouth daily.  furosemide (LASIX) 40 mg tablet Take 1 Tab by mouth every other day.     carvedilol (COREG) 25 mg tablet TAKE 1 TABLET BY MOUTH (2) TIMES A DAY    olmesartan (BENICAR) 5 mg tablet TAKE 1 TABLET BY MOUTH EVERY DAY    hydrocortisone (HYTONE) 2.5 % ointment APPLY LIGHTLY TO THE AFFECTED AREAS ON THE LEGS AND FEET TWICE A DAY.  aspirin 81 mg tablet Take 81 mg by mouth daily. No current facility-administered medications for this visit. Allergies   Allergen Reactions    Nifedipine Nausea and Vomiting and Other (comments)     Dizzy    Ace Inhibitors Cough    Codeine Unknown (comments), Nausea Only and Nausea and Vomiting    Hydralazine Other (comments)     Dizziness and Fatigue    Lipitor [Atorvastatin] Nausea and Vomiting and Vertigo    Simvastatin Nausea and Vomiting         Social History     Socioeconomic History    Marital status:      Spouse name: Not on file    Number of children: Not on file    Years of education: Not on file    Highest education level: Not on file   Tobacco Use    Smoking status: Never Smoker    Smokeless tobacco: Never Used    Tobacco comment: just smoked 2 weeks in college   Substance and Sexual Activity    Alcohol use: No    Drug use: No    Sexual activity: No   Other Topics Concern       No family history on file. Review of Systems:   Constitutional: Positive for chills. Negative for fever, malaise/fatigue and weight loss. Respiratory: Positive for shortness of breath. Negative for cough, hemoptysis and wheezing. Cardiovascular: Positive for chest pain, orthopnea and leg swelling. Negative for palpitations. Gastrointestinal: Negative. Musculoskeletal: Positive for falls, joint pain and myalgias. Neurological: Negative for dizziness. Physical Exam:   The patient is an alert, oriented, well developed, well nourished 70 y.o.  female who was in no acute distress at the time of my examination. There were no vitals taken for this visit.    BP Readings from Last 3 Encounters:   01/17/19 163/71   12/20/18 174/77   11/20/18 160/86        Wt Readings from Last 3 Encounters:   11/20/18 98 kg (216 lb)   11/09/18 99.1 kg (218 lb 6.4 oz)   10/25/18 101 kg (222 lb 9.6 oz)       HEENT: Conjunctivae white, mucosa moist, no pallor or cyanosis. Neck: Supple without masses, tenderness or thyromegaly. No jugular venous distention. Carotid upstrokes are full bilaterally, without bruits. Chest: symmetrical with good excursion. Cardiovascular: Well-healed incision in left upper chest over pacer-defibrillator insertion site with some keloid and some tenderness over the site. Mid-sternotomy scar with some keloid. Lyons is displaced between the MCL and AAL. No lifts, heaves, or thrills felt on palpation. Normal S1 and S2, with a grade I-II/VI apical systolic murmur without radiation and without appreciable diastolic murmur, rubs, clicks or gallops   Lungs: Clear to auscultation in all fields. Abdomen: Soft, with no masses, tenderness or organomegaly. Extremities: Thick lower legs with 1 + edema in lower extremities to mid calves and some decrease in peripheral pulses. Review of Data: Please refer to past medical history for most recent cardiac testing. Lab Results   Component Value Date/Time    Cholesterol, total 215 (H) 07/10/2017 11:17 AM    HDL Cholesterol 93 (H) 07/10/2017 11:17 AM    LDL, calculated 108.6 (H) 07/10/2017 11:17 AM    VLDL, calculated 13.4 07/10/2017 11:17 AM    Triglyceride 67 07/10/2017 11:17 AM    CHOL/HDL Ratio 2.3 07/10/2017 11:17 AM       Results for orders placed or performed in visit on 05/08/18   AMB POC EKG ROUTINE W/ 12 LEADS, INTER & REP     Status: None    Narrative    Normal sinus mechanism with atrial sensing and ventricular pacing and occasional PAC's. Compared to the EKG of October 24, 2017 there was a little interval change. Kimball Cranker, D.O., F.A.C.C. [facility]  [facility]  [address] [place] [address]. [address] [address]    PLEASE NOTE:  This document has been produced using voice recognition software. Unrecognized errors in transcription may be present.

## 2019-01-25 ENCOUNTER — OFFICE VISIT (OUTPATIENT)
Dept: INTERNAL MEDICINE CLINIC | Age: 79
End: 2019-01-25

## 2019-01-25 VITALS
HEIGHT: 64 IN | OXYGEN SATURATION: 98 % | RESPIRATION RATE: 14 BRPM | DIASTOLIC BLOOD PRESSURE: 72 MMHG | WEIGHT: 212.2 LBS | HEART RATE: 61 BPM | BODY MASS INDEX: 36.23 KG/M2 | TEMPERATURE: 96 F | SYSTOLIC BLOOD PRESSURE: 142 MMHG

## 2019-01-25 DIAGNOSIS — S81.801D WOUND OF RIGHT LOWER EXTREMITY, SUBSEQUENT ENCOUNTER: ICD-10-CM

## 2019-01-25 DIAGNOSIS — E21.3 HYPERPARATHYROIDISM (HCC): ICD-10-CM

## 2019-01-25 DIAGNOSIS — D64.9 ANEMIA, UNSPECIFIED TYPE: Primary | ICD-10-CM

## 2019-01-25 DIAGNOSIS — M35.1 MIXED CONNECTIVE TISSUE DISEASE (HCC): ICD-10-CM

## 2019-01-25 DIAGNOSIS — N18.30 CKD (CHRONIC KIDNEY DISEASE) STAGE 3, GFR 30-59 ML/MIN (HCC): ICD-10-CM

## 2019-01-25 DIAGNOSIS — E55.9 VITAMIN D DEFICIENCY: ICD-10-CM

## 2019-01-25 DIAGNOSIS — E78.2 MIXED HYPERLIPIDEMIA: ICD-10-CM

## 2019-01-25 DIAGNOSIS — I10 ESSENTIAL HYPERTENSION: ICD-10-CM

## 2019-01-25 RX ORDER — FUROSEMIDE 40 MG/1
TABLET ORAL
Refills: 3 | Status: ON HOLD | COMMUNITY
Start: 2019-01-15 | End: 2019-05-23 | Stop reason: SDUPTHER

## 2019-01-25 NOTE — PROGRESS NOTES
Chief Complaint Patient presents with  Hypertension  
  follow up  Anemia  
  follow up  Vitamin D Deficiency  
  follow up 1. Have you been to the ER, urgent care clinic since your last visit? Hospitalized since your last visit? No 
 
2. Have you seen or consulted any other health care providers outside of the [address] [place] [address] since your last visit? Include any pap smears or colon screening. No 
 
 
Health Maintenance Due Topic Date Due  Shingrix Vaccine Age 50> (1 of 2) 04/02/1992  Pneumococcal 65+ High/Highest Risk (2 of 2 - PPSV23) 09/04/2017  COLONOSCOPY  01/25/2018  Influenza Age 5 to Adult  08/01/2018

## 2019-01-25 NOTE — PATIENT INSTRUCTIONS
Health Maintenance Due Topic Date Due  Shingrix Vaccine Age 50> (1 of 2) 04/02/1992  Pneumococcal 65+ High/Highest Risk (2 of 2 - PPSV23) 09/04/2017  COLONOSCOPY  01/25/2018  Influenza Age 5 to Adult  08/01/2018 Body Mass Index: Care Instructions Your Care Instructions Body mass index (BMI) can help you see if your weight is raising your risk for health problems. It uses a formula to compare how much you weigh with how tall you are. · A BMI lower than 18.5 is considered underweight. · A BMI between 18.5 and 24.9 is considered healthy. · A BMI between 25 and 29.9 is considered overweight. A BMI of 30 or higher is considered obese. If your BMI is in the normal range, it means that you have a lower risk for weight-related health problems. If your BMI is in the overweight or obese range, you may be at increased risk for weight-related health problems, such as high blood pressure, heart disease, stroke, arthritis or joint pain, and diabetes. If your BMI is in the underweight range, you may be at increased risk for health problems such as fatigue, lower protection (immunity) against illness, muscle loss, bone loss, hair loss, and hormone problems. BMI is just one measure of your risk for weight-related health problems. You may be at higher risk for health problems if you are not active, you eat an unhealthy diet, or you drink too much alcohol or use tobacco products. Follow-up care is a key part of your treatment and safety. Be sure to make and go to all appointments, and call your doctor if you are having problems. It's also a good idea to know your test results and keep a list of the medicines you take. How can you care for yourself at home? · Practice healthy eating habits. This includes eating plenty of fruits, vegetables, whole grains, lean protein, and low-fat dairy. · If your doctor recommends it, get more exercise.  Walking is a good choice. Bit by bit, increase the amount you walk every day. Try for at least 30 minutes on most days of the week. · Do not smoke. Smoking can increase your risk for health problems. If you need help quitting, talk to your doctor about stop-smoking programs and medicines. These can increase your chances of quitting for good. · Limit alcohol to 2 drinks a day for men and 1 drink a day for women. Too much alcohol can cause health problems. If you have a BMI higher than 25 · Your doctor may do other tests to check your risk for weight-related health problems. This may include measuring the distance around your waist. A waist measurement of more than 40 inches in men or 35 inches in women can increase the risk of weight-related health problems. · Talk with your doctor about steps you can take to stay healthy or improve your health. You may need to make lifestyle changes to lose weight and stay healthy, such as changing your diet and getting regular exercise. If you have a BMI lower than 18.5 · Your doctor may do other tests to check your risk for health problems. · Talk with your doctor about steps you can take to stay healthy or improve your health. You may need to make lifestyle changes to gain or maintain weight and stay healthy, such as getting more healthy foods in your diet and doing exercises to build muscle. Where can you learn more? Go to http://jacquelyn-alberto.info/. Enter S176 in the search box to learn more about \"Body Mass Index: Care Instructions. \" Current as of: June 25, 2018 Content Version: 11.9 © 2953-1340 [employer]. Care instructions adapted under license by [employer] (which disclaims liability or warranty for this information). If you have questions about a medical condition or this instruction, always ask your healthcare professional. [place] [address] any warranty or liability for your use of this information.

## 2019-01-25 NOTE — PROGRESS NOTES
INTERNISTS OF [facility]: 
1/25/2019, MRN: [id number] Emily Alba is a 70 y.o. female and presents to clinic for Hypertension (follow up); Anemia (follow up); and Vitamin D Deficiency (follow up) Subjective:  
Patient is a 56-year-old -American female with history of allergic rhinitis, gout, cataract, overactive bladder, hyperlipidemia, GERD, mixed connective tissue disease (positive FOREST, RNP Ab, Sjogren's Ab, anti-chromatin Ab, and Anti-Parnell Ab), chronic gastritis with bleeding in March 2016, history of stroke, vitamin D deficiency, dysphasia status post dilation, anemia, pacemaker for history of AV block, left cavernous ICA aneurysm, hypertension, atrial fibrillation, carotid stenosis, peripheral artery disease, PVCs, DVT hx (LUE - postoperatively), and CAD status post CABG. 1. HTN: Present >3 months. Her BP manually today is 142/72. Followed by Nephrology and Cardiology. She recently had her lasix dose reduced to 20mg every day. She checks her weight daily. She cut back on her soda intake. +Lost weight. She also takes amlodipine, coreg, and benicar. No adverse side effects from her rx. Wt Readings from Last 3 Encounters:  
01/25/19 212 lb 3.2 oz (96.3 kg) 01/23/19 212 lb (96.2 kg)  
11/20/18 216 lb (98 kg) 2. RLE Wound: Present for several months. Followed by Wound Care - . She completed abx (keflex and doxycycline). No fever/chills. Sx are improving. 3. Vitamin D Deficiency, Anemia, Mixed Connective Tissue Disease, and CKD: Her most recent labs shows vitamin D deficiency. Her vitamin D level is 11. Her PTH is elevated. She could not tolerate prescription strength vitamin D so she has been taking OTC vitamin D. Her most recent creatinine was 1.18. Her most recent calcium level was normal earlier this month. Her vitamin D levels are followed by Nephrology - .   Mostly she has a history of suspected mixed connective tissue disease as she has been positive for FOREST, RNP antibody, Sjogren's antibody, anti-chromatin antibody, and anti-Parnell antibody. She is presently asymptomatic but has had bouts of AK I. Her most recent creatinine as mentioned above was within normal limits earlier this month. This is the reason she is followed by Nephrology. She also has anemia. She has a history of chronic gastritis. She was referred to GI but has yet to have her colonoscopy done. She plans to reschedule her colonoscopy. She canceled it 2/2 being out of town. No hematochezia/melena since her last apt. Patient Active Problem List  
 Diagnosis Date Noted  Lower limb ulcer, calf, right, with fat layer exposed ([address] 11/01/2018  Generalized edema 11/01/2018  Severe obesity (BMI 35.0-39. 9) with comorbidity ([address] 05/08/2018  Mixed connective tissue disease ([address] 08/22/2017  Microalbuminuria 07/10/2017  CKD (chronic kidney disease) stage 3, GFR 30-59 ml/min ([facility]) 07/10/2017  Seasonal allergic rhinitis 11/23/2016  Gout of left foot 11/22/2016  Cataract 08/09/2016  
 OAB (overactive bladder) 07/06/2016  Mixed hyperlipidemia 07/05/2016  Gastroesophageal reflux disease without esophagitis 07/05/2016  Chronic gastritis with bleeding - in February/March of 2016 per FOBT and EGD results 07/05/2016  History of CVA (cerebrovascular accident) 07/05/2016  Vitamin D deficiency 07/05/2016  Dysphagia s/p dilation 07/05/2016  Anemia 07/05/2016  Pacemaker (for h/o AV block) 07/05/2016  Intracranial aneurysm - left cavernous ICA on 2014 head/neck CTA 12/01/2014  
 HTN (hypertension) 10/13/2014  Atrial fibrillation ([address] 10/13/2014  Carotid stenosis 10/13/2014  PAD (peripheral artery disease) ([address] 10/13/2014  PVC's (premature ventricular contractions) 12/09/2011  Atherosclerotic heart disease, s/p CABG X3 in 6/08, in the setting of severe left ventricular dysfunction, and s/p a dual-chamber  S/P CABG x 3   
 
 
 Current Outpatient Medications Medication Sig Dispense Refill  diclofenac (VOLTAREN) 1 % gel USE 4 GRAMS ON KNEE FOUR TIMES A DAY AS NEEDED  3  
 cholecalciferol, vitamin D3, (VITAMIN D3) 2,000 unit tab Take 2,000 Units by mouth daily.  furosemide (LASIX) 20 mg tablet Take 20 mg by mouth daily.  amLODIPine (NORVASC) 5 mg tablet Take 1 Tab by mouth daily. 90 Tab 6  carvedilol (COREG) 25 mg tablet TAKE 1 TABLET BY MOUTH (2) TIMES A  Tab 3  
 olmesartan (BENICAR) 5 mg tablet TAKE 1 TABLET BY MOUTH EVERY DAY  3  
 hydrocortisone (HYTONE) 2.5 % ointment APPLY LIGHTLY TO THE AFFECTED AREAS ON THE LEGS AND FEET TWICE A DAY. 2  
 aspirin 81 mg tablet Take 81 mg by mouth daily.  furosemide (LASIX) 40 mg tablet TAKE 1 TABLET BY MOUTH EVERY DAY  3 Allergies Allergen Reactions  Nifedipine Nausea and Vomiting and Other (comments) Dizzy  Ace Inhibitors Cough  Codeine Unknown (comments), Nausea Only and Nausea and Vomiting  Hydralazine Other (comments) Dizziness and Fatigue  Lipitor [Atorvastatin] Nausea and Vomiting and Vertigo  Simvastatin Nausea and Vomiting Past Medical History:  
Diagnosis Date  Abnormal ankle brachial index 11/13/2014 Mild arterial disease in right leg. R VICTOR MANUEL 0.88. L VICTOR MANUEL 1.00.  Anemia  Arm DVT (deep venous thromboembolism), acute (HCC)   
 s/p anticoagulation  Atherosclerotic heart disease  Atrial fibrillation ([address]  AV block  CAD (coronary artery disease)  Cardiomyopathy, ischemic  Carotid artery stenosis  Carotid duplex 11/13/2014 Mild <50% bilateral ICA plaquing. Possible left vertebral occlusion.  Cerebral artery occlusion with cerebral infarction ([address] stroke x 2  Chest pain  CVA (cerebral infarction)  Echocardiogram 08/19/2015 EF 55%.   Apical inferior, apical septal hypk (new since study of 11), likely due to chronic V-pacing. Mild LVH. RVSP 30 mmHg. Mild LAE. Mild MR. Mild TR.  Gastritis  GERD (gastroesophageal reflux disease)  Heart failure ([address]  Hiatal hernia  Hyperlipidemia  Hypertension  Hypertensive cardiovascular disease  Intracranial aneurysm   
 left cavernous ICA 2mm aneurysm from head/neck CTA in   Long term (current) use of anticoagulants 3/10/2011  Lower extremity venous duplex 2015 No DVT bilaterally.  Nuclear cardiac imaging test 2015 Low risk. Sm apical infarction w/no ischemia vs apical thinning. Sm basal inferior defect, likely artifact. EF 56%. Inferoseptal, inferoapical WMA related to sternotomy or paced rhythm.  Pacemaker  PAD (peripheral artery disease) ([address]  PVC's   
 chronic  S/P CABG x 3 2008 LIMA-LAD. SVG-OM. SVG-dRCA  S/P cardiac cath 2008 pRCA 100%. LM patent. Cx patent. OM1 100%. mLAD 95%. LVEDP 27-29mmHg. EF 20%. mod MR  
 Tilt table evaluation 1995 Positive for orthostatic hypotension  Vitamin D deficiency Past Surgical History:  
Procedure Laterality Date  CABG, ARTERY-VEIN, THREE  2008  CARDIAC SURG PROCEDURE UNLIST    
 medtronic dual-chamber cardioverted-defibrillator  HX  SECTION    
 x2  
 HX ENDOSCOPY  3/1/16  HX ENDOSCOPY  3/1/16 [facility]  HX HYSTERECTOMY   HX ORTHOPAEDIC    
 knee surgery  HX OTHER SURGICAL  2/10/16 Pacemaker Gen Change  HX PACEMAKER Defibrillator   HX TUMOR REMOVAL    
 removed from arm.  benign No family history on file. Social History Tobacco Use  Smoking status: Never Smoker  Smokeless tobacco: Never Used  Tobacco comment: just smoked 2 weeks in college Substance Use Topics  Alcohol use: No  
 
 
ROS Review of Systems Constitutional: Negative for chills and fever. HENT: Negative for ear pain and sore throat. Eyes: Negative for blurred vision and pain. Respiratory: Positive for shortness of breath (mildly SOB - but followed by Cardiology). Negative for cough. Cardiovascular: Negative for chest pain. Gastrointestinal: Negative for abdominal pain, blood in stool and melena. Genitourinary: Negative for dysuria and hematuria. Musculoskeletal: Negative for joint pain and myalgias. Skin: Positive for rash (RLE wound is mildly TTP only when she puts topical recommended rx on it per her Wound Care team). Neurological: Negative for tingling, focal weakness and headaches. Endo/Heme/Allergies: Does not bruise/bleed easily. Psychiatric/Behavioral: Negative for substance abuse. Objective Vitals:  
 01/25/19 4264 BP: 173/76 Pulse: 61 Resp: 12 Temp: 96 °F (35.6 °C) TempSrc: Oral  
SpO2: 98% Weight: 212 lb 3.2 oz (96.3 kg) Height: 5' 4\" (1.626 m) PainSc:   2 PainLoc: Leg Physical Exam  
Constitutional: She is oriented to person, place, and time and well-developed, well-nourished, and in no distress. HENT:  
Head: Normocephalic and atraumatic. Right Ear: External ear normal.  
Left Ear: External ear normal.  
Nose: Nose normal.  
Mouth/Throat: Oropharynx is clear and moist. No oropharyngeal exudate. Eyes: Conjunctivae and EOM are normal. Pupils are equal, round, and reactive to light. Right eye exhibits no discharge. Left eye exhibits no discharge. No scleral icterus. Neck: Neck supple. Cardiovascular: Normal rate, regular rhythm, normal heart sounds and intact distal pulses. Exam reveals no gallop and no friction rub. No murmur heard. Pulmonary/Chest: Effort normal and breath sounds normal. No respiratory distress. She has no wheezes. She has no rales. Abdominal: Soft. Bowel sounds are normal. She exhibits no distension. There is no tenderness. There is no rebound and no guarding. Musculoskeletal: She exhibits no edema or tenderness (BUE). Lymphadenopathy:  
  She has no cervical adenopathy. Neurological: She is alert and oriented to person, place, and time. She exhibits normal muscle tone. Gait normal.  
Skin: Skin is warm and dry. No erythema. There is a dressing on her right lower extremity wound site. There is no surrounding erythema or drainage. Psychiatric: Affect normal.  
Nursing note and vitals reviewed. LABS Data Review:  
Lab Results Component Value Date/Time WBC 7.1 11/04/2018 06:00 PM  
 HGB 9.8 (L) 01/14/2019 11:55 AM  
 HCT 31.7 (L) 01/14/2019 11:55 AM  
 PLATELET 822 23/13/6336 06:00 PM  
 MCV 87.1 11/04/2018 06:00 PM  
 
 
Lab Results Component Value Date/Time Sodium 143 01/14/2019 11:55 AM  
 Potassium 4.4 01/14/2019 11:55 AM  
 Chloride 111 (H) 01/14/2019 11:55 AM  
 CO2 25 01/14/2019 11:55 AM  
 Anion gap 7 01/14/2019 11:55 AM  
 Glucose 85 01/14/2019 11:55 AM  
 BUN 21 (H) 01/14/2019 11:55 AM  
 Creatinine 1.18 01/14/2019 11:55 AM  
 BUN/Creatinine ratio 18 01/14/2019 11:55 AM  
 GFR est AA 54 (L) 01/14/2019 11:55 AM  
 GFR est non-AA 45 (L) 01/14/2019 11:55 AM  
 Calcium 8.9 01/14/2019 11:55 AM  
 Calcium 9.3 01/14/2019 11:55 AM  
 
 
Lab Results Component Value Date/Time Cholesterol, total 215 (H) 07/10/2017 11:17 AM  
 HDL Cholesterol 93 (H) 07/10/2017 11:17 AM  
 LDL, calculated 108.6 (H) 07/10/2017 11:17 AM  
 VLDL, calculated 13.4 07/10/2017 11:17 AM  
 Triglyceride 67 07/10/2017 11:17 AM  
 CHOL/HDL Ratio 2.3 07/10/2017 11:17 AM  
 
 
No results found for: HBA1C, HGBE8, FYA6VLCS, [id number] Assessment/Plan: 1. Essential Hypertension: +AF and HLD 
Checking a CMP, lipid panel, and urine protein screen just before her follow-up appointment. Continue with Rx as prescribed. Continue to follow-up with Cardiology. ORDERS: 
- METABOLIC PANEL, COMPREHENSIVE; Future - LIPID PANEL; Future - MICROALBUMIN, UR, RAND W/ MICROALB/CREAT RATIO; Future 2. CKD (chronic kidney disease) stage 3, GFR 30-59 ml/min:  
- Checking a CMP and lipid panel just before her follow-up appointment. - C/w Nephrology f/u ORDERS: 
- METABOLIC PANEL, COMPREHENSIVE; Future - LIPID PANEL; Future 3. Mixed connective tissue disease: Asymptomatic. Checking a CMP, CBC, and a CRP just before her follow-up appointment. ORDERS: 
- METABOLIC PANEL, COMPREHENSIVE; Future - CBC WITH AUTOMATED DIFF; Future - C REACTIVE PROTEIN, QT; Future 4 Anemia:  
I encouraged her to get her colonoscopy. I will check a CMP, CBC, iron studies, and a CRP just before her follow-up appointment. ORDERS: 
- METABOLIC PANEL, COMPREHENSIVE; Future - CBC WITH AUTOMATED DIFF; Future 
- IRON PROFILE; Future - FERRITIN; Future - C REACTIVE PROTEIN, QT; Future 5. Vitamin D deficiency: +Elevated PTH. Normal creatinine. Checking a CMP, PTH, and a vitamin D level. Okay to continue with vitamin D over-the-counter as she cannot tolerate prescription strength vitamin D. 
 
ORDERS: 
- METABOLIC PANEL, COMPREHENSIVE; Future 
- PTH INTACT; Future - VITAMIN D, 25 HYDROXY; Future 6. RLE Wound:  
 Continue to follow with wound care. 7. Health Maintenance: She declined all vaccinations. Health Maintenance Due Topic Date Due  Shingrix Vaccine Age 50> (1 of 2) 04/02/1992  Pneumococcal 65+ High/Highest Risk (2 of 2 - PPSV23) 09/04/2017  COLONOSCOPY  01/25/2018  Influenza Age 5 to Adult  08/01/2018 Lab review: labs are reviewed in the EHR and ordered as mentioned above I have discussed the diagnosis with the patient and the intended plan as seen in the above orders. The patient has received an after-visit summary and questions were answered concerning future plans. I have discussed medication side effects and warnings with the patient as well.  I have reviewed the plan of care with the patient, accepted their input and they are in agreement with the treatment goals. All questions were answered. The patient understands the plan of care. Handouts provided today with above information. Pt instructed if symptoms worsen to call the office or report to the ED for continued care. Greater than 50% of the visit time was spent in counseling and/or coordination of care. Voice recognition was used to generate this report, which may have resulted in some phonetic based errors in grammar and contents. Even though attempts were made to correct all the mistakes, some may have been missed, and remained in the body of the document. Follow-up Disposition: Not on File Samantha Olvera MD

## 2019-01-31 ENCOUNTER — HOSPITAL ENCOUNTER (OUTPATIENT)
Dept: WOUND CARE | Age: 79
Discharge: HOME | End: 2019-01-31
Payer: MEDICARE

## 2019-01-31 VITALS
DIASTOLIC BLOOD PRESSURE: 81 MMHG | SYSTOLIC BLOOD PRESSURE: 174 MMHG | RESPIRATION RATE: 16 BRPM | HEART RATE: 60 BPM | OXYGEN SATURATION: 98 % | TEMPERATURE: 96.7 F

## 2019-01-31 PROCEDURE — 15271 SKIN SUB GRAFT TRNK/ARM/LEG: CPT

## 2019-01-31 RX ORDER — LANOLIN ALCOHOL/MO/W.PET/CERES
1 CREAM (GRAM) TOPICAL 2 TIMES DAILY WITH MEALS
COMMUNITY
End: 2019-05-30 | Stop reason: ALTCHOICE

## 2019-01-31 NOTE — WOUND CARE
Wound/Ostomy Nurse Progress Note Patient: Melissa Silva QRI:[birth year] MRN: [id number] Situation: Follow up with Dr. Emeli Johnson. Background:  Slow healing right leg ulcer. Assessment: Her ulcer is healing well and measuring smaller than the last time she was seen (see Wound Doc flow sheet). There is some hypergranualtion of the tissue. She has been using Medihoney and bordered foam 3x/week. The foam dressing adhesive has caused a small abrasion adjacent to the ulcer. A Theraskin graft was applied today by Dr. Emeli Johnson. The graft was secured with steri strips and covered with saline moistened gauze, Cutimed, and foam. Roll gauze and Coban was used to secure the dressing in place. The patient tolerated the procedure well. Recommendation: Return in one week for follow up with Dr. Emeli Johnson. Patient was instructed not to change her dressings or disturb the graft in the meantime.

## 2019-01-31 NOTE — PROGRESS NOTES
Theraskin and Debridement Procedure Note Pre-Operative Diagnosis: Non-Healing Wound Post-Operative Diagnosis: Same Site: dorsal /lateral RLE Procedure: 1. Selective sharp instrument debridement of slough and devitilized tissue 2. Application of Skin Biograft - Theraskin Indications: 1. Removal of devitalized and necrotic tissue to promote healing and evaluate the extent of healing. 2. Stage 1 of a planned staged series of                       10   applications of Theraskin in wound not responding to conventional therapy After the benefits/risks/SE were discussed, the patient agreed to proceed. Time out was done: * Patient was identified by name and date of birth * Agreement on procedure being performed was verified * Procedure site verified and marked as necessary * Patient was positioned for comfort * Consent was signed and verified. Instruments used:  
 []  Dermal curette  Blade 
      [] #15  [] #10  [x] Forceps  [] Tissue nippers  [] sterile scissors  [] Other Anesthesia used:  
 []  EMLA 2.5% cream: applied to wound beds for approximately 15minutes. []   Lidocaine 2% Topical Gel    
 []  Lidocaine injectable 1% with epinephrine 1:100,000; Amount used: mL  
 []  Lidocaine injectable 1% without epinephrine; Amount used: mL  
 []  Other:   
 [x]  None  
      [] patient is insensate due to neuropathy  
      [] patient declines 
      [] allergy to anesthetic [x] tissue for debridement is either superficial, loosely adherent and/or necrotic and denervated Description of Procedure: After usual preparation, sharp debridement of slough and devitalized tissue was performed utilizing the instruments as above. Tissue was removed from the sub Q layer. The wound was debrided to remove non-viable tissue and to clearly reveal the wound margins. The wound was debrided to an acute state with some bleeding from the capillary bed. Pre-debridement measurement: see accompanying progress note Post debridement measurement: 1.2__x0.7_x0.2__cm . Bleeding: <5mL Resolved with light focal pressure. Wounds were cleaned and irrigated with saline. After usual preparation, Theraskin applied to the wound and affixed to the skin with steri strips and covered with non-adherent contact layer. The patient tolerated the procedure without complication. Theraskin 13 sq cm sheet Theraskin used: 7 sq cm Theraskin discarded: 6 sq cm Wound care applied: 
 []   Hydrogell   []  Hypergel   []   Hydrofiber/Aquacel    
 []   Cadexomer Iodine (Iodosorb)   []  Silver Alginate    []   Medihoney:  
 []   Collagenase:Santyl   []  Calcium Alginate   []   Collagen:  
 [x]   Foam   []  Non-Adherent Contact Layer   []   Xeroform  
 []   Adaptec:   []   Hydrocolloid   []   Transparent Film  
 []  Antibiotic ointment/cream    []  hyderofera blue   []   Other (see below) [] Mesalt Other:cutimed 
 
  
 []   Foam and Roll Gauze [x]   Saline moistened Gauze with mepilex  
 []    Bordered gauze:   
 []   Secure with Tape  
 []   Other    
 [x]   Compression Wrap: 
        []   Unna Boot    []Multi-Layer    [x]Tubular Bandages Teri-Ulcer Care 
  []   Cream  
  []   Lotion []   Ointment  
  []   Barrier  
  []   Other:  
 
 
The patient tolerated the procedure well with no complications. The patient left the exam room in satisfactory and stable condition.   
 
Vinicius Gamble DPM

## 2019-02-07 ENCOUNTER — HOSPITAL ENCOUNTER (OUTPATIENT)
Dept: WOUND CARE | Age: 79
Discharge: HOME | End: 2019-02-07
Payer: MEDICARE

## 2019-02-07 PROCEDURE — 77030011256 HC DRSG MEPILEX <16IN NO BORD MOLN -A: Performed by: PODIATRIST

## 2019-02-07 PROCEDURE — 97602 WOUND(S) CARE NON-SELECTIVE: CPT

## 2019-02-07 NOTE — PROGRESS NOTES
Podiatry Surgery Progress Note Patient: Carmen Patrick MRN: [id number]  SSN: [id number] YOB: 1940  Age: 70 y.o. Sex: female Assessment:  
 
Patient Active Problem List  
Diagnosis Code  Atherosclerotic heart disease, s/p CABG X3 in 6/08, in the setting of severe left ventricular dysfunction, and s/p a dual-chamber I25.10  
 S/P CABG x 3 Z95.1  PVC's (premature ventricular contractions) I49.3  
 HTN (hypertension) I10  
 Atrial fibrillation ([facility]) I48.91  
 Carotid stenosis I65.29  
 PAD (peripheral artery disease) ([facility]) I73.9  Intracranial aneurysm - left cavernous ICA on 2014 head/neck CTA I67.1  Mixed hyperlipidemia E78.2  Gastroesophageal reflux disease without esophagitis K21.9  Chronic gastritis with bleeding - in February/March of 2016 per FOBT and EGD results K29.51  
 History of CVA (cerebrovascular accident) Z80.78  
 Vitamin D deficiency E55.9  Dysphagia s/p dilation R13.10  Anemia D64.9  
 Pacemaker (for h/o AV block) Z95.0  
 OAB (overactive bladder) N32.81  
 Cataract H26.9  Gout of left foot M10.9  Seasonal allergic rhinitis J30.2  Microalbuminuria R80.9  CKD (chronic kidney disease) stage 3, GFR 30-59 ml/min ([facility]) N18.3  Mixed connective tissue disease ([address] M35.1  Severe obesity (BMI 35.0-39. 9) with comorbidity ([address] E66.01  
 Lower limb ulcer, calf, right, with fat layer exposed ([address] I12.482  Generalized edema R60.1 Plan:  
Continue [address] with hydrogel, cutimed and mepilex. Place tubigrip. Elevat feet and legs when sitting and lying. Will apply a second graft at the next visit. Total time spent with patient: 30 Minutes Care Plan discussed with: Patient and Nursing Staff Discussed:  Care Plan and home health Disposition:  Stable Ms. Jennifer Sherwood is a 70 y.o. female who was admitted for chronic VLU RLE, s/p Theraskin application 77/05/88. She has no new complaints today Subjective:  
Past Medical History Past Medical History:  
Diagnosis Date  Abnormal ankle brachial index 11/13/2014 Mild arterial disease in right leg. R VICTOR MANUEL 0.88. L VICTOR MANUEL 1.00.  Anemia  Arm DVT (deep venous thromboembolism), acute ([facility])   
 s/p anticoagulation  Atherosclerotic heart disease  Atrial fibrillation ([address]  AV block  CAD (coronary artery disease)  Cardiomyopathy, ischemic  Carotid artery stenosis  Carotid duplex 11/13/2014 Mild <50% bilateral ICA plaquing. Possible left vertebral occlusion.  Cerebral artery occlusion with cerebral infarction ([address] stroke x 2  Chest pain  CVA (cerebral infarction)  Echocardiogram 08/19/2015 EF 55%. Apical inferior, apical septal hypk (new since study of 12/19/11), likely due to chronic V-pacing. Mild LVH. RVSP 30 mmHg. Mild LAE. Mild MR. Mild TR.  Gastritis  GERD (gastroesophageal reflux disease)  Heart failure ([address]  Hiatal hernia  Hyperlipidemia  Hypertension  Hypertensive cardiovascular disease  Intracranial aneurysm   
 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  Nuclear cardiac imaging test 09/24/2015 Low risk. Sm apical infarction w/no ischemia vs apical thinning. Sm basal inferior defect, likely artifact. EF 56%. Inferoseptal, inferoapical WMA related to sternotomy or paced rhythm.  Pacemaker  PAD [facility]peripheral artery disease) [facility]  PVC's   
 chronic  S/P CABG x 3 06/08/2008 LIMA-LAD. SVG-OM. SVG-dRCA  S/P cardiac cath 06/08/2008 pRCA 100%. LM patent. Cx patent. OM1 100%. mLAD 95%. LVEDP 27-29mmHg. EF 20%. mod MR  
 Tilt table evaluation 06/01/1995 Positive for orthostatic hypotension  Vitamin D deficiency Social History Socioeconomic History  Marital status:  Spouse name: Not on file  Number of children: Not on file  Years of education: Not on file  Highest education level: Not on file Social Needs  Financial resource strain: Not on file  Food insecurity - worry: Not on file  Food insecurity - inability: Not on file  Transportation needs - medical: Not on file  Transportation needs - non-medical: Not on file Occupational History  Not on file Tobacco Use  Smoking status: Never Smoker  Smokeless tobacco: Never Used  Tobacco comment: just smoked 2 weeks in college Substance and Sexual Activity  Alcohol use: No  
 Drug use: No  
 Sexual activity: No  
Other Topics Concern [address] Service Not Asked  Blood Transfusions Not Asked  Caffeine Concern Not Asked  Occupational Exposure Not Asked [facility] [employer] Hazards Not Asked  Sleep Concern Not Asked  Stress Concern Not Asked  Weight Concern Not Asked  Special Diet Not Asked  Back Care Not Asked  Exercise Not Asked  Bike Helmet Not Asked  Seat Belt Not Asked  Self-Exams Not Asked Social History Narrative  Not on file Current Medications Current Outpatient Medications Medication Sig Dispense Refill  calcium citrate-vitamin d3 (CITRACAL+D) 315-200 mg-unit tab Take 1 Tab by mouth two (2) times daily (with meals).  furosemide (LASIX) 40 mg tablet TAKE 1 TABLET BY MOUTH EVERY DAY  3  
 diclofenac (VOLTAREN) 1 % gel USE 4 GRAMS ON KNEE FOUR TIMES A DAY AS NEEDED  3  
 cholecalciferol, vitamin D3, (VITAMIN D3) 2,000 unit tab Take 2,000 Units by mouth daily.  furosemide (LASIX) 20 mg tablet Take 20 mg by mouth daily.  amLODIPine (NORVASC) 5 mg tablet Take 1 Tab by mouth daily. 90 Tab 6  carvedilol (COREG) 25 mg tablet TAKE 1 TABLET BY MOUTH (2) TIMES A  Tab 3  
 olmesartan (BENICAR) 5 mg tablet TAKE 1 TABLET BY MOUTH EVERY DAY  3  
 hydrocortisone (HYTONE) 2.5 % ointment APPLY LIGHTLY TO THE AFFECTED AREAS ON THE LEGS AND FEET TWICE A DAY. 2  
 aspirin 81 mg tablet Take 81 mg by mouth daily. Patient Allergies Allergies Allergen Reactions  Nifedipine Nausea and Vomiting and Other (comments) Dizzy  Ace Inhibitors Cough  Codeine Unknown (comments), Nausea Only and Nausea and Vomiting  Hydralazine Other (comments) Dizziness and Fatigue  Lipitor [Atorvastatin] Nausea and Vomiting and Vertigo  Simvastatin Nausea and Vomiting Objective:  
General Exam 
alert, cooperative, no distress, appears stated age Vitals There were no vitals taken for this visit. REVIEW OF SYSTEMS: 
General: denies chronic fatigue, weight loss, fever, anemia, bruising, depression, nervousness, panic attacks HEENT: denies ringing in ears, ear infections, dizzy spells, poor vision, glaucoma, sinus trouble, hoarseness, eye infections GI: denies diarrhea, gas, bloating, heartburn, regurgitation, difficulty swallowing, painful swallowing, nausea, vomiting, constipation, abdominal pain, decreased appetite, blood in stools, black stools, jaundice, dark urine Lungs: denies pneumonia, asthma, cough, SOB, hemoptysis Heart: denies chest pain, irregular heart beat, ankle swelling, HTN Skin: denies rashes, hives, allergic reaction Urinary: denies UTI, kidney stones, decreased urine force and flow, urination at night, blood in urine, painful urination Bones and Joints: denies arthritis, rheumatism, back pain, gout, osteoporosis Neurologic: denies stroke, seizures, headaches, numbness, tingling Foot Exam 
VASCULAR EXAM:. Pedal pulses are palpable 1/4 DP 0/4 PT right and left foot, faint monophasic DP/PT right and stronger monophasic DP/PT left Skin temperature is warm to warm right and left foot. Digital capillary fill time is 4 seconds right and left foot.  There is edema of the right and left foot and ankle.  
  
NEUROLOGICAL EXAM:. Sensation Intact with 5.07g monofilament wire right and left foot. Deep tendon reflexes intact and symmetrical on the right and left foot. Babinski is age appropriate bilateral 
  MUSCULOSKELETAL EXAM:. Muscle tone is normal.  Muscle strength of the flexor and extensor group inversion and eversion Bilateral. 5/5.  
  
DERMATOLOGICAL EXAM:. Skin is of abnormal texture and turgor with atrophic skin changes noting hair growth, nail changes (thickening), Bilateral. There is a ulcer full thickness noted lateral aspect of the RLE/calf.  There is noted increase epithelialization at the 1-3o'clock position. Measurements 1.5 x 1.2 x 0.2 cm down from 1.9 x 1.6x 0.5 cm with small area of slough at the 8-11 o'clock position with increased granulation tissue  
  
 
 
Ulcer Ulcer Location: R lower leg lateral, R lower leg anterior and Ulcer base: graft in place Carlos Scale: Stage 2 Wound Leg Lower Right;Lateral (Active) Dressing Status  Removed 1/31/2019  8:26 AM  
Dressing Type  Foam 1/31/2019  8:26 AM  
Non-Pressure Injury Full thickness (subcut/muscle) 1/31/2019  8:26 AM  
Wound Length (cm) 1.2 cm 1/31/2019  8:26 AM  
Wound Width (cm) 0.7 cm 1/31/2019  8:26 AM  
Wound Depth (cm) 0.2 1/31/2019  8:26 AM  
Wound Surface area (cm^2) 0.84 cm^2 1/31/2019  8:26 AM  
Change in Wound Size % 80.95 1/31/2019  8:26 AM  
Condition of Base Hypergranulation;Slough 1/31/2019  8:26 AM  
Condition of Edges Open 1/31/2019  8:26 AM  
Tissue Type Pink;Yellow 1/31/2019  8:26 AM  
Tissue Type Percent Pink 80 1/31/2019  8:26 AM  
Tissue Type Percent Red 50 12/20/2018  9:49 AM  
Tissue Type Percent Yellow 20 1/31/2019  8:26 AM  
Drainage Amount  Scant 1/31/2019  8:26 AM  
Drainage Color Serosanguinous 1/31/2019  8:26 AM  
Wound Odor None 1/31/2019  8:26 AM  
Periwound Skin Condition Edematous 1/31/2019  8:26 AM  
Cleansing and Cleansing Agents  Dermal wound cleanser 1/31/2019  8:26 AM  
Dressing Type Applied Other (Comment) 1/31/2019  8:26 AM  
 Wound Procedure Type Selective Debridement 12/20/2018 10:14 AM  
Procedure Time Out 0900 1/31/2019  8:26 AM  
Consent Obtained  Yes 1/31/2019  8:26 AM  
Procedure Bleeding None 1/31/2019  8:26 AM  
Procedure Pain Scale Numeric 2/10 11/30/2018  8:10 AM  
Post-Procedure Length (cm) 2.1 cm 11/15/2018 10:50 AM  
Post-Procedure Width (cm) 2.2 cm 11/15/2018 10:50 AM  
Post-Procedure Depth (cm) 0.5 cm 11/15/2018 10:50 AM  
Post-Procedure Volume (cm^3) 2.31 cm^3 11/15/2018 10:50 AM  
Post-Procedure Surface Area (cm^2) 4.62 cm^2 11/15/2018 10:50 AM  
Skin Substitute Applied  Theraskin 1/31/2019  8:26 AM  
Skin Sub Application  4/24 0/53/0152  8:26 AM  
Post Procedure Procedure Pain Scale Numeric 0/10 11/15/2018 10:50 AM  
Procedure Tolerated Well 1/31/2019  8:26 AM  
Number of days: 98 Labs No results found for this or any previous visit (from the past 24 hour(s)). X-Ray:  none Procedures:  S/p Theraskin application 24/46/62 Mara Cody DPM 
February 7, 2019

## 2019-02-07 NOTE — WOUND CARE
Wound/Ostomy Nurse Progress Note Patient: Carmen Patrick THE:[birth year] MRN: [id number] Situation: Wound care follow up for graft check with Dr. Linda Fontana. Background: Venous ulcer with Theraskin graft placed one week ago. Assessment: Her graft is in place and taking well. The wound was a little dry today but otherwise looks good. A new dressing of Hydrogel, Cutimed, and Coban was applied. Recommendation: Follow up in two weeks with Dr. Linda Fontana. 02/07/19 1214 Wound Leg Lower Right;Lateral  
Date First Assessed: 11/01/18   POA: Yes  Wound Type: Vascular  Location: Leg Lower  Orientation: Right;Lateral  
Dressing Status  Removed Dressing Type  Other (Comment) (Coban, gauze, foam, Cutimed sorbact, 4x4) Non-Pressure Injury Full thickness (subcut/muscle) Wound Length (cm) (Unable to measure-graft in place) Drainage Amount  None Wound Odor None Periwound Skin Condition Edematous Cleansing and Cleansing Agents  Normal saline Dressing Type Applied Other (Comment) (Hydrogel, Cutimed, foam, roll gauze, Coban)

## 2019-02-13 ENCOUNTER — OFFICE VISIT (OUTPATIENT)
Dept: VASCULAR SURGERY | Age: 79
End: 2019-02-13

## 2019-02-13 VITALS
BODY MASS INDEX: 36.19 KG/M2 | WEIGHT: 212 LBS | HEIGHT: 64 IN | HEART RATE: 80 BPM | DIASTOLIC BLOOD PRESSURE: 70 MMHG | RESPIRATION RATE: 17 BRPM | SYSTOLIC BLOOD PRESSURE: 138 MMHG

## 2019-02-13 DIAGNOSIS — I65.23 BILATERAL CAROTID ARTERY STENOSIS: ICD-10-CM

## 2019-02-13 DIAGNOSIS — I73.9 PAD (PERIPHERAL ARTERY DISEASE) (HCC): Primary | ICD-10-CM

## 2019-02-13 DIAGNOSIS — S81.801A WOUND OF RIGHT LOWER EXTREMITY, INITIAL ENCOUNTER: ICD-10-CM

## 2019-02-13 NOTE — PROGRESS NOTES
PERLA Eason    Chief Complaint   Patient presents with    Carotid Artery Stenosis    Leg Pain       History and Physical    Sherita Pearson is a 70 y.o. female with history of mild bilateral internal carotid artery stenosis and mild PAD who presents today for her biannual follow-up. She has been doing very well overall and denies any hospital admissions or new medical issues since her last visit. She does however report that in November she fell and hit her right lower leg on a stair. She states this caused a large wound for which she has been following with a wound care clinic. She states that the wound is almost completely healed at this point. She denies any rest pain. She does not describe any symptoms of claudication. She does state that on occasion she will have to stop and rest if she walks too far but this is due to her breathing and generalized weakness and denies any issues with her legs. She denies any strokelike symptoms. No symptoms of amaurosis fugax. Otherwise she states she is in her usual state of health and is without complaint in the office today. She did have follow-up imaging and results were reviewed with her in the office today as follows. Carotid ultrasound shows mild stenosis noted within the right internal carotid artery. Moderate stenosis noted within the left internal carotid artery. Good flow noted within the right vertebral artery. Left vertebral artery is occluded. She did have slight progression of the left ICA stenosis however left ICA stenosis at low end of the scale. Leg art shows moderate arterial disease noted within the right lower extremity. Disease is noted within the tibial arteries. Mild to moderate arterial disease is noted within the left lower extremity disease is noted within the femoral-popliteal and tibial segments. There has been slight progression of disease bilaterally.        Past Medical History:   Diagnosis Date    Abnormal ankle brachial index 11/13/2014    Mild arterial disease in right leg. R VICTOR MANUEL 0.88. L VICTOR MANUEL 1.00.  Anemia     Arm DVT (deep venous thromboembolism), acute ([facility])     s/p anticoagulation    Atherosclerotic heart disease     Atrial fibrillation ([facility])     AV block     CAD (coronary artery disease)     Cardiomyopathy, ischemic     Carotid artery stenosis     Carotid duplex 11/13/2014    Mild <50% bilateral ICA plaquing. Possible left vertebral occlusion.  Cerebral artery occlusion with cerebral infarction ([facility])     stroke x 2    Chest pain     CVA (cerebral infarction)     Echocardiogram 08/19/2015    EF 55%. Apical inferior, apical septal hypk (new since study of 12/19/11), likely due to chronic V-pacing. Mild LVH. RVSP 30 mmHg. Mild LAE. Mild MR. Mild TR.  Gastritis     GERD (gastroesophageal reflux disease)     Heart failure ([facility])     Hiatal hernia     Hyperlipidemia     Hypertension     Hypertensive cardiovascular disease     Intracranial aneurysm     left cavernous ICA 2mm aneurysm from head/neck CTA in 2014    Long term (current) use of anticoagulants 3/10/2011    Lower extremity venous duplex 01/26/2015    No DVT bilaterally.  Nuclear cardiac imaging test 09/24/2015    Low risk. Sm apical infarction w/no ischemia vs apical thinning. Sm basal inferior defect, likely artifact. EF 56%. Inferoseptal, inferoapical WMA related to sternotomy or paced rhythm.  Pacemaker     PAD (peripheral artery disease) ([facility])     PVC's     chronic    S/P CABG x 3 06/08/2008    LIMA-LAD. SVG-OM. SVG-dRCA     S/P cardiac cath 06/08/2008    pRCA 100%. LM patent. Cx patent. OM1 100%. mLAD 95%. LVEDP 27-29mmHg. EF 20%.  mod MR    Tilt table evaluation 06/01/1995    Positive for orthostatic hypotension    Vitamin D deficiency      Past Surgical History:   Procedure Laterality Date    CABG, ARTERY-VEIN, THREE  6/2008    CARDIAC SURG PROCEDURE UNLIST      medtronic dual-chamber cardioverted-defibrillator    HX  SECTION      x2    HX ENDOSCOPY  3/1/16    HX ENDOSCOPY  3/1/16    HX HEMORRHOIDECTOMY      1985    HX HYSTERECTOMY          HX ORTHOPAEDIC      knee surgery    HX OTHER SURGICAL  2/10/16    Pacemaker Gen Change    HX PACEMAKER      Defibrillator     HX TUMOR REMOVAL      removed from arm.  benign     Patient Active Problem List   Diagnosis Code    Atherosclerotic heart disease, s/p CABG X3 in , in the setting of severe left ventricular dysfunction, and s/p a dual-chamber I25.10    S/P CABG x 3 Z95.1    PVC's (premature ventricular contractions) I49.3    HTN (hypertension) I10    Atrial fibrillation ([facility]) I48.91    Carotid stenosis I65.29    PAD (peripheral artery disease) ([facility]) I73.9    Intracranial aneurysm - left cavernous ICA on  head/neck CTA I67.1    Mixed hyperlipidemia E78.2    Gastroesophageal reflux disease without esophagitis K21.9    Chronic gastritis with bleeding - in February/2016 per FOBT and EGD results K29.51    History of CVA (cerebrovascular accident) Z80.78    Vitamin D deficiency E55.9    Dysphagia s/p dilation R13.10    Anemia D64.9    Pacemaker (for h/o AV block) Z95.0    OAB (overactive bladder) N32.81    Cataract H26.9    Gout of left foot M10.9    Seasonal allergic rhinitis J30.2    Microalbuminuria R80.9    CKD (chronic kidney disease) stage 3, GFR 30-59 ml/min ([facility]) N18.3    Mixed connective tissue disease ([facility]) M35.1    Severe obesity (BMI 35.0-39. 9) with comorbidity ([address] E66.01    Lower limb ulcer, calf, right, with fat layer exposed ([address] L97.212    Generalized edema R60.1     Current Outpatient Medications   Medication Sig Dispense Refill    calcium citrate-vitamin d3 (CITRACAL+D) 315-200 mg-unit tab Take 1 Tab by mouth two (2) times daily (with meals).       furosemide (LASIX) 40 mg tablet TAKE 1 TABLET BY MOUTH EVERY DAY  3    diclofenac (VOLTAREN) 1 % gel USE 4 GRAMS ON KNEE FOUR TIMES A DAY AS NEEDED  3    cholecalciferol, vitamin D3, (VITAMIN D3) 2,000 unit tab Take 2,000 Units by mouth daily.  furosemide (LASIX) 20 mg tablet Take 20 mg by mouth daily.  amLODIPine (NORVASC) 5 mg tablet Take 1 Tab by mouth daily. 90 Tab 6    carvedilol (COREG) 25 mg tablet TAKE 1 TABLET BY MOUTH (2) TIMES A  Tab 3    olmesartan (BENICAR) 5 mg tablet TAKE 1 TABLET BY MOUTH EVERY DAY  3    hydrocortisone (HYTONE) 2.5 % ointment APPLY LIGHTLY TO THE AFFECTED AREAS ON THE LEGS AND FEET TWICE A DAY. 2    aspirin 81 mg tablet Take 81 mg by mouth daily. Allergies   Allergen Reactions    Nifedipine Nausea and Vomiting and Other (comments)     Dizzy    Ace Inhibitors Cough    Codeine Unknown (comments), Nausea Only and Nausea and Vomiting    Hydralazine Other (comments)     Dizziness and Fatigue    Lipitor [Atorvastatin] Nausea and Vomiting and Vertigo    Simvastatin Nausea and Vomiting       Physical Exam:    Visit Vitals  /70 (BP 1 Location: Left arm, BP Patient Position: Sitting)   Pulse 80   Resp 17   Ht 5' 4\" (1.626 m)   Wt 212 lb (96.2 kg)   BMI 36.39 kg/m²      General: Well-appearing elderly female in no acute distress   HEENT: EOMI, no scleral icterus is noted. Pulmonary: No increased work or breathing is noted. Abdomen: obese, nondistended. Extremities: Warm and well perfused bilaterally. Pt has no significant BLE edema. Right leg dressing c/d/i. Neuro: Cranial nerves II through XII are grossly intact         Impression and Plan:  Jada Carreno is a 70 y.o. female with mild to moderate internal carotid artery stenosis as above. She is completely asymptomatic. She does also have mild to moderate PAD. She denies any symptoms of lifestyle limiting claudication. She has no rest pain. She does have a wound on her right lower leg which is healing well with ongoing wound care at the wound care clinic.   I discussed that we can continue to monitor her with routine surveillance. We will have repeat studies done in 1 years time and she will follow-up afterwards. She was educated on signs and symptoms of when to call the office sooner as needed. Plan was discussed. Patient expresses understanding and agrees. Follow-up Disposition:  Return in about 1 year (around 2/13/2020). Alicia Almaraz  [phone]        PLEASE NOTE:  This document has been produced using voice recognition software. Unrecognized errors in transcription may be present.

## 2019-02-13 NOTE — PROGRESS NOTES
1. Have you been to an emergency room or urgent care clinic since your last visit? NO  Hospitalized since your last visit? If yes, where, when, and reason for visit? NO  2. Have you seen or consulted any other health care providers outside of the [facility] since your last visit including any procedures, health maintenance items. If yes, where, when and reason for visit?  NO

## 2019-02-21 ENCOUNTER — HOSPITAL ENCOUNTER (OUTPATIENT)
Dept: WOUND CARE | Age: 79
Discharge: HOME | End: 2019-02-21
Payer: MEDICARE

## 2019-02-21 VITALS
SYSTOLIC BLOOD PRESSURE: 148 MMHG | TEMPERATURE: 98 F | OXYGEN SATURATION: 97 % | DIASTOLIC BLOOD PRESSURE: 72 MMHG | RESPIRATION RATE: 14 BRPM | HEART RATE: 63 BPM

## 2019-02-21 PROBLEM — I87.2 PERIPHERAL VENOUS INSUFFICIENCY: Status: ACTIVE | Noted: 2019-02-21

## 2019-02-21 PROCEDURE — 97602 WOUND(S) CARE NON-SELECTIVE: CPT

## 2019-02-21 NOTE — PROGRESS NOTES
Podiatry Surgery Progress Note Patient: Carolyn Reed MRN: [id number]  SSN: [id number] YOB: 1940  Age: 70 y.o. Sex: female Assessment:  
 
Patient Active Problem List  
Diagnosis Code  Atherosclerotic heart disease, s/p CABG X3 in 6/08, in the setting of severe left ventricular dysfunction, and s/p a dual-chamber I25.10  
 S/P CABG x 3 Z95.1  PVC's (premature ventricular contractions) I49.3  
 HTN (hypertension) I10  
 Atrial fibrillation ([facility]) I48.91  
 Carotid stenosis I65.29  
 PAD (peripheral artery disease) ([facility]) I73.9  Intracranial aneurysm - left cavernous ICA on 2014 head/neck CTA I67.1  Mixed hyperlipidemia E78.2  Gastroesophageal reflux disease without esophagitis K21.9  Chronic gastritis with bleeding - in February/March of 2016 per FOBT and EGD results K29.51  
 History of CVA (cerebrovascular accident) Z80.78  
 Vitamin D deficiency E55.9  Dysphagia s/p dilation R13.10  Anemia D64.9  
 Pacemaker (for h/o AV block) Z95.0  
 OAB (overactive bladder) N32.81  
 Cataract H26.9  Gout of left foot M10.9  Seasonal allergic rhinitis J30.2  Microalbuminuria R80.9  CKD (chronic kidney disease) stage 3, GFR 30-59 ml/min ([facility]) N18.3  Mixed connective tissue disease ([facility] [address] M35.1  Severe obesity (BMI 35.0-39. 9) with comorbidity ([address] E66.01  
 Lower limb ulcer, calf, right, with fat layer exposed ([address] Z79.952  Generalized edema R60.1  Peripheral venous insufficiency I87.2 Plan:  
Monitor. Lubricate feet and legs daily, not between toes. Continue compression stockings as previously instructed Total time spent with patient: 30 Minutes Care Plan discussed with: Patient and Nursing Staff Discussed:  D/C Planning Disposition:  Stable Ms. Kevin Cooper is a 70 y.o. female who was admitted for chronic VLU s/p Theraskin application 00/58/55. Subjective:  
Past Medical History Past Medical History:  
Diagnosis Date  Abnormal ankle brachial index 11/13/2014 Mild arterial disease in right leg. R VICTOR MANUEL 0.88. L VICTOR MANUEL 1.00.  Anemia  Arm DVT (deep venous thromboembolism), acute (HCC)   
 s/p anticoagulation  Atherosclerotic heart disease  Atrial fibrillation ([facility] [address]  AV block  CAD (coronary artery disease)  Cardiomyopathy, ischemic  Carotid artery stenosis  Carotid duplex 11/13/2014 Mild <50% bilateral ICA plaquing. Possible left vertebral occlusion.  Cerebral artery occlusion with cerebral infarction ([address] stroke x 2  Chest pain  CVA (cerebral infarction)  Echocardiogram 08/19/2015 EF 55%. Apical inferior, apical septal hypk (new since study of 12/19/11), likely due to chronic V-pacing. Mild LVH. RVSP 30 mmHg. Mild LAE. Mild MR. Mild TR.  Gastritis  GERD (gastroesophageal reflux disease)  Heart failure ([facility] [address]  Hiatal hernia  Hyperlipidemia  Hypertension  Hypertensive cardiovascular disease  Intracranial aneurysm   
 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  Nuclear cardiac imaging test 09/24/2015 Low risk. Sm apical infarction w/no ischemia vs apical thinning. Sm basal inferior defect, likely artifact. EF 56%. Inferoseptal, inferoapical WMA related to sternotomy or paced rhythm.  Pacemaker  PAD (peripheral artery disease) ([facility] [address]  PVC's   
 chronic  S/P CABG x 3 06/08/2008 LIMA-LAD. SVG-OM. SVG-dRCA  S/P cardiac cath 06/08/2008 pRCA 100%. LM patent. Cx patent. OM1 100%. mLAD 95%. LVEDP 27-29mmHg. EF 20%. mod MR  
 Tilt table evaluation 06/01/1995 Positive for orthostatic hypotension  Vitamin D deficiency Social History Socioeconomic History  Marital status:  Spouse name: Not on file  Number of children: Not on file  Years of education: Not on file  Highest education level: Not on file Social Needs  Financial resource strain: Not on file  Food insecurity - worry: Not on file  Food insecurity - inability: Not on file  Transportation needs - medical: Not on file  Transportation needs - non-medical: Not on file Occupational History  Not on file Tobacco Use  Smoking status: Never Smoker  Smokeless tobacco: Never Used  Tobacco comment: just smoked 2 weeks in college Substance and Sexual Activity  Alcohol use: No  
 Drug use: No  
 Sexual activity: No  
Other Topics Concern [address] Service Not Asked  Blood Transfusions Not Asked  Caffeine Concern Not Asked  Occupational Exposure Not Asked [employer] Hazards Not Asked  Sleep Concern Not Asked  Stress Concern Not Asked  Weight Concern Not Asked  Special Diet Not Asked  Back Care Not Asked  Exercise Not Asked  Bike Helmet Not Asked  Seat Belt Not Asked  Self-Exams Not Asked Social History Narrative  Not on file Current Medications Current Outpatient Medications Medication Sig Dispense Refill  calcium citrate-vitamin d3 (CITRACAL+D) 315-200 mg-unit tab Take 1 Tab by mouth two (2) times daily (with meals).  furosemide (LASIX) 40 mg tablet TAKE 1 TABLET BY MOUTH EVERY DAY  3  
 diclofenac (VOLTAREN) 1 % gel USE 4 GRAMS ON KNEE FOUR TIMES A DAY AS NEEDED  3  
 cholecalciferol, vitamin D3, (VITAMIN D3) 2,000 unit tab Take 2,000 Units by mouth daily.  furosemide (LASIX) 20 mg tablet Take 20 mg by mouth daily.  amLODIPine (NORVASC) 5 mg tablet Take 1 Tab by mouth daily. 90 Tab 6  carvedilol (COREG) 25 mg tablet TAKE 1 TABLET BY MOUTH (2) TIMES A  Tab 3  
 olmesartan (BENICAR) 5 mg tablet TAKE 1 TABLET BY MOUTH EVERY DAY  3  
 hydrocortisone (HYTONE) 2.5 % ointment APPLY LIGHTLY TO THE AFFECTED AREAS ON THE LEGS AND FEET TWICE A DAY. 2  
 aspirin 81 mg tablet Take 81 mg by mouth daily. Patient Allergies Allergies Allergen Reactions  Nifedipine Nausea and Vomiting and Other (comments) Dizzy  Ace Inhibitors Cough  Codeine Unknown (comments), Nausea Only and Nausea and Vomiting  Hydralazine Other (comments) Dizziness and Fatigue  Lipitor [Atorvastatin] Nausea and Vomiting and Vertigo  Simvastatin Nausea and Vomiting Objective:  
General Exam 
alert, cooperative, no distress, appears stated age Vitals Visit Vitals /72 (BP 1 Location: Left arm, BP Patient Position: Supine) Pulse 63 Temp 98 °F (36.7 °C) Resp 14 SpO2 97% REVIEW OF SYSTEMS: 
No changes Foot Exam 
VASCULAR EXAM:. Pedal pulses are palpable 1/4 DP 0/4 PT right and left foot, faint monophasic DP/PT right and stronger monophasic DP/PT left Skin temperature is warm to warm right and left foot. Digital capillary fill time is 4 seconds right and left foot. There is edema of the right and left foot and ankle.  
  
NEUROLOGICAL EXAM:. Sensation Intact with 5.07g monofilament wire right and left foot. Deep tendon reflexes intact and symmetrical on the right and left foot. Babinski is age appropriate bilateral 
  MUSCULOSKELETAL EXAM:. Muscle tone is normal.  Muscle strength of the flexor and extensor group inversion and eversion Bilateral. 5/5.  
  
DERMATOLOGICAL EXAM:. Skin is of abnormal texture and turgor with atrophic skin changes noting hair growth, nail changes (thickening), Bilateral. There is a Theraskin noted lateral aspect of the RLE/calf.  There is noted complete epithelialization at upon lifting of the graft. 
  
  
Ulcer Ulcer Location: R lower leg lateral and Ulcer base: Epithialization Carlos Scale: Stage 2 RESOLVED Wound Leg Lower Right;Lateral (Active) Dressing Status  Removed 2/7/2019 12:14 PM  
Dressing Type  Other (Comment) 2/7/2019 12:14 PM  
 Non-Pressure Injury Full thickness (subcut/muscle) 2/7/2019 12:14 PM  
Wound Length (cm) 1.2 cm 1/31/2019  8:26 AM  
Wound Width (cm) 0.7 cm 1/31/2019  8:26 AM  
Wound Depth (cm) 0.2 1/31/2019  8:26 AM  
Wound Surface area (cm^2) 0.84 cm^2 1/31/2019  8:26 AM  
Change in Wound Size % 80.95 1/31/2019  8:26 AM  
Condition of Base Hypergranulation;Slough 1/31/2019  8:26 AM  
Condition of Edges Open 1/31/2019  8:26 AM  
Tissue Type Pink;Yellow 1/31/2019  8:26 AM  
Tissue Type Percent Pink 80 1/31/2019  8:26 AM  
Tissue Type Percent Red 50 12/20/2018  9:49 AM  
Tissue Type Percent Yellow 20 1/31/2019  8:26 AM  
Drainage Amount  None 2/7/2019 12:14 PM  
Drainage Color Serosanguinous 1/31/2019  8:26 AM  
Wound Odor None 2/7/2019 12:14 PM  
Periwound Skin Condition Edematous 2/7/2019 12:14 PM  
Cleansing and Cleansing Agents  Normal saline 2/7/2019 12:14 PM  
Dressing Type Applied Other (Comment) 2/7/2019 12:14 PM  
Wound Procedure Type Selective Debridement 12/20/2018 10:14 AM  
Procedure Time Out 0900 1/31/2019  8:26 AM  
Consent Obtained  Yes 1/31/2019  8:26 AM  
Procedure Bleeding None 1/31/2019  8:26 AM  
Procedure Pain Scale Numeric 2/10 11/30/2018  8:10 AM  
Post-Procedure Length (cm) 2.1 cm 11/15/2018 10:50 AM  
Post-Procedure Width (cm) 2.2 cm 11/15/2018 10:50 AM  
Post-Procedure Depth (cm) 0.5 cm 11/15/2018 10:50 AM  
Post-Procedure Volume (cm^3) 2.31 cm^3 11/15/2018 10:50 AM  
Post-Procedure Surface Area (cm^2) 4.62 cm^2 11/15/2018 10:50 AM  
Skin Substitute Applied  Theraskin 1/31/2019  8:26 AM  
Skin Sub Application  3/87 2/60/0207  8:26 AM  
Post Procedure Procedure Pain Scale Numeric 0/10 11/15/2018 10:50 AM  
Procedure Tolerated Well 1/31/2019  8:26 AM  
Number of days: 112 Labs No results found for this or any previous visit (from the past 24 hour(s)). X-Ray:  none Procedures:   S/p Theraskin application 82/89/08 Mary Gomez DPM 
February 21, 2019

## 2019-02-21 NOTE — WOUND CARE
Wound/Ostomy Nurse Progress Note Patient: Haroon Quiros [id number] MRN: [id number] Wound prior to removal of Theraskin Wound after removing Theraskin remnant. Situation: Wound care follow up Background: Slow healing venous ulcer. Theraskin placed two weeks ago. Assessment: The dressing was removed and the wound was cleansed. The Theraskin remnant was removed by Dr. Lois Francois. The wound is now 100% epithelialized. Lotion was applied to her leg. Recommendation: She will continue to wear her compression stockings daily and apply moisturizer to the legs (patient was given info for Aquaphor ointment). Return to the wound clinic PRN.

## 2019-02-28 ENCOUNTER — CLINICAL SUPPORT (OUTPATIENT)
Dept: CARDIOLOGY CLINIC | Age: 79
End: 2019-02-28

## 2019-02-28 DIAGNOSIS — Z95.810 AICD (AUTOMATIC CARDIOVERTER/DEFIBRILLATOR) PRESENT: ICD-10-CM

## 2019-02-28 DIAGNOSIS — I42.9 CARDIOMYOPATHY, UNSPECIFIED TYPE (HCC): Primary | ICD-10-CM

## 2019-02-28 NOTE — PROGRESS NOTES
I have personally seen and evaluated the device findings. Interrogation reviewed and I agree with assessment.     Ofilia Back

## 2019-05-19 ENCOUNTER — HOSPITAL ENCOUNTER (INPATIENT)
Age: 79
LOS: 2 days | Discharge: HOME | DRG: 291 | End: 2019-05-23
Attending: EMERGENCY MEDICINE | Admitting: HOSPITALIST
Payer: MEDICARE

## 2019-05-19 ENCOUNTER — APPOINTMENT (OUTPATIENT)
Dept: GENERAL RADIOLOGY | Age: 79
DRG: 291 | End: 2019-05-19
Attending: EMERGENCY MEDICINE
Payer: MEDICARE

## 2019-05-19 ENCOUNTER — APPOINTMENT (OUTPATIENT)
Dept: CT IMAGING | Age: 79
DRG: 291 | End: 2019-05-19
Attending: EMERGENCY MEDICINE
Payer: MEDICARE

## 2019-05-19 DIAGNOSIS — Z86.79 HISTORY OF CORONARY ARTERY DISEASE: ICD-10-CM

## 2019-05-19 DIAGNOSIS — R53.83 FATIGUE, UNSPECIFIED TYPE: Primary | ICD-10-CM

## 2019-05-19 DIAGNOSIS — R06.02 SOB (SHORTNESS OF BREATH): ICD-10-CM

## 2019-05-19 DIAGNOSIS — D64.9 ANEMIA, UNSPECIFIED TYPE: ICD-10-CM

## 2019-05-19 PROBLEM — R53.1 WEAKNESS: Status: ACTIVE | Noted: 2019-05-19

## 2019-05-19 LAB
ALBUMIN SERPL-MCNC: 3.1 G/DL (ref 3.4–5)
ALBUMIN/GLOB SERPL: 0.6 {RATIO} (ref 0.8–1.7)
ALP SERPL-CCNC: 113 U/L (ref 45–117)
ALT SERPL-CCNC: 15 U/L (ref 13–56)
ANION GAP SERPL CALC-SCNC: 8 MMOL/L (ref 3–18)
AST SERPL-CCNC: 17 U/L (ref 15–37)
BASOPHILS # BLD: 0 K/UL (ref 0–0.1)
BASOPHILS NFR BLD: 0 % (ref 0–2)
BILIRUB SERPL-MCNC: 0.6 MG/DL (ref 0.2–1)
BNP SERPL-MCNC: 2400 PG/ML (ref 0–1800)
BUN SERPL-MCNC: 32 MG/DL (ref 7–18)
BUN/CREAT SERPL: 20 (ref 12–20)
CALCIUM SERPL-MCNC: 9.5 MG/DL (ref 8.5–10.1)
CHLORIDE SERPL-SCNC: 111 MMOL/L (ref 100–108)
CO2 SERPL-SCNC: 21 MMOL/L (ref 21–32)
CREAT SERPL-MCNC: 1.59 MG/DL (ref 0.6–1.3)
D DIMER PPP FEU-MCNC: 1.44 UG/ML(FEU)
DIFFERENTIAL METHOD BLD: ABNORMAL
EOSINOPHIL # BLD: 0.1 K/UL (ref 0–0.4)
EOSINOPHIL NFR BLD: 1 % (ref 0–5)
ERYTHROCYTE [DISTWIDTH] IN BLOOD BY AUTOMATED COUNT: 14.1 % (ref 11.6–14.5)
GLOBULIN SER CALC-MCNC: 5.1 G/DL (ref 2–4)
GLUCOSE SERPL-MCNC: 92 MG/DL (ref 74–99)
HCT VFR BLD AUTO: 34.6 % (ref 35–45)
HGB BLD-MCNC: 11.4 G/DL (ref 12–16)
LYMPHOCYTES # BLD: 1.7 K/UL (ref 0.9–3.6)
LYMPHOCYTES NFR BLD: 17 % (ref 21–52)
MCH RBC QN AUTO: 28 PG (ref 24–34)
MCHC RBC AUTO-ENTMCNC: 32.9 G/DL (ref 31–37)
MCV RBC AUTO: 85 FL (ref 74–97)
MONOCYTES # BLD: 0.8 K/UL (ref 0.05–1.2)
MONOCYTES NFR BLD: 8 % (ref 3–10)
NEUTS SEG # BLD: 7.7 K/UL (ref 1.8–8)
NEUTS SEG NFR BLD: 74 % (ref 40–73)
PLATELET # BLD AUTO: 225 K/UL (ref 135–420)
PMV BLD AUTO: 9.7 FL (ref 9.2–11.8)
POTASSIUM SERPL-SCNC: 4.4 MMOL/L (ref 3.5–5.5)
PROT SERPL-MCNC: 8.2 G/DL (ref 6.4–8.2)
RBC # BLD AUTO: 4.07 M/UL (ref 4.2–5.3)
SODIUM SERPL-SCNC: 140 MMOL/L (ref 136–145)
TROPONIN I BLD-MCNC: <0.04 NG/ML (ref 0–0.08)
TROPONIN I SERPL-MCNC: <0.02 NG/ML (ref 0–0.04)
WBC # BLD AUTO: 10.3 K/UL (ref 4.6–13.2)

## 2019-05-19 PROCEDURE — 93005 ELECTROCARDIOGRAM TRACING: CPT

## 2019-05-19 PROCEDURE — 71045 X-RAY EXAM CHEST 1 VIEW: CPT

## 2019-05-19 PROCEDURE — 96375 TX/PRO/DX INJ NEW DRUG ADDON: CPT

## 2019-05-19 PROCEDURE — 77030029684 HC NEB SM VOL KT MONA -A

## 2019-05-19 PROCEDURE — 96374 THER/PROPH/DIAG INJ IV PUSH: CPT

## 2019-05-19 PROCEDURE — 96361 HYDRATE IV INFUSION ADD-ON: CPT

## 2019-05-19 PROCEDURE — 74011250637 HC RX REV CODE- 250/637: Performed by: EMERGENCY MEDICINE

## 2019-05-19 PROCEDURE — 71275 CT ANGIOGRAPHY CHEST: CPT

## 2019-05-19 PROCEDURE — 74011250636 HC RX REV CODE- 250/636: Performed by: EMERGENCY MEDICINE

## 2019-05-19 PROCEDURE — 85379 FIBRIN DEGRADATION QUANT: CPT

## 2019-05-19 PROCEDURE — 84484 ASSAY OF TROPONIN QUANT: CPT

## 2019-05-19 PROCEDURE — 80053 COMPREHEN METABOLIC PANEL: CPT

## 2019-05-19 PROCEDURE — 74011636320 HC RX REV CODE- 636/320: Performed by: EMERGENCY MEDICINE

## 2019-05-19 PROCEDURE — 83880 ASSAY OF NATRIURETIC PEPTIDE: CPT

## 2019-05-19 PROCEDURE — 99218 HC RM OBSERVATION: CPT

## 2019-05-19 PROCEDURE — 94640 AIRWAY INHALATION TREATMENT: CPT

## 2019-05-19 PROCEDURE — 99285 EMERGENCY DEPT VISIT HI MDM: CPT

## 2019-05-19 PROCEDURE — 81001 URINALYSIS AUTO W/SCOPE: CPT

## 2019-05-19 PROCEDURE — 74011000250 HC RX REV CODE- 250: Performed by: EMERGENCY MEDICINE

## 2019-05-19 PROCEDURE — 85025 COMPLETE CBC W/AUTO DIFF WBC: CPT

## 2019-05-19 RX ORDER — IPRATROPIUM BROMIDE AND ALBUTEROL SULFATE 2.5; .5 MG/3ML; MG/3ML
3 SOLUTION RESPIRATORY (INHALATION) ONCE
Status: COMPLETED | OUTPATIENT
Start: 2019-05-19 | End: 2019-05-19

## 2019-05-19 RX ORDER — GUAIFENESIN 100 MG/5ML
162 LIQUID (ML) ORAL
Status: COMPLETED | OUTPATIENT
Start: 2019-05-19 | End: 2019-05-19

## 2019-05-19 RX ORDER — IPRATROPIUM BROMIDE AND ALBUTEROL SULFATE 2.5; .5 MG/3ML; MG/3ML
3 SOLUTION RESPIRATORY (INHALATION)
Status: COMPLETED | OUTPATIENT
Start: 2019-05-19 | End: 2019-05-19

## 2019-05-19 RX ADMIN — IOPAMIDOL 72 ML: 755 INJECTION, SOLUTION INTRAVENOUS at 19:04

## 2019-05-19 RX ADMIN — Medication 162 MG: at 15:15

## 2019-05-19 RX ADMIN — IPRATROPIUM BROMIDE AND ALBUTEROL SULFATE 3 ML: .5; 3 SOLUTION RESPIRATORY (INHALATION) at 15:15

## 2019-05-19 RX ADMIN — SODIUM CHLORIDE 500 ML: 900 INJECTION, SOLUTION INTRAVENOUS at 20:17

## 2019-05-19 RX ADMIN — IPRATROPIUM BROMIDE AND ALBUTEROL SULFATE 3 ML: .5; 3 SOLUTION RESPIRATORY (INHALATION) at 20:17

## 2019-05-19 NOTE — ED PROVIDER NOTES
[facility] Emergency Department Treatment Report Patient: Yael Grant Age: 70 y.o. Sex: female YOB: 1940 Admit Date: 5/19/2019 PCP: Nicolle Russo MD  
MRN: [id number]  CSN: [id number] Room: [place] [address] Time Dictated: 2:17 PM   
 
I hereby certify this patient for admission based upon medical necessity as   
noted below: Chief Complaint Chief Complaint Patient presents with  Fatigue History of Present Illness 70 y.o. female with past medical history as below presents with fatigue and shortness of breath. Patient states that over the past week, she has developed worsening shortness of breath worse this morning. Patient states that the symptoms are moderate to severe. She states the symptoms are worse with exertion. She states the symptoms are only very mildly relieved with rest.  She has had mild intermittent cough. Otherwise, she denies fever, chills, chest pain, abdominal pain, nausea/vomiting/diarrhea, or other associated symptoms. Review of Systems Constitutional:  No fever, chills Eyes: No visual complaints. ENT: No sore throat, runny nose Heme/Lymph: No easy bruising or lymph node swelling Respiratory: Positive for cough, dyspnea Cardiovascular: No chest pain, palpitations Gastrointestinal: No vomiting, diarrhea Genitourinary: No dysuria, frequency Musculoskeletal: No joint pain, swelling Integumentary: No rashes or other skin lesions Neurological: No headaches, sensory or motor symptoms. Denies complaints in all other systems Past Medical/Surgical History Past Medical History:  
Diagnosis Date  Abnormal ankle brachial index 11/13/2014 Mild arterial disease in right leg. R VICTOR MANUEL 0.88. L VICTOR MANUEL 1.00.  Anemia  Arm DVT (deep venous thromboembolism), acute (HCC)   
 s/p anticoagulation  Atherosclerotic heart disease  Atrial fibrillation ([facility] [address]  AV block  CAD (coronary artery disease)  Cardiomyopathy, ischemic  Carotid artery stenosis  Carotid duplex 2014 Mild <50% bilateral ICA plaquing. Possible left vertebral occlusion.  Cerebral artery occlusion with cerebral infarction ([address] stroke x 2  Chest pain  CVA (cerebral infarction)  Echocardiogram 2015 EF 55%. Apical inferior, apical septal hypk (new since study of 11), likely due to chronic V-pacing. Mild LVH. RVSP 30 mmHg. Mild LAE. Mild MR. Mild TR.  Gastritis  GERD (gastroesophageal reflux disease)  Heart failure ([address]  Hiatal hernia  Hyperlipidemia  Hypertension  Hypertensive cardiovascular disease  Intracranial aneurysm   
 left cavernous ICA 2mm aneurysm from head/neck CTA in   Long term (current) use of anticoagulants 3/10/2011  Lower extremity venous duplex 2015 No DVT bilaterally.  Nuclear cardiac imaging test 2015 Low risk. Sm apical infarction w/no ischemia vs apical thinning. Sm basal inferior defect, likely artifact. EF 56%. Inferoseptal, inferoapical WMA related to sternotomy or paced rhythm.  Pacemaker  PAD (peripheral artery disease) ([facility] [address]  PVC's   
 chronic  S/P CABG x 3 2008 LIMA-LAD. SVG-OM. SVG-dRCA  S/P cardiac cath 2008 pRCA 100%. LM patent. Cx patent. OM1 100%. mLAD 95%. LVEDP 27-29mmHg. EF 20%. mod MR  
 Tilt table evaluation 1995 Positive for orthostatic hypotension  Vitamin D deficiency Past Surgical History:  
Procedure Laterality Date  CABG, ARTERY-VEIN, THREE  2008  CARDIAC SURG PROCEDURE UNLIST    
 medtronic dual-chamber cardioverted-defibrillator  HX  SECTION    
 x2  
 HX ENDOSCOPY  3/1/16  HX ENDOSCOPY  3/1/16 [facility]  HX HYSTERECTOMY   HX ORTHOPAEDIC    
 knee surgery  HX OTHER SURGICAL  2/10/16 Pacemaker Gen Change  HX PACEMAKER Defibrillator   HX TUMOR REMOVAL    
 removed from arm.  benign Social History Social History Socioeconomic History  Marital status:  Spouse name: Not on file  Number of children: Not on file  Years of education: Not on file  Highest education level: Not on file Tobacco Use  Smoking status: Never Smoker  Smokeless tobacco: Never Used  Tobacco comment: just smoked 2 weeks in college Substance and Sexual Activity  Alcohol use: No  
 Drug use: No  
 Sexual activity: Never Other Topics Concern Family History History reviewed. No pertinent family history. Current Medications Current Facility-Administered Medications Medication Dose Route Frequency Provider Last Rate Last Dose  albuterol-ipratropium (DUO-NEB) 2.5 MG-0.5 MG/3 ML  3 mL Nebulization NOW Pb Peralta MD      
 sodium chloride 0.9 % bolus infusion 500 mL  500 mL IntraVENous ONCE Pb Peralta MD      
 
Current Outpatient Medications Medication Sig Dispense Refill  furosemide (LASIX) 40 mg tablet TAKE 1 TABLET BY MOUTH EVERY DAY  3  
 amLODIPine (NORVASC) 5 mg tablet Take 1 Tab by mouth daily. 90 Tab 6  carvedilol (COREG) 25 mg tablet TAKE 1 TABLET BY MOUTH (2) TIMES A  Tab 3  
 olmesartan (BENICAR) 5 mg tablet TAKE 1 TABLET BY MOUTH EVERY DAY  3  
 hydrocortisone (HYTONE) 2.5 % ointment APPLY LIGHTLY TO THE AFFECTED AREAS ON THE LEGS AND FEET TWICE A DAY. 2  
 aspirin 81 mg tablet Take 81 mg by mouth daily.  calcium citrate-vitamin d3 (CITRACAL+D) 315-200 mg-unit tab Take 1 Tab by mouth two (2) times daily (with meals).  diclofenac (VOLTAREN) 1 % gel USE 4 GRAMS ON KNEE FOUR TIMES A DAY AS NEEDED  3  
 cholecalciferol, vitamin D3, (VITAMIN D3) 2,000 unit tab Take 2,000 Units by mouth daily.  furosemide (LASIX) 20 mg tablet Take 20 mg by mouth daily. Allergies Allergies Allergen Reactions  Nifedipine Nausea and Vomiting and Other (comments) Dizzy  Ace Inhibitors Cough  Codeine Unknown (comments), Nausea Only and Nausea and Vomiting  Hydralazine Other (comments) Dizziness and Fatigue  Lipitor [Atorvastatin] Nausea and Vomiting and Vertigo  Simvastatin Nausea and Vomiting Physical Exam  
 
ED Triage Vitals [05/19/19 1342] Enc Vitals Group BP (!) 204/94 Pulse (Heart Rate) 80 Resp Rate 16 Temp 99 °F (37.2 °C) Temp src O2 Sat (%) 98 % Weight 210 lb Height 5' 4\" Head Circumference Peak Flow Pain Score Pain Loc Pain Edu? Excl. in [address]? Constituational: Patient is afebrile, vital signs reviewed, patient is uncomfortable in moderate distress. Head: Atraumatic, normocephalic Eyes: Normal inspection. No conjunctival injection or discharge. ENT:  No facial bruises, normal external ear and nose inspection. Neck:  Supple, non tender. normal inspection. Cardiovascular:  regular rate and rhythm, heart sounds normal without murmurs. Radial pulses 2+ and equal bilaterally Respiratory: Moderate respiratory distress, diffuse expiratory wheezing appreciated in all lung fields, no rales or rhonchi Abdomen: soft, nontender, nondistended, no rebound or guarding, normoactive bowel sounds Musculoskeletal:  normal ROM, non-tender, mild 1+ pedal edema. Calves soft, symmetric, and non-tender with no palpable cords. Skin: color normal, no rash, warm, dry. Neuro: awake & alert oriented ×3, no motor/sensory deficit. Psych: mood/affect normal. 
 
Impression and Management Plan Labs and imaging studies will be utilized to narrow the differential diagnosis and evaluate the potential causes of the patients chief complaint, and final disposition will be based on the results of their workup as well as their response to symptomatic treatment. Diagnostic Studies Lab:  
 Recent Results (from the past 24 hour(s)) EKG, 12 LEAD, INITIAL Collection Time: 05/19/19  1:50 PM  
Result Value Ref Range Ventricular Rate 78 BPM  
 Atrial Rate 78 BPM  
 P-R Interval 216 ms  
 QRS Duration 208 ms Q-T Interval 456 ms  
 QTC Calculation (Bezet) 519 ms Calculated P Axis 59 degrees Calculated R Axis -70 degrees Calculated T Axis 107 degrees Diagnosis Electronic ventricular pacemaker When compared with ECG of 04-NOV-2018 17:42, 
Vent. rate has decreased BY  10 BPM 
  
CBC WITH AUTOMATED DIFF Collection Time: 05/19/19  2:35 PM  
Result Value Ref Range WBC 10.3 4.6 - 13.2 K/uL  
 RBC 4.07 (L) 4.20 - 5.30 M/uL  
 HGB 11.4 (L) 12.0 - 16.0 g/dL HCT 34.6 (L) 35.0 - 45.0 % MCV 85.0 74.0 - 97.0 FL  
 MCH 28.0 24.0 - 34.0 PG  
 MCHC 32.9 31.0 - 37.0 g/dL  
 RDW 14.1 11.6 - 14.5 % PLATELET 434 870 - 519 K/uL MPV 9.7 9.2 - 11.8 FL  
 NEUTROPHILS 74 (H) 40 - 73 % LYMPHOCYTES 17 (L) 21 - 52 % MONOCYTES 8 3 - 10 % EOSINOPHILS 1 0 - 5 % BASOPHILS 0 0 - 2 %  
 ABS. NEUTROPHILS 7.7 1.8 - 8.0 K/UL  
 ABS. LYMPHOCYTES 1.7 0.9 - 3.6 K/UL  
 ABS. MONOCYTES 0.8 0.05 - 1.2 K/UL  
 ABS. EOSINOPHILS 0.1 0.0 - 0.4 K/UL  
 ABS. BASOPHILS 0.0 0.0 - 0.1 K/UL  
 DF AUTOMATED    
NT-PRO BNP Collection Time: 05/19/19  2:35 PM  
Result Value Ref Range NT pro-BNP 2,400 (H) 0 - 2,370 PG/ML  
METABOLIC PANEL, COMPREHENSIVE Collection Time: 05/19/19  2:35 PM  
Result Value Ref Range Sodium 140 136 - 145 mmol/L Potassium 4.4 3.5 - 5.5 mmol/L Chloride 111 (H) 100 - 108 mmol/L  
 CO2 21 21 - 32 mmol/L Anion gap 8 3.0 - 18 mmol/L Glucose 92 74 - 99 mg/dL BUN 32 (H) 7.0 - 18 MG/DL Creatinine 1.59 (H) 0.6 - 1.3 MG/DL  
 BUN/Creatinine ratio 20 12 - 20 GFR est AA 38 (L) >60 ml/min/1.73m2 GFR est non-AA 31 (L) >60 ml/min/1.73m2 Calcium 9.5 8.5 - 10.1 MG/DL  Bilirubin, total 0.6 0.2 - 1.0 MG/DL  
 ALT (SGPT) 15 13 - 56 U/L  
 AST (SGOT) 17 15 - 37 U/L Alk. phosphatase 113 45 - 117 U/L Protein, total 8.2 6.4 - 8.2 g/dL Albumin 3.1 (L) 3.4 - 5.0 g/dL Globulin 5.1 (H) 2.0 - 4.0 g/dL A-G Ratio 0.6 (L) 0.8 - 1.7    
TROPONIN I Collection Time: 05/19/19  2:35 PM  
Result Value Ref Range Troponin-I, QT <0.02 0.0 - 0.045 NG/ML  
D DIMER Collection Time: 05/19/19  2:35 PM  
Result Value Ref Range D DIMER 1.44 (H) <0.46 ug/ml(FEU) POC TROPONIN-I Collection Time: 05/19/19  2:41 PM  
Result Value Ref Range Troponin-I (POC) <0.04 0.00 - 0.08 ng/mL Imaging: CTA CHEST W OR W WO CONT Final Result IMPRESSION:  
1. No convincing evidence for pulmonary embolus from the main pulmonary artery  
to the proximal segmental pulmonary arteries. . The distal segmental and  
subsegmental pulmonary arteries in the lower lobes are not well evaluated due to  
respiratory motion artifact. 2. Cardiomegaly. Prominent main pulmonary artery may indicate pulmonary arterial  
hypertension. 3. Mosaic groundglass attenuation greater at the perihilar/ basilar region  
suggesting pulmonary edema. Other considerations include subsegmental  
atelectasis, small airways disease, or pneumonitis. XR CHEST PORT Final Result IMPRESSION:  
  
Postsurgical changes as above. Enlarged cardiac silhouette with suspected  
interstitial infiltrate/edema. Probable left midlung and bibasilar streaky  
atelectasis. Cta Chest W Or W Wo Cont Result Date: 5/19/2019 CTA CHEST PULMONARY EMBOLISM PROTOCOL INDICATION: Shortness of breath Question pulmonary embolism. COMPARISON: None TECHNIQUE:  With IV administration of 72 ml Isovue-370, axial CT images through the thorax were obtained using helical technique following a pulmonary embolism protocol.   In order more optimally to evaluate the pulmonary arterial tree in multiplanar CT angiographic fashion, coronal and sagittal reformation maximum intensity projection (MIP) images were also performed. Subsequently, serial axial CT images were obtained of the abdomen and pelvis. All CT scans at this facility are performed using dose optimization technique as appropriate to a performed exam, to include automated exposure control, adjustment of the mA and/or kV according to patient's size (including appropriate matching for site-specific examinations), or use of iterative reconstruction technique. CHEST FINDINGS: -Pulmonary Arteries: No evidence for pulmonary embolism from the main pulmonary artery to the proximal segmental pulmonary arteries. The distal segmental and subsegmental pulmonary arteries at the lower lobes are not well evaluated due to respiratory motion artifact. The main pulmonary artery measures 3.2 cm in diameter. -Mediastinum: Visualized portion of the thyroid gland is unremarkable. No evidence for thoracic aortic aneurysm or dissection. Atherosclerosis and coronary artery atherosclerosis noted, with evidence of prior CABG. No pericardial effusion. Mild cardiomegaly. No reflux of contrast into the hepatic veins. ICD leads appear to be in appropriate position. . -Lymph Nodes: No mediastinal, axillary, or hilar adenopathy -Lungs: Evaluation slightly limited due to respiratory motion artifact. Mosaic groundglass attenuation, greatest at the perihilar region and lower lungs. Mild bronchial wall thickening. No pleural effusion or pneumothorax. -Upper abdomen: No acute finding. -Bones: No acute osseous abnormalities are identified. Multilevel degenerative spondylosis and disc disease noted. Prior median sternotomy. IMPRESSION: 1. No convincing evidence for pulmonary embolus from the main pulmonary artery to the proximal segmental pulmonary arteries. . The distal segmental and subsegmental pulmonary arteries in the lower lobes are not well evaluated due to respiratory motion artifact. 2. Cardiomegaly. Prominent main pulmonary artery may indicate pulmonary arterial hypertension. 3. Mosaic groundglass attenuation greater at the perihilar/ basilar region suggesting pulmonary edema. Other considerations include subsegmental atelectasis, small airways disease, or pneumonitis. Xr Chest [facility] Result Date: 5/19/2019 EXAMINATION: Chest single view INDICATION: Shortness of breath COMPARISON: 11/4/2018 FINDINGS: Single frontal view of the chest obtained. Multilead left subclavian pacer, unchanged. Post median sternotomy with numerous mediastinal clips. Moderately enlarged cardiac silhouette. Aortic arch calcifications. Prominent perihilar interstitium. Left midlung and bibasilar streaky densities. No evidence of pneumothorax. No acute osseous findings. IMPRESSION: Postsurgical changes as above. Enlarged cardiac silhouette with suspected interstitial infiltrate/edema. Probable left midlung and bibasilar streaky atelectasis. EKG:  Read and interpreted by me, paced rhythm with possible concordant ST depression in lead V2 discussed with interventional cardiology, do not suspect STEMI 
 
MOST RECENT VITALS Visit Vitals BP (!) 204/94 (BP 1 Location: Left arm, BP Patient Position: At rest) Pulse 80 Temp 99 °F (37.2 °C) Resp 16 Ht 5' 4\" (1.626 m) Wt 95.3 kg (210 lb) SpO2 98% BMI 36.05 kg/m² Medical Decision Making Patient seen and examined. Uncomfortable appearing, nontoxic, afebrile patient in moderate distress secondary to respiratory status. Patient states that she has had worsening shortness of breath over the past week, and much worse today. Initial EKG concerning for concordant ST depression in lead V2. Discussed case with interventional cardiology who was not concerned for STEMI. Troponin negative. Delta troponin also negative. Lower suspicion for ACS, MI at this time. Diffuse wheezes appreciated during exam, improved after 2 nebulizer treatments.   Patient felt mildly improved, but remains uncomfortable. Suspect this is a combination of patient's asthma, as well as a component of congestive heart failure. Given patient's significant cardiac history of CABG, as well as her new EKG findings, I discussed case with cardiology recommend admission for observation. They will evaluate patient during this hospitalization. Discussed case with internal medicine who will admit for further management. Plan for admission discussed with patient who understood. All questions answered. Stable for transfer to telemetry. Final Diagnosis ICD-10-CM ICD-9-CM 1. Fatigue, unspecified type R53.83 780.79  
2. SOB (shortness of breath) R06.02 786.05  
3. Anemia, unspecified type D64.9 285.9 4. History of coronary artery disease Z86.79 V12.59 Disposition Admit to telemetry Che Varghese MD 
May 19, 2019 My signature above authenticates this document and my orders, the final   
diagnosis (es), discharge prescription (s), and instructions in the Epic   
record. If you have any questions please contact [phone]. 
  
Nursing notes have been reviewed by the physician/ advanced practice   
Clinician. Dragon medical dictation software was used for portions of this report. Unintended voice recognition errors may occur.

## 2019-05-20 ENCOUNTER — APPOINTMENT (OUTPATIENT)
Dept: NON INVASIVE DIAGNOSTICS | Age: 79
DRG: 291 | End: 2019-05-20
Attending: PHYSICIAN ASSISTANT
Payer: MEDICARE

## 2019-05-20 LAB
ANION GAP SERPL CALC-SCNC: 7 MMOL/L (ref 3–18)
APPEARANCE UR: ABNORMAL
ATRIAL RATE: 78 BPM
BACTERIA URNS QL MICRO: NORMAL /HPF
BILIRUB UR QL: NEGATIVE
BUN SERPL-MCNC: 30 MG/DL (ref 7–18)
BUN/CREAT SERPL: 21 (ref 12–20)
CALCIUM SERPL-MCNC: 9.1 MG/DL (ref 8.5–10.1)
CALCULATED P AXIS, ECG09: 59 DEGREES
CALCULATED R AXIS, ECG10: -70 DEGREES
CALCULATED T AXIS, ECG11: 107 DEGREES
CHLORIDE SERPL-SCNC: 110 MMOL/L (ref 100–108)
CK MB CFR SERPL CALC: NORMAL % (ref 0–4)
CK MB SERPL-MCNC: <1 NG/ML (ref 5–25)
CK SERPL-CCNC: 38 U/L (ref 26–192)
CO2 SERPL-SCNC: 20 MMOL/L (ref 21–32)
COLOR UR: YELLOW
CREAT SERPL-MCNC: 1.42 MG/DL (ref 0.6–1.3)
DIAGNOSIS, 93000: NORMAL
ECHO IVC SNIFF: 1.45 CM
ECHO LA AREA 4C: 20.5 CM2
ECHO LA VOL 2C: 86.89 ML (ref 22–52)
ECHO LA VOL 4C: 66.18 ML (ref 22–52)
ECHO LA VOL BP: 86.91 ML (ref 22–52)
ECHO LA VOL/BSA BIPLANE: 43.52 ML/M2 (ref 16–28)
ECHO LA VOLUME INDEX A2C: 43.51 ML/M2 (ref 16–28)
ECHO LA VOLUME INDEX A4C: 33.14 ML/M2 (ref 16–28)
ECHO LV INTERNAL DIMENSION DIASTOLIC: 5.7 CM (ref 3.9–5.3)
ECHO LV INTERNAL DIMENSION SYSTOLIC: 4.28 CM
ECHO LV IVSD: 1.25 CM (ref 0.6–0.9)
ECHO LV MASS 2D: 358.2 G (ref 67–162)
ECHO LV MASS INDEX 2D: 179.4 G/M2 (ref 43–95)
ECHO LV POSTERIOR WALL DIASTOLIC: 1.21 CM (ref 0.6–0.9)
ECHO LVOT DIAM: 1.96 CM
ECHO LVOT PEAK GRADIENT: 10.2 MMHG
ECHO LVOT PEAK VELOCITY: 159.4 CM/S
ECHO LVOT VTI: 29.6 CM
ECHO MV A VELOCITY: 115.45 CM/S
ECHO MV E DECELERATION TIME (DT): 248.1 MS
ECHO MV E VELOCITY: 108.86 CM/S
ECHO MV E/A RATIO: 0.94
ECHO PULMONARY ARTERY SYSTOLIC PRESSURE (PASP): 52 MMHG
ECHO TV REGURGITANT MAX VELOCITY: 332.33 CM/S
ECHO TV REGURGITANT PEAK GRADIENT: 44.2 MMHG
EPITH CASTS URNS QL MICRO: NORMAL /LPF (ref 0–5)
GLUCOSE SERPL-MCNC: 103 MG/DL (ref 74–99)
GLUCOSE UR STRIP.AUTO-MCNC: NEGATIVE MG/DL
GRAN CASTS URNS QL MICRO: NORMAL /LPF
HGB UR QL STRIP: ABNORMAL
KETONES UR QL STRIP.AUTO: NEGATIVE MG/DL
LEUKOCYTE ESTERASE UR QL STRIP.AUTO: NEGATIVE
NITRITE UR QL STRIP.AUTO: NEGATIVE
P-R INTERVAL, ECG05: 216 MS
PH UR STRIP: 5.5 [PH] (ref 5–8)
POTASSIUM SERPL-SCNC: 4 MMOL/L (ref 3.5–5.5)
PROT UR STRIP-MCNC: 100 MG/DL
Q-T INTERVAL, ECG07: 456 MS
QRS DURATION, ECG06: 208 MS
QTC CALCULATION (BEZET), ECG08: 519 MS
RBC #/AREA URNS HPF: NORMAL /HPF (ref 0–5)
SODIUM SERPL-SCNC: 137 MMOL/L (ref 136–145)
SP GR UR REFRACTOMETRY: 1.02 (ref 1–1.03)
TROPONIN I SERPL-MCNC: 0.03 NG/ML (ref 0–0.04)
UROBILINOGEN UR QL STRIP.AUTO: 1 EU/DL (ref 0.2–1)
VENTRICULAR RATE, ECG03: 78 BPM
WBC URNS QL MICRO: NEGATIVE /HPF (ref 0–4)

## 2019-05-20 PROCEDURE — 96372 THER/PROPH/DIAG INJ SC/IM: CPT

## 2019-05-20 PROCEDURE — 74011250636 HC RX REV CODE- 250/636: Performed by: HOSPITALIST

## 2019-05-20 PROCEDURE — 93306 TTE W/DOPPLER COMPLETE: CPT

## 2019-05-20 PROCEDURE — 96375 TX/PRO/DX INJ NEW DRUG ADDON: CPT

## 2019-05-20 PROCEDURE — 96361 HYDRATE IV INFUSION ADD-ON: CPT

## 2019-05-20 PROCEDURE — 80048 BASIC METABOLIC PNL TOTAL CA: CPT

## 2019-05-20 PROCEDURE — 77030038269 HC DRN EXT URIN PURWCK BARD -A

## 2019-05-20 PROCEDURE — 94640 AIRWAY INHALATION TREATMENT: CPT

## 2019-05-20 PROCEDURE — 74011250637 HC RX REV CODE- 250/637: Performed by: HOSPITALIST

## 2019-05-20 PROCEDURE — 87077 CULTURE AEROBIC IDENTIFY: CPT

## 2019-05-20 PROCEDURE — 87040 BLOOD CULTURE FOR BACTERIA: CPT

## 2019-05-20 PROCEDURE — 74011000250 HC RX REV CODE- 250: Performed by: HOSPITALIST

## 2019-05-20 PROCEDURE — 99218 HC RM OBSERVATION: CPT

## 2019-05-20 PROCEDURE — 87186 SC STD MICRODIL/AGAR DIL: CPT

## 2019-05-20 PROCEDURE — 82550 ASSAY OF CK (CPK): CPT

## 2019-05-20 PROCEDURE — 96374 THER/PROPH/DIAG INJ IV PUSH: CPT

## 2019-05-20 RX ORDER — OLMESARTAN MEDOXOMIL 5 MG/1
5 TABLET ORAL DAILY
Status: DISCONTINUED | OUTPATIENT
Start: 2019-05-20 | End: 2019-05-22

## 2019-05-20 RX ORDER — AMLODIPINE BESYLATE 5 MG/1
5 TABLET ORAL DAILY
Status: DISCONTINUED | OUTPATIENT
Start: 2019-05-20 | End: 2019-05-23

## 2019-05-20 RX ORDER — MELATONIN
2000 DAILY
Status: DISCONTINUED | OUTPATIENT
Start: 2019-05-20 | End: 2019-05-23 | Stop reason: HOSPADM

## 2019-05-20 RX ORDER — FUROSEMIDE 10 MG/ML
20 INJECTION INTRAMUSCULAR; INTRAVENOUS ONCE
Status: COMPLETED | OUTPATIENT
Start: 2019-05-20 | End: 2019-05-20

## 2019-05-20 RX ORDER — HYDROCORTISONE 25 MG/G
OINTMENT TOPICAL 2 TIMES DAILY
Status: DISCONTINUED | OUTPATIENT
Start: 2019-05-20 | End: 2019-05-23 | Stop reason: HOSPADM

## 2019-05-20 RX ORDER — MORPHINE SULFATE 10 MG/ML
1 INJECTION, SOLUTION INTRAMUSCULAR; INTRAVENOUS ONCE
Status: COMPLETED | OUTPATIENT
Start: 2019-05-20 | End: 2019-05-20

## 2019-05-20 RX ORDER — MICONAZOLE NITRATE 2 %
1 CREAM WITH APPLICATOR VAGINAL 2 TIMES DAILY WITH MEALS
Status: DISCONTINUED | OUTPATIENT
Start: 2019-05-20 | End: 2019-05-23 | Stop reason: HOSPADM

## 2019-05-20 RX ORDER — ENOXAPARIN SODIUM 100 MG/ML
40 INJECTION SUBCUTANEOUS EVERY 24 HOURS
Status: DISCONTINUED | OUTPATIENT
Start: 2019-05-20 | End: 2019-05-23 | Stop reason: HOSPADM

## 2019-05-20 RX ORDER — MORPHINE SULFATE 2 MG/ML
1 INJECTION, SOLUTION INTRAMUSCULAR; INTRAVENOUS
Status: DISCONTINUED | OUTPATIENT
Start: 2019-05-20 | End: 2019-05-20

## 2019-05-20 RX ORDER — ACETAMINOPHEN 325 MG/1
650 TABLET ORAL
Status: COMPLETED | OUTPATIENT
Start: 2019-05-20 | End: 2019-05-20

## 2019-05-20 RX ORDER — GUAIFENESIN 100 MG/5ML
81 LIQUID (ML) ORAL DAILY
Status: DISCONTINUED | OUTPATIENT
Start: 2019-05-20 | End: 2019-05-23 | Stop reason: HOSPADM

## 2019-05-20 RX ORDER — FUROSEMIDE 10 MG/ML
20 INJECTION INTRAMUSCULAR; INTRAVENOUS DAILY
Status: DISCONTINUED | OUTPATIENT
Start: 2019-05-21 | End: 2019-05-21

## 2019-05-20 RX ORDER — HYDRALAZINE HYDROCHLORIDE 20 MG/ML
10 INJECTION INTRAMUSCULAR; INTRAVENOUS
Status: DISCONTINUED | OUTPATIENT
Start: 2019-05-20 | End: 2019-05-23 | Stop reason: HOSPADM

## 2019-05-20 RX ORDER — IPRATROPIUM BROMIDE AND ALBUTEROL SULFATE 2.5; .5 MG/3ML; MG/3ML
3 SOLUTION RESPIRATORY (INHALATION)
Status: DISCONTINUED | OUTPATIENT
Start: 2019-05-20 | End: 2019-05-23 | Stop reason: HOSPADM

## 2019-05-20 RX ORDER — CARVEDILOL 25 MG/1
25 TABLET ORAL 2 TIMES DAILY WITH MEALS
Status: DISCONTINUED | OUTPATIENT
Start: 2019-05-20 | End: 2019-05-23 | Stop reason: HOSPADM

## 2019-05-20 RX ORDER — LIDOCAINE HYDROCHLORIDE 20 MG/ML
15 SOLUTION OROPHARYNGEAL
Status: DISCONTINUED | OUTPATIENT
Start: 2019-05-20 | End: 2019-05-23 | Stop reason: HOSPADM

## 2019-05-20 RX ORDER — ONDANSETRON 2 MG/ML
4 INJECTION INTRAMUSCULAR; INTRAVENOUS
Status: DISCONTINUED | OUTPATIENT
Start: 2019-05-20 | End: 2019-05-23 | Stop reason: HOSPADM

## 2019-05-20 RX ADMIN — MORPHINE SULFATE 1 MG: 10 INJECTION, SOLUTION INTRAMUSCULAR; INTRAVENOUS at 15:02

## 2019-05-20 RX ADMIN — AMLODIPINE BESYLATE 5 MG: 5 TABLET ORAL at 08:10

## 2019-05-20 RX ADMIN — ONDANSETRON 4 MG: 2 INJECTION INTRAMUSCULAR; INTRAVENOUS at 15:02

## 2019-05-20 RX ADMIN — ASPIRIN 81 MG 81 MG: 81 TABLET ORAL at 08:08

## 2019-05-20 RX ADMIN — ALUMINUM HYDROXIDE AND MAGNESIUM HYDROXIDE 15 ML: 200; 200 SUSPENSION ORAL at 14:58

## 2019-05-20 RX ADMIN — IPRATROPIUM BROMIDE AND ALBUTEROL SULFATE 3 ML: .5; 3 SOLUTION RESPIRATORY (INHALATION) at 12:58

## 2019-05-20 RX ADMIN — CARVEDILOL 25 MG: 25 TABLET, FILM COATED ORAL at 16:53

## 2019-05-20 RX ADMIN — FUROSEMIDE 20 MG: 10 INJECTION, SOLUTION INTRAMUSCULAR; INTRAVENOUS at 12:53

## 2019-05-20 RX ADMIN — CARVEDILOL 25 MG: 25 TABLET, FILM COATED ORAL at 08:10

## 2019-05-20 RX ADMIN — ACETAMINOPHEN 650 MG: 325 TABLET ORAL at 14:52

## 2019-05-20 RX ADMIN — LIDOCAINE HYDROCHLORIDE 15 ML: 20 SOLUTION ORAL; TOPICAL at 14:58

## 2019-05-20 RX ADMIN — ENOXAPARIN SODIUM 40 MG: 40 INJECTION SUBCUTANEOUS at 07:10

## 2019-05-20 NOTE — PROGRESS NOTES
Virtual reviewer communicated change to CM which reflect outpatient observation order written prior to discharge order being written. Code 44 delivered to patient. CM met with the patient and provided to the patient the printed information about her outpatient observation status. The patient was given the flyer entitled, \"Medicare Outpatient Observation: Information & notification. \" All questions were answered, no additional discharge needs identified at this time and patient expects to return to their (home, assisted living facility, relatives home, etc.) after discharge today. Oral and Written notification given to patient and/or caregiver informing them that they are currently an Outpatient receiving care in our facility. Outpatient services include Observation Services under [place] and [place] requirements.

## 2019-05-20 NOTE — H&P
History & Physical 
Patient: Ammy Gonzalez MRN: [id number]  CSN: [id number] YOB: 1940  Age: 70 y.o. Sex: female DOA: 5/19/2019 Chief Complaint:  
Weakness HPI:  
 
70 y.o. AA female with past medical history of CAD S/P CABG, COPD, HTN, LE edema  presents with fatigue and shortness of breath. Patient states that over the past week, she has developed worsening shortness of breath. Patient states that the symptoms are moderate to severe. She states the symptoms are worse with exertion. She states the symptoms are only very mildly relieved with rest.  She has had mild intermittent cough. Otherwise, she denies fever, chills, chest pain  or other associated symptoms. As per patient she had diarrhea on 05/18/19 which was watery , happened 3 times and resolved by itself. No abdominal pain. No dizziness. Past Medical History:  
Diagnosis Date  Abnormal ankle brachial index 11/13/2014 Mild arterial disease in right leg. R VICTOR MANUEL 0.88. L VICTOR MANUEL 1.00.  Anemia  Arm DVT (deep venous thromboembolism), acute (HCC)   
 s/p anticoagulation  Atherosclerotic heart disease  Atrial fibrillation ([address]  AV block  CAD (coronary artery disease)  Cardiomyopathy, ischemic  Carotid artery stenosis  Carotid duplex 11/13/2014 Mild <50% bilateral ICA plaquing. Possible left vertebral occlusion.  Cerebral artery occlusion with cerebral infarction ([address] stroke x 2  Chest pain  CVA (cerebral infarction)  Echocardiogram 08/19/2015 EF 55%. Apical inferior, apical septal hypk (new since study of 12/19/11), likely due to chronic V-pacing. Mild LVH. RVSP 30 mmHg. Mild LAE. Mild MR. Mild TR.  Gastritis  GERD (gastroesophageal reflux disease)  Heart failure ([address]  Hiatal hernia  Hyperlipidemia  Hypertension  Hypertensive cardiovascular disease  Intracranial aneurysm left cavernous ICA 2mm aneurysm from head/neck CTA in 2014  Long term (current) use of anticoagulants 3/10/2011  Lower extremity venous duplex 2015 No DVT bilaterally.  Nuclear cardiac imaging test 2015 Low risk. Sm apical infarction w/no ischemia vs apical thinning. Sm basal inferior defect, likely artifact. EF 56%. Inferoseptal, inferoapical WMA related to sternotomy or paced rhythm.  Pacemaker  PAD (peripheral artery disease) ([address]  PVC's   
 chronic  S/P CABG x 3 2008 LIMA-LAD. SVG-OM. SVG-dRCA  S/P cardiac cath 2008 pRCA 100%. LM patent. Cx patent. OM1 100%. mLAD 95%. LVEDP 27-29mmHg. EF 20%. mod MR  
 Tilt table evaluation 1995 Positive for orthostatic hypotension  Vitamin D deficiency Past Surgical History:  
Procedure Laterality Date  CABG, ARTERY-VEIN, THREE  2008  CARDIAC SURG PROCEDURE UNLIST    
 medtronic dual-chamber cardioverted-defibrillator  HX  SECTION    
 x2  
 HX ENDOSCOPY  3/1/16  HX ENDOSCOPY  3/1/16 [facility] [place] [facility]""  HX HYSTERECTOMY   HX ORTHOPAEDIC    
 knee surgery  HX OTHER SURGICAL  2/10/16 Pacemaker Gen Change  HX PACEMAKER Defibrillator   HX TUMOR REMOVAL    
 removed from arm.  benign Family History : Negative for premature CAD. Social History Socioeconomic History  Marital status:  Spouse name: Not on file  Number of children: Not on file  Years of education: Not on file  Highest education level: Not on file Tobacco Use  Smoking status: Never Smoker  Smokeless tobacco: Never Used  Tobacco comment: just smoked 2 weeks in college Substance and Sexual Activity  Alcohol use: No  
 Drug use: No  
 Sexual activity: Never Other Topics Concern Prior to Admission medications Medication Sig Start Date End Date Taking? Authorizing Provider furosemide (LASIX) 40 mg tablet TAKE 1 TABLET BY MOUTH EVERY DAY 1/15/19  Yes Provider, Historical  
amLODIPine (NORVASC) 5 mg tablet Take 1 Tab by mouth daily. 12/3/18  Yes Seth Cross P, CANDE  
carvedilol (COREG) 25 mg tablet TAKE 1 TABLET BY MOUTH (2) TIMES A DAY 11/1/18  Yes Kalia Celis DO  
olmesartan (BENICAR) 5 mg tablet TAKE 1 TABLET BY MOUTH EVERY DAY 8/30/18  Yes Provider, Historical  
hydrocortisone (HYTONE) 2.5 % ointment APPLY LIGHTLY TO THE AFFECTED AREAS ON THE LEGS AND FEET TWICE A DAY. 2/2/18  Yes Provider, Historical  
aspirin 81 mg tablet Take 81 mg by mouth daily. Yes Provider, Historical  
calcium citrate-vitamin d3 (CITRACAL+D) 315-200 mg-unit tab Take 1 Tab by mouth two (2) times daily (with meals). Provider, Historical  
diclofenac (VOLTAREN) 1 % gel USE 4 GRAMS ON KNEE FOUR TIMES A DAY AS NEEDED 1/16/19   Provider, Historical  
cholecalciferol, vitamin D3, (VITAMIN D3) 2,000 unit tab Take 2,000 Units by mouth daily. Provider, Historical  
furosemide (LASIX) 20 mg tablet Take 20 mg by mouth daily. Provider, Historical  
 
 
Allergies Allergen Reactions  Nifedipine Nausea and Vomiting and Other (comments) Dizzy  Ace Inhibitors Cough  Codeine Unknown (comments), Nausea Only and Nausea and Vomiting  Hydralazine Other (comments) Dizziness and Fatigue  Lipitor [Atorvastatin] Nausea and Vomiting and Vertigo  Simvastatin Nausea and Vomiting Review of Systems Pertinent positive as noted in HPI. All other systems reviewed and are negative. Physical Exam:  
 
Physical Exam: 
Visit Vitals /66 Pulse 74 Temp 99.9 °F (37.7 °C) Resp 18 Ht 5' 4\" (1.626 m) Wt 95.3 kg (210 lb) SpO2 99% BMI 36.05 kg/m² O2 Device: Room air General:  Patient is awake,  cooperative, no distress, appears stated age. Head: Normocephalic, atraumatic, without obvious abnormality. Eyes:  Conjunctivae/corneas clear. PERRL, EOMs intact. Nose: Nares normal. No drainage or sinus tenderness. Neck: Supple, symmetrical, trachea midline, no adenopathy, thyroid: no enlargement, no carotid bruit and no JVD. Lungs:   Coarse BS  to auscultation bilaterally. No rales. No Wheezing. Heart:  Pace  rhythm, S1, S2 normal.  
  Abdomen: Soft, non-tender. Bowel sounds normal. No organomegaly. Extremities: Bilateral LE lymphedema. Left > Right. Pulses: 2+ and symmetric all extremities. Skin:  No rashes , Ulcer or lesions Neurologic: AAOx3, No focal motor or sensory deficit. Labs Reviewed and discussed with patient and patient's Family. Procedures/imaging:  
See electronic medical records for all procedures/Xrays and details which were not copied into this note but were reviewed prior to creation of Plan Assessment/Plan # Weakness and MCMILLAN with hx of CAD S/P CABG: Likely secondary to diarrhea and viral gastroenteritis but other causes especially cardiac cause need to be ruled out: 
-Place patient on observation. ED discussed with Cardiology HENRY Bloom Client and they recommended to observe her over night and they will see her at am. They did not recommend any more cardiac work up. Hold her lasix . Continue Aspirin. # CKD-III: Monitor BMP. Avoid Nephrotoxic agents. # Atrial Fibrillation: Continue Telemetry. Coreg. # HTN: Continue home medications. Cover with hydralazine IV prn. # Hyperlipidemia: Continue Statin. # DVT prophylaxis: Lovenox. Further evaluation and treatment per patient's attending, Consultants recommendation, test results and patient's clinical course. Plan of the care was discussed with patient and patient's family. They are in agreement. Patient is Full Code.  
 
Ramin Dean MD 
5/19/2019 9:22 PM

## 2019-05-20 NOTE — ED NOTES
Bedside, Verbal and Written shift change report given to [address] [place] [address] Avenue (oncoming nurse) by Vijay HESTER(offgoing nurse). Report included the following information SBAR, Kardex, and MAR. Hourly rounding and  chart checks completed.

## 2019-05-20 NOTE — PROGRESS NOTES
[facility] Hospitalist Group Progress Note Patient: Ammy Gonzalez Age: 70 y.o. : 1940 MR#: [id number] SSN: [id number] Date: 2019 Subjective: SOB at rest. Weakness prior to admission. Been in ED all day in stretcher. No physical activity today while in ED. Denies CP/chest discomfort, abdominal pain, N/V, fever or chills. Assessment/Plan:  
1. SOB 2. Weakness 3. Elevated BNP 4. fever 5. HTN 6. HLD 7. CKD3 8. afib Plan 1. Cardiology consult. Low suspicion of ACS but would recommend nuclear stress test tomorrow if breathing improves. NPO after midnight. ECHO pending. Resume OP cardiac medications, gentle diuresis given renal dysfunction. Lasix 20 mg x1 dose. 2. Oxygen as needed. duonebs as needed 3. PT/OT eval and treat Case discussed with:  [x]Patient  [x]Family  []Nursing  []Case Management DVT Prophylaxis:  [x]Lovenox  []Hep SQ  []SCDs  []Coumadin   []On Heparin gtt Objective:  
VS:  
Visit Vitals /55 Pulse 84 Temp (!) 100.8 °F (38.2 °C) Resp (!) 32 Ht 5' 4\" (1.626 m) Wt 95.3 kg (210 lb) SpO2 96% Breastfeeding? No  
BMI 36.05 kg/m² Tmax/24hrs: Temp (24hrs), Av.4 °F (38 °C), Min:99.9 °F (37.7 °C), Max:100.8 °F (38.2 °C) Intake/Output Summary (Last 24 hours) at 2019 1603 Last data filed at 2019 0700 Gross per 24 hour Intake 500 ml Output  Net 500 ml General:  Alert, NAD Cardiovascular:  Aspen Suarez Pulmonary:  LSC throughout; respiratory effort WNL 
GI:  +BS in all four quadrants, soft, non-tender Extremities:  Bilateral lower extremities nonpitting edema Neuro: alert and oriented Family contact: at bedside Labs:   
Recent Results (from the past 24 hour(s)) METABOLIC PANEL, BASIC Collection Time: 19  7:05 AM  
Result Value Ref Range Sodium 137 136 - 145 mmol/L Potassium 4.0 3.5 - 5.5 mmol/L  Chloride 110 (H) 100 - 108 mmol/L  
 CO2 20 (L) 21 - 32 mmol/L Anion gap 7 3.0 - 18 mmol/L Glucose 103 (H) 74 - 99 mg/dL BUN 30 (H) 7.0 - 18 MG/DL Creatinine 1.42 (H) 0.6 - 1.3 MG/DL  
 BUN/Creatinine ratio 21 (H) 12 - 20 GFR est AA 43 (L) >60 ml/min/1.73m2 GFR est non-AA 36 (L) >60 ml/min/1.73m2 Calcium 9.1 8.5 - 10.1 MG/DL  
CARDIAC PANEL,(CK, CKMB & TROPONIN) Collection Time: 05/20/19  7:05 AM  
Result Value Ref Range CK 38 26 - 192 U/L  
 CK - MB <1.0 <3.6 ng/ml CK-MB Index  0.0 - 4.0 % CALCULATION NOT PERFORMED WHEN RESULT IS BELOW LINEAR LIMIT Troponin-I, QT 0.03 0.0 - 0.045 NG/ML  
ECHO ADULT COMPLETE Collection Time: 05/20/19 12:12 PM  
Result Value Ref Range LA Volume 86.91 22 - 52 mL  
 LVIDd 5.70 (A) 3.9 - 5.3 cm  
 LVPWd 1.21 (A) 0.6 - 0.9 cm LVIDs 4.28 cm IVSd 1.25 (A) 0.6 - 0.9 cm  
 LVOT d 1.96 cm  
 LVOT Peak Velocity 159.40 cm/s LVOT Peak Gradient 10.2 mmHg LVOT VTI 29.60 cm  
 MV A Taz 115.45 cm/s  
 MV E Taz 108.86 cm/s  
 MV E/A 0.90 Inferior Vena Cava Diameter Sniffing 1.45 cm  
 LA Vol 4C 66.18 (A) 22 - 52 mL  
 LA Vol 2C 86.89 (A) 22 - 52 mL  
 LA Area 4C 20.5 cm2 LV Mass .2 (A) 67 - 162 g  
 LV Mass AL Index 143.7 (A) 43 - 95 g/m2 Mitral Valve E Wave Deceleration Time 248.1 ms  
 Triscuspid Valve Regurgitation Peak Gradient 44.2 mmHg  
 TR Max Velocity 332.33 cm/s  
 LA Vol Index 43.52 16 - 28 ml/m2 PASP 52.0 mmHg LA Vol Index 43.51 16 - 28 ml/m2 LA Vol Index 33.14 16 - 28 ml/m2 Signed By: Patricia Haines NP May 20, 2019

## 2019-05-20 NOTE — ROUTINE PROCESS
Bedside shift change report given to Jose Severin (oncoming nurse) by Radha Easton RN (offgoing nurse). Report included the following information SBAR, Kardex, Intake/Output, Recent Results and Cardiac Rhythm paced.

## 2019-05-20 NOTE — CONSULTS
[facility] - Consult Note Consultation request by Olamide Alvarado MD for advice/opinion related to evaluating Shortness of breath [R06.02] Weakness [R53.1] Date of  Admission: 5/19/2019  1:45 PM  
Primary Care Physician:  Sade Mac MD 
 
 Assessment:  
 
Patient Active Problem List  
Diagnosis Code  Atherosclerotic heart disease, s/p CABG X3 in 6/08, in the setting of severe left ventricular dysfunction, and s/p a dual-chamber I25.10  
 S/P CABG x 3 Z95.1  PVC's (premature ventricular contractions) I49.3  
 HTN (hypertension) I10  
 Atrial fibrillation ([facility]) I48.91  
 Carotid stenosis I65.29  
 PAD (peripheral artery disease) ([facility]) I73.9  Intracranial aneurysm - left cavernous ICA on 2014 head/neck CTA I67.1  Mixed hyperlipidemia E78.2  Gastroesophageal reflux disease without esophagitis K21.9  Chronic gastritis with bleeding - in February/March of 2016 per FOBT and EGD results K29.51  
 History of CVA (cerebrovascular accident) Z80.78  
 Vitamin D deficiency E55.9  Dysphagia s/p dilation R13.10  Anemia D64.9  
 Pacemaker (for h/o AV block) Z95.0  
 OAB (overactive bladder) N32.81  
 Cataract H26.9  Gout of left foot M10.9  Seasonal allergic rhinitis J30.2  Microalbuminuria R80.9  CKD (chronic kidney disease) stage 3, GFR 30-59 ml/min ([facility]) N18.3  Mixed connective tissue disease ([address] M35.1  Severe obesity (BMI 35.0-39. 9) with comorbidity ([address] E66.01  
 Lower limb ulcer, calf, right, with fat layer exposed ([address] Q64.323  Generalized edema R60.1  Peripheral venous insufficiency I87.2  Shortness of breath R06.02  
 Weakness R53.1  
 
- Fatigue and worsening of chronic dyspnea with one day of diarrhea. CTA chest negative for acute PE 
- Negative troponin x 2, EKG is V-paced.  Low suspicion of ACS 
- CAD s/p CABG x 3 in 2008 with LIMA to LAD, reverse SVG to OM1 and reverse SVG to distal RCA 
 - Echocardiogram from 5/2018: EF 45-50%; mild phtn with PASP of 39 mmHg 
- Hx/o DVT following CABG and was on Coumadin - S/p dual-chamber PPM in Oct 2008 for AV block, gen change Feb 2016 
- Hx/o ischemic CMY 
- Essential hypertension 
- Hx/o CVA 
- Stage 3 CKD  
- GERD Primary cardiologist: Dr. Rudolph Mckeon Plan:  
 
Patient with fatigue and SOB. Initial cardiac enzymes negative and EKG with V-paced rhythm. Low suspicion of ACS but due to patient's history, would recommend nuclear stress test tomorrow if breathing improves. Will make NPO after midnight Will complete echocardiogram. 
Would resume outpatient cardiac regimen. Gentle diuresis given renal dysfunction History of Present Illness: This is a 70 y.o. female admitted for Shortness of breath [R06.02] Weakness [R53.1]. Patient complains of:  Fatigue and SOB. She states that she was having diarrhea on Saturday and became extremely weak and fatigue and noticed some worsening of her chronic SOB. She denies any CP at this time but has had some paresthesias to the hands and some abdominal and back discomfort. She has a pmhx of CABG x 3 in 2008, CVA, DVT, ischemic CMY with EF 45-50 per echo from 5/2018, HTN, CKD, GERD Cardiac risk factors: obesity, sedentary life style, hypertension, post-menopausal 
 
Review of Symptoms:  Except as stated above include: 
Constitutional:  Positive for fatigue and weakness, negative for fever Ears, nose, throat:  Negative for cough or congestion Respiratory:  Positive for SOB and wheezing Cardiovascular:  Negative for CP, palps, syncope. Positive for MCMILLAN and SOB Gastrointestinal: Positive for diarrhea and abd pain. Negative for N, V Genitourinary:  Negative for urinary complaints Musculoskeletal:  Positive for back pain Neurological:  Positive for dizziness Dermatological:  Negative for skin rash or lesions Hematological: Negative for bruising Endocrinological: Negative for heat or cold intolerance Psychological:  Negative for anxiety or depression Past Medical History:  
 
Past Medical History:  
Diagnosis Date  Abnormal ankle brachial index 11/13/2014 Mild arterial disease in right leg. R VICTOR MANUEL 0.88. L VICTOR MANUEL 1.00.  Anemia  Arm DVT (deep venous thromboembolism), acute (HCC)   
 s/p anticoagulation  Atherosclerotic heart disease  Atrial fibrillation ([address]  AV block  CAD (coronary artery disease)  Cardiomyopathy, ischemic  Carotid artery stenosis  Carotid duplex 11/13/2014 Mild <50% bilateral ICA plaquing. Possible left vertebral occlusion.  Cerebral artery occlusion with cerebral infarction ([address] stroke x 2  Chest pain  CVA (cerebral infarction)  Echocardiogram 08/19/2015 EF 55%. Apical inferior, apical septal hypk (new since study of 12/19/11), likely due to chronic V-pacing. Mild LVH. RVSP 30 mmHg. Mild LAE. Mild MR. Mild TR.  Gastritis  GERD (gastroesophageal reflux disease)  Heart failure [facility]  Hiatal hernia  Hyperlipidemia  Hypertension  Hypertensive cardiovascular disease  Intracranial aneurysm   
 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  Nuclear cardiac imaging test 09/24/2015 Low risk. Sm apical infarction w/no ischemia vs apical thinning. Sm basal inferior defect, likely artifact. EF 56%. Inferoseptal, inferoapical WMA related to sternotomy or paced rhythm.  Pacemaker  PAD (peripheral artery disease) ([facility]  PVC's   
 chronic  S/P CABG x 3 06/08/2008 LIMA-LAD. SVG-OM. SVG-dRCA  S/P cardiac cath 06/08/2008 pRCA 100%. LM patent. Cx patent. OM1 100%. mLAD 95%. LVEDP 27-29mmHg. EF 20%. mod MR  
 Tilt table evaluation 06/01/1995 Positive for orthostatic hypotension  Vitamin D deficiency Social History:  
 
Social History Socioeconomic History  Marital status:  Spouse name: Not on file  Number of children: Not on file  Years of education: Not on file  Highest education level: Not on file Tobacco Use  Smoking status: Never Smoker  Smokeless tobacco: Never Used  Tobacco comment: just smoked 2 weeks in college Substance and Sexual Activity  Alcohol use: No  
 Drug use: No  
 Sexual activity: Never Other Topics Concern Family History:  
History reviewed. No pertinent family history. Medications: Allergies Allergen Reactions  Nifedipine Nausea and Vomiting and Other (comments) Dizzy  Ace Inhibitors Cough  Codeine Unknown (comments), Nausea Only and Nausea and Vomiting  Hydralazine Other (comments) Dizziness and Fatigue  Lipitor [Atorvastatin] Nausea and Vomiting and Vertigo  Simvastatin Nausea and Vomiting Current Facility-Administered Medications Medication Dose Route Frequency  amLODIPine (NORVASC) tablet 5 mg  5 mg Oral DAILY  aspirin chewable tablet 81 mg  81 mg Oral DAILY  calcium citrate-vitamin D3 (CITRACAL WITH VITAMIN D MAXIMUM) tablet 1 Tab  1 Tab Oral BID WITH MEALS  carvedilol (COREG) tablet 25 mg  25 mg Oral BID WITH MEALS  cholecalciferol (VITAMIN D3) tablet 2,000 Units  2,000 Units Oral DAILY  hydrocortisone (HYTONE) 2.5 % ointment   Topical BID  olmesartan (BENICAR) tablet 5 mg  5 mg Oral DAILY  enoxaparin (LOVENOX) injection 40 mg  40 mg SubCUTAneous Q24H  
 albuterol-ipratropium (DUO-NEB) 2.5 MG-0.5 MG/3 ML  3 mL Nebulization Q4H PRN  
 hydrALAZINE (APRESOLINE) 20 mg/mL injection 10 mg  10 mg IntraVENous Q6H PRN  
 morphine injection 1 mg  1 mg IntraVENous Q4H PRN Current Outpatient Medications Medication Sig  furosemide (LASIX) 40 mg tablet TAKE 1 TABLET BY MOUTH EVERY DAY  
  amLODIPine (NORVASC) 5 mg tablet Take 1 Tab by mouth daily.  carvedilol (COREG) 25 mg tablet TAKE 1 TABLET BY MOUTH (2) TIMES A DAY  olmesartan (BENICAR) 5 mg tablet TAKE 1 TABLET BY MOUTH EVERY DAY  hydrocortisone (HYTONE) 2.5 % ointment APPLY LIGHTLY TO THE AFFECTED AREAS ON THE LEGS AND FEET TWICE A DAY.  aspirin 81 mg tablet Take 81 mg by mouth daily.  calcium citrate-vitamin d3 (CITRACAL+D) 315-200 mg-unit tab Take 1 Tab by mouth two (2) times daily (with meals).  diclofenac (VOLTAREN) 1 % gel USE 4 GRAMS ON KNEE FOUR TIMES A DAY AS NEEDED  cholecalciferol, vitamin D3, (VITAMIN D3) 2,000 unit tab Take 2,000 Units by mouth daily.  furosemide (LASIX) 20 mg tablet Take 20 mg by mouth daily. Physical Exam:  
 
Visit Vitals /53 Pulse 75 Temp 99.9 °F (37.7 °C) Resp 26 Ht 5' 4\" (1.626 m) Wt 95.3 kg (210 lb) SpO2 97% Breastfeeding? No  
BMI 36.05 kg/m² BP Readings from Last 3 Encounters:  
05/20/19 187/53  
02/21/19 148/72  
02/13/19 138/70 Pulse Readings from Last 3 Encounters:  
05/20/19 75  
02/21/19 63  
02/13/19 80 Wt Readings from Last 3 Encounters:  
05/19/19 95.3 kg (210 lb) 02/13/19 96.2 kg (212 lb)  
01/25/19 96.3 kg (212 lb 3.2 oz) General:  fatigued, cooperative, no distress, appears stated age Neck:  nontender, no masses, no stridor, no JVD Lungs:  Diminished breath sounds bilaterally Heart:  regular rate and rhythm, S1, S2 normal, no murmur, click, rub or gallop Abdomen:  Abdomen with some tenderness to palpation Extremities:  extremities normal, atraumatic, no cyanosis; trace lower extremity edema bilaterally Skin: Warm and dry. no hyperpigmentation, vitiligo, or suspicious lesions Neuro: alert, oriented x3, affect appropriate, no focal neurological deficits, no involuntary movements Psych: normal mood Data Review:  
 
Recent Labs 05/19/19 
1435 WBC 10.3 HGB 11.4* HCT 34.6*  Recent Labs 05/20/19 
0705 05/19/19 
1435  140  
K 4.0 4.4 * 111* CO2 20* 21 * 92 BUN 30* 32* CREA 1.42* 1.59* CA 9.1 9.5 ALB  --  3.1*  
SGOT  --  17 ALT  --  15 Results for orders placed or performed during the hospital encounter of 05/19/19 EKG, 12 LEAD, INITIAL Result Value Ref Range Ventricular Rate 78 BPM  
 Atrial Rate 78 BPM  
 P-R Interval 216 ms  
 QRS Duration 208 ms Q-T Interval 456 ms  
 QTC Calculation (Bezet) 519 ms Calculated P Axis 59 degrees Calculated R Axis -70 degrees Calculated T Axis 107 degrees Diagnosis Electronic ventricular pacemaker When compared with ECG of 04-NOV-2018 17:42, 
Vent. rate has decreased BY  10 BPM 
Confirmed by Reyes Lev MD, --- ([id number]) on 5/20/2019 9:05:34 AM 
  
Results for orders placed or performed in visit on 05/08/18 AMB POC EKG ROUTINE W/ 12 LEADS, INTER & REP Narrative Normal sinus mechanism with atrial sensing and ventricular pacing and occasional PAC's. Compared to the EKG of October 24, 2017 there was a little interval change. Results for orders placed or performed in visit on 12/31/14 PACEMAKER CHECK Impression V-paced - 77%; A-sensed - 82%; A-paced - 29%; V-sensed - 23%; Lead impedances and threshold WNL; No events All Cardiac Markers in the last 24 hours:   
Lab Results Component Value Date/Time CPK 38 05/20/2019 07:05 AM  
 CKMB <1.0 05/20/2019 07:05 AM  
 CKND1  05/20/2019 07:05 AM  
  CALCULATION NOT PERFORMED WHEN RESULT IS BELOW LINEAR LIMIT  
 TROIQ 0.03 05/20/2019 07:05 AM  
 TROIQ <0.02 05/19/2019 02:35 PM  
 TNIPOC <0.04 05/19/2019 02:41 PM  
 
 
Last Lipid:   
Lab Results Component Value Date/Time  Cholesterol, total 215 (H) 07/10/2017 11:17 AM  
 HDL Cholesterol 93 (H) 07/10/2017 11:17 AM  
 LDL, calculated 108.6 (H) 07/10/2017 11:17 AM  
 Triglyceride 67 07/10/2017 11:17 AM  
 CHOL/HDL Ratio 2.3 07/10/2017 11:17 AM  
 
 
 Signed By: HENRY Parnell May 20, 2019

## 2019-05-20 NOTE — PHYSICIAN ADVISORY
Letter of admission status determination Sara Marquez Age: 70 y.o. MRN: [id number] Date of admission: 5/19/2019 I have reviewed this case as it involves a Medicare patient that was admitted as Inpatient and was subsequently changed to Observation status. Patient's condition and the current documented plan of care do support the need for medically necessary hospitalization for over two midnights. Therefore, since the patient is expected to require medically necessary hospitalization across a second midnight, I recommend upgrade to Inpatient status. The final decision regarding the patient's hospitalization status depends on the attending physician's judgment. Kelli Villegas MD, [facility], [facility], [facility], [facility] Physician Advisor [address] [place] [address],[address] [phone]

## 2019-05-20 NOTE — ROUTINE PROCESS
TRANSFER - OUT REPORT: 
 
Verbal report given to Tahira Martinez RN (name) on [facility]  being transferred to  (unit) for routine progression of care Report consisted of patients Situation, Background, Assessment and  
Recommendations(SBAR). Information from the following report(s) SBAR was reviewed with the receiving nurse. Lines:  
Peripheral IV 05/19/19 Right Hand (Active) Site Assessment Clean, dry, & intact 5/19/2019  2:41 PM  
Phlebitis Assessment 0 5/19/2019  2:41 PM  
Infiltration Assessment 0 5/19/2019  2:41 PM  
Dressing Status Clean, dry, & intact 5/19/2019  2:41 PM  
Dressing Type 4 X 4;Tape;Transparent 5/19/2019  2:41 PM  
Hub Color/Line Status Blue;Flushed;Patent 5/19/2019  2:41 PM  
Action Taken Blood drawn 5/19/2019  2:41 PM  
   
Peripheral IV 05/19/19 Left;Upper Arm (Active) Site Assessment Clean, dry, & intact 5/19/2019  6:02 PM  
Phlebitis Assessment 0 5/19/2019  6:02 PM  
Infiltration Assessment 0 5/19/2019  6:02 PM  
Dressing Status Clean, dry, & intact 5/19/2019  6:02 PM  
Dressing Type Transparent 5/19/2019  6:02 PM  
Hub Color/Line Status Pink;Flushed;Patent 5/19/2019  6:02 PM  
  
 
Opportunity for questions and clarification was provided. Patient transported with: 
 [email or web address]

## 2019-05-20 NOTE — PROGRESS NOTES
Completed echocardiogram. Report to follow. Patient to be transported back to room.  
 
Anika Badillo, RCS, RDCS

## 2019-05-21 ENCOUNTER — APPOINTMENT (OUTPATIENT)
Dept: NON INVASIVE DIAGNOSTICS | Age: 79
DRG: 291 | End: 2019-05-21
Attending: PHYSICIAN ASSISTANT
Payer: MEDICARE

## 2019-05-21 LAB
ANION GAP SERPL CALC-SCNC: 8 MMOL/L (ref 3–18)
BASOPHILS # BLD: 0 K/UL (ref 0–0.1)
BASOPHILS NFR BLD: 0 % (ref 0–2)
BUN SERPL-MCNC: 33 MG/DL (ref 7–18)
BUN/CREAT SERPL: 18 (ref 12–20)
CALCIUM SERPL-MCNC: 9.5 MG/DL (ref 8.5–10.1)
CHLORIDE SERPL-SCNC: 110 MMOL/L (ref 100–108)
CO2 SERPL-SCNC: 20 MMOL/L (ref 21–32)
CREAT SERPL-MCNC: 1.79 MG/DL (ref 0.6–1.3)
DIFFERENTIAL METHOD BLD: ABNORMAL
EOSINOPHIL # BLD: 0.1 K/UL (ref 0–0.4)
EOSINOPHIL NFR BLD: 1 % (ref 0–5)
ERYTHROCYTE [DISTWIDTH] IN BLOOD BY AUTOMATED COUNT: 14.3 % (ref 11.6–14.5)
GLUCOSE SERPL-MCNC: 90 MG/DL (ref 74–99)
HCT VFR BLD AUTO: 29.1 % (ref 35–45)
HGB BLD-MCNC: 9.3 G/DL (ref 12–16)
LYMPHOCYTES # BLD: 1.3 K/UL (ref 0.9–3.6)
LYMPHOCYTES NFR BLD: 12 % (ref 21–52)
MCH RBC QN AUTO: 27.4 PG (ref 24–34)
MCHC RBC AUTO-ENTMCNC: 32 G/DL (ref 31–37)
MCV RBC AUTO: 85.6 FL (ref 74–97)
MONOCYTES # BLD: 0.8 K/UL (ref 0.05–1.2)
MONOCYTES NFR BLD: 7 % (ref 3–10)
NEUTS SEG # BLD: 9.2 K/UL (ref 1.8–8)
NEUTS SEG NFR BLD: 80 % (ref 40–73)
PLATELET # BLD AUTO: 184 K/UL (ref 135–420)
PMV BLD AUTO: 9.4 FL (ref 9.2–11.8)
POTASSIUM SERPL-SCNC: 4.2 MMOL/L (ref 3.5–5.5)
RBC # BLD AUTO: 3.4 M/UL (ref 4.2–5.3)
SODIUM SERPL-SCNC: 138 MMOL/L (ref 136–145)
STRESS BASELINE DIAS BP: 86 MMHG
STRESS BASELINE HR: 68 BPM
STRESS BASELINE SYS BP: 184 MMHG
STRESS ESTIMATED WORKLOAD: 1 METS
STRESS EXERCISE DUR MIN: NORMAL
STRESS PEAK DIAS BP: 86 MMHG
STRESS PEAK SYS BP: 184 MMHG
STRESS PERCENT HR ACHIEVED: 62 %
STRESS POST PEAK HR: 88 BPM
STRESS RATE PRESSURE PRODUCT: NORMAL BPM*MMHG
STRESS TARGET HR: 143 BPM
WBC # BLD AUTO: 11.4 K/UL (ref 4.6–13.2)

## 2019-05-21 PROCEDURE — A9500 TC99M SESTAMIBI: HCPCS

## 2019-05-21 PROCEDURE — 74011250636 HC RX REV CODE- 250/636: Performed by: HOSPITALIST

## 2019-05-21 PROCEDURE — 65270000029 HC RM PRIVATE

## 2019-05-21 PROCEDURE — 74011250637 HC RX REV CODE- 250/637: Performed by: HOSPITALIST

## 2019-05-21 PROCEDURE — 36415 COLL VENOUS BLD VENIPUNCTURE: CPT

## 2019-05-21 PROCEDURE — 80048 BASIC METABOLIC PNL TOTAL CA: CPT

## 2019-05-21 PROCEDURE — 96372 THER/PROPH/DIAG INJ SC/IM: CPT

## 2019-05-21 PROCEDURE — 74011000250 HC RX REV CODE- 250: Performed by: NURSE PRACTITIONER

## 2019-05-21 PROCEDURE — 74011250636 HC RX REV CODE- 250/636: Performed by: INTERNAL MEDICINE

## 2019-05-21 PROCEDURE — 85025 COMPLETE CBC W/AUTO DIFF WBC: CPT

## 2019-05-21 PROCEDURE — 99218 HC RM OBSERVATION: CPT

## 2019-05-21 PROCEDURE — 87040 BLOOD CULTURE FOR BACTERIA: CPT

## 2019-05-21 PROCEDURE — 77030013140 HC MSK NEB VYRM -A

## 2019-05-21 RX ORDER — BUDESONIDE 0.5 MG/2ML
500 INHALANT ORAL
Status: DISCONTINUED | OUTPATIENT
Start: 2019-05-21 | End: 2019-05-23 | Stop reason: HOSPADM

## 2019-05-21 RX ORDER — FUROSEMIDE 20 MG/1
20 TABLET ORAL DAILY
Status: DISCONTINUED | OUTPATIENT
Start: 2019-05-22 | End: 2019-05-22

## 2019-05-21 RX ORDER — VANCOMYCIN 1.75 GRAM/500 ML IN 0.9 % SODIUM CHLORIDE INTRAVENOUS
1750 ONCE
Status: COMPLETED | OUTPATIENT
Start: 2019-05-22 | End: 2019-05-22

## 2019-05-21 RX ORDER — SODIUM CHLORIDE 9 MG/ML
250 INJECTION, SOLUTION INTRAVENOUS ONCE
Status: COMPLETED | OUTPATIENT
Start: 2019-05-21 | End: 2019-05-21

## 2019-05-21 RX ORDER — VANCOMYCIN/0.9 % SOD CHLORIDE 1 G/100 ML
1000 PLASTIC BAG, INJECTION (ML) INTRAVENOUS ONCE
Status: DISCONTINUED | OUTPATIENT
Start: 2019-05-21 | End: 2019-05-21 | Stop reason: DRUGHIGH

## 2019-05-21 RX ADMIN — ASPIRIN 81 MG 81 MG: 81 TABLET ORAL at 11:19

## 2019-05-21 RX ADMIN — CARVEDILOL 25 MG: 25 TABLET, FILM COATED ORAL at 17:11

## 2019-05-21 RX ADMIN — CARVEDILOL 25 MG: 25 TABLET, FILM COATED ORAL at 11:19

## 2019-05-21 RX ADMIN — FUROSEMIDE 20 MG: 10 INJECTION, SOLUTION INTRAMUSCULAR; INTRAVENOUS at 11:19

## 2019-05-21 RX ADMIN — Medication 1 TABLET: at 11:19

## 2019-05-21 RX ADMIN — OLMESARTAN MEDOXOMIL 5 MG: 5 TABLET, COATED ORAL at 11:18

## 2019-05-21 RX ADMIN — ENOXAPARIN SODIUM 40 MG: 40 INJECTION SUBCUTANEOUS at 06:32

## 2019-05-21 RX ADMIN — AMLODIPINE BESYLATE 5 MG: 5 TABLET ORAL at 11:18

## 2019-05-21 RX ADMIN — Medication 1 TABLET: at 17:11

## 2019-05-21 RX ADMIN — SODIUM CHLORIDE 250 ML: 900 INJECTION, SOLUTION INTRAVENOUS at 09:10

## 2019-05-21 RX ADMIN — REGADENOSON 0.4 MG: 0.08 INJECTION, SOLUTION INTRAVENOUS at 09:10

## 2019-05-21 RX ADMIN — BUDESONIDE 500 MCG: 0.5 INHALANT RESPIRATORY (INHALATION) at 17:11

## 2019-05-21 RX ADMIN — VITAMIN D, TAB 1000IU (100/BT) 2000 UNITS: 25 TAB at 11:18

## 2019-05-21 NOTE — PROGRESS NOTES
[facility]  -  Progress Note Patient: Dominique Trevino MRN: [id number]  SSN: [id number] YOB: 1940  Age: 70 y.o. Sex: female Admit Date: 5/19/2019 Hospital Problem List:  
 
Hospital Problems  Date Reviewed: 2/21/2019 Codes Class Noted POA Shortness of breath ICD-10-CM: R06.02 
ICD-9-CM: 786.05  5/19/2019 Unknown Weakness ICD-10-CM: R53.1 ICD-9-CM: 780.79  5/19/2019 Unknown - Fatigue and worsening of chronic dyspnea with one day of diarrhea. CTA chest negative for acute PE 
- Negative troponin x 2, EKG is V-paced. Low suspicion of ACS 
- CAD s/p CABG x 3 in 2008 with LIMA to LAD, reverse SVG to OM1 and reverse SVG to distal RCA 
- Echocardiogram from this admission reveals worsening LV function with EF of 36-40% (previously 45-50% in 5/2018), grade 1 DDx, worsening PHTN with PASP of 52 mmHg 
- Hx/o DVT following CABG and was on Coumadin - S/p dual-chamber PPM in Oct 2008 for AV block, gen change Feb 2016 
- Hx/o ischemic CMY 
- Essential hypertension 
- Hx/o CVA 
- Stage 3 CKD  
- GERD 
  
Primary cardiologist: Dr. Teague Floor Assessment & Plan:  
 
Some improvement in respiratory status and fatigue Echo from yesterday with decrease in LV function with EF 36-40% and worsening pulmonary HTN with PASP of 52 mmHg Nuclear stress test completed this morning, results pending Volume status appears stable today. Some worsening in SCr up to 1.7. Okay to transition to PO lasix Continue remainder of cardiac regimen Subjective: No new complaints. Breathing slightly improved and fatigue improving Objective:  
  
Patient Vitals for the past 8 hrs: 
 Temp Pulse Resp BP SpO2  
05/21/19 0420 98.7 °F (37.1 °C) 67 22 142/82 95 % Patient Vitals for the past 96 hrs: 
 Weight 05/21/19 0435 89.8 kg (198 lb) 05/20/19 1117 95.3 kg (210 lb) 05/19/19 1342 95.3 kg (210 lb) No intake or output data in the 24 hours ending 05/21/19 0942 Physical Exam: 
General:  fatigued, cooperative, no distress, appears stated age Neck:  nontender, no masses, no stridor, no JVD Lungs:  clear to auscultation bilaterally Heart:  regular rate and rhythm, S1, S2 normal, no murmur, click, rub or gallop Abdomen:  abdomen is soft without significant tenderness, masses, organomegaly or guarding Extremities:  extremities normal, atraumatic, no cyanosis or edema Data Review:  
 
Labs: Results:  
   
Chemistry Recent Labs  
  05/21/19 
0314 05/20/19 
0705 05/19/19 
1435 GLU 90 103* 92  137 140  
K 4.2 4.0 4.4 * 110* 111* CO2 20* 20* 21 BUN 33* 30* 32* CREA 1.79* 1.42* 1.59* CA 9.5 9.1 9.5 AGAP 8 7 8 BUCR 18 21* 20  
AP  --   --  113 TP  --   --  8.2 ALB  --   --  3.1*  
GLOB  --   --  5.1* AGRAT  --   --  0.6* CBC w/Diff Recent Labs  
  05/21/19 
0602 05/19/19 
1435 WBC 11.4 10.3 RBC 3.40* 4.07* HGB 9.3* 11.4* HCT 29.1* 34.6*  
 225 GRANS 80* 74* LYMPH 12* 17* EOS 1 1 Cardiac Enzymes No results found for: CPK, CK, CKMMB, CKMB, RCK3, CKMBT, CKNDX, CKND1, KELI, TROPT, TROIQ, AZUL, TROPT, TNIPOC, BNP, BNPP Coagulation No results for input(s): PTP, INR, APTT in the last 72 hours. No lab exists for component: INREXT Lipid Panel Lab Results Component Value Date/Time Cholesterol, total 215 (H) 07/10/2017 11:17 AM  
 HDL Cholesterol 93 (H) 07/10/2017 11:17 AM  
 LDL, calculated 108.6 (H) 07/10/2017 11:17 AM  
 VLDL, calculated 13.4 07/10/2017 11:17 AM  
 Triglyceride 67 07/10/2017 11:17 AM  
 CHOL/HDL Ratio 2.3 07/10/2017 11:17 AM  
  
BNP Lab Results Component Value Date/Time  
 B-type Natriuretic Peptide 184.7 (H) 04/13/2011 12:00 AM  
 B-type Natriuretic Peptide CANCELED 04/07/2011 12:00 AM  
  
Liver Enzymes Recent Labs 05/19/19 
1435 TP 8.2 ALB 3.1*  SGOT 17  
  
Digoxin Thyroid Studies Lab Results Component Value Date/Time TSH 1.87 07/10/2017 11:17 AM  
    
 
 
Signed By: HENRY Dyer May 21, 2019

## 2019-05-21 NOTE — PROGRESS NOTES
Reason for Admission:   Shortness of breath [R06.02] Weakness [R53.1] Weakness [R53.1] RRAT Score:     21 Resources/supports as identified by patient/family:   Lives with son, daughter and  live nearby. Patient is still independent, drives. Top Challenges facing patient (as identified by patient/family and CM): None identified. Current Advanced Directive/Advance Care Plan:   yes Plan for utilizing home health:    Not at this time, pt states she does not need it. Patient does not appear to be homebound, as pt is still working as a teacher at [employer]. Likelihood of readmission:   HIGH Transition of Care Plan:               
 
 
Initial assessment completed with patient. Cognitive status of patient: oriented to time, place, person and situation. Face sheet information confirmed:  yes. The patient designates daughter and son to participate in her discharge plan and to receive any needed information. This patient lives in a single family home with son. Patient is able to navigate steps as needed. Prior to hospitalization, patient was considered to be independent with ADLs/IADLS : yes . Patient has a current ACP document on file: yes The son will be available to transport patient home upon discharge. The patient already has [employer] equipment available in the home. Patient is not currently active with home health. Patient has not stayed in a skilled nursing facility or rehab. This patient is on dialysis :no 
 
Currently, the discharge plan is Home. The patient states that she can obtain her medications from the pharmacy, and take her medications as directed. Patient's current insurance is Medicare and [employer]. Care Management Interventions PCP Verified by CM: Yes 
Palliative Care Criteria Met (RRAT>21 & CHF Dx)?: No 
Mode of Transport at Discharge: Self Transition of Care Consult (CM Consult): Discharge Planning Current Support Network: Relative's Home Confirm Follow Up Transport: Self Plan discussed with Pt/Family/Caregiver: Yes Discharge Location Discharge Placement: Home 
 
 
 
ALBARO Kendall Case Management [phone]

## 2019-05-21 NOTE — ROUTINE PROCESS
Bedside shift change report given to Reinier Jones (oncoming nurse) by Johnnie Jimenez RN (offgoing nurse). Report included the following information SBAR, Kardex, Intake/Output, Recent Results and Cardiac Rhythm Paced.

## 2019-05-21 NOTE — CDMP QUERY
Patient admitted with weakness and MCMILLAN. Noted to have worsening LV function and PHTN per cardiology note 5/21/19. If possible, please document in progress notes and d/c summary the suspected/probable etiology of MCMILLAN. ? MCMILLAN d/t___________ 
? Other Explanation of clinical findings ? Clinically Undetermined (no explanation for clinical findings) The medical record reflects the following: 
 
   Risk Factors: Chronic illness Clinical Indicators: 5/21 Cardiology note-  Echocardiogram from this admission reveals worsening LV function with EF of 36-40% (previously 45-50% in 5/2018), grade 1 DDx, worsening PHTN with PASP of 52 mmHg Treatment: Cardiology consult; Lasix; ECHO; nuclear stress test 
 
If you DECLINE this query or would like to communicate with CDIS, please utilize the \"[employer] message box\" at the TOP of the Progress Note on the right. Thank you, Amos Luis RN/CCDS 
[phone]

## 2019-05-21 NOTE — ROUTINE PROCESS
Bedside verbal report received from Celsa Peoples, reviewed SBAR, MAR, VS and summary of care. Patient awake and quiet with family members at bedside. Patient encouraged to call for assistance, call light in reach.

## 2019-05-21 NOTE — PROGRESS NOTES
[facility] Patient: Tess Morley Age: 70 y.o. : 1940 MR#: [id number] SSN: [id number] Date: 2019 Subjective: Son at bedside. SOB with minimal exertions. No chest pain / discomfort. No n/v/d/c or abd pain. reporting no diarrhea since . Assessment/Plan:  
1. Weakness and MCMILLAN suspect due to fluid overload. In patient with hx CAD s/p CABG 2. Numbness / tingling to both hands X 2-3 days. ?neuropathy. 3. Bacteremia - GPC. ? contamination 4. A. Fib 5. HTN 6. HLD 7. CKD3 8. Chronic anemia of chronic disease. Plan 1. Cardiology following. CTA chest neg for PE. Echo with EF 39-28%, grade 1 diastolic dysfunction. Underwent nuclear stress test this AM- results pending. IV lasix transitioned to PO on . Continue cardiac / BP regimen. Patient reporting improved MCMILLAN. 2. Neuro focal otherwise negative. Strength equal. No hx DM or neuropathy, no c/o pain. Monitor. ?unclear if benefit from gabapentin. 3. ID consulted. Will repeat blood cultures, no fevers, no leukocytosis, no active wounds. D/w ID, Will hold off on IV abx at this time 3. Oxygen as needed. duonebs as needed 4. PT/OT eval and treat Case discussed with:  [x]Patient  [x]Family  []Nursing  []Case Management DVT Prophylaxis:  [x]Lovenox  []Hep SQ  []SCDs  []Coumadin   []On Heparin gtt Objective:  
VS:  
Visit Vitals /86 Pulse 67 Temp 98.7 °F (37.1 °C) Resp 22 Ht 5' 4\" (1.626 m) Wt 89.8 kg (198 lb) SpO2 95% Breastfeeding? No  
BMI 33.99 kg/m² Tmax/24hrs: Temp (24hrs), Av °F (37.2 °C), Min:98.4 °F (36.9 °C), Max:100.8 °F (38.2 °C) No intake or output data in the 24 hours ending 19 1127 General:  Alert, NAD Cardiovascular:  Christoph Mueller Pulmonary:  LSC throughout; respiratory effort WNL 
GI:  +BS in all four quadrants, soft, non-tender Extremities: trace bilateral lower extremities nonpitting edema Neuro: alert and oriented Family contact: at bedside Labs:   
Recent Results (from the past 24 hour(s)) ECHO ADULT COMPLETE Collection Time: 05/20/19 12:12 PM  
Result Value Ref Range LA Volume 86.91 22 - 52 mL  
 LVIDd 5.70 (A) 3.9 - 5.3 cm  
 LVPWd 1.21 (A) 0.6 - 0.9 cm LVIDs 4.28 cm IVSd 1.25 (A) 0.6 - 0.9 cm  
 LVOT d 1.96 cm  
 LVOT Peak Velocity 159.40 cm/s LVOT Peak Gradient 10.2 mmHg LVOT VTI 29.60 cm  
 MV A Taz 115.45 cm/s  
 MV E Taz 108.86 cm/s  
 MV E/A 0.94 Inferior Vena Cava Diameter Sniffing 1.45 cm  
 LA Vol 4C 66.18 (A) 22 - 52 mL  
 LA Vol 2C 86.89 (A) 22 - 52 mL  
 LA Area 4C 20.5 cm2 LV Mass .2 (A) 67 - 162 g  
 LV Mass AL Index 179.4 (A) 43 - 95 g/m2 Mitral Valve E Wave Deceleration Time 248.1 ms  
 Triscuspid Valve Regurgitation Peak Gradient 44.2 mmHg  
 TR Max Velocity 332.33 cm/s  
 LA Vol Index 43.52 16 - 28 ml/m2 PASP 52.0 mmHg LA Vol Index 43.51 16 - 28 ml/m2 LA Vol Index 33.14 16 - 28 ml/m2 CULTURE, BLOOD Collection Time: 05/20/19  2:55 PM  
Result Value Ref Range Special Requests: NO SPECIAL REQUESTS Culture result: NO GROWTH AFTER 15 HOURS    
CULTURE, BLOOD Collection Time: 05/20/19  3:10 PM  
Result Value Ref Range Special Requests: NO SPECIAL REQUESTS Culture result: NO GROWTH AFTER 15 HOURS METABOLIC PANEL, BASIC Collection Time: 05/21/19  3:14 AM  
Result Value Ref Range Sodium 138 136 - 145 mmol/L Potassium 4.2 3.5 - 5.5 mmol/L Chloride 110 (H) 100 - 108 mmol/L  
 CO2 20 (L) 21 - 32 mmol/L Anion gap 8 3.0 - 18 mmol/L Glucose 90 74 - 99 mg/dL BUN 33 (H) 7.0 - 18 MG/DL Creatinine 1.79 (H) 0.6 - 1.3 MG/DL  
 BUN/Creatinine ratio 18 12 - 20 GFR est AA 33 (L) >60 ml/min/1.73m2 GFR est non-AA 27 (L) >60 ml/min/1.73m2 Calcium 9.5 8.5 - 10.1 MG/DL  
CBC WITH AUTOMATED DIFF Collection Time: 05/21/19  6:02 AM  
Result Value Ref Range WBC 11.4 4.6 - 13.2 K/uL RBC 3.40 (L) 4.20 - 5.30 M/uL HGB 9.3 (L) 12.0 - 16.0 g/dL HCT 29.1 (L) 35.0 - 45.0 % MCV 85.6 74.0 - 97.0 FL  
 MCH 27.4 24.0 - 34.0 PG  
 MCHC 32.0 31.0 - 37.0 g/dL  
 RDW 14.3 11.6 - 14.5 % PLATELET 320 966 - 339 K/uL MPV 9.4 9.2 - 11.8 FL  
 NEUTROPHILS 80 (H) 40 - 73 % LYMPHOCYTES 12 (L) 21 - 52 % MONOCYTES 7 3 - 10 % EOSINOPHILS 1 0 - 5 % BASOPHILS 0 0 - 2 %  
 ABS. NEUTROPHILS 9.2 (H) 1.8 - 8.0 K/UL  
 ABS. LYMPHOCYTES 1.3 0.9 - 3.6 K/UL  
 ABS. MONOCYTES 0.8 0.05 - 1.2 K/UL  
 ABS. EOSINOPHILS 0.1 0.0 - 0.4 K/UL  
 ABS. BASOPHILS 0.0 0.0 - 0.1 K/UL  
 DF AUTOMATED    
NUCLEAR CARDIAC STRESS TEST Collection Time: 05/21/19 10:17 AM  
Result Value Ref Range Target  bpm  
 Exercise duration time 00:04:00 Stress Base Systolic  mmHg Stress Base Diastolic BP 86 mmHg Post peak HR 88 BPM  
 Baseline HR 68 BPM  
 Estimated workload 1.0 METS Baseline  mmHg Percent HR 62 % Stress Base Diastolic BP 86 mmHg Stress Rate Pressure Product 16,192 BPM*mmHg Signed By: Tim Chakraborty NP May 21, 2019

## 2019-05-21 NOTE — PROGRESS NOTES
Pt states both of her hands have been numb x2 days, cardiology PA aware, no new orders.  Will also notify hospitalist.

## 2019-05-21 NOTE — ROUTINE PROCESS
Bedside shift change report given to [address] (oncoming nurse) by Kraina Salmon RN (offgoing nurse). Report included the following information SBAR, Kardex, Intake/Output, Recent Results and Cardiac Rhythm paced. Patient awake on rounds.

## 2019-05-22 ENCOUNTER — APPOINTMENT (OUTPATIENT)
Dept: CT IMAGING | Age: 79
DRG: 291 | End: 2019-05-22
Attending: INTERNAL MEDICINE
Payer: MEDICARE

## 2019-05-22 LAB
ANION GAP SERPL CALC-SCNC: 7 MMOL/L (ref 3–18)
BASOPHILS # BLD: 0 K/UL (ref 0–0.1)
BASOPHILS NFR BLD: 0 % (ref 0–2)
BUN SERPL-MCNC: 37 MG/DL (ref 7–18)
BUN/CREAT SERPL: 20 (ref 12–20)
CALCIUM SERPL-MCNC: 9.5 MG/DL (ref 8.5–10.1)
CHLORIDE SERPL-SCNC: 110 MMOL/L (ref 100–108)
CO2 SERPL-SCNC: 22 MMOL/L (ref 21–32)
CREAT SERPL-MCNC: 1.81 MG/DL (ref 0.6–1.3)
DIFFERENTIAL METHOD BLD: ABNORMAL
EOSINOPHIL # BLD: 0.1 K/UL (ref 0–0.4)
EOSINOPHIL NFR BLD: 1 % (ref 0–5)
ERYTHROCYTE [DISTWIDTH] IN BLOOD BY AUTOMATED COUNT: 14.1 % (ref 11.6–14.5)
GLUCOSE SERPL-MCNC: 95 MG/DL (ref 74–99)
HCT VFR BLD AUTO: 29.1 % (ref 35–45)
HGB BLD-MCNC: 9.5 G/DL (ref 12–16)
LYMPHOCYTES # BLD: 1.4 K/UL (ref 0.9–3.6)
LYMPHOCYTES NFR BLD: 15 % (ref 21–52)
MCH RBC QN AUTO: 27.7 PG (ref 24–34)
MCHC RBC AUTO-ENTMCNC: 32.6 G/DL (ref 31–37)
MCV RBC AUTO: 84.8 FL (ref 74–97)
MONOCYTES # BLD: 0.7 K/UL (ref 0.05–1.2)
MONOCYTES NFR BLD: 7 % (ref 3–10)
NEUTS SEG # BLD: 7.1 K/UL (ref 1.8–8)
NEUTS SEG NFR BLD: 77 % (ref 40–73)
PLATELET # BLD AUTO: 197 K/UL (ref 135–420)
PMV BLD AUTO: 9.7 FL (ref 9.2–11.8)
POTASSIUM SERPL-SCNC: 4 MMOL/L (ref 3.5–5.5)
RBC # BLD AUTO: 3.43 M/UL (ref 4.2–5.3)
SODIUM SERPL-SCNC: 139 MMOL/L (ref 136–145)
VANCOMYCIN SERPL-MCNC: 20.6 UG/ML (ref 5–40)
WBC # BLD AUTO: 9.1 K/UL (ref 4.6–13.2)

## 2019-05-22 PROCEDURE — 74011250637 HC RX REV CODE- 250/637: Performed by: NURSE PRACTITIONER

## 2019-05-22 PROCEDURE — 65270000029 HC RM PRIVATE

## 2019-05-22 PROCEDURE — 74011000250 HC RX REV CODE- 250: Performed by: NURSE PRACTITIONER

## 2019-05-22 PROCEDURE — 85025 COMPLETE CBC W/AUTO DIFF WBC: CPT

## 2019-05-22 PROCEDURE — 74011000258 HC RX REV CODE- 258: Performed by: INTERNAL MEDICINE

## 2019-05-22 PROCEDURE — 74011250637 HC RX REV CODE- 250/637: Performed by: HOSPITALIST

## 2019-05-22 PROCEDURE — 80048 BASIC METABOLIC PNL TOTAL CA: CPT

## 2019-05-22 PROCEDURE — 74011250636 HC RX REV CODE- 250/636: Performed by: INTERNAL MEDICINE

## 2019-05-22 PROCEDURE — 77030038269 HC DRN EXT URIN PURWCK BARD -A

## 2019-05-22 PROCEDURE — 94761 N-INVAS EAR/PLS OXIMETRY MLT: CPT

## 2019-05-22 PROCEDURE — 97162 PT EVAL MOD COMPLEX 30 MIN: CPT

## 2019-05-22 PROCEDURE — 74011636637 HC RX REV CODE- 636/637: Performed by: HOSPITALIST

## 2019-05-22 PROCEDURE — 74176 CT ABD & PELVIS W/O CONTRAST: CPT

## 2019-05-22 PROCEDURE — 97166 OT EVAL MOD COMPLEX 45 MIN: CPT

## 2019-05-22 PROCEDURE — 74011250636 HC RX REV CODE- 250/636: Performed by: HOSPITALIST

## 2019-05-22 PROCEDURE — 97530 THERAPEUTIC ACTIVITIES: CPT

## 2019-05-22 PROCEDURE — 36415 COLL VENOUS BLD VENIPUNCTURE: CPT

## 2019-05-22 PROCEDURE — 74011000250 HC RX REV CODE- 250: Performed by: HOSPITALIST

## 2019-05-22 PROCEDURE — 94640 AIRWAY INHALATION TREATMENT: CPT

## 2019-05-22 PROCEDURE — 80202 ASSAY OF VANCOMYCIN: CPT

## 2019-05-22 PROCEDURE — 97535 SELF CARE MNGMENT TRAINING: CPT

## 2019-05-22 RX ORDER — PREDNISONE 20 MG/1
60 TABLET ORAL
Status: DISCONTINUED | OUTPATIENT
Start: 2019-05-23 | End: 2019-05-22

## 2019-05-22 RX ORDER — ALLOPURINOL 100 MG/1
100 TABLET ORAL DAILY
Status: DISCONTINUED | OUTPATIENT
Start: 2019-05-22 | End: 2019-05-23 | Stop reason: HOSPADM

## 2019-05-22 RX ORDER — FUROSEMIDE 40 MG/1
40 TABLET ORAL DAILY
Status: DISCONTINUED | OUTPATIENT
Start: 2019-05-23 | End: 2019-05-23 | Stop reason: HOSPADM

## 2019-05-22 RX ORDER — OXYCODONE AND ACETAMINOPHEN 5; 325 MG/1; MG/1
1 TABLET ORAL
Status: DISCONTINUED | OUTPATIENT
Start: 2019-05-22 | End: 2019-05-23 | Stop reason: HOSPADM

## 2019-05-22 RX ORDER — PREDNISONE 20 MG/1
60 TABLET ORAL
Status: DISCONTINUED | OUTPATIENT
Start: 2019-05-22 | End: 2019-05-23 | Stop reason: HOSPADM

## 2019-05-22 RX ADMIN — AMLODIPINE BESYLATE 5 MG: 5 TABLET ORAL at 09:11

## 2019-05-22 RX ADMIN — ALLOPURINOL 100 MG: 100 TABLET ORAL at 09:11

## 2019-05-22 RX ADMIN — BUDESONIDE 500 MCG: 0.5 INHALANT RESPIRATORY (INHALATION) at 07:35

## 2019-05-22 RX ADMIN — OXYCODONE HYDROCHLORIDE AND ACETAMINOPHEN 1 TABLET: 5; 325 TABLET ORAL at 18:41

## 2019-05-22 RX ADMIN — PREDNISONE 60 MG: 20 TABLET ORAL at 18:45

## 2019-05-22 RX ADMIN — PIPERACILLIN SODIUM,TAZOBACTAM SODIUM 3.38 G: 3; .375 INJECTION, POWDER, FOR SOLUTION INTRAVENOUS at 04:58

## 2019-05-22 RX ADMIN — PIPERACILLIN SODIUM,TAZOBACTAM SODIUM 3.38 G: 3; .375 INJECTION, POWDER, FOR SOLUTION INTRAVENOUS at 00:02

## 2019-05-22 RX ADMIN — PIPERACILLIN SODIUM,TAZOBACTAM SODIUM 2.25 G: 2; .25 INJECTION, POWDER, FOR SOLUTION INTRAVENOUS at 11:08

## 2019-05-22 RX ADMIN — ENOXAPARIN SODIUM 40 MG: 40 INJECTION SUBCUTANEOUS at 06:15

## 2019-05-22 RX ADMIN — ASPIRIN 81 MG 81 MG: 81 TABLET ORAL at 09:11

## 2019-05-22 RX ADMIN — IPRATROPIUM BROMIDE AND ALBUTEROL SULFATE 3 ML: .5; 3 SOLUTION RESPIRATORY (INHALATION) at 00:02

## 2019-05-22 RX ADMIN — CARVEDILOL 25 MG: 25 TABLET, FILM COATED ORAL at 09:11

## 2019-05-22 RX ADMIN — BUDESONIDE 500 MCG: 0.5 INHALANT RESPIRATORY (INHALATION) at 19:59

## 2019-05-22 RX ADMIN — VITAMIN D, TAB 1000IU (100/BT) 2000 UNITS: 25 TAB at 09:12

## 2019-05-22 RX ADMIN — OLMESARTAN MEDOXOMIL 5 MG: 5 TABLET, COATED ORAL at 09:11

## 2019-05-22 RX ADMIN — Medication 1 TABLET: at 18:41

## 2019-05-22 RX ADMIN — CARVEDILOL 25 MG: 25 TABLET, FILM COATED ORAL at 18:41

## 2019-05-22 RX ADMIN — FUROSEMIDE 20 MG: 20 TABLET ORAL at 09:11

## 2019-05-22 RX ADMIN — Medication 1 TABLET: at 09:11

## 2019-05-22 RX ADMIN — VANCOMYCIN HYDROCHLORIDE 1750 MG: 10 INJECTION, POWDER, LYOPHILIZED, FOR SOLUTION INTRAVENOUS at 00:17

## 2019-05-22 RX ADMIN — OXYCODONE HYDROCHLORIDE AND ACETAMINOPHEN 1 TABLET: 5; 325 TABLET ORAL at 09:12

## 2019-05-22 NOTE — PROGRESS NOTES
[facility] Progress Note Patient: Catalina Brewster Age: 70 y.o. : 1940 MR#: [id number] SSN: [id number] Date: 2019 Subjective:  
Pt is c/o pain in her R foot - has a h/o Gout - mentions she was on Allopurinol but stopped taking it since she ran out of her meds Bld cx X2 + last night - IV abx started but ID stopped it Assessment/Plan:  
1. Weakness and MCMILLAN suspect due to fluid overload. In patient with hx CAD s/p CABG 2. Numbness / tingling to both hands X 2-3 days. ?neuropathy. 3. Bacteremia - GPC. ? contamination 4. A. Fib 5. HTN 6. HLD 7. CKD3 8. Chronic anemia of chronic disease. 9. Gout Plan 1. Cardiology following. CTA chest neg for PE. Echo with EF 05-29%, grade 1 diastolic dysfunction. NST reviewed - shows fixed defects - On Po lasix , will follow 2. Neuro focal otherwise negative. Strength equal. No hx DM or neuropathy, no c/o pain. Monitor. ?unclear if benefit from gabapentin. 3. ID consulted. _ likely think contaminant - stopped Abx , will follow 3. Oxygen as needed. duonebs as needed 4. PT/OT eval and treat 5. Allopurinol started, added prednisone for acute flare - will follow 6. CKD3 - will consult Nephrology since creatinine is elevated likely from lasix Case discussed with:  [x]Patient  [x]Family  []Nursing  []Case Management DVT Prophylaxis:  [x]Lovenox  []Hep SQ  []SCDs  []Coumadin   []On Heparin gtt Objective:  
VS:  
Visit Vitals /75 (BP 1 Location: Left arm, BP Patient Position: At rest) Pulse 80 Temp 96.9 °F (36.1 °C) Resp 16 Ht 5' 4\" (1.626 m) Wt 89.1 kg (196 lb 6.4 oz) SpO2 90% Breastfeeding? No  
BMI 33.71 kg/m² Tmax/24hrs: Temp (24hrs), Av °F (36.7 °C), Min:96.8 °F (36 °C), Max:98.9 °F (37.2 °C) Intake/Output Summary (Last 24 hours) at 2019 1301 Last data filed at 2019 7782 Gross per 24 hour Intake 300 ml Output 600 ml Net -300 ml General:  Alert, NAD Cardiovascular:  Fazal Sandy Pulmonary:  LSC throughout; respiratory effort WNL 
GI:  +BS in all four quadrants, soft, non-tender Extremities: trace bilateral lower extremities nonpitting edema Neuro: alert and oriented Family contact: at bedside Labs:   
Recent Results (from the past 24 hour(s)) CULTURE, BLOOD Collection Time: 05/21/19  2:43 PM  
Result Value Ref Range Special Requests: NO SPECIAL REQUESTS Culture result: NO GROWTH AFTER 16 HOURS    
CULTURE, BLOOD Collection Time: 05/21/19  4:57 PM  
Result Value Ref Range Special Requests: NO SPECIAL REQUESTS Culture result: NO GROWTH AFTER 14 HOURS    
CBC WITH AUTOMATED DIFF Collection Time: 05/22/19  3:37 AM  
Result Value Ref Range WBC 9.1 4.6 - 13.2 K/uL  
 RBC 3.43 (L) 4.20 - 5.30 M/uL HGB 9.5 (L) 12.0 - 16.0 g/dL HCT 29.1 (L) 35.0 - 45.0 % MCV 84.8 74.0 - 97.0 FL  
 MCH 27.7 24.0 - 34.0 PG  
 MCHC 32.6 31.0 - 37.0 g/dL  
 RDW 14.1 11.6 - 14.5 % PLATELET 471 028 - 582 K/uL MPV 9.7 9.2 - 11.8 FL  
 NEUTROPHILS 77 (H) 40 - 73 % LYMPHOCYTES 15 (L) 21 - 52 % MONOCYTES 7 3 - 10 % EOSINOPHILS 1 0 - 5 % BASOPHILS 0 0 - 2 %  
 ABS. NEUTROPHILS 7.1 1.8 - 8.0 K/UL  
 ABS. LYMPHOCYTES 1.4 0.9 - 3.6 K/UL  
 ABS. MONOCYTES 0.7 0.05 - 1.2 K/UL  
 ABS. EOSINOPHILS 0.1 0.0 - 0.4 K/UL  
 ABS. BASOPHILS 0.0 0.0 - 0.1 K/UL  
 DF AUTOMATED METABOLIC PANEL, BASIC Collection Time: 05/22/19  3:37 AM  
Result Value Ref Range Sodium 139 136 - 145 mmol/L Potassium 4.0 3.5 - 5.5 mmol/L Chloride 110 (H) 100 - 108 mmol/L  
 CO2 22 21 - 32 mmol/L Anion gap 7 3.0 - 18 mmol/L Glucose 95 74 - 99 mg/dL BUN 37 (H) 7.0 - 18 MG/DL Creatinine 1.81 (H) 0.6 - 1.3 MG/DL  
 BUN/Creatinine ratio 20 12 - 20 GFR est AA 33 (L) >60 ml/min/1.73m2 GFR est non-AA 27 (L) >60 ml/min/1.73m2 Calcium 9.5 8.5 - 10.1 MG/DL [facility]  Collection Time: 05/22/19  9:45 AM  
 Result Value Ref Range Vancomycin, random 20.6 5.0 - 40.0 UG/ML Signed By: Perlita Pop MD   
 May 22, 2019

## 2019-05-22 NOTE — CDMP QUERY
Patient admitted with MCMILLAN, noted to have fluid overload with PMH of CHF and decrease in LV function with EF of 36-40%. If possible, please document in progress notes and d/c summary if you are evaluating and/or treating any of the following: ?      Acute on Chronic Systolic CHF ? Acute on Chronic Diastolic CHF ? Acute on Chronic Systolic and Diastolic CHF ? Acute Systolic CHF ? Acute Diastolic CHF ? Acute Systolic and Diastolic CHF ? Chronic Systolic CHF ? Chronic Diastolic CHF ? Chronic Systolic and Diastolic CHF ? No CHF-only PMH 
? Other, please specify ? Clinically unable to determine 
? Other Explanation of clinical findings ? Clinically Undetermined (no explanation for clinical findings) The medical record reflects the following: 
 
   Risk Factors: PMH: CHF home meds include Coreg and Lasix; Hx Ischemic CMY Clinical Indicators:  decrease in LV function with EF 36-40% and worsening pulmonary HTN with PASP of 52 mmHg Treatment: Lasix; Coreg If you DECLINE this query or would like to communicate with CDIS, please utilize the \""[employer] message box\" at the TOP of the Progress Note on the right. Thank you, Jerrica John RN/CCDS 
[phone]

## 2019-05-22 NOTE — PROGRESS NOTES
Problem: Mobility Impaired (Adult and Pediatric) Goal: *Acute Goals and Plan of Care (Insert Text) Description Physical Therapy Goals Initiated 5/22/2019 and to be accomplished within 7 day(s) 1. Patient will move from supine to sit and sit to supine  in bed with independence. 2.  Patient will transfer from bed to chair and chair to bed with modified independence using the least restrictive device. 3.  Patient will perform sit to stand with modified independence. 4.  Patient will ambulate with modified independence for 150 feet with the least restrictive device. 5.  Patient will ascend/descend 4 stairs with bilateral handrail(s) with modified independence. To prepare pt for home mobility. PLOF:  Pt lives with family in a 1 story home with 4 steps to enter, bilateral railings. 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 
 
PHYSICAL THERAPY EVALUATION Patient: Bella Starkey (75 y.o. female) Date: 5/22/2019 Primary Diagnosis: Shortness of breath [R06.02] Weakness [R53.1] Weakness [R53.1] Precautions:   Contact PLOF: See goals section above ASSESSMENT : 
Based on the objective data described below, the patient presents with decreased bilateral LE strength, decreased ability to ambulate due to pain in L foot and impaired balance following admission for shortness of breath and weakness. Pt reports she is limited in her ability to put weight through her L foot due to a gout exacerbation. Pt was cleared by nursing to work with therapy. Pt was seen with OT to maximize participation. Pt was received semi-reclined in bed, agreeable to participate in PT with encouragement. Pt performed supine-sit with CGA and sit-stand with min A x 2 after several attempts. Pt displayed god sitting balance and fair standing balance.   Pt ambulated 2 feet toward [facility] with RW but was limited by L foot pain. Pt was given instruction on using RW to alleviate weight on L LE but pt states she is unable to use the RW effectively because of RA pain in R hand. Pt returned to sitting with CGA and to supine with supervision. Anticipate pt will be able to ambulate functional distances when gout pain is controlled. At conclusion of session, pt was left resting comfortably in chair, needs met, call bell in reach, nurse notified. Patient will benefit from skilled intervention to address the above impairments. Patient's rehabilitation potential is considered to be Good Factors which may influence rehabilitation potential include:  
? None noted ? Mental ability/status ? Medical condition ? Home/family situation and support systems ? Safety awareness 
? Pain tolerance/management 
? Other: PLAN : 
Recommendations and Planned Interventions:  
?           Bed Mobility Training             ? Neuromuscular Re-Education ? Transfer Training                   ? Orthotic/Prosthetic Training 
? Gait Training                          ? Modalities ? Therapeutic Exercises           ? Edema Management/Control ? Therapeutic Activities            ? Family Training/Education ? Patient Education ? Other (comment): Frequency/Duration: Patient will be followed by physical therapy 1-2 times per day to address goals. Discharge Recommendations: Home Health Further Equipment Recommendations for Discharge: N/A  
 
SUBJECTIVE:  
Patient stated ? I don't need this. ? OBJECTIVE DATA SUMMARY:  
 
Past Medical History:  
Diagnosis Date Abnormal ankle brachial index 11/13/2014 Mild arterial disease in right leg. R VICTOR MANUEL 0.88. L VICTOR MANUEL 1.00. Anemia Arm DVT (deep venous thromboembolism), acute (HCC)   
 s/p anticoagulation Atherosclerotic heart disease Atrial fibrillation ([facility] [address] AV block CAD (coronary artery disease) Cardiomyopathy, ischemic Carotid artery stenosis Carotid duplex 2014 Mild <50% bilateral ICA plaquing. Possible left vertebral occlusion. Cerebral artery occlusion with cerebral infarction ([facility] [address] stroke x 2 Chest pain CVA (cerebral infarction) Echocardiogram 2015 EF 55%. Apical inferior, apical septal hypk (new since study of 11), likely due to chronic V-pacing. Mild LVH. RVSP 30 mmHg. Mild LAE. Mild MR. Mild TR. Gastritis GERD (gastroesophageal reflux disease) Heart failure ([facility] [address] Hiatal hernia Hyperlipidemia Hypertension Hypertensive cardiovascular disease Intracranial aneurysm   
 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 Nuclear cardiac imaging test 2015 Low risk. Sm apical infarction w/no ischemia vs apical thinning. Sm basal inferior defect, likely artifact. EF 56%. Inferoseptal, inferoapical WMA related to sternotomy or paced rhythm. Pacemaker PAD (peripheral artery disease) ([facility] [address] PVC's   
 chronic S/P CABG x 3 2008 LIMA-LAD. SVG-OM. SVG-dRCA   
 S/P cardiac cath 2008 pRCA 100%. LM patent. Cx patent. OM1 100%. mLAD 95%. LVEDP 27-29mmHg. EF 20%. mod MR Tilt table evaluation 1995 Positive for orthostatic hypotension Vitamin D deficiency Past Surgical History:  
Procedure Laterality Date CABG, ARTERY-VEIN, THREE  2008 CARDIAC SURG PROCEDURE UNLIST    
 medtronic dual-chamber cardioverted-defibrillator HX  SECTION    
 x2 HX ENDOSCOPY  3/1/16 HX ENDOSCOPY  3/1/16 [address] [place] HX HYSTERECTOMY  HX ORTHOPAEDIC    
 knee surgery HX OTHER SURGICAL  2/10/16 Pacemaker Gen Change HX PACEMAKER Defibrillator 2008 HX TUMOR REMOVAL    
 removed from arm.  benign Barriers to Learning/Limitations: None Compensate with: N/A Home Situation: 
Home Situation Home Environment: Private residence # Steps to Enter: 4 Rails to Enter: Yes Hand Rails : Bilateral 
One/Two Story Residence: One story Living Alone: No 
Support Systems: Family member(s) Patient Expects to be Discharged to[de-identified] Private residence Current DME Used/Available at Home: None Critical Behavior: 
Neurologic State: Alert Orientation Level: Oriented X4 Cognition: Appropriate decision making; Appropriate for age attention/concentration; Appropriate safety awareness Psychosocial 
Purposeful Interaction: Yes Pt Identified Daily Priority: Clinical issues (comment) [employer] Process: Teaching/learning; Attend basic human needs Caring Interventions: Reassure; Therapeutic modalities; Other caring modalities Reassure: Therapeutic listening; Informing;Quiet presence;Caring rounds Therapeutic Modalities: Intentional therapeutic touch Strength:   
Strength: Generally decreased, functional 
 
Tone & Sensation:  
Tone: Normal 
 
Sensation: Intact Range Of Motion: 
AROM: Generally decreased, functional 
Functional Mobility: 
Bed Mobility: 
Supine to Sit: Contact guard assistance Sit to Supine: Supervision Transfers: 
Sit to Stand: Minimum assistance;Assist x2 Stand to Sit: Minimum assistance Balance:  
Sitting: Intact Standing: Impaired; With support Standing - Static: Good Standing - Dynamic : Fair Ambulation/Gait Training: 
Distance (ft): 2 Feet (ft)(limited by pain) Assistive Device: Walker, rolling Ambulation - Level of Assistance: Contact guard assistance Gait Description (WDL): Exceptions to [facility] Gait Abnormalities: Antalgic Stance: Left decreased;Right increased Pain: 
Pain level pre-treatment: 8/10 (L foot, ache) Pain level post-treatment: 8/10  (L foot , ache) Pain Intervention(s) : Medication (see MAR); Rest, Ice, Repositioning Response to intervention: Nurse notified, See doc flow Activity Tolerance:  
Fair Please refer to the flowsheet for vital signs taken during this treatment. After treatment:  
?         Patient left in no apparent distress sitting up in chair ? Patient left in no apparent distress in bed 
? Call bell left within reach ? Nursing notified ? Caregiver present ? Bed alarm activated ? SCDs applied COMMUNICATION/EDUCATION:  
?         Role of Physical Therapy in the acute care setting. ?         Fall prevention education was provided and the patient/caregiver indicated understanding. ? Patient/family have participated as able in goal setting and plan of care. ?         Patient/family agree to work toward stated goals and plan of care. ?         Patient understands intent and goals of therapy, but is neutral about his/her participation. ? Patient is unable to participate in goal setting/plan of care: ongoing with therapy staff. ?         Other: Thank you for this referral. 
Vel George, PT Time Calculation: 27 mins Eval Complexity: History: MEDIUM  Complexity : 1-2 comorbidities / personal factors will impact the outcome/ POC Exam:MEDIUM Complexity : 3 Standardized tests and measures addressing body structure, function, activity limitation and / or participation in recreation  Presentation: MEDIUM Complexity : Evolving with changing characteristics  Clinical Decision Making:Medium Complexity    Overall Complexity:MEDIUM

## 2019-05-22 NOTE — HOME CARE
Attempted to round on ACO patient however physician was in the room. Will follow up.  Dung Mckeon, ASHLEYN

## 2019-05-22 NOTE — CONSULTS
Consult Note Consult requested by: dr Hailey Ramirez Sandy Day is a 70 y.o. female [address] who is being seen on consult for crf Chief Complaint Patient presents with  Fatigue Admission diagnosis: <principal problem not specified> HPI: 70 y o  [place] female admitted with sob,asked to evaluate for renal failure. hx of crf stage 3,chf,gout.had chest ct scan with iv contrast on admission to r/o pe.had pos blood cultures,presumed contaminant. hx of cad,cardiomyopathy,chf,afib,gout. was out of allopurinol,restaretd on this admission,c/o pain left big toe Past Medical History:  
Diagnosis Date  Abnormal ankle brachial index 11/13/2014 Mild arterial disease in right leg. R VICTOR MANUEL 0.88. L VICTOR MANUEL 1.00.  Anemia  Arm DVT (deep venous thromboembolism), acute (HCC)   
 s/p anticoagulation  Atherosclerotic heart disease  Atrial fibrillation ([address]  AV block  CAD (coronary artery disease)  Cardiomyopathy, ischemic  Carotid artery stenosis  Carotid duplex 11/13/2014 Mild <50% bilateral ICA plaquing. Possible left vertebral occlusion.  Cerebral artery occlusion with cerebral infarction ([address] stroke x 2  Chest pain  CVA (cerebral infarction)  Echocardiogram 08/19/2015 EF 55%. Apical inferior, apical septal hypk (new since study of 12/19/11), likely due to chronic V-pacing. Mild LVH. RVSP 30 mmHg. Mild LAE. Mild MR. Mild TR.  Gastritis  GERD (gastroesophageal reflux disease)  Heart failure ([address]  Hiatal hernia  Hyperlipidemia  Hypertension  Hypertensive cardiovascular disease  Intracranial aneurysm   
 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  Nuclear cardiac imaging test 09/24/2015 Low risk. Sm apical infarction w/no ischemia vs apical thinning.   Sm basal inferior defect, likely artifact. EF 56%. Inferoseptal, inferoapical WMA related to sternotomy or paced rhythm.  Pacemaker  PAD (peripheral artery disease) ([address]  PVC's   
 chronic  S/P CABG x 3 2008 [facility]. SVG-OM. SVG-dRCA  S/P cardiac cath 2008 pRCA 100%. LM patent. Cx patent. OM1 100%. mLAD 95%. LVEDP 27-29mmHg. EF 20%. mod MR  
 Tilt table evaluation 1995 Positive for orthostatic hypotension  Vitamin D deficiency Past Surgical History:  
Procedure Laterality Date  CABG, ARTERY-VEIN, THREE  2008  CARDIAC SURG PROCEDURE UNLIST    
 medtronic dual-chamber cardioverted-defibrillator  HX  SECTION    
 x2  
 HX ENDOSCOPY  3/1/16  HX ENDOSCOPY  3/1/16 [facility]  HX HYSTERECTOMY   HX ORTHOPAEDIC    
 knee surgery  HX OTHER SURGICAL  2/10/16 Pacemaker Gen Change  HX PACEMAKER Defibrillator   HX TUMOR REMOVAL    
 removed from arm.  benign Social History Socioeconomic History  Marital status:  Spouse name: Not on file  Number of children: Not on file  Years of education: Not on file  Highest education level: Not on file Occupational History  Not on file Social Needs  Financial resource strain: Not on file  Food insecurity:  
  Worry: Not on file Inability: Not on file  Transportation needs:  
  Medical: Not on file Non-medical: Not on file Tobacco Use  Smoking status: Never Smoker  Smokeless tobacco: Never Used  Tobacco comment: just smoked 2 weeks in college Substance and Sexual Activity  Alcohol use: No  
 Drug use: No  
 Sexual activity: Never Lifestyle  Physical activity:  
  Days per week: Not on file Minutes per session: Not on file  Stress: Not on file Relationships  Social connections:  
  Talks on phone: Not on file Gets together: Not on file Attends [employer] service: Not on file Active member of club or organization: Not on file Attends meetings of clubs or organizations: Not on file Relationship status: Not on file  Intimate partner violence:  
  Fear of current or ex partner: Not on file Emotionally abused: Not on file Physically abused: Not on file Forced sexual activity: Not on file Other Topics Concern [address] Service Not Asked  Blood Transfusions Not Asked  Caffeine Concern Not Asked  Occupational Exposure Not Asked Hettie Model Hazards Not Asked  Sleep Concern Not Asked  Stress Concern Not Asked  Weight Concern Not Asked  Special Diet Not Asked  Back Care Not Asked  Exercise Not Asked  Bike Helmet Not Asked  Seat Belt Not Asked  Self-Exams Not Asked Social History Narrative  Not on file History reviewed. No pertinent family history. Allergies Allergen Reactions  Nifedipine Nausea and Vomiting and Other (comments) Dizzy  Ace Inhibitors Cough  Codeine Unknown (comments), Nausea Only and Nausea and Vomiting  Hydralazine Other (comments) Dizziness and Fatigue  Lipitor [Atorvastatin] Nausea and Vomiting and Vertigo  Simvastatin Nausea and Vomiting Home Medications:  
 
Prior to Admission Medications Prescriptions Last Dose Informant Patient Reported? Taking? amLODIPine (NORVASC) 5 mg tablet   No Yes Sig: Take 1 Tab by mouth daily. aspirin 81 mg tablet   Yes Yes Sig: Take 81 mg by mouth daily. calcium citrate-vitamin d3 (CITRACAL+D) 315-200 mg-unit tab   Yes No  
Sig: Take 1 Tab by mouth two (2) times daily (with meals). carvedilol (COREG) 25 mg tablet   No Yes Sig: TAKE 1 TABLET BY MOUTH (2) TIMES A DAY cholecalciferol, vitamin D3, (VITAMIN D3) 2,000 unit tab   Yes No  
Sig: Take 2,000 Units by mouth daily.   
diclofenac (VOLTAREN) 1 % gel   Yes No  
Sig: USE 4 GRAMS ON KNEE FOUR TIMES A DAY AS NEEDED  
 furosemide (LASIX) 20 mg tablet   Yes No  
Sig: Take 20 mg by mouth daily. furosemide (LASIX) 40 mg tablet   Yes Yes Sig: TAKE 1 TABLET BY MOUTH EVERY DAY  
hydrocortisone (HYTONE) 2.5 % ointment   Yes Yes Sig: APPLY LIGHTLY TO THE AFFECTED AREAS ON THE LEGS AND FEET TWICE A DAY. olmesartan (BENICAR) 5 mg tablet   Yes Yes Sig: TAKE 1 TABLET BY MOUTH EVERY DAY Facility-Administered Medications: None Current Facility-Administered Medications Medication Dose Route Frequency  allopurinol (ZYLOPRIM) tablet 100 mg  100 mg Oral DAILY  oxyCODONE-acetaminophen (PERCOCET) 5-325 mg per tablet 1 Tab  1 Tab Oral Q6H PRN  predniSONE (DELTASONE) tablet 60 mg  60 mg Oral DAILY WITH BREAKFAST  furosemide (LASIX) tablet 20 mg  20 mg Oral DAILY  budesonide (PULMICORT) 500 mcg/2 ml nebulizer suspension  500 mcg Nebulization BID RT  
 amLODIPine (NORVASC) tablet 5 mg  5 mg Oral DAILY  aspirin chewable tablet 81 mg  81 mg Oral DAILY  calcium citrate-vitamin D3 (CITRACAL WITH VITAMIN D MAXIMUM) tablet 1 Tab  1 Tab Oral BID WITH MEALS  carvedilol (COREG) tablet 25 mg  25 mg Oral BID WITH MEALS  cholecalciferol (VITAMIN D3) tablet 2,000 Units  2,000 Units Oral DAILY  hydrocortisone (HYTONE) 2.5 % ointment   Topical BID  olmesartan (BENICAR) tablet 5 mg  5 mg Oral DAILY  enoxaparin (LOVENOX) injection 40 mg  40 mg SubCUTAneous Q24H  
 albuterol-ipratropium (DUO-NEB) 2.5 MG-0.5 MG/3 ML  3 mL Nebulization Q4H PRN  
 hydrALAZINE (APRESOLINE) 20 mg/mL injection 10 mg  10 mg IntraVENous Q6H PRN  
 lidocaine (XYLOCAINE) 2 % viscous solution 15 mL  15 mL Mouth/Throat Q3H PRN  
 ondansetron (ZOFRAN) injection 4 mg  4 mg IntraVENous Q6H PRN Review of Systems: A comprehensive review of systems was negative except for that written in the HPI. Data Review: 
 
Labs: Results:  
   
Chemistry Recent Labs  
  05/22/19 
9036 05/21/19 
0314 05/20/19 
6582 GLU 95 90 103*  138 137  
K 4.0 4.2 4.0  
* 110* 110* CO2 22 20* 20* BUN 37* 33* 30* CREA 1.81* 1.79* 1.42* CA 9.5 9.5 9.1 AGAP 7 8 7 BUCR 20 18 21* PTH  Lab Results Component Value Date/Time Calcium 9.5 05/22/2019 03:37 AM  
 Phosphorus 3.5 01/14/2019 11:55 AM  
 PTH, Intact 262.8 (H) 01/14/2019 11:55 AM  
  
CBC w/Diff Recent Labs  
  05/22/19 
3780 05/21/19 
0602 WBC 9.1 11.4 RBC 3.43* 3.40* HGB 9.5* 9.3* HCT 29.1* 29.1*  
 184 GRANS 77* 80* LYMPH 15* 12* EOS 1 1 Coagulation No results for input(s): PTP, INR, APTT in the last 72 hours. No lab exists for component: INREXT Iron/Ferritin No results for input(s): IRON in the last 72 hours. No lab exists for component: TIBCCALC BNP No results for input(s): BNPP in the last 72 hours. Cardiac Enzymes Recent Labs  
  05/20/19 
8578 CPK 38 CKND1 CALCULATION NOT PERFORMED WHEN RESULT IS BELOW LINEAR LIMIT Liver Enzymes No results for input(s): TP, ALB, TBIL, AP, SGOT, GPT in the last 72 hours. No lab exists for component: DBIL Thyroid Studies Lab Results Component Value Date/Time TSH 1.87 07/10/2017 11:17 AM  
    
Urinalysis Lab Results Component Value Date/Time Color YELLOW 05/19/2019 07:15 AM  
 Appearance CLOUDY 05/19/2019 07:15 AM  
 Specific gravity 1.025 05/19/2019 07:15 AM  
 pH (UA) 5.5 05/19/2019 07:15 AM  
 Protein 100 (A) 05/19/2019 07:15 AM  
 Glucose NEGATIVE  05/19/2019 07:15 AM  
 Ketone NEGATIVE  05/19/2019 07:15 AM  
 Bilirubin NEGATIVE  05/19/2019 07:15 AM  
 Urobilinogen 1.0 05/19/2019 07:15 AM  
 Nitrites NEGATIVE  05/19/2019 07:15 AM  
 Leukocyte Esterase NEGATIVE  05/19/2019 07:15 AM  
 Epithelial cells 1+ 05/19/2019 07:15 AM  
 Bacteria Few 05/19/2019 07:15 AM  
 WBC NEGATIVE  05/19/2019 07:15 AM  
 RBC 0 to 2 05/19/2019 07:15 AM  
  
  
IMAGES:  
XR Results (maximum last 3): Results from Hospital Encounter encounter on 05/19/19 XR CHEST PORT  
 Narrative EXAMINATION: Chest single view INDICATION: Shortness of breath COMPARISON: 11/4/2018 FINDINGS: Single frontal view of the chest obtained. Multilead left subclavian 
pacer, unchanged. Post median sternotomy with numerous mediastinal clips. Moderately enlarged cardiac silhouette. Aortic arch calcifications. Prominent 
perihilar interstitium. Left midlung and bibasilar streaky densities. No 
evidence of pneumothorax. No acute osseous findings. Impression IMPRESSION: 
 
Postsurgical changes as above. Enlarged cardiac silhouette with suspected 
interstitial infiltrate/edema. Probable left midlung and bibasilar streaky 
atelectasis. Results from Hospital Encounter encounter on 11/04/18 XR CHEST PA LAT Narrative EXAMINATION: Chest 2 views INDICATION: Shortness of breath COMPARISON: 2/24/2017 FINDINGS: Frontal and lateral views of the chest obtained. No consolidation. Status post median sternotomy and surgical clips, and multilevel left subclavian 
pacer/AICD. Borderline prominent cardiac silhouette. Slightly prominent 
perihilar interstitium and left basilar streaky density. No evidence of 
pneumothorax. No acute osseous findings. Impression IMPRESSION: 
 
No clearly acute findings. Perhaps minimal perihilar vascular congestion versus 
infiltrate. Left basilar streaky density, likely atelectasis or scar. Results from Hospital Encounter encounter on 11/02/17 XR KNEE RT MIN 4 V Narrative Right knee radiograph 4 views INDICATION: Pain and osteoarthritis COMPARISON: 11/2/2017 contralateral 
  
 Impression IMPRESSION: 
Tricompartmental joint space narrowing is seen. Subchondral cystic formation 
noted in the proximal tibia, distal fibula and patella. No patellar tilt. Osteophytes are most notable in the patellofemoral and lateral compartments. The 
upright image shows increased depression in the medial compartment. CT Results (maximum last 3): Results from Hospital Encounter encounter on 05/19/19 CTA CHEST W OR W WO CONT Narrative CTA CHEST PULMONARY EMBOLISM PROTOCOL INDICATION: Shortness of breath Question pulmonary embolism. COMPARISON: None TECHNIQUE:  With IV administration of 72 ml [id number], axial CT images through 
the thorax were obtained using helical technique following a pulmonary embolism 
protocol. In order more optimally to evaluate the pulmonary arterial tree in 
multiplanar CT angiographic fashion, coronal and sagittal reformation maximum 
intensity projection (MIP) images were also performed. Subsequently, serial 
axial CT images were obtained of the abdomen and pelvis. All CT scans at this 
facility are performed using dose optimization technique as appropriate to a 
performed exam, to include automated exposure control, adjustment of the mA 
and/or kV according to patient's size (including appropriate matching for 
site-specific examinations), or use of iterative reconstruction technique. CHEST FINDINGS: 
-Pulmonary Arteries: No evidence for pulmonary embolism from the main pulmonary 
artery to the proximal segmental pulmonary arteries. The distal segmental and 
subsegmental pulmonary arteries at the lower lobes are not well evaluated due to 
respiratory motion artifact. The main pulmonary artery measures 3.2 cm in 
diameter. 
 
-Mediastinum: Visualized portion of the thyroid gland is unremarkable. No 
evidence for thoracic aortic aneurysm or dissection. Atherosclerosis and 
coronary artery atherosclerosis noted, with evidence of prior CABG. No 
pericardial effusion. Mild cardiomegaly. No reflux of contrast into the hepatic 
veins. ICD leads appear to be in appropriate position. . 
 
-Lymph Nodes: No mediastinal, axillary, or hilar adenopathy 
 
-Lungs: Evaluation slightly limited due to respiratory motion artifact. Mosaic 
groundglass attenuation, greatest at the perihilar region and lower lungs. Mild bronchial wall thickening. No pleural effusion or pneumothorax. -Upper abdomen: No acute finding. 
 
-Bones: No acute osseous abnormalities are identified. Multilevel degenerative 
spondylosis and disc disease noted. Prior median sternotomy. Impression IMPRESSION: 
1. No convincing evidence for pulmonary embolus from the main pulmonary artery 
to the proximal segmental pulmonary arteries. . The distal segmental and 
subsegmental pulmonary arteries in the lower lobes are not well evaluated due to 
respiratory motion artifact. 2. Cardiomegaly. Prominent main pulmonary artery may indicate pulmonary arterial 
hypertension. 3. Mosaic groundglass attenuation greater at the perihilar/ basilar region 
suggesting pulmonary edema. Other considerations include subsegmental 
atelectasis, small airways disease, or pneumonitis. Results from Hospital Encounter encounter on 10/13/14 CTA NECK Narrative CT ANGIOGRAPHY OF THE NECK CPT CODE: 83633 HISTORY: Abnormal carotid ultrasound. Technique: After the intravenous administration of iodinated IV contrast, thin 
section axial CT scans of the neck were obtained. These cover from upper 
mediastinum through the skull base. Acquisition of 1.25/0.625 mm thickness 
images. From the thin section source images, generation of multiple volume 
rendered three-dimensional images on an independent workstation. These are 
archived on PACS as a component of this examination. Additionally, sagittal, 
coronal, curved multiplanar reformats. Contrast used: 100 cc Optiray-350 IV, 
with findings centered upon arterial enhancement. FINDINGS:  
Injection from the left side. There is also left-sided cardiac device. These 
findings do create some streak artifact. There is some venous contamination with 
hyperdense opacification particularly of left-sided cervical region venous 
structures. Aortic arch: Mild atherosclerosis. Normal caliber. Brachiocephalic artery: Widely patent, unremarkable. Right subclavian artery: Unremarkable. Right common carotid artery: Widely patent and unremarkable proximal of its 
bifurcation. Near its bifurcation there is mild atheroma. No significant 
stenosis. Right ECA: Unremarkable. Right ICA: A less than 1 cm long atherosclerotic plaque at its origin centered 
posteriorly. Less than 50% stenosis proximally; widely patent distally. Reference axial source images 108-121. There is atherosclerosis of the cavernous ICA without high-grade narrowing. Right vertebral artery: Tortuous origin, but widely patent; it is the dominant 
vertebral artery. Intradural segment unremarkable. Patent basilar artery. Left subclavian artery: Mild atherosclerosis proximally, no significant 
stenosis. Left common carotid artery: Mild atheroma distally near its bifurcation, 
otherwise unremarkable. There is no significant stenosis. Left ECA: Unremarkable. Left ICA: Similar morphology of atherosclerotic plaque formation. There is less 
than 50% stenosis at this level. Widely patent within the more distal cervical 
segment. There is some atherosclerotic irregularity in the cavernous ICA. There 
is incidental identification of a 2 mm aneurysmal outpouching along the medial 
wall of the left cavernous ICA as on axial source image 178. Left vertebral artery: Limited delineation because of the extent of venous 
contamination particularly along its region. It is the nondominant vessel. Most 
of its length does not visibly opacify; at the level of C2-3 there is 
reconstitution. It shows some segmental irregularity at the level of C2 as on 
axial 123-134. It then becomes essentially atretic at the intradural segment mid 
aspect where there is some atherosclerosis. Does still visibly opacified 
distally forming a confluence for an unremarkable basilar artery. No focal finding within the included upper lungs. Some heterogeneity of lung 
parenchyma that may reflect some air trapping. Mild to moderate degenerative changes along the cervical spine. Some disc space 
narrowing most apparent C4-5 through C7-T1. Associated ossific ridge formation 
posteriorly. No high-grade spinal stenosis. Some levels of foraminal stenoses 
such as C5-6 and C6-7. Some hypodense nodular foci within the thyroid gland most evident on the left as 
on axial 57. Patent airway. Glottic region and supraglottic region unremarkable. The major salivary glands are unremarkable. Preservation of the parapharyngeal 
fat planes. No cervical region adenopathy. There is chronic hyperostosis in the 
region of the sphenoid sinuses and some opacification of the sphenoid sinus. May 
relate to prior transsphenoidal surgery. No aggressive or destructive features. No hemodynamically significant (<50 %) diameter stenosis proximal LICA by NASCET 
criteria. No hemodynamically significant (<50 %) diameter stenosis proximal OTONIEL by NASCET 
criteria. Impression IMPRESSION: 
 
1. Mild to moderate atherosclerotic stenosis at the bilateral cervical ICA 
regions; no hemodynamically significant stenosis, measuring less than 50% 
diameter stenosis. 2. Incidentally, a 2 mm left cavernous ICA aneurysm projecting medially. 3. Nondominant left vertebral artery with regions of segmental high-grade 
stenosis at the skull base and intradural segment. However, does look like it 
remains patent throughout, and the basilar artery is widely patent, fed by the 
dominant right vertebral artery. 4. Moderate degenerative changes along the cervical spine. 5. Information submitted to the ordering physician Dr. Twyla Sanders via the 
[facility] system, level yellow. Thank you for this referral. 
  
Results from [place] encounter on 07/30/14 CT HEAD WO CONT Narrative CPT CODE: 62927 HISTORY: Headache. COMPARISON: None. FINDINGS: 
 
Low-attenuation area in the left occipital lobe. CT appearance suggestive of a 
subacute or chronic infarct. The remainder of the gray-white matter 
differentiation is preserved. A 7 mm low-attenuation white matter focus in the 
deep left white matter, image 16. No acute intracranial hemorrhage The size of the ventricles and sulci are mildly prominent. No extra axial 
fluid collections. Soft tissue density in the sphenoid and ethmoid sinuses. Mild mucosal 
thickening of the left maxillary sinus. .  No evidence for skull fracture. Impression IMPRESSION: 
 
 
1. Low-attenuation area in the left occipital lobe. Appearance suggestive of a 
subacute or chronic infarct however correlate clinically. If there is high 
clinical concern for acute infarct or further evaluation is warranted 
clinically, suggest MRI. 2. Low-attenuation area in the left white matter adjacent to the caudate may 
represent an age-indeterminate lacunar infarct. 3.  Pansinusitis. MRI Results (maximum last 3): No results found for this or any previous visit. Nuclear Medicine Results (maximum last 3): Results from [facility] Encounter encounter on 09/24/15 TRANSCRIBED NUCLEAR MEDICINE  
TRANSCRIBED NUCLEAR MEDICINE Results from Hospital Encounter encounter on 07/14/14 TRANSCRIBED NUCLEAR MEDICINE  
 
 
US Results (maximum last 3): Results from Hospital Encounter encounter on 09/25/17 US RETROPERITONEUM COMP Narrative EXAMINATION: Ultrasound retroperitoneum INDICATION: Renal cyst 
 
COMPARISON: 3/3/2017 TECHNIQUE: Grayscale and color sonographic images of the retroperitoneum 
obtained. FINDINGS: 
 
Right kidney: 8.7 cm length. Increased echotexture. No focal abnormality or 
hydronephrosis. Left kidney: 9.3 cm length. Increased echotexture. 1.3 x 1.2 cm cyst in the mid 
kidney. No suspicious lesions or hydronephrosis. Spleen: 8.3 cm length. No focal abnormality. Bladder: Incompletely distended, otherwise unremarkable. Impression IMPRESSION: 
1. Evidence of medical renal disease without obstructive uropathy. Simple 
appearing cyst in the left kidney. Results from Hospital Encounter encounter on 03/03/17 US RETROPERITONEUM COMP Narrative RETROPERITONEAL ULTRASOUND 
 
CPT CODE: 22770 INDICATION: Acute renal failure COMPARISON: None TECHNIQUE: Real-time ultrasonography of the kidneys, ureters and bladder was 
performed and selected images submitted for review. FINDINGS: 
 
Suboptimal visualization the kidneys laterally, limited acoustic windows. The kidneys are small. The right kidney measures 7.8 x 3.7 x 4.5 cm and the 
left kidney measures 8.5 x 3.7 x 4.1 cm. Renal cortex is approximately 
isoechoic relative to liver and spleen. There is no evidence of hydronephrosis 
or definite hydroureter. No definite sonographic evidence of  shadowing renal 
calculus identified. Small rounded hypoechoic region left kidney midpole appears to slightly bulge 
the contour of the kidney suspect for a lesion measuring approximately 1 cm, 
statistically likely a cyst but cannot be definitively characterized on the 
basis of this study. Renal echotexture appears decreased in the adjacent kidney, 
possibly also a cyst, not well visualized. Imaging follow-up is recommended, 
attention here on sonographic follow-up might be helpful as clinically 
warranted. The urinary bladder is poorly visualized due to nondistention and cannot be 
assessed. The spleen appears grossly homogeneous in echotexture and within normal limits 
in size measuring 8.3 cm. Impression Impression: No evidence of hydronephrosis. Small kidneys with borderline prominent renal cortical echotexture, possibly due 
to chronic medical renal disease. Suboptimally visualized kidneys, limited acoustic windows. Probable small hypoechoic cyst in the left mid kidney with question of additional adjacent 
cyst, imaging follow-up recommended, please see above. Results from Abstract encounter on 01/06/11 US DUPLEX UPPER EXT VENOUS LEFT  
 
 
DEXA Results (maximum last 3): No results found for this or any previous visit. [facility] Results (maximum last 3): Results from Hospital Encounter encounter on 09/18/18 [facility] MAMMO LT DX INCL CAD Narrative DIGITAL DIAGNOSTIC LEFT MAMMOGRAM 
 
INDICATION: Screening callback for  calcifications in the left breast   
 
COMPARISON:  9/6/2018, 2017, 2015, 2014. TECHNIQUE: Digital mammography was performed with CAD of the left breast. 
Magnification views, ML view, and exaggerated cc view were obtained. FINDINGS: With additional imaging, vascular calcifications noted at the 
posterior upper left breast. Additional previously questioned calcifications are 
not visualized and may represent mottle artifact. Evaluation may be limited due 
to close proximity to the pacemaker device. Impression IMPRESSION:  
Some of the previously questioned calcifications are not clearly visualized, and 
may have represented mottle artifact. Benign vascular calcifications are noted. 
-As a precaution, recommend short interval six-month follow-up diagnostic left 
mammogram. 
 
BIRADS 3:  Probably Benign Results from Hospital Encounter encounter on 09/06/18 [facility] MAMMO BI SCREENING INCL CAD Narrative EXAM: BILATERAL SCREENING MAMMOGRAM DIGITAL WITH CAD CLINICAL HISTORY/INDICATION: Asymptomatic breast cancer screening COMPARISON: Mammogram 17 through 10 TECHNIQUE: Digital CC and MLO views of both breasts with computer assisted 
detection, iCAD Second Look 7.2- H. FINDINGS:  
 
Possible new grouping of microcalcification in the deep posterior superior left 
breast 18 cm from the nipple just inferior to the metal battery pack.  No focal 
 mass is seen. There is no distortion. No abnormality of the right breast. 
  
 Impression IMPRESSION: 
 
Possible new grouping of left breast microcalcification. Magnification views recommended. Peggyann Gang BIRADS 0:  Incomplete: Need additional imaging evaluation. Breast composition:  There are scattered areas of fibroglandular density. Results from [facility] Encounter encounter on 07/20/17 SHIVA MAMMO BI SCREENING INCL CAD Narrative DIGITAL BILATERAL SCREENING MAMMOGRAM   
 
INDICATION:  Screening. COMPARISON:  2015, 2014, 2011 TECHNIQUE: Digital mammography was performed with CAD. Routine views were 
performed. FINDINGS:  
Radiopaque pacemaker limits evaluation of the posterior upper left 
breast/axilla. No suspicious mass or suspicious calcifications are seen . Impression IMPRESSION:  
No evidence for malignancy. Recommend routine annual follow up. BIRADS 2:  Benign Breast Density B: There are scattered areas of fibroglandular density. IR Results (maximum last 3): No results found for this or any previous visit. VAS/US Results (maximum last 3): Results from [facility] Encounter encounter on 07/20/17 DUPLEX LOWER EXT VENOUS BILAT PET Results (maximum last 3): No results found for this or any previous visit. No results found for this or any previous visit. @LASTPROCAMB([id number]) CULTURE:  
) Recent Labs  
  05/21/19 
1657 05/21/19 
1443 05/20/19 [address] CULT NO GROWTH AFTER 14 HOURS NO GROWTH AFTER 16 HOURS AEROBIC AND ANAEROBIC BOTTLES STAPHYLOCOCCUS SPECIES*  AEROBIC BOTTLE POSSIBLE STREPTOCOCCI,NON HEMOLYTIC* Recent Labs  
  05/21/19 
1657 05/21/19 
1443 05/20/19 [address] 05/20/19 
1455 CULT NO GROWTH AFTER 14 HOURS NO GROWTH AFTER 16 HOURS AEROBIC AND ANAEROBIC BOTTLES STAPHYLOCOCCUS SPECIES*  AEROBIC BOTTLE POSSIBLE STREPTOCOCCI,NON HEMOLYTIC* AEROBIC AND ANAEROBIC BOTTLES STAPHYLOCOCCUS SPECIES, COAGULASE NEGATIVE* Physical Assessment: Visit Vitals /75 (BP 1 Location: Left arm, BP Patient Position: At rest) Pulse 80 Temp 96.9 °F (36.1 °C) Resp 16 Ht 5' 4\" (1.626 m) Wt 89.1 kg (196 lb 6.4 oz) SpO2 90% Breastfeeding? No  
BMI 33.71 kg/m² Last 3 Recorded Weights in this Encounter 05/21/19 0435 05/21/19 1017 05/22/19 0431 Weight: 89.8 kg (198 lb) 89.8 kg (198 lb) 89.1 kg (196 lb 6.4 oz) Intake/Output Summary (Last 24 hours) at 5/22/2019 1552 Last data filed at 5/22/2019 8141 Gross per 24 hour Intake 300 ml Output 600 ml Net -300 ml Physial Exam: 
General appearance: alert, cooperative, mild distress, appears stated age Skin: normal coloration and turgor, no rashes, no suspicious skin lesions noted. HEENT: Head; normocephalic, atraumatic. NESSA. ENT- ENT exam normal, no neck nodes or sinus tenderness. Lungs: diminished breath sounds bases Heart: S1, S2 normal 
Abdomen: soft, non-tender. Bowel sounds normal. No masses,  no organomegaly Extremities:  edema . tender l mcp PLAN / RECOMMENDATION:   
Acute/crf stage 3,related to iv contrast.hold benicar for now,continue lasix for chf.may need to increase dose. discussed with pt and son Anemia,will be candidate for outpatient epo Chf,adjust lasix prn 
Gout,on allopurinol,prednisone Thank you for the consultation to participate in patient's care. I have personally discussed my plan with the referring physician. Luiz Hartman MD 
May 22, 2019

## 2019-05-22 NOTE — ROUTINE PROCESS
Bedside and Verbal shift change report given to Lyle Little RN (oncoming nurse) by Shivani Mcdonnell RN 
 (offgoing nurse). Report included the following information SBAR, Kardex, Intake/Output and MAR.

## 2019-05-22 NOTE — PROGRESS NOTES
Problem: Self Care Deficits Care Plan (Adult) Goal: *Acute Goals and Plan of Care (Insert Text) Description Occupational Therapy Goals Initiated 5/22/2019 within 7 day(s). 1.  Patient will perform toilet transfers with supervision/set-up 2. Patient will perform toileting with supervision/set-up. 3.  Patient will perform a functional activity while standing with supervision for 3 minutes, demonstrating Fair+ standing balance. 4.  Patient will participate in upper extremity therapeutic exercise/activities with supervision/set-up for 8 minutes. Prior Level of Function Pt reports she lives with her son in a [facility] with no LORA. She was (I) with basic self-care/ADLs and IADLs, including cooking, cleaning, and driving PTA. Pt did not use an AD for functional mobility. Outcome: Progressing Towards Goal 
 OCCUPATIONAL THERAPY EVALUATION Patient: Penelope Garza (84 y.o. female) Date: 5/22/2019 Primary Diagnosis: Shortness of breath [R06.02] Weakness [R53.1] Weakness [R53.1] Precautions:  Contact; Falls ASSESSMENT : 
Pt cleared to participate in OT evaluation by RN. Upon entering room, pt supine with HOB elevated, alert, and agreeable to therapy session with son present. Pt seen in conjunction with PT to increase pt participation and to maximize safety of patient and staff members. Based on the objective data described below, the patient presents with increased pain, decreased strength, decreased endurance, decreased functional balance, and decreased functional mobility, affecting her performance in basic self-care/ADL tasks. Pt is able to perform LB dressing with supervision/setup at bed level. Pt requires CG assist for bed mobility to participate in self-care. Pt requires min assist x2 for sit<>stand for sit-stand (with RW) in preparation for [facility] transfers for toileting. Transfer not performed at this time as pt in limited by pain in LLE/L foot at this time.  Pt is able to scoot self towards the [facility] with CGA. Educated pt on the role of OT, evaluation process, and goals for therapy with pt demonstrating good understanding. The pt will benefit from further OT services, in order to maximize her ADL performance and decrease the risk for complications associated with decreased functional activity. At the end of the session, pt resting comfortably in bed, call-bell in reach, with all needs met. RN notified post-session. Patient will benefit from skilled intervention to address the above impairments. Patient's rehabilitation potential is considered to be Good Factors which may influence rehabilitation potential include: ? None noted ? Mental ability/status ? Medical condition ? Home/family situation and support systems ? Safety awareness ? Pain tolerance/management ? Other: PLAN : 
Recommendations and Planned Interventions:  
?               Self Care Training                  ? Therapeutic Activities ? Functional Mobility Training   ? Cognitive Retraining 
? Therapeutic Exercises           ? Endurance Activities ? Balance Training                    ? Neuromuscular Re-Education ? Visual/Perceptual Training     ? Home Safety Training 
? Patient Education                   ? Family Training/Education ? Other (comment): Frequency/Duration: Patient will be followed by occupational therapy 1-2 times per day/4-7 days per week to address goals. Discharge Recommendations: Home Health Further Equipment Recommendations for Discharge: Shower chair; BSC  
 
SUBJECTIVE:  
Patient stated ? I don't need this. ? OBJECTIVE DATA SUMMARY:  
 
Past Medical History:  
Diagnosis Date Abnormal ankle brachial index 11/13/2014 Mild arterial disease in right leg. R VICTOR MANUEL 0.88. L VICTOR MANUEL 1.00. Anemia Arm DVT (deep venous thromboembolism), acute (HCC)   
 s/p anticoagulation Atherosclerotic heart disease Atrial fibrillation ([address] AV block CAD (coronary artery disease) Cardiomyopathy, ischemic Carotid artery stenosis Carotid duplex 2014 Mild <50% bilateral ICA plaquing. Possible left vertebral occlusion. Cerebral artery occlusion with cerebral infarction ([address] stroke x 2 Chest pain CVA (cerebral infarction) Echocardiogram 2015 EF 55%. Apical inferior, apical septal hypk (new since study of 11), likely due to chronic V-pacing. Mild LVH. RVSP 30 mmHg. Mild LAE. Mild MR. Mild TR. Gastritis GERD (gastroesophageal reflux disease) Heart failure ([address] Hiatal hernia Hyperlipidemia Hypertension Hypertensive cardiovascular disease Intracranial aneurysm   
 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 Nuclear cardiac imaging test 2015 Low risk. Sm apical infarction w/no ischemia vs apical thinning. Sm basal inferior defect, likely artifact. EF 56%. Inferoseptal, inferoapical WMA related to sternotomy or paced rhythm. Pacemaker PAD (peripheral artery disease) ([address] PVC's   
 chronic S/P CABG x 3 2008 LIMA-LAD. SVG-OM. SVG-dRCA   
 S/P cardiac cath 2008 pRCA 100%. LM patent. Cx patent. OM1 100%. mLAD 95%. LVEDP 27-29mmHg. EF 20%. mod MR Tilt table evaluation 1995 Positive for orthostatic hypotension Vitamin D deficiency Past Surgical History:  
Procedure Laterality Date CABG, ARTERY-VEIN, THREE  2008 CARDIAC SURG PROCEDURE UNLIST    
 medtronic dual-chamber cardioverted-defibrillator  HX  SECTION    
 x2  
 HX ENDOSCOPY  3/1/16 HX ENDOSCOPY  3/1/16 [address] HX HYSTERECTOMY 1980 HX ORTHOPAEDIC    
 knee surgery HX OTHER SURGICAL  2/10/16 Pacemaker Gen Change HX PACEMAKER Defibrillator 2008 HX TUMOR REMOVAL    
 removed from arm.  benign Barriers to Learning/Limitations: yes;  Pain Compensate with: visual, verbal, tactile, kinesthetic cues/model Home Situation:  
Home Situation Home Environment: Private residence # Steps to Enter: 4 Rails to Enter: Yes Hand Rails : Bilateral 
One/Two Story Residence: One story Living Alone: No 
Support Systems: Family member(s) Patient Expects to be Discharged to[de-identified] Private residence Current DME Used/Available at Home: None Tub or Shower Type: Tub/Shower combination ? Right hand dominant   ? Left hand dominant Cognitive/Behavioral Status: 
Neurologic State: Alert Orientation Level: Oriented X4 Cognition: Follows commands Safety/Judgement: Fall prevention Skin: Visible skin appeared intact Edema: Slight edema on B hands; Pt educated on placing hands on pillows for edema control Coordination: BUE Coordination: Within functional limits Fine Motor Skills-Upper: Left Intact; Right Intact Gross Motor Skills-Upper: Left Intact; Right Intact Balance: 
Sitting: Intact Standing: Impaired; With support Standing - Static: Good Standing - Dynamic : Fair Strength: BUE Strength: Generally decreased, functional 
 
 
Tone & Sensation: BUE Tone: Normal 
Sensation: Intact Range of Motion: BUE 
AROM: Within functional limits Functional Mobility and Transfers for ADLs: 
Bed Mobility: 
Supine to Sit: Contact guard assistance Sit to Supine: Supervision Transfers: 
Sit to Stand: Minimum assistance;Assist x2 Stand to Sit: Minimum assistance ADL Assessment:  
Feeding: Setup Oral Facial Hygiene/Grooming: Setup Bathing: Setup;Supervision Upper Body Dressing: Setup;Supervision Lower Body Dressing: Setup;Supervision Toileting: Minimum assistance;Assist x2(Decreased standing balance for transfers) ADL Intervention: Lower Body Dressing Assistance Dressing Assistance: Supervision;Set-up Position Performed: Long sitting on bed Cognitive Retraining Safety/Judgement: Fall prevention Pain: 
Pain level pre-treatment: 8/10 (LLE; L foot) Pain level post-treatment: 8/10 Pain Intervention(s): Medication (see MAR); Rest, Ice, Repositioning Response to intervention: Nurse notified, See doc flow Activity Tolerance:  
Fair- 
 
Please refer to the flowsheet for vital signs taken during this treatment. After treatment:  
? Patient left in no apparent distress sitting up in chair ? Patient left in no apparent distress in bed 
? Call bell left within reach ? Nursing notified ? Son present ? Bed alarm activated COMMUNICATION/EDUCATION:  
? Role of Occupational Therapy in the acute care setting 
? Home safety education was provided and the patient/caregiver indicated understanding. ? Patient/family have participated as able in goal setting and plan of care. ? Patient/family agree to work toward stated goals and plan of care. ? Patient understands intent and goals of therapy, but is neutral about his/her participation. ? Patient is unable to participate in goal setting and plan of care. Thank you for this referral. 
Bogdan Olivo OT Time Calculation: 33 mins Eval Complexity: History: MEDIUM Complexity : Expanded review of history including physical, cognitive and psychosocial  history ; Examination: LOW Complexity : 1-3 performance deficits relating to physical, cognitive , or psychosocial skils that result in activity limitations and / or participation restrictions ; Decision Making:LOW Complexity : No comorbidities that affect functional and no verbal or physical assistance needed to complete eval tasks

## 2019-05-22 NOTE — PROGRESS NOTES
[facility]  -  Progress Note Patient: Radha Form MRN: [id number]  SSN: [id number] YOB: 1940  Age: 70 y.o. Sex: female Admit Date: 5/19/2019 Hospital Problem List:  
 
Hospital Problems  Date Reviewed: 2/21/2019 Codes Class Noted POA Shortness of breath ICD-10-CM: R06.02 
ICD-9-CM: 786.05  5/19/2019 Unknown Weakness ICD-10-CM: R53.1 ICD-9-CM: 780.79  5/19/2019 Unknown - Fatigue and worsening of chronic dyspnea with one day of diarrhea. CTA chest negative for acute PE 
- Negative troponin x 2, EKG is V-paced. Low suspicion of ACS 
- Nuclear stress test this admission did reveal a moderate sized fixed defect consistent with prior MI, but no acute ischemia. High risk study due to calculated EF of 28% 
- CAD s/p CABG x 3 in 2008 with LIMA to LAD, reverse SVG to OM1 and reverse SVG to distal RCA 
- Echocardiogram from this admission reveals worsening LV function with EF of 36-40% (previously 45-50% in 5/2018), grade 1 DDx, worsening PHTN with PASP of 52 mmHg 
- Hx/o DVT following CABG and was on Coumadin - S/p dual-chamber PPM in Oct 2008 for AV block, gen change Feb 2016 
- Hx/o ischemic CMY 
- Essential hypertension 
- Hx/o CVA 
- Stage 3 CKD  
- GERD 
  
Primary cardiologist: Dr. Vaibhav Ding Assessment & Plan:  
 
Positive blood culture x 1; possible contaminant. Awaiting repeat cx Nuclear stress test with fixed defects but no acute ischemia. High risk study due to calculated EF of 28%. No further cardiac testing planned during this admission History of CKD with rising SCr, currently 1.81; appreciate nephrology input in regard to diuresis Continue remainder of cardiac regimen Subjective: No CV complaints. Patient still with SOB. Also complains of acute gout to left ankle and foot Objective:  
  
Patient Vitals for the past 8 hrs: 
 Temp Pulse Resp BP SpO2  
05/22/19 0816 96.8 °F (36 °C) 76 15 158/77 93 % 05/22/19 0735     96 % 05/22/19 0306 98.6 °F (37 °C) 75 20 144/76 97 % Patient Vitals for the past 96 hrs: 
 Weight 05/22/19 0431 89.1 kg (196 lb 6.4 oz) 05/21/19 1017 89.8 kg (198 lb) 05/21/19 0435 89.8 kg (198 lb) 05/20/19 1117 95.3 kg (210 lb) 05/19/19 1342 95.3 kg (210 lb) Intake/Output Summary (Last 24 hours) at 5/22/2019 9451 Last data filed at 5/22/2019 9822 Gross per 24 hour Intake 300 ml Output 800 ml Net -500 ml Physical Exam: 
General:  alert, cooperative, no distress, appears stated age Neck:  nontender, no masses, no stridor, no JVD Lungs:  clear to auscultation bilaterally Heart:  regular rate and rhythm, S1, S2 normal, no murmur, click, rub or gallop Abdomen:  abdomen is soft without significant tenderness, masses, organomegaly or guarding Extremities:  extremities normal, atraumatic, no cyanosis or edema; tenderness to left ankle Data Review:  
 
Labs: Results:  
   
Chemistry Recent Labs  
  05/22/19 
2833 05/21/19 
0314 05/20/19 
0705 05/19/19 
1435 GLU 95 90 103* 92  138 137 140  
K 4.0 4.2 4.0 4.4 * 110* 110* 111* CO2 22 20* 20* 21 BUN 37* 33* 30* 32* CREA 1.81* 1.79* 1.42* 1.59* CA 9.5 9.5 9.1 9.5 AGAP 7 8 7 8 BUCR 20 18 21* 20  
AP  --   --   --  113 TP  --   --   --  8.2 ALB  --   --   --  3.1*  
GLOB  --   --   --  5.1* AGRAT  --   --   --  0.6* CBC w/Diff Recent Labs  
  05/22/19 
0337 05/21/19 
0602 05/19/19 
1435 WBC 9.1 11.4 10.3 RBC 3.43* 3.40* 4.07* HGB 9.5* 9.3* 11.4* HCT 29.1* 29.1* 34.6*  
 184 225 GRANS 77* 80* 74* LYMPH 15* 12* 17* EOS 1 1 1 Cardiac Enzymes No results found for: CPK, CK, CKMMB, CKMB, RCK3, CKMBT, CKNDX, CKND1, KELI, TROPT, TROIQ, AZUL, TROPT, TNIPOC, BNP, BNPP Coagulation No results for input(s): PTP, INR, APTT in the last 72 hours. No lab exists for component: INREXT Lipid Panel Lab Results Component Value Date/Time Cholesterol, total 215 (H) 07/10/2017 11:17 AM  
 HDL Cholesterol 93 (H) 07/10/2017 11:17 AM  
 LDL, calculated 108.6 (H) 07/10/2017 11:17 AM  
 VLDL, calculated 13.4 07/10/2017 11:17 AM  
 Triglyceride 67 07/10/2017 11:17 AM  
 CHOL/HDL Ratio 2.3 07/10/2017 11:17 AM  
  
BNP Lab Results Component Value Date/Time  
 B-type Natriuretic Peptide 184.7 (H) 2011 12:00 AM  
 B-type Natriuretic Peptide CANCELED 2011 12:00 AM  
  
Liver Enzymes Recent Labs 19 
1435 TP 8.2 ALB 3.1*  SGOT 17  
  
Digoxin Thyroid Studies Lab Results Component Value Date/Time TSH 1.87 07/10/2017 11:17 AM  
    
 
 
Signed By: Genoveva Apgar, PA May 22, 2019

## 2019-05-22 NOTE — ROUTINE PROCESS
Bedside shift change report given to Heather Rodriguez RN (oncoming nurse) by Dagmar Mendoza RN (offgoing nurse). Report included the following information SBAR, Kardex, Intake/Output, Recent Results and Cardiac Rhythm paced. Patient awake on rounds.

## 2019-05-22 NOTE — ROUTINE PROCESS
Bedside verbal report received from Ursula Nielsen, reviewed SBAR, MAR, VS and summary of care. Patient awake and quiet with family members at bedside. Patient encouraged to call for assistance, call light in reach.

## 2019-05-22 NOTE — CONSULTS
Infectious Disease Consultation Note Requested by: dr. Marcellus Hazel Reason: staphylococcus, non hemolytic strep bloodstream infection Current abx Prior abx Pip/tazo, vancomycin since 5/21/19 Lines:  
 
 
Assessment : 
 
70 y.o. AA female with past medical history of MRSA, CAD S/P CABG, COPD, HTN, LE edema  presents with fatigue and shortness of breath. Now with non hemolytic strep, staph species in blood culture; temp of 100.8 on 5/20/19. Highly complex clinical picture. After obtaining detailed history and exam; non hemolytic strep, staph in blood culture is likely contaminant. Although 2 sets labelled 15 minutes apart, they are likely same draw (frequent occurrence in ED). H/o weight loss. Rule out undiagnosed malignancy. Fevers likely due to gout exacerbation left ankle Acute on chronic renal insufficiency: multifactorial 
 
Recommendations: 
 
1 d/c zosyn, vancomycin. Risk of continuation of abx outweigh the benefit. 2. F/u blood culture 5/21 3. Consider steroids for left ankle gout flare 4. Ct abd/pelvis 5. Monitor renal function, clinically 6. F/u cardiology recommendations Advance Care planning: discussed  with patient/surrogate decision maker: chana jennings: [phone] Thank you for consultation request. Above plan was discussed in details with patient, family and dr Marecllus Hazel. Please call me if any further questions or concerns. Will continue to participate in the care of this patient. HPI: 
 
70 y.o. AA female with past medical history of MRSA, CAD S/P CABG, COPD, HTN, LE edema  presents with fatigue and shortness of breath.  Patient states that over the past week, she has developed worsening shortness of breath.  Patient states that the symptoms are moderate to severe.  She states the symptoms are worse with exertion.  She states the symptoms are only very mildly relieved with rest.  She has had mild intermittent cough.  Otherwise, she denies fever, chills, chest pain  or other associated symptoms. As per patient she had diarrhea on 05/18/19 which was watery , happened 3 times and resolved by itself. No abdominal pain. She was admitted to [facility] on 5/19 for further w/u. Evaluated by cardiology - no evidence of acute MI. She had blood cultures 5/19x2 positive for staph species, non hemolytic streptococcus. She had temp of 100.8 on 5/20/19. Started on pip/tazo, vancomycin. I have been consulted for further recommendations.  
  
No further fevers. States that she has significant pain in her left ankle due to gout. She stopped taking allopurinol for about 2 weeks since she ran out of it. She denies subjective fever or chills at home. Has lost 14 lbs in last month - unintentional. Has had poor appetite for about 3-4 days. Patient denies headaches, visual disturbances, sore throat, runny nose, earaches, cp, chills, cough, abdominal pain, diarrhea, burning micturition, or weakness in extremities. denies back pain/flank pain. denies recent sick contacts. No h/o recent travel.  h/o MRSA infection right leg last year.  
 
  
 
 
 
Past Medical History:  
Diagnosis Date  Abnormal ankle brachial index 11/13/2014 Mild arterial disease in right leg. R VICTOR MANUEL 0.88. L VICTOR MANUEL 1.00.  Anemia  Arm DVT (deep venous thromboembolism), acute (HCC)   
 s/p anticoagulation  Atherosclerotic heart disease  Atrial fibrillation ([address]  AV block  CAD (coronary artery disease)  Cardiomyopathy, ischemic  Carotid artery stenosis  Carotid duplex 11/13/2014 Mild <50% bilateral ICA plaquing. Possible left vertebral occlusion.  Cerebral artery occlusion with cerebral infarction ([address] stroke x 2  Chest pain  CVA (cerebral infarction)  Echocardiogram 08/19/2015 EF 55%. Apical inferior, apical septal hypk (new since study of 12/19/11), likely due to chronic V-pacing. Mild LVH. RVSP 30 mmHg.   Mild LAE.  Mild MR. Mild TR.  Gastritis  GERD (gastroesophageal reflux disease)  Heart failure [facility]  Hiatal hernia  Hyperlipidemia  Hypertension  Hypertensive cardiovascular disease  Intracranial aneurysm   
 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  Nuclear cardiac imaging test 2015 Low risk. Sm apical infarction w/no ischemia vs apical thinning. Sm basal inferior defect, likely artifact. EF 56%. Inferoseptal, inferoapical WMA related to sternotomy or paced rhythm.  Pacemaker  PAD (peripheral artery disease) ([facility]  PVC's   
 chronic  S/P CABG x 3 2008 LIMA-LAD. SVG-OM. SVG-dRCA  S/P cardiac cath 2008 pRCA 100%. LM patent. Cx patent. OM1 100%. mLAD 95%. LVEDP 27-29mmHg. EF 20%. mod MR  
 Tilt table evaluation 1995 Positive for orthostatic hypotension  Vitamin D deficiency Past Surgical History:  
Procedure Laterality Date  CABG, ARTERY-VEIN, THREE  2008  CARDIAC SURG PROCEDURE UNLIST    
 medtronic dual-chamber cardioverted-defibrillator  HX  SECTION    
 x2  
 HX ENDOSCOPY  3/1/16  HX ENDOSCOPY  3/1/16 [address]  HX HYSTERECTOMY   HX ORTHOPAEDIC    
 knee surgery  HX OTHER SURGICAL  2/10/16 Pacemaker Gen Change  HX PACEMAKER Defibrillator   HX TUMOR REMOVAL    
 removed from arm.  benign Current Discharge Medication List  
  
CONTINUE these medications which have NOT CHANGED Details  
!! furosemide (LASIX) 40 mg tablet TAKE 1 TABLET BY MOUTH EVERY DAY Refills: 3  
  
amLODIPine (NORVASC) 5 mg tablet Take 1 Tab by mouth daily. Qty: 90 Tab, Refills: 6  
  
carvedilol (COREG) 25 mg tablet TAKE 1 TABLET BY MOUTH (2) TIMES A DAY Qty: 180 Tab, Refills: 3 olmesartan (BENICAR) 5 mg tablet TAKE 1 TABLET BY MOUTH EVERY DAY Refills: 3  
  
hydrocortisone (HYTONE) 2.5 % ointment APPLY LIGHTLY TO THE AFFECTED AREAS ON THE LEGS AND FEET TWICE A DAY. Refills: 2  
  
aspirin 81 mg tablet Take 81 mg by mouth daily. calcium citrate-vitamin d3 (CITRACAL+D) 315-200 mg-unit tab Take 1 Tab by mouth two (2) times daily (with meals). diclofenac (VOLTAREN) 1 % gel USE 4 GRAMS ON KNEE FOUR TIMES A DAY AS NEEDED Refills: 3 cholecalciferol, vitamin D3, (VITAMIN D3) 2,000 unit tab Take 2,000 Units by mouth daily. !! furosemide (LASIX) 20 mg tablet Take 20 mg by mouth daily. !! - Potential duplicate medications found. Please discuss with provider. Current Facility-Administered Medications Medication Dose Route Frequency  allopurinol (ZYLOPRIM) tablet 100 mg  100 mg Oral DAILY  oxyCODONE-acetaminophen (PERCOCET) 5-325 mg per tablet 1 Tab  1 Tab Oral Q6H PRN  piperacillin-tazobactam (ZOSYN) 2.25 g in 0.9% sodium chloride (MBP/ADV) 50 mL MBP  2.25 g IntraVENous Q6H  
 furosemide (LASIX) tablet 20 mg  20 mg Oral DAILY  budesonide (PULMICORT) 500 mcg/2 ml nebulizer suspension  500 mcg Nebulization BID RT  
 VANCOMYCIN INFORMATION NOTE   Other Rx Dosing/Monitoring  amLODIPine (NORVASC) tablet 5 mg  5 mg Oral DAILY  aspirin chewable tablet 81 mg  81 mg Oral DAILY  calcium citrate-vitamin D3 (CITRACAL WITH VITAMIN D MAXIMUM) tablet 1 Tab  1 Tab Oral BID WITH MEALS  carvedilol (COREG) tablet 25 mg  25 mg Oral BID WITH MEALS  cholecalciferol (VITAMIN D3) tablet 2,000 Units  2,000 Units Oral DAILY  hydrocortisone (HYTONE) 2.5 % ointment   Topical BID  olmesartan (BENICAR) tablet 5 mg  5 mg Oral DAILY  enoxaparin (LOVENOX) injection 40 mg  40 mg SubCUTAneous Q24H  
 albuterol-ipratropium (DUO-NEB) 2.5 MG-0.5 MG/3 ML  3 mL Nebulization Q4H PRN  
 hydrALAZINE (APRESOLINE) 20 mg/mL injection 10 mg  10 mg IntraVENous Q6H PRN  
  lidocaine (XYLOCAINE) 2 % viscous solution 15 mL  15 mL Mouth/Throat Q3H PRN  
 ondansetron (ZOFRAN) injection 4 mg  4 mg IntraVENous Q6H PRN Allergies: Nifedipine; Ace inhibitors; Codeine; Hydralazine; Lipitor [atorvastatin]; and Simvastatin History reviewed. No pertinent family history. Social History Socioeconomic History  Marital status:  Spouse name: Not on file  Number of children: Not on file  Years of education: Not on file  Highest education level: Not on file Occupational History  Not on file Social Needs  Financial resource strain: Not on file  Food insecurity:  
  Worry: Not on file Inability: Not on file  Transportation needs:  
  Medical: Not on file Non-medical: Not on file Tobacco Use  Smoking status: Never Smoker  Smokeless tobacco: Never Used  Tobacco comment: just smoked 2 weeks in college Substance and Sexual Activity  Alcohol use: No  
 Drug use: No  
 Sexual activity: Never Lifestyle  Physical activity:  
  Days per week: Not on file Minutes per session: Not on file  Stress: Not on file Relationships  Social connections:  
  Talks on phone: Not on file Gets together: Not on file Attends [employer] service: Not on file Active member of club or organization: Not on file Attends meetings of clubs or organizations: Not on file Relationship status: Not on file  Intimate partner violence:  
  Fear of current or ex partner: Not on file Emotionally abused: Not on file Physically abused: Not on file Forced sexual activity: Not on file Other Topics Concern [address] Service Not Asked  Blood Transfusions Not Asked  Caffeine Concern Not Asked  Occupational Exposure Not Asked Helyn Kiara Hazards Not Asked  Sleep Concern Not Asked  Stress Concern Not Asked  Weight Concern Not Asked  Special Diet Not Asked  Back Care Not Asked  Exercise Not Asked  Bike Helmet Not Asked  Seat Belt Not Asked  Self-Exams Not Asked Social History Narrative  Not on file Social History Tobacco Use Smoking Status Never Smoker Smokeless Tobacco Never Used Tobacco Comment  
 just smoked 2 weeks in college Temp (24hrs), Av.2 °F (36.8 °C), Min:96.8 °F (36 °C), Max:98.9 °F (37.2 °C) Visit Vitals /77 (BP 1 Location: Left arm, BP Patient Position: At rest) Pulse 76 Temp 96.8 °F (36 °C) Resp 15 Ht 5' 4\" (1.626 m) Wt 89.1 kg (196 lb 6.4 oz) SpO2 93% Breastfeeding? No  
BMI 33.71 kg/m² ROS: 12 point ROS obtained in details. Pertinent positives as mentioned in HPI,  
otherwise negative Physical Exam: 
 
General: Well developed, well nourished female laying on the bed/sitting on the  bed AAOx3 in no acute distress. General:   awake alert and oriented HEENT:  Normocephalic, atraumatic, PERRL, EOMI, no scleral icterus or pallor; no conjunctival hemmohage;  nasal and oral mucous are moist and without evidence of lesions. No thrush. Dentition good. Neck supple, no bruits. Lymph Nodes:   no cervical, axillary or inguinal adenopathy Lungs:   non-labored, bilaterally clear to auscultation- no crackles wheezes rales or rhonchi Heart:  RRR, s1 and s2; no murmurs rubs or gallops, no edema, + pedal pulses Abdomen:  soft, non-distended, active bowel sounds, no hepatomegaly, no splenomegaly. Appropriate surgical scars for stated surgeries. Non-tender Genitourinary:  deferred Extremities:   no clubbing, cyanosis; no joint effusions or swelling; Full ROM of all large joints to the upper and lower extremities; muscle mass appropriate for age Neurologic:  No gross focal sensory abnormalities; 5/5 muscle strength to upper and lower extremities. Speech appropriate. Cranial nerves intact Skin:  No rash or ulcers noted Wound:   
  
Back:  no spinal or paraspinal muscle tenderness or rigidity, no CVA tenderness Psychiatric:  No suicidal or homicidal ideations, appropriate mood and affect Labs: Results:  
Chemistry Recent Labs  
  05/22/19 
9214 05/21/19 
0314 05/20/19 
0705 05/19/19 
1435 GLU 95 90 103* 92  138 137 140  
K 4.0 4.2 4.0 4.4 * 110* 110* 111* CO2 22 20* 20* 21 BUN 37* 33* 30* 32* CREA 1.81* 1.79* 1.42* 1.59* CA 9.5 9.5 9.1 9.5 AGAP 7 8 7 8 BUCR 20 18 21* 20  
AP  --   --   --  113 TP  --   --   --  8.2 ALB  --   --   --  3.1*  
GLOB  --   --   --  5.1* AGRAT  --   --   --  0.6* CBC w/Diff Recent Labs  
  05/22/19 
0337 05/21/19 
0602 05/19/19 
1435 WBC 9.1 11.4 10.3 RBC 3.43* 3.40* 4.07* HGB 9.5* 9.3* 11.4* HCT 29.1* 29.1* 34.6*  
 184 225 GRANS 77* 80* 74* LYMPH 15* 12* 17* EOS 1 1 1 Microbiology Recent Labs  
  05/21/19 
1657 05/21/19 
1443 05/20/19  05/20/19 
1455 CULT NO GROWTH AFTER 14 HOURS NO GROWTH AFTER 16 HOURS AEROBIC AND ANAEROBIC BOTTLES STAPHYLOCOCCUS SPECIES*  AEROBIC BOTTLE POSSIBLE STREPTOCOCCI,NON HEMOLYTIC* CULTURE IN PROGRESS,FURTHER UPDATES TO FOLLOW RADIOLOGY: 
 
All available imaging studies/reports in [facility] for this admission were reviewed Dr. Amaris Pratt, Infectious Disease Specialist 
[phone] May 22, 2019 
10:52 AM

## 2019-05-23 VITALS
HEIGHT: 64 IN | DIASTOLIC BLOOD PRESSURE: 75 MMHG | OXYGEN SATURATION: 98 % | BODY MASS INDEX: 34.04 KG/M2 | WEIGHT: 199.4 LBS | TEMPERATURE: 97 F | HEART RATE: 68 BPM | SYSTOLIC BLOOD PRESSURE: 159 MMHG | RESPIRATION RATE: 18 BRPM

## 2019-05-23 LAB
ANION GAP SERPL CALC-SCNC: 7 MMOL/L (ref 3–18)
BACTERIA SPEC CULT: ABNORMAL
BUN SERPL-MCNC: 35 MG/DL (ref 7–18)
BUN/CREAT SERPL: 20 (ref 12–20)
CALCIUM SERPL-MCNC: 10.1 MG/DL (ref 8.5–10.1)
CHLORIDE SERPL-SCNC: 108 MMOL/L (ref 100–108)
CO2 SERPL-SCNC: 22 MMOL/L (ref 21–32)
CREAT SERPL-MCNC: 1.72 MG/DL (ref 0.6–1.3)
GLUCOSE SERPL-MCNC: 126 MG/DL (ref 74–99)
GRAM STN SPEC: ABNORMAL
GRAM STN SPEC: ABNORMAL
POTASSIUM SERPL-SCNC: 4.8 MMOL/L (ref 3.5–5.5)
SERVICE CMNT-IMP: ABNORMAL
SODIUM SERPL-SCNC: 137 MMOL/L (ref 136–145)

## 2019-05-23 PROCEDURE — 74011250636 HC RX REV CODE- 250/636: Performed by: HOSPITALIST

## 2019-05-23 PROCEDURE — 74011250637 HC RX REV CODE- 250/637: Performed by: INTERNAL MEDICINE

## 2019-05-23 PROCEDURE — 74011250637 HC RX REV CODE- 250/637: Performed by: HOSPITALIST

## 2019-05-23 PROCEDURE — 80048 BASIC METABOLIC PNL TOTAL CA: CPT

## 2019-05-23 PROCEDURE — 74011000250 HC RX REV CODE- 250: Performed by: NURSE PRACTITIONER

## 2019-05-23 PROCEDURE — 36415 COLL VENOUS BLD VENIPUNCTURE: CPT

## 2019-05-23 PROCEDURE — 74011636637 HC RX REV CODE- 636/637: Performed by: HOSPITALIST

## 2019-05-23 RX ORDER — FUROSEMIDE 40 MG/1
TABLET ORAL
Qty: 30 TAB | Refills: 0 | Status: SHIPPED | OUTPATIENT
Start: 2019-05-23 | End: 2019-06-07 | Stop reason: SDUPTHER

## 2019-05-23 RX ORDER — ALLOPURINOL 100 MG/1
100 TABLET ORAL DAILY
Qty: 30 TAB | Refills: 0 | Status: SHIPPED | OUTPATIENT
Start: 2019-05-24 | End: 2019-07-24

## 2019-05-23 RX ORDER — AMLODIPINE BESYLATE 10 MG/1
10 TABLET ORAL DAILY
Status: DISCONTINUED | OUTPATIENT
Start: 2019-05-24 | End: 2019-05-23 | Stop reason: HOSPADM

## 2019-05-23 RX ORDER — PREDNISONE 20 MG/1
20 TABLET ORAL
Qty: 3 TAB | Refills: 0 | Status: SHIPPED | OUTPATIENT
Start: 2019-05-24 | End: 2019-05-27

## 2019-05-23 RX ADMIN — HYDRALAZINE HYDROCHLORIDE 10 MG: 20 INJECTION, SOLUTION INTRAMUSCULAR; INTRAVENOUS at 11:38

## 2019-05-23 RX ADMIN — ALLOPURINOL 100 MG: 100 TABLET ORAL at 09:52

## 2019-05-23 RX ADMIN — FUROSEMIDE 40 MG: 40 TABLET ORAL at 09:52

## 2019-05-23 RX ADMIN — ASPIRIN 81 MG 81 MG: 81 TABLET ORAL at 09:52

## 2019-05-23 RX ADMIN — PREDNISONE 60 MG: 20 TABLET ORAL at 09:52

## 2019-05-23 RX ADMIN — CARVEDILOL 25 MG: 25 TABLET, FILM COATED ORAL at 09:52

## 2019-05-23 RX ADMIN — OXYCODONE HYDROCHLORIDE AND ACETAMINOPHEN 1 TABLET: 5; 325 TABLET ORAL at 09:53

## 2019-05-23 RX ADMIN — ENOXAPARIN SODIUM 40 MG: 40 INJECTION SUBCUTANEOUS at 06:35

## 2019-05-23 RX ADMIN — BUDESONIDE 500 MCG: 0.5 INHALANT RESPIRATORY (INHALATION) at 08:00

## 2019-05-23 RX ADMIN — ONDANSETRON 4 MG: 2 INJECTION INTRAMUSCULAR; INTRAVENOUS at 12:17

## 2019-05-23 RX ADMIN — AMLODIPINE BESYLATE 5 MG: 5 TABLET ORAL at 09:52

## 2019-05-23 RX ADMIN — Medication 1 TABLET: at 09:52

## 2019-05-23 RX ADMIN — VITAMIN D, TAB 1000IU (100/BT) 2000 UNITS: 25 TAB at 09:52

## 2019-05-23 NOTE — HOME CARE
Rounded on this \"[facility]\" patient ,explained to patient about [address] services offered and left patient a brochure on [address]. DUSTIN FAITH.

## 2019-05-23 NOTE — PROGRESS NOTES
Infectious Disease progress Note Requested by: dr. Anu Baez Reason: staphylococcus, non hemolytic strep bloodstream infection Current abx Prior abx Pip/tazo, vancomycin 5/21/19-5/22/19 Lines:  
 
 
Assessment : 
 
70 y.o. AA female with past medical history of MRSA, CAD S/P CABG, COPD, HTN, LE edema  presents with fatigue and shortness of breath. Now with non hemolytic strep, staph species in blood culture; temp of 100.8 on 5/20/19. Highly complex clinical picture. After obtaining detailed history and exam; non hemolytic strep, cons in blood culture is likely contaminant. Although 2 sets labelled 15 minutes apart, they are likely same draw (frequent occurrence in ED). H/o weight loss. Rule out undiagnosed malignancy. Fevers likely due to gout exacerbation left ankle Acute on chronic renal insufficiency: multifactorial 
 
CT scan 5/22- infrarenal portion of the abdominal aorta shows a fusiform dilatation 
measuring 3.1 x 3.1 cm Recommendations: 1. hold off abx 2. F/u with vascular surgery as outpatient for infra renal aortic aneurysm 3. mx of gout per primary team 
4. Ok to d/c patient from [facility] Advance Care planning: discussed  with patient/surrogate decision maker: chana jennings: [phone] Above plan was discussed in details with patient,  and dr Anu Baez. Please call me if any further questions or concerns. Will continue to participate in the care of this patient. HPI: 
Resolved left ankle pain. C/o pain in left foot. She denies subjective fever or chills . Patient denies headaches, visual disturbances, sore throat, runny nose, earaches, cp, chills, cough, abdominal pain, diarrhea, burning micturition, or weakness in extremities. denies back pain/flank pain. home Medication List  
 Details  
!! furosemide (LASIX) 40 mg tablet TAKE 1 TABLET BY MOUTH EVERY DAY Refills: 3  
  
amLODIPine (NORVASC) 5 mg tablet Take 1 Tab by mouth daily. Qty: 90 Tab, Refills: 6  
  
carvedilol (COREG) 25 mg tablet TAKE 1 TABLET BY MOUTH (2) TIMES A DAY Qty: 180 Tab, Refills: 3  
  
olmesartan (BENICAR) 5 mg tablet TAKE 1 TABLET BY MOUTH EVERY DAY Refills: 3  
  
hydrocortisone (HYTONE) 2.5 % ointment APPLY LIGHTLY TO THE AFFECTED AREAS ON THE LEGS AND FEET TWICE A DAY. Refills: 2  
  
aspirin 81 mg tablet Take 81 mg by mouth daily. calcium citrate-vitamin d3 (CITRACAL+D) 315-200 mg-unit tab Take 1 Tab by mouth two (2) times daily (with meals). diclofenac (VOLTAREN) 1 % gel USE 4 GRAMS ON KNEE FOUR TIMES A DAY AS NEEDED Refills: 3 cholecalciferol, vitamin D3, (VITAMIN D3) 2,000 unit tab Take 2,000 Units by mouth daily. !! furosemide (LASIX) 20 mg tablet Take 20 mg by mouth daily. !! - Potential duplicate medications found. Please discuss with provider. Current Facility-Administered Medications Medication Dose Route Frequency  allopurinol (ZYLOPRIM) tablet 100 mg  100 mg Oral DAILY  oxyCODONE-acetaminophen (PERCOCET) 5-325 mg per tablet 1 Tab  1 Tab Oral Q6H PRN  predniSONE (DELTASONE) tablet 60 mg  60 mg Oral DAILY WITH BREAKFAST  furosemide (LASIX) tablet 40 mg  40 mg Oral DAILY  budesonide (PULMICORT) 500 mcg/2 ml nebulizer suspension  500 mcg Nebulization BID RT  
 amLODIPine (NORVASC) tablet 5 mg  5 mg Oral DAILY  aspirin chewable tablet 81 mg  81 mg Oral DAILY  calcium citrate-vitamin D3 (CITRACAL WITH VITAMIN D MAXIMUM) tablet 1 Tab  1 Tab Oral BID WITH MEALS  carvedilol (COREG) tablet 25 mg  25 mg Oral BID WITH MEALS  cholecalciferol (VITAMIN D3) tablet 2,000 Units  2,000 Units Oral DAILY  hydrocortisone (HYTONE) 2.5 % ointment   Topical BID  enoxaparin (LOVENOX) injection 40 mg  40 mg SubCUTAneous Q24H  
 albuterol-ipratropium (DUO-NEB) 2.5 MG-0.5 MG/3 ML  3 mL Nebulization Q4H PRN  
 hydrALAZINE (APRESOLINE) 20 mg/mL injection 10 mg  10 mg IntraVENous Q6H PRN  
  lidocaine (XYLOCAINE) 2 % viscous solution 15 mL  15 mL Mouth/Throat Q3H PRN  
 ondansetron (ZOFRAN) injection 4 mg  4 mg IntraVENous Q6H PRN Allergies: Nifedipine; Ace inhibitors; Codeine; Hydralazine; Lipitor [atorvastatin]; and Simvastatin Temp (24hrs), Av.4 °F (36.3 °C), Min:96.9 °F (36.1 °C), Max:98.1 °F (36.7 °C) Visit Vitals /86 (BP 1 Location: Right arm, BP Patient Position: At rest) Pulse 68 Temp 97.4 °F (36.3 °C) Resp 20 Ht 5' 4\" (1.626 m) Wt 90.4 kg (199 lb 6.4 oz) SpO2 97% Breastfeeding? No  
BMI 34.23 kg/m² ROS: 12 point ROS obtained in details. Pertinent positives as mentioned in HPI,  
otherwise negative Physical Exam: 
 
General: Well developed, well nourished female laying on the bed/sitting on the  bed AAOx3 in no acute distress. General:   awake alert and oriented HEENT:  Normocephalic, atraumatic, PERRL, EOMI, no scleral icterus or pallor; no conjunctival hemmohage;  nasal and oral mucous are moist and without evidence of lesions. No thrush. Dentition good. Neck supple, no bruits. Lymph Nodes:   no cervical, axillary or inguinal adenopathy Lungs:   non-labored, bilaterally clear to auscultation- no crackles wheezes rales or rhonchi Heart:  RRR, s1 and s2; no murmurs rubs or gallops, no edema, + pedal pulses Abdomen:  soft, non-distended, active bowel sounds, no hepatomegaly, no splenomegaly. Appropriate surgical scars for stated surgeries. Non-tender Genitourinary:  deferred Extremities:   no clubbing, cyanosis; no joint effusions or swelling; Full ROM of all large joints to the upper and lower extremities; muscle mass appropriate for age Neurologic:  No gross focal sensory abnormalities; 5/5 muscle strength to upper and lower extremities. Speech appropriate. Cranial nerves intact Skin:  No rash or ulcers noted Wound:   
  
Back:  no spinal or paraspinal muscle tenderness or rigidity, no CVA tenderness Psychiatric:  No suicidal or homicidal ideations, appropriate mood and affect Labs: Results:  
Chemistry Recent Labs  
  05/23/19 
0240 05/22/19 
0082 05/21/19 
7820 * 95 90  139 138  
K 4.8 4.0 4.2  110* 110* CO2 22 22 20* BUN 35* 37* 33* CREA 1.72* 1.81* 1.79* CA 10.1 9.5 9.5 AGAP 7 7 8 BUCR 20 20 18 CBC w/Diff Recent Labs  
  05/22/19 
0337 05/21/19 
0602 WBC 9.1 11.4 RBC 3.43* 3.40* HGB 9.5* 9.3* HCT 29.1* 29.1*  
 184 GRANS 77* 80* LYMPH 15* 12* EOS 1 1 Microbiology Recent Labs  
  05/21/19 
1657 05/21/19 
1443 05/20/19  05/20/19 
1455 CULT NO GROWTH 2 DAYS NO GROWTH 2 DAYS AEROBIC AND ANAEROBIC BOTTLES STAPHYLOCOCCUS SPECIES, COAGULASE NEGATIVE*  AEROBIC BOTTLE POSSIBLE STREPTOCOCCI,NON HEMOLYTIC* AEROBIC AND ANAEROBIC BOTTLES STAPHYLOCOCCUS SPECIES, COAGULASE NEGATIVE* RADIOLOGY: 
 
All available imaging studies/reports in [facility] for this admission were reviewed Dr. Juan Montgomery, Infectious Disease Specialist 
[phone] May 23, 2019 
10:52 AM

## 2019-05-23 NOTE — PROGRESS NOTES
Patient was in great spirits and looking forward to maybe going home today.  conducted a Follow up consultation and Spiritual Assessment for Kendell Foster, who is a 70 y.o.,female. The  provided the following Interventions: 
Continued the relationship of care and support. Listened empathically. Offered prayer and assurance of continued prayer on patients behalf. Chart reviewed. The following outcomes were achieved: 
Patient expressed gratitude for 's visit. Assessment: 
There are no further spiritual or [employer]ism issues which require Spiritual Care Services interventions at this time. Plan: 
Chaplains will continue to follow and will provide pastoral care on an as needed/requested basis.  recommends bedside caregivers page  on duty if patient shows signs of acute spiritual or emotional distress. Chaplain Resident Damion Child Spiritual Care  
[phone]

## 2019-05-23 NOTE — DISCHARGE SUMMARY
Discharge Summary Patient: Sara Marquez MRN: [id number]  CSN: [id number] YOB: 1940  Age: 70 y.o. Sex: female DOA: 5/19/2019 LOS:  LOS: 3 days   Discharge Date:   
 
Admission Diagnoses: Shortness of breath [R06.02] Weakness [R53.1] Weakness [R53.1] Discharge Diagnoses: CHF exac - Acute Systolic Dysfxn 
CKD 3 HTN Gout H/o CAD HLPD Discharge Condition: Stable Consults: Cardiology PHYSICAL EXAM 
Visit Vitals /78 Pulse 63 Temp 97.7 °F (36.5 °C) Resp 18 Ht 5' 4\" (1.626 m) Wt 90.4 kg (199 lb 6.4 oz) SpO2 99% Breastfeeding? No  
BMI 34.23 kg/m² General: Alert, cooperative, no acute distress HEENT: PERRLA, EOMI. Anicteric sclerae. Lungs:  CTA Bilaterally. No Wheezing/Rhonchi/Rales. Heart:  Regular rate and Rhythm. Abdomen: Soft, Non distended, Non tender. + Bowel sounds. Extremities: No edema/ cyanosis/ clubbing Psych:   Good insight. Not anxious or agitated. Neurologic:  AA oriented X 3. Moves all extremities. HPI: 
70 y.o. AA female with past medical history of CAD S/P CABG, COPD, HTN, LE edema  presents with fatigue and shortness of breath.  Patient states that over the past week, she has developed worsening shortness of breath.  Patient states that the symptoms are moderate to severe.  She states the symptoms are worse with exertion.  She states the symptoms are only very mildly relieved with rest.  She has had mild intermittent cough.  Otherwise, she denies fever, chills, chest pain  or other associated symptoms. As per patient she had diarrhea on 05/18/19 which was watery , happened 3 times and resolved by itself. No abdominal pain. No dizziness. HPI : 
 
70 y.o. AA female with past medical history of CAD S/P CABG, COPD, HTN, LE edema  presents with fatigue and shortness of breath.  Patient states that over the past week, she has developed worsening shortness of breath.  Patient states that the symptoms are moderate to severe.  She states the symptoms are worse with exertion.  She states the symptoms are only very mildly relieved with rest.  She has had mild intermittent cough.  Otherwise, she denies fever, chills, chest pain  or other associated symptoms. As per patient she had diarrhea on 05/18/19 which was watery , happened 3 times and resolved by itself. No abdominal pain. No dizziness. Hospital Course:  
Pt admitted to the hosp with SOB - seen by cardiology on the ER & underwent Echo which showed EF 36-40% , pt noted to have CHF & diuresed with IV lasix - currently being discharged with PO lasix She also underwent a Nuc Stress test which showed a fixed defect - per Cardiology - Medical Management She c/o pain in the R foot - ? Gout since she has a knwn history - started on PO Meds Seen by PT & OT - recommended HH which she refused Meds reconciled & pt is beng discharged home & is advised to f/u with PCP in 2 weeks Pt is also advised to f/u with vascular surg for eval of infrarenal Abd aorta Imaging: 
Echo · Left Ventricle: Mildly dilated left ventricle. Mild concentric hypertrophy. Moderate systolic dysfunction. Estimated left ventricular ejection fraction is 36 - 40%. Mild (grade 1) left ventricular diastolic dysfunction. · Tricuspid Valve: Mild to moderate tricuspid valve regurgitation is present. · Pulmonary Artery: Moderate pulmonary hypertension. Pulmonary arterial systolic pressure is 79.2 mmHg. · Pulmonic Valve: Mild to moderate pulmonic valve regurgitation is present. · Aortic Valve: Probably trileaflet aortic valve. Aortic valve sclerosis. · Mitral Valve: Mitral valve thickening. Mitral annular calcification. Mild mitral valve regurgitation. · Left Atrium: Moderately dilated left atrium.  
  
Nuc stress test  
· Left ventricular perfusion is abnormal. 
· Myocardial perfusion imaging defect 1: There is a defect that is moderate to large in size with a severe reduction in uptake present in the apical to basal inferior location(s) that is non-reversible. There is abnormal wall motion in the defect area. The defect appears to probably be infarction. · Myocardial perfusion imaging defect 2: There is a defect that is small to moderate in size with a severe reduction in uptake affecting the apical inferior location(s) that is non-reversible. There is abnormal wall motion in the defect area. The defect appears to probably be infarction. · Gated SPECT: Left ventricular function post-stress was abnormal. Calculated ejection fraction is 28%. There is no evidence of transient ischemic dilation (TID). · Abnormal myocardial perfusion imaging. Fixed defect consistent with prior myocardial infarction. Myocardial perfusion imaging supports a high risk stress test. 
· Baseline ECG: Paced rhythm. Possible underlying atrial fibrillation/flutter. Frequent PVCs. CT Abd & Pelvis IMPRESSION No renal calcifications or obstructive uropathy. The calcifications associated with both kidneys appear to be vascular. No dilated loops of bowel, free fluid or free air in the peritoneum. The gallbladder appears normal. 
 No CT evidence for appendicitis. 3.1 cm fusiform dilation of the infrarenal abdominal aorta. No pathologic lymphadenopathy. Bone window images shows no evidence for an aggressive lytic or blastic lesion 
within the visualized skeletal structures. Discharge Medications:    
Current Discharge Medication List  
  
START taking these medications Details  
allopurinol (ZYLOPRIM) 100 mg tablet Take 1 Tab by mouth daily. Qty: 30 Tab, Refills: 0  
  
predniSONE (DELTASONE) 20 mg tablet Take 20 mg by mouth daily (with breakfast) for 3 days. Qty: 3 Tab, Refills: 0 CONTINUE these medications which have NOT CHANGED Details  
furosemide (LASIX) 40 mg tablet TAKE 1 TABLET BY MOUTH EVERY DAY Refills: 3 amLODIPine (NORVASC) 5 mg tablet Take 1 Tab by mouth daily. Qty: 90 Tab, Refills: 6  
  
carvedilol (COREG) 25 mg tablet TAKE 1 TABLET BY MOUTH (2) TIMES A DAY Qty: 180 Tab, Refills: 3  
  
hydrocortisone (HYTONE) 2.5 % ointment APPLY LIGHTLY TO THE AFFECTED AREAS ON THE LEGS AND FEET TWICE A DAY. Refills: 2  
  
aspirin 81 mg tablet Take 81 mg by mouth daily. calcium citrate-vitamin d3 (CITRACAL+D) 315-200 mg-unit tab Take 1 Tab by mouth two (2) times daily (with meals). diclofenac (VOLTAREN) 1 % gel USE 4 GRAMS ON KNEE FOUR TIMES A DAY AS NEEDED Refills: 3 STOP taking these medications  
  
 olmesartan (BENICAR) 5 mg tablet Comments:  
Reason for Stopping:   
   
 cholecalciferol, vitamin D3, (VITAMIN D3) 2,000 unit tab Comments:  
Reason for Stopping:   
   
  
 
 
Current Facility-Administered Medications:  
  allopurinol (ZYLOPRIM) tablet 100 mg, 100 mg, Oral, DAILY, Dudley Aguilar MD, 100 mg at 05/23/19 7298   oxyCODONE-acetaminophen (PERCOCET) 5-325 mg per tablet 1 Tab, 1 Tab, Oral, Q6H PRN, Dudley Aguilar MD, 1 Tab at 05/23/19 5936   predniSONE (DELTASONE) tablet 60 mg, 60 mg, Oral, DAILY WITH BREAKFAST, Dudley Aguilar MD, 60 mg at 05/23/19 3502   furosemide (LASIX) tablet 40 mg, 40 mg, Oral, DAILY, Camille Becerra MD, 40 mg at 05/23/19 6381   budesonide (PULMICORT) 500 mcg/2 ml nebulizer suspension, 500 mcg, Nebulization, BID RT, Brian Brown, NP, 500 mcg at 05/22/19 5893   amLODIPine (NORVASC) tablet 5 mg, 5 mg, Oral, DAILY, Mehul Curiel MD, 5 mg at 05/23/19 8590   aspirin chewable tablet 81 mg, 81 mg, Oral, DAILY, Mehul Curiel MD, 81 mg at 05/23/19 6544   calcium citrate-vitamin D3 (CITRACAL WITH VITAMIN D MAXIMUM) tablet 1 Tab, 1 Tab, Oral, BID WITH MEALS, Mehul Curiel MD, 1 Tab at 05/23/19 6787   carvedilol (COREG) tablet 25 mg, 25 mg, Oral, BID WITH MEALS, Mehul Curiel MD, 25 mg at 05/23/19 2957   cholecalciferol (VITAMIN D3) tablet 2,000 Units, 2,000 Units, Oral, DAILY, Vadim Christensen MD, 2,000 Units at 05/23/19 9607   hydrocortisone (HYTONE) 2.5 % ointment, , Topical, BID, Vadim Christensen MD, Stopped at 05/20/19 1800 
  enoxaparin (LOVENOX) injection 40 mg, 40 mg, SubCUTAneous, Q24H, Vadim Christensen MD, 40 mg at 05/23/19 0314   albuterol-ipratropium (DUO-NEB) 2.5 MG-0.5 MG/3 ML, 3 mL, Nebulization, Q4H PRN, Vadim Christensen MD, 3 mL at 05/22/19 0002   hydrALAZINE (APRESOLINE) 20 mg/mL injection 10 mg, 10 mg, IntraVENous, Q6H PRN, Vadim Christensen MD, 10 mg at 05/23/19 1138   lidocaine (XYLOCAINE) 2 % viscous solution 15 mL, 15 mL, Mouth/Throat, Q3H PRN, Kiko Lindo MD, 15 mL at 05/20/19 1458   ondansetron (ZOFRAN) injection 4 mg, 4 mg, IntraVENous, Q6H PRN, Kiko Lindo MD, 4 mg at 05/20/19 1502 · It is important that you take the medication exactly as they are prescribed. · Keep your medication in the bottles provided by the pharmacist and keep a list of the medication names, dosages, and times to be taken in your wallet. · Do not take other medications without consulting your doctor. Minutes spent on discharge: 40 minutes spent coordinating this discharge (review instructions/follow-up, prescriptions, preparing report for sign off) Bruna Reyna MD 
5/23/2019 11:48 AM

## 2019-05-23 NOTE — PROGRESS NOTES
RENAL DAILY PROGRESS NOTE Patient: Adelaida Loera               Sex: female          DOA: 5/19/2019  1:45 PM  
    
YOB: 1940      Age:  70 y.o.        LOS:  LOS: 3 days Subjective:  
 
Adelaida Loera is a 70 y.o.  who presents with Shortness of breath [R06.02] Weakness [R53.1] Weakness [R53.1]. Asked to evaluate for renal failure,admitted with sob,had ct scan with contrast on admission. hx of chf,gout Chief complains: Patient denies nausea, vomiting, chest pain, dizziness, shortness of breath or headache. 
- Reviewed last 24 hrs events Current Facility-Administered Medications Medication Dose Route Frequency  allopurinol (ZYLOPRIM) tablet 100 mg  100 mg Oral DAILY  oxyCODONE-acetaminophen (PERCOCET) 5-325 mg per tablet 1 Tab  1 Tab Oral Q6H PRN  predniSONE (DELTASONE) tablet 60 mg  60 mg Oral DAILY WITH BREAKFAST  furosemide (LASIX) tablet 40 mg  40 mg Oral DAILY  budesonide (PULMICORT) 500 mcg/2 ml nebulizer suspension  500 mcg Nebulization BID RT  
 amLODIPine (NORVASC) tablet 5 mg  5 mg Oral DAILY  aspirin chewable tablet 81 mg  81 mg Oral DAILY  calcium citrate-vitamin D3 (CITRACAL WITH VITAMIN D MAXIMUM) tablet 1 Tab  1 Tab Oral BID WITH MEALS  carvedilol (COREG) tablet 25 mg  25 mg Oral BID WITH MEALS  cholecalciferol (VITAMIN D3) tablet 2,000 Units  2,000 Units Oral DAILY  hydrocortisone (HYTONE) 2.5 % ointment   Topical BID  enoxaparin (LOVENOX) injection 40 mg  40 mg SubCUTAneous Q24H  
 albuterol-ipratropium (DUO-NEB) 2.5 MG-0.5 MG/3 ML  3 mL Nebulization Q4H PRN  
 hydrALAZINE (APRESOLINE) 20 mg/mL injection 10 mg  10 mg IntraVENous Q6H PRN  
 lidocaine (XYLOCAINE) 2 % viscous solution 15 mL  15 mL Mouth/Throat Q3H PRN  
 ondansetron (ZOFRAN) injection 4 mg  4 mg IntraVENous Q6H PRN Objective:  
 
Visit Vitals /78 Pulse 63 Temp 97.7 °F (36.5 °C) Resp 18 Ht 5' 4\" (1.626 m) Wt 90.4 kg (199 lb 6.4 oz) SpO2 99% Breastfeeding? No  
BMI 34.23 kg/m² Intake/Output Summary (Last 24 hours) at 5/23/2019 1310 Last data filed at 5/22/2019 2039 Gross per 24 hour Intake  Output 400 ml Net -400 ml Physical Examination:  
 
 
RS: Chest is bilateral equal, no wheezing / rales / crackles CVS: S1-S2 heard, RRR, No S3 / murmur Abdomen: Soft, Non tender, Not distended, Positive bowel sounds, no organomegaly, no CVA / supra pubic tenderness Extremities: + edema, no cyanosis, skin is warm on touch CNS: Awake & follows commands, CN II-XII are grossly intact. HEENT: Head is atraumatic, PERRLA, conjunctiva pink & non icteric. No JVD or carotid bruit Data Review:   
 
Labs:  
 
Hematology:  
Recent Labs  
  05/22/19 
0039 05/21/19 
0602 WBC 9.1 11.4 HGB 9.5* 9.3* HCT 29.1* 29.1* Chemistry:  
Recent Labs  
  05/23/19 
0240 05/22/19 
5585 05/21/19 
7051 BUN 35* 37* 33* CREA 1.72* 1.81* 1.79* CA 10.1 9.5 9.5  
K 4.8 4.0 4.2  139 138  110* 110* CO2 22 22 20* * 95 90 Images: 
 
XR (Most Recent). CXR reviewed by me and compared with previous CXR Results from [facility] [place] Encounter encounter on 05/19/19 XR CHEST PORT Narrative EXAMINATION: Chest single view INDICATION: Shortness of breath COMPARISON: 11/4/2018 FINDINGS: Single frontal view of the chest obtained. Multilead left subclavian 
pacer, unchanged. Post median sternotomy with numerous mediastinal clips. Moderately enlarged cardiac silhouette. Aortic arch calcifications. Prominent 
perihilar interstitium. Left midlung and bibasilar streaky densities. No 
evidence of pneumothorax. No acute osseous findings. Impression IMPRESSION: 
 
Postsurgical changes as above. Enlarged cardiac silhouette with suspected 
interstitial infiltrate/edema. Probable left midlung and bibasilar streaky 
atelectasis. CT (Most Recent) [place] [facility] 05/[place]/19 CT ABD PELV WO CONT Narrative EXAM: CT ABD PELV WO CONT INDICATION: rule out malignancy, hydronephrosis COMPARISON: None. CONTRAST: None. TECHNIQUE:  
Unenhanced multislice helical CT was performed from the diaphragm to the 
symphysis pubis without oral or intravenous contrast administration. Contiguous 5 mm axial images were reconstructed and lung and soft tissue windows were 
generated. Coronal and sagittal reformations were generated. CT dose reduction 
was achieved through use of a standardized protocol tailored for this 
examination and automatic exposure control for dose modulation. FINDINGS: 
The visualized lung bases shows evidence for interstitial fibrosis in the 
posterior costophrenic angles of the left lower lobes. No nodules or infiltrates 
are observed. The cardiac silhouette appears enlarged. No pericardial effusion 
is seen. Andi Broach The absence of intravenous contrast material reduces the sensitivity for 
evaluation of the solid parenchymal organs of the abdomen. No focal 
abnormalities are seen within the liver, spleen, pancreas or adrenal glands or 
kidneys. No high attenuation filling defects are seen in the gallbladder. Bilateral calcifications appear to represent vascular calcifications in the 
kidneys. No hydronephrosis or hydroureter is seen. Andi Broach The the infrarenal portion of the abdominal aorta shows a fusiform dilatation 
measuring 3.1 x 3.1 cm. Calcified atherosclerotic plaques are observed. . There 
is no retroperitoneal adenopathy or mass. The bowel is not obstructed. The appendix is nonvisualized but no inflammatory 
changes are seen surrounding the cecum. . There is no ascites or free 
intraperitoneal air. The uterus is surgically absent. . There is no pelvic mass or adenopathy. Impression No renal calcifications or obstructive uropathy. The calcifications associated with both kidneys appear to be vascular. No dilated loops of bowel, free fluid or free air in the peritoneum. The gallbladder appears normal. 
 No CT evidence for appendicitis. 3.1 cm fusiform dilation of the infrarenal abdominal aorta. No pathologic lymphadenopathy. Bone window images shows no evidence for an aggressive lytic or blastic lesion 
within the visualized skeletal structures. EKG Results for orders placed or performed in visit on 05/08/18 AMB POC EKG ROUTINE W/ 12 LEADS, INTER & REP     Status: None Narrative Normal sinus mechanism with atrial sensing and ventricular pacing and occasional PAC's. Compared to the EKG of October 24, 2017 there was a little interval change. I have personally reviewed the old medical records and labs Plan / Recommendation: 1. Acute/crf slightly improved overnight,continue lasix for chf.holding arbs for now 2.htn,not controlled. increase norvasc while holding arbs 3.gout,better on allopurinol,prednisone D/w Dr. Jluis Sun MD 
Nephrology 5/23/2019

## 2019-05-23 NOTE — PROGRESS NOTES
D/c noted for today, pt is refusing Home Health, Dr. Vaibhav Ramirez aware. Patient's son is going to  pt this evening. Pt reports she can take care of herself and needs no help. 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 Tamiko Hartman, MSW Case Management [phone]

## 2019-05-24 ENCOUNTER — PATIENT OUTREACH (OUTPATIENT)
Dept: INTERNAL MEDICINE CLINIC | Age: 79
End: 2019-05-24

## 2019-05-24 LAB
BACTERIA SPEC CULT: ABNORMAL
BACTERIA SPEC CULT: ABNORMAL
GRAM STN SPEC: ABNORMAL
SERVICE CMNT-IMP: ABNORMAL

## 2019-05-24 NOTE — PROGRESS NOTES
Hospital Discharge Follow-Up Date/Time:  5/24/2019 10:07 AM 
 
[facility] and discharged on 5/23/19 for shortness of breath. The physician discharge summary was not available at the time of outreach. Patient was contacted within 1 business days of discharge. Contacted patient/family for Transitions of Care Coordination  follow up. No answer. Left message introducing self, role and reason for call. Requested return call. Contact information provided. Will attempt to contact at a later time.

## 2019-05-27 LAB
BACTERIA SPEC CULT: NORMAL
BACTERIA SPEC CULT: NORMAL
SERVICE CMNT-IMP: NORMAL
SERVICE CMNT-IMP: NORMAL

## 2019-05-28 ENCOUNTER — PATIENT OUTREACH (OUTPATIENT)
Dept: INTERNAL MEDICINE CLINIC | Age: 79
End: 2019-05-28

## 2019-05-28 NOTE — Clinical Note
Patient c/o constipation Last BM 5/22Advised to increase water intake and to limit caffeinated beverages.  May need stool softner

## 2019-05-28 NOTE — PROGRESS NOTES
Hospital Discharge Follow-Up Date/Time:  2019 2:54 PM 
 
Patient was admitted to [facility] on 19 and discharged on 19 for SOB. The physician discharge summary was available at the time of outreach. Patient was contacted within 2 business days of discharge. Top Challenges reviewed with the provider Left ankle swelling; 4/10 foot pain Constipated; no BM since day before d/c; not eating much b/c has not had BM Method of communication with provider :chart routing Inpatient RRAT score: 21 Was this a readmission? no  
Patient stated reason for the readmission: N/A Nurse Navigator (NN) contacted the patient by telephone to perform post hospital discharge assessment. Verified name and  with patient as identifiers. Provided introduction to self, and explanation of the Nurse Navigator role. Patient reported: SOB better Not as tired as before hospital 
Moving more today Left foot/ankle pain; 4/10; gout pain (taking allopurinol) No BM since day before d/c; not eating much Not eating as much Not weighing daily Hand stiffness X 2 weeks Patient denied:  
Wheezing Cough/congestion Swelling to feet/ankle Abdominal fullness/swelling Abdominal pain/crmaping Difficulty urinating Dizziness CP Patient has scale at home but has not weighed self in awhile. Educated patient on the importance of daily weights. Instructed patient to weigh at the same time of day, in the morning after urinating but before eating, wearing the same amount of clothing. Patient  instructed to write down the date, time and weight each time he/she weighs. Instructed to report a weight gain of 3 lbs or greater in 24 hours/1 day or 5 lbs or more in one week. Also instructed patient  to monitor for SOB while lying flat, swelling to legs/feet, fatigue with doing normal daily activities or swelling/fullness to abdomen.  Also advised patient to avoid adding additional salt to foods and limit eating processed foods such as lunch meat, TV dinner or microwavable meals. Patient voiced understanding. Reviewed discharge instructions and red flags with patient who verbalized understanding. Patient given an opportunity to ask questions and does not have any further questions or concerns at this time. The patient agrees to contact the PCP office for questions related to their healthcare. NN provided contact information for future reference. Disease Specific:   CHF Heart Failure Note Do you have a Scale:    yes How often do you weigh:  \"I have not weighed in awhile\" Daily Weight (document daily weights in flowsheets):   patient has not weighed since d/c and had not been weighing before hospitalization Amount:  N/A Provider Notified:   N/A Zone:(Pt Reported)  green EF: 36-40% (result) on 5/20/19 (date) Type of HF:   HFrEF Cardiac Device present: pacemaker; defibrillator Heart Failure Medications: Betablocker, Diuretic, Anticoagulant Summary of patient's top problems: 1. Constipation: advised to increase fluid intake and limit caffeine intake 2. Personal hx of RA' hand stiffness X 2 weeks: has Diclofenac gel 3. CHF: reviewed daily weights, s/s of exacerbation and limiting salt intake Home Health orders at discharge: none Home Health company: N/A Date of initial visit: N/A Durable Medical Equipment ordered/company: N/A Durable Medical Equipment received: N/A Barriers to care? lack of knowledge about disease Advance Care Planning:  
Does patient have an Advance Directive:  reviewed and current Medication(s):  
New Medications at Discharge: allopurinol, prednisone (completed) Changed Medications at Discharge: Lasix Discontinued Medications at Discharge: Benicar, Vit D3 Medication reconciliation was performed with patient, who verbalizes understanding of administration of home medications.   There were no barriers to obtaining medications identified at this time. Referral to Pharm D needed: no  
 
Current Outpatient Medications Medication Sig  
 allopurinol (ZYLOPRIM) 100 mg tablet Take 1 Tab by mouth daily.  furosemide (LASIX) 40 mg tablet TAKE 1 TABLET BY MOUTH EVERY DAY  diclofenac (VOLTAREN) 1 % gel USE 4 GRAMS ON KNEE FOUR TIMES A DAY AS NEEDED  
 amLODIPine (NORVASC) 5 mg tablet Take 1 Tab by mouth daily.  carvedilol (COREG) 25 mg tablet TAKE 1 TABLET BY MOUTH (2) TIMES A DAY  hydrocortisone (HYTONE) 2.5 % ointment APPLY LIGHTLY TO THE AFFECTED AREAS ON THE LEGS AND FEET TWICE A DAY.  aspirin 81 mg tablet Take 81 mg by mouth daily.  calcium citrate-vitamin d3 (CITRACAL+D) 315-200 mg-unit tab Take 1 Tab by mouth two (2) times daily (with meals). No current facility-administered medications for this visit. There are no discontinued medications. [facility] follow up appointment(s):  
Future Appointments Date Time Provider Korey Monte 5/30/2019  9:00 AM CSI, PACER [facility] PAZ SCHED  
5/30/2019 11:30 AM Kristen Peterson NP [facility] PAZ SCHED  
6/7/2019  8:00 AM [facility] US RM 1 [facility]  
6/7/2019 10:15 AM Eugenio [facility] [place] [place] [facility] PAZ SCHED  
7/24/2019  8:40 AM Cinthya Black DO [address] [place] [address]  
7/26/2019  8:00 AM Tai Ambrocio MD [facility] [address]  
2/14/2020 10:00 AM [facility] PAZ SCHED  
2/14/2020 11:00 AM [facility] [address]  
2/24/2020  9:00 AM Pierre, [facility] [address] [place], [place] [address] [address] Non-[facility] follow up appointment(s): Dr. Marcos Blunt, nephrology 6/21/19 at [address]:  n/a  
 
 
Goals  Attends follow-up appointments as directed. 5/28: Patient aware of upcoming appts with Meron Min NP and cardiology. Patient made aware of appt with Dr. Efraín Mace, nephrology.  Maintains daily weight.   
  5/28: Educated patient on reason/importance of daily weight. Reviewed proper technique and what to report.  Prevent complications post hospitalization. Daily weights Adhering to low salt diet Monitoring and reporting s/s of CHF exacerbation Medication adherence Chart forwarded to Murray Magaña heart failure navigator for continued monitoring.

## 2019-05-30 ENCOUNTER — OFFICE VISIT (OUTPATIENT)
Dept: INTERNAL MEDICINE CLINIC | Age: 79
End: 2019-05-30

## 2019-05-30 ENCOUNTER — HOSPITAL ENCOUNTER (OUTPATIENT)
Dept: LAB | Age: 79
Discharge: HOME | End: 2019-05-30
Payer: MEDICARE

## 2019-05-30 ENCOUNTER — CLINICAL SUPPORT (OUTPATIENT)
Dept: CARDIOLOGY CLINIC | Age: 79
End: 2019-05-30

## 2019-05-30 ENCOUNTER — PATIENT OUTREACH (OUTPATIENT)
Dept: INTERNAL MEDICINE CLINIC | Age: 79
End: 2019-05-30

## 2019-05-30 VITALS
HEART RATE: 70 BPM | RESPIRATION RATE: 20 BRPM | TEMPERATURE: 97 F | BODY MASS INDEX: 33.49 KG/M2 | SYSTOLIC BLOOD PRESSURE: 141 MMHG | DIASTOLIC BLOOD PRESSURE: 71 MMHG | WEIGHT: 196.2 LBS | OXYGEN SATURATION: 97 % | HEIGHT: 64 IN

## 2019-05-30 DIAGNOSIS — Z95.810 AICD (AUTOMATIC CARDIOVERTER/DEFIBRILLATOR) PRESENT: ICD-10-CM

## 2019-05-30 DIAGNOSIS — I50.42 CHRONIC COMBINED SYSTOLIC AND DIASTOLIC CONGESTIVE HEART FAILURE (HCC): ICD-10-CM

## 2019-05-30 DIAGNOSIS — I50.9 CONGESTIVE HEART FAILURE, UNSPECIFIED HF CHRONICITY, UNSPECIFIED HEART FAILURE TYPE (HCC): Primary | ICD-10-CM

## 2019-05-30 DIAGNOSIS — R73.9 HYPERGLYCEMIA: ICD-10-CM

## 2019-05-30 DIAGNOSIS — D89.2 HYPERGAMMAGLOBULINEMIA: ICD-10-CM

## 2019-05-30 DIAGNOSIS — I42.9 CARDIOMYOPATHY, UNSPECIFIED TYPE (HCC): Primary | ICD-10-CM

## 2019-05-30 DIAGNOSIS — M1A.0720 CHRONIC GOUT OF LEFT FOOT, UNSPECIFIED CAUSE: Primary | ICD-10-CM

## 2019-05-30 DIAGNOSIS — M1A.0720 CHRONIC GOUT OF LEFT FOOT, UNSPECIFIED CAUSE: ICD-10-CM

## 2019-05-30 DIAGNOSIS — R29.898 LEFT ARM WEAKNESS: ICD-10-CM

## 2019-05-30 LAB
ANION GAP SERPL CALC-SCNC: 10 MMOL/L (ref 3–18)
BUN SERPL-MCNC: 49 MG/DL (ref 7–18)
BUN/CREAT SERPL: 27 (ref 12–20)
CALCIUM SERPL-MCNC: 9.2 MG/DL (ref 8.5–10.1)
CHLORIDE SERPL-SCNC: 103 MMOL/L (ref 100–108)
CO2 SERPL-SCNC: 24 MMOL/L (ref 21–32)
CREAT SERPL-MCNC: 1.8 MG/DL (ref 0.6–1.3)
CRP SERPL-MCNC: 1 MG/DL (ref 0–0.3)
GLUCOSE SERPL-MCNC: 96 MG/DL (ref 74–99)
HBA1C MFR BLD: 6 % (ref 4.2–5.6)
POTASSIUM SERPL-SCNC: 3.9 MMOL/L (ref 3.5–5.5)
SODIUM SERPL-SCNC: 137 MMOL/L (ref 136–145)
URATE SERPL-MCNC: 10.5 MG/DL (ref 2.6–7.2)

## 2019-05-30 PROCEDURE — 86140 C-REACTIVE PROTEIN: CPT

## 2019-05-30 PROCEDURE — 84155 ASSAY OF PROTEIN SERUM: CPT

## 2019-05-30 PROCEDURE — 83036 HEMOGLOBIN GLYCOSYLATED A1C: CPT

## 2019-05-30 PROCEDURE — 80048 BASIC METABOLIC PNL TOTAL CA: CPT

## 2019-05-30 PROCEDURE — 84550 ASSAY OF BLOOD/URIC ACID: CPT

## 2019-05-30 PROCEDURE — 36415 COLL VENOUS BLD VENIPUNCTURE: CPT

## 2019-05-30 RX ORDER — OLMESARTAN MEDOXOMIL 5 MG/1
TABLET ORAL
Refills: 3 | COMMUNITY
Start: 2019-05-17 | End: 2019-05-30 | Stop reason: ALTCHOICE

## 2019-05-30 NOTE — PROGRESS NOTES
Please see my in office provider ART note. Pt to be seen by me today.     Dr. Gupta Guard  [facility], [address] [facility], [address]  Phone: [phone]  Fax: [phone]

## 2019-05-30 NOTE — PROGRESS NOTES
TRANSITIONS OF CARE FACE-TO-FACE VISIT  INTERNISTS OF [facility] [place]:  5/30/2019, MRN: [id number]  Chief Complaint   Patient presents with   [facility] admission 5-19-19 for Shortness of Breath and Weakness         Vanesa Cintron is a 70 y.o. female and presents to clinic after recent hospitalization. Subjective:   Discharged from: [facility]    Summary of hospitalization problems/diagnoses:   Patient is a 79-year-old -American female with history of allergic rhinitis, gout, cataract, overactive bladder, hyperlipidemia, GERD, mixed connective tissue disease (positive FOREST, RNP Ab, Sjogren's Ab, anti-chromatin Ab, and Anti-Parnell Ab), chronic gastritis with bleeding in March 2016, history of stroke, vitamin D deficiency, dysphasia status post dilation, anemia, pacemaker for history of AV block, left cavernous ICA aneurysm, hypertension, atrial fibrillation, CHF, carotid stenosis, 3.1 cm fusiform dilation of the infrarenal abdominal aorta, peripheral artery disease, PVCs, DVT hx (LUE - postoperatively), and CAD status post CABG. 1.  CHF: Patient was admitted for shortness of breath evaluation. She is subsequently diagnosed with CHF exacerbation. She was treated with IV Lasix. She was evaluated by Cardiology. An echocardiogram was obtained. Findings are listed below. She also had a nuclear stress test given her history of CAD status post CABG. Those findings are also listed below. Since her hospital discharge, she is losing weight. She checks her weight daily. She has a h/o hyperglycemia noted on previous BMPs. She has not had an A1C checked recently. BP is 141/71. No CP since hospital d/c. \"My breathing has been good\" since the hospital d/c. No wheezing since hospital d/c.     5/19/19 Chest CTA: No convincing evidence for pulmonary embolus from the main pulmonary artery to the proximal segmental pulmonary arteries. . The distal segmental and subsegmental pulmonary arteries in the lower lobes are not well evaluated due to respiratory motion artifact. Cardiomegaly. Prominent main pulmonary artery may indicate pulmonary arterial hypertension. Mosaic groundglass attenuation greater at the perihilar/ basilar region suggesting pulmonary edema. Other considerations include subsegmental atelectasis, small airways disease, or pneumonitis. 5/21/19 Nuclear Stress Test: Abnormal myocardial perfusion imaging. Fixed defect consistent with prior myocardial infarction. Myocardial perfusion imaging supports a high risk stress test.  There is a prior study available for comparison. As compared to the previous study, there are significant changes. Perfusion defects appear to be worse. The left ventricular ejection fraction has decreased. 5/20/19 Echocardiogram: Left Ventricle: Mildly dilated left ventricle. Mild concentric hypertrophy. Moderate systolic dysfunction. Estimated left ventricular ejection fraction is 36 - 40%. Mild (grade 1) left ventricular diastolic dysfunction. Tricuspid Valve: Mild to moderate tricuspid valve regurgitation is present. Pulmonary Artery: Moderate pulmonary hypertension. Pulmonary arterial systolic pressure is 82.5 mmHg. Pulmonic Valve: Mild to moderate pulmonic valve regurgitation is present. Aortic Valve: Probably trileaflet aortic valve. Aortic valve sclerosis. Mitral Valve: Mitral valve thickening. Mitral annular calcification. Mild mitral valve regurgitation. Left Atrium: Moderately dilated left atrium. There is a prior study available for comparison that was performed on 5/17/2018. As compared to the previous study, there are significant changes. LV function has decreased. 2.  Gout: She had b/l foot pain while in the hospital.  She was treated for presumed gout flareup. She has a history of gout for over 6 months.   She also has some type of mixed connective tissue/inflammatory arthritis condition, monitored by Rheumatology but not yet on any medications for this condition. She takes allopurinol - which was restarted during her most recent hospital admission. She continues to see Nephrology. She has CKD. She is scheduled to see Nephrology next wk. She has not had a uric acid level checked in >6 months. She is presently asymptomatic. 3.  Infrarenal Dilated Abdominal Aorta: Noted randomly on a CT scan obtained during his past hospitalization. She is already followed by Vascular Surgery for other comorbidities. She is scheduled to f/u with Vascular Surgery later this year. 5/22/19 Abdomen CT: No renal calcifications or obstructive uropathy. The calcifications associated with both kidneys appear to be vascular. No dilated loops of bowel, free fluid or free air in the peritoneum. The gallbladder appears normal. No CT evidence for appendicitis. 3.1 cm fusiform dilation of the infrarenal abdominal aorta. No pathologic lymphadenopathy. Bone window images shows no evidence for an aggressive lytic or blastic lesion within the visualized skeletal structures. 4. BLE Neuropathy: Prior to hospital admission, she reports having b/l hand numbness. She has never had this before. Sx did not improve and are still present. She denies neck pain. 5.  Hypogammaglobulinemia: Her most recent labs show an elevated globulin level. Etiology is unknown. Current Outpatient Medications on File Prior to Visit   Medication Sig Dispense Refill    allopurinol (ZYLOPRIM) 100 mg tablet Take 1 Tab by mouth daily. 30 Tab 0    furosemide (LASIX) 40 mg tablet TAKE 1 TABLET BY MOUTH EVERY DAY 30 Tab 0    diclofenac (VOLTAREN) 1 % gel USE 4 GRAMS ON KNEE FOUR TIMES A DAY AS NEEDED  3    amLODIPine (NORVASC) 5 mg tablet Take 1 Tab by mouth daily. 90 Tab 6    carvedilol (COREG) 25 mg tablet TAKE 1 TABLET BY MOUTH (2) TIMES A  Tab 3    hydrocortisone (HYTONE) 2.5 % ointment APPLY LIGHTLY TO THE AFFECTED AREAS ON THE LEGS AND FEET TWICE A DAY.   2    aspirin 81 mg tablet Take 81 mg by mouth daily.  olmesartan (BENICAR) 5 mg tablet TAKE 1 TABLET BY MOUTH EVERY DAY  3     No current facility-administered medications on file prior to visit. Medication changes: no  Medication list updated:  yes  Needs referral or labs:  no  Treatment Barriers: no      Patient Active Problem List    Diagnosis Date Noted    Shortness of breath 05/19/2019    Weakness 05/19/2019    Peripheral venous insufficiency 02/21/2019    Lower limb ulcer, calf, right, with fat layer exposed [facility] 11/01/2018    Generalized edema 11/01/2018    Severe obesity (BMI 35.0-39. 9) with comorbidity ([facility] 05/08/2018    Mixed connective tissue disease ([facility] 08/22/2017    Microalbuminuria 07/10/2017    CKD (chronic kidney disease) stage 3, GFR 30-59 ml/min ([facility]) 07/10/2017    Seasonal allergic rhinitis 11/23/2016    Gout of left foot 11/22/2016    Cataract 08/09/2016    OAB (overactive bladder) 07/06/2016    Mixed hyperlipidemia 07/05/2016    Gastroesophageal reflux disease without esophagitis 07/05/2016    Chronic gastritis with bleeding - in February/March of 2016 per FOBT and EGD results 07/05/2016    History of CVA (cerebrovascular accident) 07/05/2016    Vitamin D deficiency 07/05/2016    Dysphagia s/p dilation 07/05/2016    Anemia 07/05/2016    Pacemaker (for h/o AV block) 07/05/2016    Intracranial aneurysm - left cavernous ICA on 2014 head/neck CTA 12/01/2014    HTN (hypertension) 10/13/2014    Atrial fibrillation ([facility] 10/13/2014    Carotid stenosis 10/13/2014    PAD (peripheral artery disease) ([facility] 10/13/2014    PVC's (premature ventricular contractions) 12/09/2011    Atherosclerotic heart disease, s/p CABG X3 in 6/08, in the setting of severe left ventricular dysfunction, and s/p a dual-chamber     S/P CABG x 3        Current Outpatient Medications   Medication Sig Dispense Refill    allopurinol (ZYLOPRIM) 100 mg tablet Take 1 Tab by mouth daily.  30 Tab 0    furosemide (LASIX) 40 mg tablet TAKE 1 TABLET BY MOUTH EVERY DAY 30 Tab 0    diclofenac (VOLTAREN) 1 % gel USE 4 GRAMS ON KNEE FOUR TIMES A DAY AS NEEDED  3    amLODIPine (NORVASC) 5 mg tablet Take 1 Tab by mouth daily. 90 Tab 6    carvedilol (COREG) 25 mg tablet TAKE 1 TABLET BY MOUTH (2) TIMES A  Tab 3    hydrocortisone (HYTONE) 2.5 % ointment APPLY LIGHTLY TO THE AFFECTED AREAS ON THE LEGS AND FEET TWICE A DAY. 2    aspirin 81 mg tablet Take 81 mg by mouth daily.  olmesartan (BENICAR) 5 mg tablet TAKE 1 TABLET BY MOUTH EVERY DAY  3       Allergies   Allergen Reactions    Nifedipine Nausea and Vomiting and Other (comments)     Dizzy    Ace Inhibitors Cough    Codeine Unknown (comments), Nausea Only and Nausea and Vomiting    Hydralazine Other (comments)     Dizziness and Fatigue    Lipitor [Atorvastatin] Nausea and Vomiting and Vertigo    Simvastatin Nausea and Vomiting       Past Medical History:   Diagnosis Date    Abnormal ankle brachial index 11/13/2014    Mild arterial disease in right leg. R VICTOR MANUEL 0.88. L VICTOR MANUEL 1.00.  Anemia     Arm DVT (deep venous thromboembolism), acute [facility])     s/p anticoagulation    Atherosclerotic heart disease     Atrial fibrillation ([facility])     AV block     CAD (coronary artery disease)     Cardiomyopathy, ischemic     Carotid artery stenosis     Carotid duplex 11/13/2014    Mild <50% bilateral ICA plaquing. Possible left vertebral occlusion.  Cerebral artery occlusion with cerebral infarction ([facility])     stroke x 2    Chest pain     CVA (cerebral infarction)     Echocardiogram 08/19/2015    EF 55%. Apical inferior, apical septal hypk (new since study of 12/19/11), likely due to chronic V-pacing. Mild LVH. RVSP 30 mmHg. Mild LAE. Mild MR. Mild TR.       Gastritis     GERD (gastroesophageal reflux disease)     Heart failure ([facility])     Hiatal hernia     Hyperlipidemia     Hypertension     Hypertensive cardiovascular disease     Intracranial aneurysm     left cavernous ICA 2mm aneurysm from head/neck CTA in 2014    Long term (current) use of anticoagulants 3/10/2011    Lower extremity venous duplex 2015    No DVT bilaterally.  Nuclear cardiac imaging test 2015    Low risk. Sm apical infarction w/no ischemia vs apical thinning. Sm basal inferior defect, likely artifact. EF 56%. Inferoseptal, inferoapical WMA related to sternotomy or paced rhythm.  Pacemaker     PAD (peripheral artery disease) (HCC)     PVC's     chronic    S/P CABG x 3 2008    LIMA-LAD. SVG-OM. SVG-dRCA     S/P cardiac cath 2008    pRCA 100%. LM patent. Cx patent. OM1 100%. mLAD 95%. LVEDP 27-29mmHg. EF 20%. mod MR    Tilt table evaluation 1995    Positive for orthostatic hypotension    Vitamin D deficiency        Past Surgical History:   Procedure Laterality Date    CABG, ARTERY-VEIN, THREE  2008    CARDIAC SURG PROCEDURE UNLIST      medtronic dual-chamber cardioverted-defibrillator    HX  SECTION      x2    HX ENDOSCOPY  3/1/16    HX ENDOSCOPY  3/1/16    HX HEMORRHOIDECTOMY      1985    HX HYSTERECTOMY          HX ORTHOPAEDIC      knee surgery    HX OTHER SURGICAL  2/10/16    Pacemaker Gen Change    HX PACEMAKER      Defibrillator     HX TUMOR REMOVAL      removed from arm.  benign       No family history on file. Social History     Tobacco Use    Smoking status: Never Smoker    Smokeless tobacco: Never Used    Tobacco comment: just smoked 2 weeks in college   Substance Use Topics    Alcohol use: No       ROS   Review of Systems   Constitutional: Negative for chills and fever. HENT: Negative for ear pain and sore throat. Eyes: Negative for blurred vision and pain. Respiratory: Negative for cough and shortness of breath. Cardiovascular: Positive for leg swelling (chronic, unchanged). Negative for chest pain.    Gastrointestinal: Negative for abdominal pain, blood in stool and melena. Genitourinary: Negative for dysuria and hematuria. Musculoskeletal: Negative for joint pain and myalgias. Skin: Negative for rash. Neurological: Positive for tingling and focal weakness. Negative for headaches. Endo/Heme/Allergies: Does not bruise/bleed easily. Psychiatric/Behavioral: Negative for substance abuse. Objective     Vitals:    05/30/19 1128   BP: 141/71   Pulse: 70   Resp: 20   Temp: 97 °F (36.1 °C)   TempSrc: Oral   SpO2: 97%   Weight: 196 lb 3.2 oz (89 kg)   Height: 5' 4\" (1.626 m)   PainSc:   0 - No pain       Physical Exam   Constitutional: She is oriented to person, place, and time and well-developed, well-nourished, and in no distress. HENT:   Head: Normocephalic and atraumatic. Right Ear: External ear normal.   Left Ear: External ear normal.   Nose: Nose normal.   Mouth/Throat: Oropharynx is clear and moist. No oropharyngeal exudate. Eyes: Pupils are equal, round, and reactive to light. Conjunctivae and EOM are normal. Right eye exhibits no discharge. Left eye exhibits no discharge. No scleral icterus. Neck: Neck supple. Cardiovascular: Normal rate, regular rhythm, normal heart sounds and intact distal pulses. Exam reveals no gallop and no friction rub. No murmur heard. Pulmonary/Chest: Effort normal and breath sounds normal. No respiratory distress. She has no wheezes. She has no rales. Abdominal: Soft. Bowel sounds are normal. She exhibits no distension. There is no tenderness. There is no rebound and no guarding. Musculoskeletal: She exhibits edema (BLE - unchanged/chronic). She exhibits no tenderness (Bue). Lymphadenopathy:     She has no cervical adenopathy. Neurological: She is alert and oriented to person, place, and time. She exhibits normal muscle tone. Gait normal.   Skin: Skin is warm and dry. No erythema. Psychiatric: Affect normal.   Nursing note and vitals reviewed.       LABS   Data Review:   Lab Results   Component Value Date/Time WBC 9.1 05/22/2019 03:37 AM    HGB 9.5 (L) 05/22/2019 03:37 AM    HCT 29.1 (L) 05/22/2019 03:37 AM    PLATELET 942 95/90/5907 03:37 AM    MCV 84.8 05/22/2019 03:37 AM       Lab Results   Component Value Date/Time    Sodium 137 05/23/2019 02:40 AM    Potassium 4.8 05/23/2019 02:40 AM    Chloride 108 05/23/2019 02:40 AM    CO2 22 05/23/2019 02:40 AM    Anion gap 7 05/23/2019 02:40 AM    Glucose 126 (H) 05/23/2019 02:40 AM    BUN 35 (H) 05/23/2019 02:40 AM    Creatinine 1.72 (H) 05/23/2019 02:40 AM    BUN/Creatinine ratio 20 05/23/2019 02:40 AM    GFR est AA 35 (L) 05/23/2019 02:40 AM    GFR est non-AA 29 (L) 05/23/2019 02:40 AM    Calcium 10.1 05/23/2019 02:40 AM       Lab Results   Component Value Date/Time    Cholesterol, total 215 (H) 07/10/2017 11:17 AM    HDL Cholesterol 93 (H) 07/10/2017 11:17 AM    LDL, calculated 108.6 (H) 07/10/2017 11:17 AM    VLDL, calculated 13.4 07/10/2017 11:17 AM    Triglyceride 67 07/10/2017 11:17 AM    CHOL/HDL Ratio 2.3 07/10/2017 11:17 AM       No results found for: HBA1C, HGBE8, VAZ5TWUQ, JHY3LFTY, HBW2TRGD    Assessment/Plan:   Lab review: labs are reviewed in the EHR  Referrals: not needed  Complexity: High    Community resources identified for patient: none needed  Durable Medical Equipment: none needed    1. Chronic gout of left foot: Presently asymptomatic. Checking a uric acid level, CRP, and a BMP. Continue with Rx as prescribed. ORDERS:  - URIC ACID; Future  - C REACTIVE PROTEIN, QT; Future  - METABOLIC PANEL, BASIC; Future    2. CHF: Stable. Checking A1c and a BMP given her history of CHF and elevated blood glucose noted on random BMPs.  Continue to follow-up with Cardiology   Continue with Rx as prescribed. I instructed her to check her weight daily and to notify all of her doctors if she gains more than 3 pounds in 24 hours or more than 5 pounds in a week. All questions were answered. ORDERS:  - HEMOGLOBIN A1C W/O EAG;  Future  - METABOLIC PANEL, BASIC; Future    3. Hypergammaglobulinemia: Etiology is unknown. Checking an SPEP and BMP. ORDERS:  - PROTEIN ELECTROPHORESIS; Future  - METABOLIC PANEL, BASIC; Future    4. Left arm weakness/Paresthesias: Etiology is unknown. Checking a head CT. ORDERS:  - CT HEAD WO CONT; Future    5. Dilated Infrarenal Abdominal Aorta: Noted incidentally on imaging studies.  I instructed the patient to follow-up with Vascular Surgery who she is already established with. A. Key points we discussed today:  1. Pt instructed to call our office if symptoms worsen or if the pt/caregiver has any questions. 2. Handout given       Orders Placed This Encounter    URIC ACID     Standing Status:   Future     Standing Expiration Date:   5/30/2020    HEMOGLOBIN A1C W/O EAG     Standing Status:   Future     Standing Expiration Date:   11/26/2019    C REACTIVE PROTEIN, QT     Standing Status:   Future     Standing Expiration Date:   5/30/2020    olmesartan (BENICAR) 5 mg tablet     Sig: TAKE 1 TABLET BY MOUTH EVERY DAY     Refill:  3       B. New labs/medications ordered today:   1. A new medication list was given to the patient/family/caregiver. The medication list has been updated. A new medication list was given today to the patient. I have discussed the diagnosis with the patient and the intended plan as seen in the above orders. The patient has received an after-visit summary and questions were answered concerning future plans. I have discussed medication side effects and warnings with the patient as well. I have reviewed the plan of care with the patient, accepted their input and they are in agreement with the treatment goals. All questions were answered. The patient understands the plan of care. Handouts provided today with above information. Pt instructed if symptoms worsen to call the office or report to the ED for continued care.      Follow-up and Dispositions    · Return if symptoms worsen or fail to improve. Initial transitional care contact was made on: 5/28/19.

## 2019-05-30 NOTE — PROGRESS NOTES
Chief Complaint   Patient presents with   [facility] admission 5-19-19 for Shortness of Breath and Weakness       1. Have you been to the ER, urgent care clinic since your last visit? Hospitalized since your last visit? Yes When: 5-19-19 Where: [facility] admission Reason:  Shortness of breath and weakness    2. Have you seen or consulted any other health care providers outside of the [address] [place] [address] since your last visit? Include any pap smears or colon screening.  No      Health Maintenance Due   Topic Date Due    Shingrix Vaccine Age 49> (1 of 2) 04/02/1992    Pneumococcal 65+ years (1 of 2 - PCV13) 04/02/2007    COLONOSCOPY  01/25/2018

## 2019-05-30 NOTE — PATIENT INSTRUCTIONS
Gout: Care Instructions  Your Care Instructions    Gout is a form of arthritis caused by a buildup of uric acid crystals in a joint. It causes sudden attacks of pain, swelling, redness, and stiffness, usually in one joint, especially the big toe. Gout usually comes on without a cause. But it can be brought on by drinking alcohol (especially beer) or eating seafood and red meat. Taking certain medicines, such as diuretics or aspirin, also can bring on an attack of gout. Taking your medicines as prescribed and following up with your doctor regularly can help you avoid gout attacks in the future. Follow-up care is a key part of your treatment and safety. Be sure to make and go to all appointments, and call your doctor if you are having problems. It's also a good idea to know your test results and keep a list of the medicines you take. How can you care for yourself at home? · If the joint is swollen, put ice or a cold pack on the area for 10 to 20 minutes at a time. Put a thin cloth between the ice and your skin. · Prop up the sore limb on a pillow when you ice it or anytime you sit or lie down during the next 3 days. Try to keep it above the level of your heart. This will help reduce swelling. · Rest sore joints. Avoid activities that put weight or strain on the joints for a few days. Take short rest breaks from your regular activities during the day. · Take your medicines exactly as prescribed. Call your doctor if you think you are having a problem with your medicine. · Take pain medicines exactly as directed. ? If the doctor gave you a prescription medicine for pain, take it as prescribed. ? If you are not taking a prescription pain medicine, ask your doctor if you can take an over-the-counter medicine. · Eat less seafood and red meat. · Check with your doctor before drinking alcohol. · Losing weight, if you are overweight, may help reduce attacks of gout. But do not go on a [employer]. \" Losing a lot of weight in a short amount of time can cause a gout attack. When should you call for help? Call your doctor now or seek immediate medical care if:    · You have a fever.     · The joint is so painful you cannot use it.     · You have sudden, unexplained swelling, redness, warmth, or severe pain in one or more joints.    Watch closely for changes in your health, and be sure to contact your doctor if:    · You have joint pain.     · Your symptoms get worse or are not improving after 2 or 3 days. Where can you learn more? Go to http://jacquelyn-alberto.info/. Enter G909 in the search box to learn more about \"Gout: Care Instructions. \"  Current as of: Zulma 10, 2018  Content Version: 11.9  © 9846-7216 [employer]. Care instructions adapted under license by [employer] (which disclaims liability or warranty for this information). If you have questions about a medical condition or this instruction, always ask your healthcare professional. [place] [address] any warranty or liability for your use of this information. Heart Failure: Care Instructions  Your Care Instructions    Heart failure occurs when your heart does not pump as much blood as the body needs. Failure does not mean that the heart has stopped pumping but rather that it is not pumping as well as it should. Over time, this causes fluid buildup in your lungs and other parts of your body. Fluid buildup can cause shortness of breath, fatigue, swollen ankles, and other problems. By taking medicines regularly, reducing sodium (salt) in your diet, checking your weight every day, and making lifestyle changes, you can feel better and live longer. Follow-up care is a key part of your treatment and safety. Be sure to make and go to all appointments, and call your doctor if you are having problems. It's also a good idea to know your test results and keep a list of the medicines you take.   How can you care for yourself at home? Medicines    · Be safe with medicines. Take your medicines exactly as prescribed. Call your doctor if you think you are having a problem with your medicine.     · Do not take any vitamins, over-the-counter medicine, or herbal products without talking to your doctor first. Gris Sen not take ibuprofen (Advil or Motrin) and naproxen (Aleve) without talking to your doctor first. They could make your heart failure worse.     · You may be taking some of the following medicine. ? Beta-blockers can slow heart rate, decrease blood pressure, and improve your condition. Taking a beta-blocker may lower your chance of needing to be hospitalized. ? Angiotensin-converting enzyme inhibitors (ACEIs) reduce the heart's workload, lower blood pressure, and reduce swelling. Taking an ACEI may lower your chance of needing to be hospitalized again. ? Angiotensin II receptor blockers (ARBs) work like ACEIs. Your doctor may prescribe them instead of ACEIs. ? Diuretics, also called water pills, reduce swelling. ? Potassium supplements replace this important mineral, which is sometimes lost with diuretics. ? Aspirin and other blood thinners prevent blood clots, which can cause a stroke or heart attack.    You will get more details on the specific medicines your doctor prescribes. Diet    · Your doctor may suggest that you limit sodium to 2,000 milligrams (mg) a day or less. That is less than 1 teaspoon of salt a day, including all the salt you eat in cooking or in packaged foods. People get most of their sodium from processed foods. Fast food and restaurant meals also tend to be very high in sodium.     · Ask your doctor how much liquid you can drink each day. You may have to limit liquids.    Weight    · Weigh yourself without clothing at the same time each day. Record your weight. Call your doctor if you have a sudden weight gain, such as more than 2 to 3 pounds in a day or 5 pounds in a week.  (Your doctor may suggest a different range of weight gain.) A sudden weight gain may mean that your heart failure is getting worse.    Activity level    · Start light exercise (if your doctor says it is okay). Even if you can only do a small amount, exercise will help you get stronger, have more energy, and manage your weight and your stress. Walking is an easy way to get exercise. Start out by walking a little more than you did before. Bit by bit, increase the amount you walk.     · When you exercise, watch for signs that your heart is working too hard. You are pushing yourself too hard if you cannot talk while you are exercising. If you become short of breath or dizzy or have chest pain, stop, sit down, and rest.     · If you feel \"wiped out\" the day after you exercise, walk slower or for a shorter distance until you can work up to a better pace.     · Get enough rest at night. Sleeping with 1 or 2 pillows under your upper body and head may help you breathe easier.    Lifestyle changes    · Do not smoke. Smoking can make a heart condition worse. If you need help quitting, talk to your doctor about stop-smoking programs and medicines. These can increase your chances of quitting for good. Quitting smoking may be the most important step you can take to protect your heart.     · Limit alcohol to 2 drinks a day for men and 1 drink a day for women. Too much alcohol can cause health problems.     · Avoid getting sick from colds and the flu. Get a pneumococcal vaccine shot. If you have had one before, ask your doctor whether you need another dose. Get a flu shot each year. If you must be around people with colds or the flu, wash your hands often. When should you call for help?   Call 911 if you have symptoms of sudden heart failure such as:    · You have severe trouble breathing.     · You cough up pink, foamy mucus.     · You have a new irregular or rapid heartbeat.    Call your doctor now or seek immediate medical care if:    · You have new or increased shortness of breath.     · You are dizzy or lightheaded, or you feel like you may faint.     · You have sudden weight gain, such as more than 2 to 3 pounds in a day or 5 pounds in a week. (Your doctor may suggest a different range of weight gain.)     · You have increased swelling in your legs, ankles, or feet.     · You are suddenly so tired or weak that you cannot do your usual activities.    Watch closely for changes in your health, and be sure to contact your doctor if you develop new symptoms. Where can you learn more? Go to http://jacquelyn-alberto.info/. Enter V090 in the search box to learn more about \"Heart Failure: Care Instructions. \"  Current as of: July 22, 2018  Content Version: 11.9  © 5963-8394 [employer]. Care instructions adapted under license by [employer] (which disclaims liability or warranty for this information). If you have questions about a medical condition or this instruction, always ask your healthcare professional. [address] any warranty or liability for your use of this information.

## 2019-06-02 PROBLEM — L97.212 LOWER LIMB ULCER, CALF, RIGHT, WITH FAT LAYER EXPOSED (HCC): Status: RESOLVED | Noted: 2018-11-01 | Resolved: 2019-06-02

## 2019-06-02 PROBLEM — I50.42 CHRONIC COMBINED SYSTOLIC AND DIASTOLIC CONGESTIVE HEART FAILURE (HCC): Status: ACTIVE | Noted: 2019-06-02

## 2019-06-02 PROBLEM — R53.1 WEAKNESS: Status: RESOLVED | Noted: 2019-05-19 | Resolved: 2019-06-02

## 2019-06-02 PROBLEM — R06.02 SHORTNESS OF BREATH: Status: RESOLVED | Noted: 2019-05-19 | Resolved: 2019-06-02

## 2019-06-03 ENCOUNTER — PATIENT OUTREACH (OUTPATIENT)
Dept: CARDIOLOGY CLINIC | Age: 79
End: 2019-06-03

## 2019-06-03 NOTE — PROGRESS NOTES
NN contacted the patient by telephone to perform CHF follow Up. Noted Priorities/Plan:  Daily weights, returning to baseline     Daily Weight: 194 lbs today. Pt weighed 196 lbs on discharge on 5/30/19  Zone: green     Signs/Symptoms: Edema none; SOB none; orthopnea none. Pt states feeling well. Goals      Attends follow-up appointments as directed. 5/28: Patient aware of upcoming appts with Stella Chakraborty, NP and cardiology. Patient made aware of appt with Dr. Smith Kerr, nephrology. 5/30: Patient attended PCP appt as scheduled.  Maintains daily weight.      5/28: Educated patient on reason/importance of daily weight. Reviewed proper technique and what to report.  Prevent complications post hospitalization. Daily weights  Adhering to low salt diet  Monitoring and reporting s/s of CHF exacerbation  Medication adherence            Needs addressed from pathway:   Week 1-4   Provide Daily Disease Management  (NN initiated)  ? Reviewed importance of Daily weight (Before Breakfast-  Reviewed Daily Zone Identification (symptom management; weight, edema, SOB, activity/sleep changes)-notify provider immediately as indicated  ? Reviewed Cardiac Low-sodium Diet and include carbohydrate controlled diet education  ? Reviewed importance of Fluid restriction  ? Comorbidity Management  ? Confirmed follow-up appointments/transportation. Additional assessment   ? Reinforced Fall precautions    ? Activity tolerance assessment: pt reports at baseline  ? Reviewed Energy conservation management and balancing activity with rest  ? Labs/diagnostics per MD office  ? Medication Therapy: no issues identified  ? Diet/appetite assessment: pt reports no issues  ? Reviewed appropriate ED/Hospital utilization  ? Immunizations up to date        Pneumonia       Flu  ? Home assessment/modifications: no needs identified  ? Pt denies transportation assistance needs  ? Pt denies medication assistance needs  ?  Home health services are in place     Psychosocial: Reassurance/emotional support     Monitoring:  ? My Chart    Education:  ? Reviewed Advanced care planning status   ? Support system identification ( eg: Caregiver, meal planning, community resources, family, friends, Adventist, support group)   ? Health literacy for heart failure  ? Caregiver education and preparation       Have you been to an ER/Hospital since discharge from [facility]? No      Have you followed up with PCP/Cardiologist/Specialist?   19 PCP appt w/ Dr Mcfarland Due    Upcomin19 Card appt w/ LESIA Rendon Transportation:  family/friends  Diet: cardiac/no salt added  Activity/ADLs: self. Medications Reconciled at this time:  no changes since last PCP visit  Home health:  Company/Completion: n/a  Social Support: family/friends    Advanced Care Plan: on file, reviewed, and current    Patient reminded that there is a physician on call 24 hours a day / 7 days a week (M-F 5pm to 8am and from Friday 5pm until Monday 8a for the weekend) should the patient have questions or concerns. Patient reminded to call 911 if situation is emergent or patient feels the situation is emergent. Pt verbalizes understanding.

## 2019-06-04 LAB
ALBUMIN SERPL ELPH-MCNC: 2.8 G/DL (ref 2.9–4.4)
ALBUMIN/GLOB SERPL: 0.8 {RATIO} (ref 0.7–1.7)
ALPHA1 GLOB SERPL ELPH-MCNC: 0.3 G/DL (ref 0–0.4)
ALPHA2 GLOB SERPL ELPH-MCNC: 0.7 G/DL (ref 0.4–1)
B-GLOBULIN SERPL ELPH-MCNC: 1.3 G/DL (ref 0.7–1.3)
GAMMA GLOB SERPL ELPH-MCNC: 1.1 G/DL (ref 0.4–1.8)
GLOBULIN SER CALC-MCNC: 3.4 G/DL (ref 2.2–3.9)
M PROTEIN SERPL ELPH-MCNC: ABNORMAL G/DL
PROT SERPL-MCNC: 6.2 G/DL (ref 6–8.5)

## 2019-06-06 NOTE — PROGRESS NOTES
HPI: 
Renetta Murrieta is a 70 y.o. female presenting for a post hospital follow up visit. She was admitted to [facility] from 5/19-5/23/19 for shortness of breath and fatigue related to acute on chronic systolic heart failure. She was started on IV lasix which did improve her symptoms; however, she did have some worsening renal function. She underwent a nuclear stress test which did show some fixed lesions, but no reversible ischemia. She also had an echocardiogram which revealed reduced LV systolic function with an EF of 35-40% with mild diastolic dysfunction and moderate pulmonary hypertension. She presents today reporting that she has been doing quite well since she got out of the hospital.  Her shortness of breath has significantly improved and she is actually down 4 pounds since admission. She is currently taking Lasix 40 mg daily without a potassium supplement and a BMP completed 1 week ago does reveal potassium within normal limits at 3.9. She denies any chest discomfort, shortness of breath, lower extremity swelling, palpitations, dizziness, syncope, orthopnea or PND. She actually reports that her lower extremities are less swollen now than they have been in quite some time. She has a past medical history of CAD s/p CAB x 3 in 2008, hx/o DVT and previously on Coumadin, AV block s/p dual-chamber PPM in Oct 2008 with gen change in Feb 2016, hx/o ischemic cardiomyopathy, hypertension, chronic kidney disease stage 3, hx/o CVA and GERD. Cardiac Imaging/Procedures:  
Echocardiogram from 5/20/19:  
EF 36-40%, mild LVH, grade DDx, moderate pulmonary HTN with PASP of 52 mmHg Nuclear stress test:  
2 fixed defects consistent with prior myocardial infarction. EF calculated at 28%, supporting a high risk test.  
 
Impression/Plan: 1. Chronic HFrEF, appears compensated 2. History of ischemic cardiomyopathy, EF 36-40% per echo from May 2019 3. CAD, s/p CABG x 3, 2008, stable 4. AV block, s/p dual-chamber PPM, Oct 2008, generator change Feb 2016 
5. Essential hypertension, optimal 
6. Chronic kidney disease, stage 3, followed by nephrology The patient is presenting for a post hospital follow-up. She was admitted to [facility] toward the end of May 2019 for fatigue and shortness of breath. Since discharge from the hospital she reports that she has been doing quite well and reports resolution of the shortness of breath. She states that her lower extremity swelling is better than it has been in quite some time and she is also slowly been getting her energy back. She continues on 40 mg of Lasix daily and her recent BMP reveals a normal potassium level. She does have a history of coronary artery disease as well as ischemic cardiomyopathy with most recent ejection fraction of 36 to 40% per echocardiogram from May 2019. She currently denies any cardiovascular symptoms at this time. Her blood pressure is well controlled on her current regimen so no medication changes will be made at this time. She does suffer from chronic kidney disease which is currently being followed by nephrology. She reports that she is due to see Dr. Cele Bojorquez next week for follow-up. From a cardiac standpoint, the patient appears to be doing quite well. She does have a follow-up scheduled with Dr. Bonnita Kussmaul next month and prefers to keep that appointment. She will follow-up as scheduled or sooner if needed. All questions answered. Speedy Nguyen PA-C Please note:  Portions of this chart were created with Dragon medical speech to text program.  Unrecognized errors may be present. Physical: 
Vitals: 
Visit Vitals /54 Pulse 61 Ht 5' 4\" (1.626 m) Wt 88.9 kg (196 lb) SpO2 96% BMI 33.64 kg/m² PMH: 
Past Medical History:  
Diagnosis Date  Abnormal ankle brachial index 11/13/2014 Mild arterial disease in right leg. R VICTOR MANUEL 0.88. L VICTOR MANUEL 1.00.  Anemia  Arm DVT (deep venous thromboembolism), acute (HCC)   
 s/p anticoagulation  Atherosclerotic heart disease  Atrial fibrillation ([facility]  AV block  CAD (coronary artery disease)  Cardiomyopathy, ischemic  Carotid artery stenosis  Carotid duplex 11/13/2014 Mild <50% bilateral ICA plaquing. Possible left vertebral occlusion.  Cerebral artery occlusion with cerebral infarction ([address] stroke x 2  Chest pain  CVA (cerebral infarction)  Echocardiogram 08/19/2015 EF 55%. Apical inferior, apical septal hypk (new since study of 12/19/11), likely due to chronic V-pacing. Mild LVH. RVSP 30 mmHg. Mild LAE. Mild MR. Mild TR.  Gastritis  GERD (gastroesophageal reflux disease)  Heart failure ([facility] [address]  Hiatal hernia  Hyperlipidemia  Hypertension  Hypertensive cardiovascular disease  Intracranial aneurysm   
 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  Nuclear cardiac imaging test 09/24/2015 Low risk. Sm apical infarction w/no ischemia vs apical thinning. Sm basal inferior defect, likely artifact. EF 56%. Inferoseptal, inferoapical WMA related to sternotomy or paced rhythm.  Pacemaker  PAD (peripheral artery disease) ([facility] [address]  PVC's   
 chronic  S/P CABG x 3 06/08/2008 LIMA-LAD. SVG-OM. SVG-dRCA  S/P cardiac cath 06/08/2008 pRCA 100%. LM patent. Cx patent. OM1 100%. mLAD 95%. LVEDP 27-29mmHg. EF 20%. mod MR  
 Tilt table evaluation 06/01/1995 Positive for orthostatic hypotension  Vitamin D deficiency MEDS: 
Current Outpatient Medications on File Prior to Visit Medication Sig Dispense Refill  allopurinol (ZYLOPRIM) 100 mg tablet Take 1 Tab by mouth daily.  30 Tab 0  
 diclofenac (VOLTAREN) 1 % gel USE 4 GRAMS ON KNEE FOUR TIMES A DAY AS NEEDED  3  
  amLODIPine (NORVASC) 5 mg tablet Take 1 Tab by mouth daily. 90 Tab 6  carvedilol (COREG) 25 mg tablet TAKE 1 TABLET BY MOUTH (2) TIMES A  Tab 3  
 hydrocortisone (HYTONE) 2.5 % ointment APPLY LIGHTLY TO THE AFFECTED AREAS ON THE LEGS AND FEET TWICE A DAY. 2  
 aspirin 81 mg tablet Take 81 mg by mouth daily. No current facility-administered medications on file prior to visit. Allergies and Sensitivities: 
Allergies Allergen Reactions  Nifedipine Nausea and Vomiting and Other (comments) Dizzy  Ace Inhibitors Cough  Codeine Unknown (comments), Nausea Only and Nausea and Vomiting  Hydralazine Other (comments) Dizziness and Fatigue  Lipitor [Atorvastatin] Nausea and Vomiting and Vertigo  Simvastatin Nausea and Vomiting Family History: No family history on file. Social History: She  reports that she has never smoked. She has never used smokeless tobacco.  She  reports that she does not drink alcohol. Review of Systems: 
 
Constitutional: negative for fevers, chills, sweats, fatigue and malaise Respiratory: negative for cough Cardiovascular: negative for chest pain, palpitations, syncope, dyspnea on exertion, SOB, PND, orthopnea Gastrointestinal: negative for nausea, vomiting, diarrhea, melena and abdominal pain Genitourinary: negative for hematuria Musculoskeletal: negative for lower extremity swelling or claudication. Intermittent hand tingling bilaterally Neurological: negative for dizziness and weakness. Behavioral/Psych: negative for anxiety Physical Exam:  
 
General appearance: no apparent distress HEENT: normocephalic, atraumatic Neck: supple, no JVD, no carotid bruits Respiratory: clear to auscultation bilaterally Cardiovascular: normal S1 and S2,  regular rate and rhythm, no murmurs Abdomen: soft, non-tender, no hepatomegaly Extremities: some soft edema present Neurological: alert and oriented to person, place and time Behavioral/Psych: normal mood and affect Data: 
EKG: 
 
LABS: 
Lab Results Component Value Date/Time Sodium 137 05/30/2019 12:04 PM  
 Potassium 3.9 05/30/2019 12:04 PM  
 Chloride 103 05/30/2019 12:04 PM  
 CO2 24 05/30/2019 12:04 PM  
 Glucose 96 05/30/2019 12:04 PM  
 BUN 49 (H) 05/30/2019 12:04 PM  
 Creatinine 1.80 (H) 05/30/2019 12:04 PM  
 
Lab Results Component Value Date/Time Cholesterol, total 215 (H) 07/10/2017 11:17 AM  
 HDL Cholesterol 93 (H) 07/10/2017 11:17 AM  
 LDL, calculated 108.6 (H) 07/10/2017 11:17 AM  
 Triglyceride 67 07/10/2017 11:17 AM  
 CHOL/HDL Ratio 2.3 07/10/2017 11:17 AM  
 
Lab Results Component Value Date/Time  ALT (SGPT) 15 05/19/2019 02:35 PM

## 2019-06-07 ENCOUNTER — PATIENT OUTREACH (OUTPATIENT)
Dept: CARDIOLOGY CLINIC | Age: 79
End: 2019-06-07

## 2019-06-07 ENCOUNTER — OFFICE VISIT (OUTPATIENT)
Dept: CARDIOLOGY CLINIC | Age: 79
End: 2019-06-07

## 2019-06-07 ENCOUNTER — HOSPITAL ENCOUNTER (OUTPATIENT)
Dept: ULTRASOUND IMAGING | Age: 79
Discharge: HOME | End: 2019-06-07
Attending: INTERNAL MEDICINE
Payer: MEDICARE

## 2019-06-07 VITALS
BODY MASS INDEX: 33.46 KG/M2 | SYSTOLIC BLOOD PRESSURE: 130 MMHG | HEART RATE: 61 BPM | OXYGEN SATURATION: 96 % | WEIGHT: 196 LBS | HEIGHT: 64 IN | DIASTOLIC BLOOD PRESSURE: 54 MMHG

## 2019-06-07 DIAGNOSIS — I10 ESSENTIAL HYPERTENSION: Primary | ICD-10-CM

## 2019-06-07 DIAGNOSIS — N18.30 CHRONIC KIDNEY DISEASE, STAGE III (MODERATE) (HCC): ICD-10-CM

## 2019-06-07 DIAGNOSIS — Z95.0 PACEMAKER: ICD-10-CM

## 2019-06-07 DIAGNOSIS — N18.30 CKD (CHRONIC KIDNEY DISEASE) STAGE 3, GFR 30-59 ML/MIN (HCC): ICD-10-CM

## 2019-06-07 DIAGNOSIS — I50.42 CHRONIC COMBINED SYSTOLIC AND DIASTOLIC CONGESTIVE HEART FAILURE (HCC): ICD-10-CM

## 2019-06-07 DIAGNOSIS — I25.10 ATHEROSCLEROSIS OF NATIVE CORONARY ARTERY OF NATIVE HEART WITHOUT ANGINA PECTORIS: ICD-10-CM

## 2019-06-07 DIAGNOSIS — I42.9 CARDIOMYOPATHY, UNSPECIFIED TYPE (HCC): ICD-10-CM

## 2019-06-07 PROCEDURE — 76770 US EXAM ABDO BACK WALL COMP: CPT

## 2019-06-07 RX ORDER — FUROSEMIDE 40 MG/1
TABLET ORAL
Qty: 30 TAB | Refills: 6 | Status: SHIPPED | OUTPATIENT
Start: 2019-06-07 | End: 2019-08-12 | Stop reason: SDUPTHER

## 2019-06-07 NOTE — PROGRESS NOTES
Patient attended appointments with her Cardiologist's PA, ABRAHAM Pitts. on 6/7/19, Nurse Navigator reviewed EMR and will follow up on next scheduled outreach.

## 2019-06-07 NOTE — PROGRESS NOTES
Tess Morley presents today for Chief Complaint Patient presents with  
[facility] Follow Up  
 CHF Tess Morley preferred language for health care discussion is english/other. Is someone accompanying this pt? y es friend Is the patient using any DME equipment during [address]? no 
 
Depression Screening: 
3 most recent PHQ Screens 6/7/2019 Little interest or pleasure in doing things Not at all Feeling down, depressed, irritable, or hopeless Not at all Total Score PHQ 2 0 Learning Assessment: 
Learning Assessment 5/30/2019 PRIMARY LEARNER Patient HIGHEST LEVEL OF EDUCATION - PRIMARY LEARNER  > 4 YEARS OF COLLEGE  
BARRIERS PRIMARY LEARNER NONE  
CO-LEARNER CAREGIVER No  
PRIMARY LANGUAGE ENGLISH  
LEARNER PREFERENCE PRIMARY DEMONSTRATION  
  -  
  -  
ANSWERED BY patient RELATIONSHIP SELF Abuse Screening: 
Abuse Screening Questionnaire 5/30/2019 Do you ever feel afraid of your partner? Marvene Hence Are you in a relationship with someone who physically or mentally threatens you? Marvene Hence Is it safe for you to go home? Gael Martinez Fall Risk Fall Risk Assessment, last 12 mths 6/7/2019 Able to walk? Yes Fall in past 12 months? Yes Fall with injury? Yes  
Number of falls in past 12 months 1 Fall Risk Score 2 Pt currently taking Anticoagulant therapy? asa Coordination of Care: 1. Have you been to the ER, urgent care clinic since your last visit? Hospitalized since your last visit? yes 2. Have you seen or consulted any other health care providers outside of the [address] [place] [address] [place] since your last visit? Include any pap smears or colon screening.  Yes rheumatologist

## 2019-06-07 NOTE — PATIENT INSTRUCTIONS
No medication changes at this time. Last potassium was within normal limits so no need to add potassium supplement today Follow up with Dr. Rodney Conrad as scheduled or sooner if needed

## 2019-06-10 ENCOUNTER — HOSPITAL ENCOUNTER (OUTPATIENT)
Dept: CT IMAGING | Age: 79
Discharge: HOME | End: 2019-06-10
Attending: INTERNAL MEDICINE
Payer: MEDICARE

## 2019-06-10 DIAGNOSIS — R29.898 LEFT ARM WEAKNESS: ICD-10-CM

## 2019-06-10 PROCEDURE — 70450 CT HEAD/BRAIN W/O DYE: CPT

## 2019-06-12 ENCOUNTER — TELEPHONE (OUTPATIENT)
Dept: INTERNAL MEDICINE CLINIC | Age: 79
End: 2019-06-12

## 2019-06-12 PROBLEM — R73.02 IMPAIRED GLUCOSE TOLERANCE: Status: ACTIVE | Noted: 2019-06-12

## 2019-06-12 NOTE — PROGRESS NOTES
Please let her know that her most recent labs show an elevated uric acid level 10.5. We will need to recheck her uric acid level when she has been on allopurinol for at least 6 months. Her A1c is 6. She has newly diagnosed prediabetes. She needs to reduce her weight and her carb intake to prevent progression to type 2 diabetes. Her renal function is at her CKD baseline. We will continue to monitor her renal function with her Nephrologist. 
 
Dr. Rox Altman [facility] [address] [place] [address], [address] [facility], [address] Phone: [phone] Fax: [phone]

## 2019-06-12 NOTE — TELEPHONE ENCOUNTER
----- Message from Javier Romano MD sent at 6/12/2019 11:52 AM EDT -----  Please let her know that her head CT shows that she has evidence of chronic small strokes. There is also some volume loss. She is to work hard to reduce her CVD risk. She is to take all of her medications and work on reducing her weight by maintaining a heart healthy diet. She can continue to take aspirin for now. She should follow-up with her GI team given her history of gastritis as aspirin can cause gastritis to recur.     Dr. Ninfa Blandon  [facility], [address] [place] [address], [address] [place] [address] Str[address]  Phone: [phone]  Fax: [phone]

## 2019-06-12 NOTE — PROGRESS NOTES
Please let her know that her head CT shows that she has evidence of chronic small strokes. There is also some volume loss. She is to work hard to reduce her CVD risk. She is to take all of her medications and work on reducing her weight by maintaining a heart healthy diet. She can continue to take aspirin for now. She should follow-up with her GI team given her history of gastritis as aspirin can cause gastritis to recur.     Dr. Miroslava Ansari  [facility] [address], [address] [place] [address] Str.  Phone: [phone]  Fax: [phone]

## 2019-06-12 NOTE — TELEPHONE ENCOUNTER
----- Message from Javier Romano MD sent at 6/12/2019  9:44 AM EDT -----  Please let her know that her most recent labs show an elevated uric acid level 10.5. We will need to recheck her uric acid level when she has been on allopurinol for at least 6 months. Her A1c is 6. She has newly diagnosed prediabetes. She needs to reduce her weight and her carb intake to prevent progression to type 2 diabetes. Her renal function is at her CKD baseline.   We will continue to monitor her renal function with her Nephrologist.    Dr. Ninfa Blandon  [facility] [address], [address] [place] [address] Str.  Phone: [phone]  Fax: [phone]

## 2019-06-12 NOTE — TELEPHONE ENCOUNTER
Patient contacted, patient identified with two identifiers (Name & ). Patient aware of results per DR. Hicks and verbalizes understanding.

## 2019-06-13 ENCOUNTER — OFFICE VISIT (OUTPATIENT)
Dept: VASCULAR SURGERY | Age: 79
End: 2019-06-13

## 2019-06-13 ENCOUNTER — PATIENT OUTREACH (OUTPATIENT)
Dept: CARDIOLOGY CLINIC | Age: 79
End: 2019-06-13

## 2019-06-13 ENCOUNTER — TELEPHONE (OUTPATIENT)
Dept: VASCULAR SURGERY | Age: 79
End: 2019-06-13

## 2019-06-13 VITALS
HEIGHT: 64 IN | HEART RATE: 70 BPM | OXYGEN SATURATION: 99 % | BODY MASS INDEX: 33.46 KG/M2 | WEIGHT: 196 LBS | RESPIRATION RATE: 20 BRPM | SYSTOLIC BLOOD PRESSURE: 110 MMHG | DIASTOLIC BLOOD PRESSURE: 80 MMHG

## 2019-06-13 DIAGNOSIS — I65.23 BILATERAL CAROTID ARTERY STENOSIS: ICD-10-CM

## 2019-06-13 DIAGNOSIS — I71.40 ABDOMINAL AORTIC ANEURYSM (AAA) WITHOUT RUPTURE: Primary | ICD-10-CM

## 2019-06-13 DIAGNOSIS — I73.9 PAD (PERIPHERAL ARTERY DISEASE) (HCC): ICD-10-CM

## 2019-06-13 DIAGNOSIS — R60.0 BILATERAL EDEMA OF LOWER EXTREMITY: ICD-10-CM

## 2019-06-13 DIAGNOSIS — I50.9 CONGESTIVE HEART FAILURE, UNSPECIFIED HF CHRONICITY, UNSPECIFIED HEART FAILURE TYPE (HCC): ICD-10-CM

## 2019-06-13 NOTE — PROGRESS NOTES
1. Have you been to the ER, urgent care clinic since your last visit? Hospitalized since your last visit? Yes Reason for visit: [facility] emergency dept in 05/2019     2. Have you seen or consulted any other health care providers outside of the [address] [place] [address] since your last visit? Include any pap smears or colon screening.  Yes Reason for visit: eye doctor , nephrology      3 most recent [facility] 36 Screens 6/13/2019   Little interest or pleasure in doing things Not at all   Feeling down, depressed, irritable, or hopeless Not at all   Total Score PHQ 2 0

## 2019-06-13 NOTE — PROGRESS NOTES
Mi Babb    Chief Complaint   Patient presents with    Carotid Artery Stenosis       History and Physical    Mi Babb is a 70 y.o. female with a history of mild carotid artery stenosis and PAD who presents to the office today in follow-up after recent hospital admission. She had a recent hospital admission for acute CHF exacerbation. During her admission she had a CTA scan of the abdomen pelvis which showed a 3.1 cm fusiform dilation of the infrarenal abdominal aorta. She denies any abdominal or back pain outside of some atypical stiffness in the morning and her back. Other than that she states that she is in her usual state of health. She does state that she is trying hard to change her diet to a healthier lifestyle. She is being followed closely with cardiology as well. She does have compression stockings that she wears for her edema and states that she is also taking a fluid pill daily. She denies any symptoms of lifestyle limiting claudication and certainly has no true rest pain. She denies any strokelike symptoms or vision changes. Past Medical History:   Diagnosis Date    Abnormal ankle brachial index 11/13/2014    Mild arterial disease in right leg. R VICTOR MANUEL 0.88. L VICTOR MANUEL 1.00.  Anemia     Arm DVT (deep venous thromboembolism), acute ([facility])     s/p anticoagulation    Atherosclerotic heart disease     Atrial fibrillation ([facility])     AV block     CAD (coronary artery disease)     Cardiomyopathy, ischemic     Carotid artery stenosis     Carotid duplex 11/13/2014    Mild <50% bilateral ICA plaquing. Possible left vertebral occlusion.  Cerebral artery occlusion with cerebral infarction ([facility])     stroke x 2    Chest pain     CVA (cerebral infarction)     Echocardiogram 08/19/2015    EF 55%. Apical inferior, apical septal hypk (new since study of 12/19/11), likely due to chronic V-pacing. Mild LVH. RVSP 30 mmHg. Mild LAE. Mild MR. Mild TR.       Gastritis     GERD (gastroesophageal reflux disease)     Heart failure ([facility]     Hiatal hernia     Hyperlipidemia     Hypertension     Hypertensive cardiovascular disease     Intracranial aneurysm     left cavernous ICA 2mm aneurysm from head/neck CTA in     Long term (current) use of anticoagulants 3/10/2011    Lower extremity venous duplex 2015    No DVT bilaterally.  Nuclear cardiac imaging test 2015    Low risk. Sm apical infarction w/no ischemia vs apical thinning. Sm basal inferior defect, likely artifact. EF 56%. Inferoseptal, inferoapical WMA related to sternotomy or paced rhythm.  Pacemaker     PAD (peripheral artery disease) ([facility])     PVC's     chronic    S/P CABG x 3 2008    LIMA-LAD. SVG-OM. SVG-dRCA     S/P cardiac cath 2008    pRCA 100%. LM patent. Cx patent. OM1 100%. mLAD 95%. LVEDP 27-29mmHg. EF 20%.  mod MR    Tilt table evaluation 1995    Positive for orthostatic hypotension    Vitamin D deficiency      Past Surgical History:   Procedure Laterality Date    CABG, ARTERY-VEIN, THREE  2008    CARDIAC SURG PROCEDURE UNLIST      medtronic dual-chamber cardioverted-defibrillator    HX  SECTION      x2    HX ENDOSCOPY  3/1/16    HX ENDOSCOPY  3/1/16    HX HEMORRHOIDECTOMY          HX HYSTERECTOMY          HX ORTHOPAEDIC      knee surgery    HX OTHER SURGICAL  2/10/16    Pacemaker Gen Change    HX PACEMAKER      Defibrillator     HX TUMOR REMOVAL      removed from arm.  benign     Patient Active Problem List   Diagnosis Code    Atherosclerotic heart disease, s/p CABG X3 in , in the setting of severe left ventricular dysfunction, and s/p a dual-chamber I25.10    S/P CABG x 3 Z95.1    HTN (hypertension) I10    Atrial fibrillation ([facility]) I48.91    Carotid stenosis I65.29    PAD (peripheral artery disease) ([facility] [address] [facility] I73.9    Intracranial aneurysm - left cavernous ICA on  head/neck CTA I67.1    Mixed hyperlipidemia E78.2  Gastroesophageal reflux disease without esophagitis K21.9    Chronic gastritis with bleeding - in February/March of 2016 per FOBT and EGD results K29.51    History of CVA (cerebrovascular accident) Z80.78    Vitamin D deficiency E55.9    Dysphagia s/p dilation R13.10    Anemia D64.9    Pacemaker (for h/o AV block) Z95.0    OAB (overactive bladder) N32.81    Cataract H26.9    Gout of left foot M10.9    Seasonal allergic rhinitis J30.2    Microalbuminuria R80.9    CKD (chronic kidney disease) stage 3, GFR 30-59 ml/min ([facility]) N18.3    Mixed connective tissue disease ([facility]) M35.1    Severe obesity (BMI 35.0-39. 9) with comorbidity ([address] E66.01    Generalized edema R60.1    Peripheral venous insufficiency I87.2    Chronic combined systolic and diastolic congestive heart failure (HCC) I50.42    Impaired glucose tolerance R73.02     Current Outpatient Medications   Medication Sig Dispense Refill    furosemide (LASIX) 40 mg tablet TAKE 1 TABLET BY MOUTH EVERY DAY 30 Tab 6    allopurinol (ZYLOPRIM) 100 mg tablet Take 1 Tab by mouth daily. 30 Tab 0    diclofenac (VOLTAREN) 1 % gel USE 4 GRAMS ON KNEE FOUR TIMES A DAY AS NEEDED  3    amLODIPine (NORVASC) 5 mg tablet Take 1 Tab by mouth daily. 90 Tab 6    carvedilol (COREG) 25 mg tablet TAKE 1 TABLET BY MOUTH (2) TIMES A  Tab 3    hydrocortisone (HYTONE) 2.5 % ointment APPLY LIGHTLY TO THE AFFECTED AREAS ON THE LEGS AND FEET TWICE A DAY. 2    aspirin 81 mg tablet Take 81 mg by mouth daily.        Allergies   Allergen Reactions    Nifedipine Nausea and Vomiting and Other (comments)     Dizzy    Ace Inhibitors Cough    Codeine Unknown (comments), Nausea Only and Nausea and Vomiting    Hydralazine Other (comments)     Dizziness and Fatigue    Lipitor [Atorvastatin] Nausea and Vomiting and Vertigo    Simvastatin Nausea and Vomiting       Physical Exam:    Visit Vitals  /80 (BP 1 Location: Left arm, BP Patient Position: Sitting)   Pulse 70   Resp 20   Ht 5' 4\" (1.626 m)   Wt 196 lb (88.9 kg)   SpO2 99%   BMI 33.64 kg/m²      General: Well-appearing elderly female in no acute distress   HEENT: EOMI, no scleral icterus is noted. Pulmonary: No increased work or breathing is noted. Abdomen: obese, nondistended, soft, NT. No palpable pulsatile mass appreciated. Extremities: Warm and well perfused bilaterally. Pt has +BLE edema, compression stockings in place. Neuro: Cranial nerves II through XII are grossly intact   Integument: No ulcerations are identified visibly      Impression and Plan:  Preston Starr is a 70 y.o. female with history of mild carotid artery stenosis and PAD. She presents to the office today in follow-up after recent hospital admission when she had a CTA scan which showed a small fusiform AAA measuring 3.1 cm. She is completely asymptomatic. I discussed that we will obtain an ultrasound in 3 months to reassess and make sure that this is not a rapidly growing aneurysm. If her ultrasound is stable then we can follow this on an annual basis with ultrasound. She was educated on the signs and symptoms of when to call the office sooner and when to go to the emergency room. Plan was discussed. Patient expresses understanding and agrees. Paddy Almaraz  [phone]        PLEASE NOTE:  This document has been produced using voice recognition software. Unrecognized errors in transcription may be present.

## 2019-06-13 NOTE — PROGRESS NOTES
Patient attended appointments with her vascular surgeon's PA, CELESTINE Jay on 6/13/19, Nurse Navigator reviewed EMR and will follow up on next scheduled outreach.

## 2019-06-13 NOTE — TELEPHONE ENCOUNTER
Left message for patient that Dr. Zechariah Gonzalez would like her to have a follow-up ultrasound in 3 months time. Information given to the  for scheduling.

## 2019-06-18 ENCOUNTER — PATIENT OUTREACH (OUTPATIENT)
Dept: CARDIOLOGY CLINIC | Age: 79
End: 2019-06-18

## 2019-06-18 NOTE — PROGRESS NOTES
NN contacted the patient by telephone to perform CHF follow Up. Noted Priorities/Plan:  Daily weights, returning to baseline Daily Weight: 194 lbs today. Pt weighed 196 lbs on last clinic visit on 6/13/19 Zone: green Signs/Symptoms: Edema none; SOB none; orthopnea none. Pt states feeling well and no issues at this time. Goals  Attends follow-up appointments as directed. 5/28: Patient aware of upcoming appts with Alton Araujo, NP and cardiology. Patient made aware of appt with Dr. Kavita Jerome, nephrology. 5/30: Patient attended PCP appt as scheduled.  Maintains daily weight.   
  5/28: Educated patient on reason/importance of daily weight. Reviewed proper technique and what to report. 6/3/19 pt reports weighing daily  Prevent complications post hospitalization. Daily weights Adhering to low salt diet Monitoring and reporting s/s of CHF exacerbation Medication adherence Needs addressed from pathway:  
Week 1-4 Provide Daily Disease Management 
(NN initiated) ? Reviewed importance of Daily weight (Before Breakfast- 
Reviewed Daily Zone Identification (symptom management; weight, edema, SOB, activity/sleep changes)-notify provider immediately as indicated ? Reviewed Cardiac Low-sodium Diet and include carbohydrate controlled diet education ? Reviewed importance of Fluid restriction ? Comorbidity Management ? Confirmed follow-up appointments/transportation. Additional assessment ? Reinforced Fall precautions ? Activity tolerance assessment: pt reports at baseline ? Reviewed Energy conservation management and balancing activity with rest 
? Labs/diagnostics per MD office ? Medication Therapy: no issues identified ? Diet/appetite assessment: pt reports no issues ? Reviewed appropriate ED/Hospital utilization ? Immunizations up to date Pneumonia Flu 
? Home assessment/modifications: no needs identified ? Pt denies transportation assistance needs ? Pt denies medication assistance needs ? Home health services are in place Psychosocial: Reassurance/emotional support Monitoring: ? My Chart Education: 
? Reviewed Advanced care planning status ? Support system identification ( eg: Caregiver, meal planning, community resources, family, friends, [employer], support group) ? Health literacy for heart failure ? Caregiver education and preparation Have you been to an ER/Hospital since discharge from [facility]? No   
 
Have you followed up with PCP/Cardiologist/Specialist?  
19 PCP appt w/ Dr Nat Ruth 19 Card appt w/ LESIA Diop 
19 Vascular appt w/ CELESTINE Jay [facility]19 Rheumatology appt w/ Dr Juanjo Serna 19 Card appt w/ Dr Napoleon Hurtado 19 PCP appt w/ DEBRA Adams Transportation:  family/friends Diet: cardiac/no salt added Activity/ADLs: self. Medications Reconciled at this time:  no changes since last PCP visit Home health:  Company/Completion: n/a Social Support: family/friends Advanced Care Plan: on file, reviewed, and current Patient reminded that there is a physician on call 24 hours a day / 7 days a week (M-F 5pm to 8am and from Friday 5pm until Monday 8a for the weekend) should the patient have questions or concerns. Patient reminded to call 911 if situation is emergent or patient feels the situation is emergent. Pt verbalizes understanding.

## 2019-06-24 ENCOUNTER — TELEPHONE (OUTPATIENT)
Dept: CARDIOLOGY CLINIC | Age: 79
End: 2019-06-24

## 2019-06-24 NOTE — TELEPHONE ENCOUNTER
Dr. Jillian Holcomb , patient's rheumatologist, would like to know if Dr. Erik William is ok to with patient being on Plaquenil. Will forward to Dr. Erik William for advisement.

## 2019-06-25 ENCOUNTER — PATIENT OUTREACH (OUTPATIENT)
Dept: CARDIOLOGY CLINIC | Age: 79
End: 2019-06-25

## 2019-06-25 NOTE — PROGRESS NOTES
NN contacted the patient by telephone to perform CHF follow Up. Noted Priorities/Plan:  Daily weights, returning to baseline Daily Weight: Pt weighs 195 lbs today. Pt weighed 194 lbs on last outreach on 6/18/19 Zone: green Signs/Symptoms: Edema none; SOB none; orthopnea none. Pt states feeling well and no issues at this time. Goals  Attends follow-up appointments as directed. 5/28: Patient aware of upcoming appts with Joy Lomeli, NP and cardiology. Patient made aware of appt with Dr. Cindi Simmons, nephrology. 5/30: Patient attended PCP appt as scheduled.  Maintains daily weight.   
  5/28: Educated patient on reason/importance of daily weight. Reviewed proper technique and what to report. 6/3/19 pt reports weighing daily  Prevent complications post hospitalization. Daily weights Adhering to low salt diet Monitoring and reporting s/s of CHF exacerbation Medication adherence Needs addressed from pathway:  
Week 5-8 Provide Daily Disease Management (patient/caregiver initiated) ? Reviewed and reinforced importance of Daily weight (Before Breakfast 
? Reviewed Daily Zone Identification (symptom management; weight, edema, SOB, activity/sleep changes)-notify provider immediately as indicated ? Cardiac Low-sodium Diet (No added sodium; 1500mg as indicated) also educated on carbohydrate controlled diet ? Reviewed importance of Fluid restriction ? Comorbidity Management ? Confirmed follow-up appointments/transportation. Additional Assessments ? Reinforced Fall precautions ? Activity tolerance assessment: pt reports at baseline ? Reviewed Energy conservation management and balancing activity w/ rest 
? Labs/diagnostics per MD office ? Medication Therapy: no issues identified ? Diet/appetite assessment: no issues noted ? Reviewed appropriate ED/Hospital utilization ? Immunizations up to date Pneumonia       Flu 
 ? Home re-assessment/modifications: no needs identified Psychosocial: Reassurance and emotional support; depression screening. Monitoring: ? My Chart Education: 
? Advanced Care Planning status reviewed ? Patient/Caregiver verifies support systems (meal planning, medication and transportation needs, community resources) ? Health literacy for heart failure Have you been to an ER/Hospital since discharge from [facility]? No   
 
Have you followed up with PCP/Cardiologist/Specialist?  
19 PCP appt w/ Dr Nu Lal 19 Card appt w/ LESIA Loo 
19 Vascular appt w/ CELESTINE Lopez 
19 Rheumatology appt w/ Dr Angelica Schirmer [facility]19 Card appt w/ Dr Dalila Sheriff 19 PCP appt w/ DEBRA Cleaning Transportation:  family/friends Diet: cardiac/no salt added Activity/ADLs: self. Medications Reconciled at this time:  no changes since last PCP visit Home health:  Company/Completion: n/a Social Support: family/friends Advanced Care Plan: on file, reviewed, and current Patient reminded that there is a physician on call 24 hours a day / 7 days a week (M-F 5pm to 8am and from Friday 5pm until Monday 8a for the weekend) should the patient have questions or concerns. Patient reminded to call 911 if situation is emergent or patient feels the situation is emergent. Pt verbalizes understanding.

## 2019-07-02 ENCOUNTER — PATIENT OUTREACH (OUTPATIENT)
Dept: CARDIOLOGY CLINIC | Age: 79
End: 2019-07-02

## 2019-07-02 NOTE — TELEPHONE ENCOUNTER
Plaquenil   Received: 6 days ago   Message Contents   DO Demario Marshall Poll   Caller: Unspecified (1 week ago)             This lady could go on Plaquenil if her current medications don't show any interaction and she gets an EKG within a week to see if any QT prolongation since Torsades is a possibility with this drug. ES          Check patient's medication list against Plaquenil on the drug interaction . No interactions found. Called Thea at rheumatology and informed her. Scheduled EKG for 7/24/19.

## 2019-07-02 NOTE — PROGRESS NOTES
NN contacted the patient by telephone to perform CHF follow Up. Noted Priorities/Plan:  Daily weights, returning to baseline Daily Weight: Pt weighs 195 lbs today. Pt weighed 195 lbs on last outreach on 6/25/19 Zone: green Signs/Symptoms: Edema none; SOB none; orthopnea none. Pt states feeling well and no issues at this time. Goals  Attends follow-up appointments as directed. 5/28: Patient aware of upcoming appts with Tree Fairbanks, NP and cardiology. Patient made aware of appt with Dr. Dash Vieyra, nephrology. 5/30: Patient attended PCP appt as scheduled.  Maintains daily weight.   
  5/28: Educated patient on reason/importance of daily weight. Reviewed proper technique and what to report. 6/3/19 pt reports weighing daily  Prevent complications post hospitalization. Daily weights Adhering to low salt diet Monitoring and reporting s/s of CHF exacerbation Medication adherence Needs addressed from pathway:  
Week 5-8 Provide Daily Disease Management (patient/caregiver initiated) ? Reviewed and reinforced importance of Daily weight (Before Breakfast 
? Reviewed Daily Zone Identification (symptom management; weight, edema, SOB, activity/sleep changes)-notify provider immediately as indicated ? Cardiac Low-sodium Diet (No added sodium; 1500mg as indicated) also educated on carbohydrate controlled diet ? Reviewed importance of Fluid restriction ? Comorbidity Management ? Confirmed follow-up appointments/transportation. Additional Assessments ? Reinforced Fall precautions ? Activity tolerance assessment: pt reports at baseline ? Reviewed Energy conservation management and balancing activity w/ rest 
? Labs/diagnostics per MD office ? Medication Therapy: no issues identified ? Diet/appetite assessment: no issues noted ? Reviewed appropriate ED/Hospital utilization ? Immunizations up to date Pneumonia       Flu 
 ? Home re-assessment/modifications: no needs identified Psychosocial: Reassurance and emotional support; depression screening. Monitoring: ? My Chart Education: 
? Advanced Care Planning status reviewed ? Patient/Caregiver verifies support systems (meal planning, medication and transportation needs, community resources) ? Health literacy for heart failure Have you been to an ER/Hospital since discharge from [facility]? No   
 
Have you followed up with PCP/Cardiologist/Specialist?  
19 PCP appt w/ Dr Mildred Fraga 19 Card appt w/ LESIA Hoskins 
19 Vascular appt w/ CELESTINE Temple 
19 Rheumatology appt w/ Dr Dee Dee Rashid [facility]19 Card appt w/ Dr Neelam Valentin 19 PCP appt w/ DEBRA Holloway Transportation:  family/friends Diet: cardiac/no salt added Activity/ADLs: self. Medications Reconciled at this time:  no changes since last PCP visit Home health:  Company/Completion: n/a Social Support: family/friends Advanced Care Plan: on file, reviewed, and current Patient reminded that there is a physician on call 24 hours a day / 7 days a week (M-F 5pm to 8am and from Friday 5pm until Monday 8a for the weekend) should the patient have questions or concerns. Patient reminded to call 911 if situation is emergent or patient feels the situation is emergent. Pt verbalizes understanding.

## 2019-07-10 ENCOUNTER — PATIENT OUTREACH (OUTPATIENT)
Dept: CARDIOLOGY CLINIC | Age: 79
End: 2019-07-10

## 2019-07-10 NOTE — PROGRESS NOTES
NN contacted the patient by telephone to perform CHF follow Up. Noted Priorities/Plan:  Daily weights, returning to baseline Daily Weight: Pt weighs 194 lbs today. Pt weighed 195 lbs on last outreach on 7/2/19 Zone: green Signs/Symptoms: Edema none; SOB none; orthopnea none. Pt states feeling well and no issues at this time. Goals  Attends follow-up appointments as directed. 5/28: Patient aware of upcoming appts with Akua Willard, NP and cardiology. Patient made aware of appt with Dr. Cassandra Keith, nephrology. 5/30: Patient attended PCP appt as scheduled.  Maintains daily weight.   
  5/28: Educated patient on reason/importance of daily weight. Reviewed proper technique and what to report. 6/3/19 pt reports weighing daily  Prevent complications post hospitalization. Daily weights Adhering to low salt diet Monitoring and reporting s/s of CHF exacerbation Medication adherence Needs addressed from pathway:  
Week 5-8 Provide Daily Disease Management (patient/caregiver initiated) ? Reviewed and reinforced importance of Daily weight (Before Breakfast 
? Reviewed Daily Zone Identification (symptom management; weight, edema, SOB, activity/sleep changes)-notify provider immediately as indicated ? Cardiac Low-sodium Diet (No added sodium; 1500mg as indicated) also educated on carbohydrate controlled diet ? Reviewed importance of Fluid restriction ? Comorbidity Management ? Confirmed follow-up appointments/transportation. Additional Assessments ? Reinforced Fall precautions ? Activity tolerance assessment: pt reports at baseline ? Reviewed Energy conservation management and balancing activity w/ rest 
? Labs/diagnostics per MD office ? Medication Therapy: no issues identified ? Diet/appetite assessment: no issues noted ? Reviewed appropriate ED/Hospital utilization ? Immunizations up to date Pneumonia       Flu 
 ? Home re-assessment/modifications: no needs identified Psychosocial: Reassurance and emotional support; depression screening. Monitoring: ? My Chart Education: 
? Advanced Care Planning status reviewed ? Patient/Caregiver verifies support systems (meal planning, medication and transportation needs, community resources) ? Health literacy for heart failure Have you been to an ER/Hospital since discharge from [facility]? No   
 
Have you followed up with PCP/Cardiologist/Specialist?  
19 PCP appt w/ Dr Stephanie Clements 19 Card appt w/ Steff Madera B. 
19 Vascular appt w/ CELESTINE Gomez 
19 Rheumatology appt w/ Dr Maria Isabel Pérez [facility]19 Card appt w/ Dr Alicia Mcclellan 19 PCP appt w/ DEBRA Henley Transportation:  family/friends Diet: cardiac/no salt added Activity/ADLs: self. Medications Reconciled at this time:  no changes since last PCP visit Home health:  Company/Completion: n/a Social Support: family/friends Advanced Care Plan: on file, reviewed, and current Patient reminded that there is a physician on call 24 hours a day / 7 days a week (M-F 5pm to 8am and from Friday 5pm until Monday 8a for the weekend) should the patient have questions or concerns. Patient reminded to call 911 if situation is emergent or patient feels the situation is emergent. Pt verbalizes understanding.

## 2019-07-14 ENCOUNTER — HOSPITAL ENCOUNTER (EMERGENCY)
Age: 79
Discharge: HOME | End: 2019-07-15
Attending: EMERGENCY MEDICINE
Payer: MEDICARE

## 2019-07-14 ENCOUNTER — APPOINTMENT (OUTPATIENT)
Dept: GENERAL RADIOLOGY | Age: 79
End: 2019-07-14
Attending: EMERGENCY MEDICINE
Payer: MEDICARE

## 2019-07-14 DIAGNOSIS — M54.50 ACUTE RIGHT-SIDED LOW BACK PAIN WITHOUT SCIATICA: ICD-10-CM

## 2019-07-14 DIAGNOSIS — M79.18 MUSCULOSKELETAL PAIN: Primary | ICD-10-CM

## 2019-07-14 LAB
ALBUMIN SERPL-MCNC: 3.3 G/DL (ref 3.4–5)
ALBUMIN/GLOB SERPL: 0.8 {RATIO} (ref 0.8–1.7)
ALP SERPL-CCNC: 95 U/L (ref 45–117)
ALT SERPL-CCNC: 12 U/L (ref 13–56)
ANION GAP SERPL CALC-SCNC: 9 MMOL/L (ref 3–18)
AST SERPL-CCNC: 11 U/L (ref 15–37)
BASOPHILS # BLD: 0 K/UL (ref 0–0.1)
BASOPHILS NFR BLD: 0 % (ref 0–2)
BILIRUB SERPL-MCNC: 0.5 MG/DL (ref 0.2–1)
BUN SERPL-MCNC: 42 MG/DL (ref 7–18)
BUN/CREAT SERPL: 20 (ref 12–20)
CALCIUM SERPL-MCNC: 9.9 MG/DL (ref 8.5–10.1)
CHLORIDE SERPL-SCNC: 103 MMOL/L (ref 100–108)
CK MB CFR SERPL CALC: NORMAL % (ref 0–4)
CK MB SERPL-MCNC: <1 NG/ML (ref 5–25)
CK SERPL-CCNC: 35 U/L (ref 26–192)
CO2 SERPL-SCNC: 22 MMOL/L (ref 21–32)
CREAT SERPL-MCNC: 2.1 MG/DL (ref 0.6–1.3)
DIFFERENTIAL METHOD BLD: ABNORMAL
EOSINOPHIL # BLD: 0.1 K/UL (ref 0–0.4)
EOSINOPHIL NFR BLD: 1 % (ref 0–5)
ERYTHROCYTE [DISTWIDTH] IN BLOOD BY AUTOMATED COUNT: 14.4 % (ref 11.6–14.5)
GLOBULIN SER CALC-MCNC: 4.4 G/DL (ref 2–4)
GLUCOSE SERPL-MCNC: 112 MG/DL (ref 74–99)
HCT VFR BLD AUTO: 32.3 % (ref 35–45)
HGB BLD-MCNC: 10.5 G/DL (ref 12–16)
LIPASE SERPL-CCNC: 116 U/L (ref 73–393)
LYMPHOCYTES # BLD: 1.4 K/UL (ref 0.9–3.6)
LYMPHOCYTES NFR BLD: 16 % (ref 21–52)
MCH RBC QN AUTO: 27.3 PG (ref 24–34)
MCHC RBC AUTO-ENTMCNC: 32.5 G/DL (ref 31–37)
MCV RBC AUTO: 83.9 FL (ref 74–97)
MONOCYTES # BLD: 0.7 K/UL (ref 0.05–1.2)
MONOCYTES NFR BLD: 9 % (ref 3–10)
NEUTS SEG # BLD: 6.4 K/UL (ref 1.8–8)
NEUTS SEG NFR BLD: 74 % (ref 40–73)
PLATELET # BLD AUTO: 251 K/UL (ref 135–420)
PMV BLD AUTO: 9.6 FL (ref 9.2–11.8)
POTASSIUM SERPL-SCNC: 3.7 MMOL/L (ref 3.5–5.5)
PROT SERPL-MCNC: 7.7 G/DL (ref 6.4–8.2)
RBC # BLD AUTO: 3.85 M/UL (ref 4.2–5.3)
SODIUM SERPL-SCNC: 134 MMOL/L (ref 136–145)
TROPONIN I SERPL-MCNC: <0.02 NG/ML (ref 0–0.04)
WBC # BLD AUTO: 8.6 K/UL (ref 4.6–13.2)

## 2019-07-14 PROCEDURE — 71046 X-RAY EXAM CHEST 2 VIEWS: CPT

## 2019-07-14 PROCEDURE — 80053 COMPREHEN METABOLIC PANEL: CPT

## 2019-07-14 PROCEDURE — 83690 ASSAY OF LIPASE: CPT

## 2019-07-14 PROCEDURE — 85025 COMPLETE CBC W/AUTO DIFF WBC: CPT

## 2019-07-14 PROCEDURE — 99284 EMERGENCY DEPT VISIT MOD MDM: CPT

## 2019-07-14 PROCEDURE — 93005 ELECTROCARDIOGRAM TRACING: CPT

## 2019-07-14 PROCEDURE — 82550 ASSAY OF CK (CPK): CPT

## 2019-07-15 ENCOUNTER — APPOINTMENT (OUTPATIENT)
Dept: CT IMAGING | Age: 79
End: 2019-07-15
Attending: NURSE PRACTITIONER
Payer: MEDICARE

## 2019-07-15 VITALS
RESPIRATION RATE: 14 BRPM | HEIGHT: 64 IN | BODY MASS INDEX: 32.44 KG/M2 | OXYGEN SATURATION: 100 % | TEMPERATURE: 98.9 F | WEIGHT: 190 LBS | SYSTOLIC BLOOD PRESSURE: 168 MMHG | DIASTOLIC BLOOD PRESSURE: 72 MMHG | HEART RATE: 80 BPM

## 2019-07-15 LAB
AMORPH CRY URNS QL MICRO: ABNORMAL
APPEARANCE UR: CLEAR
ATRIAL RATE: 69 BPM
BACTERIA URNS QL MICRO: ABNORMAL /HPF
BILIRUB UR QL: NEGATIVE
CALCULATED P AXIS, ECG09: 3 DEGREES
CALCULATED R AXIS, ECG10: -65 DEGREES
CALCULATED T AXIS, ECG11: 111 DEGREES
COLOR UR: YELLOW
DIAGNOSIS, 93000: NORMAL
EPITH CASTS URNS QL MICRO: ABNORMAL /LPF (ref 0–5)
GLUCOSE UR STRIP.AUTO-MCNC: NEGATIVE MG/DL
HGB UR QL STRIP: NEGATIVE
HYALINE CASTS URNS QL MICRO: ABNORMAL /LPF (ref 0–2)
KETONES UR QL STRIP.AUTO: NEGATIVE MG/DL
LEUKOCYTE ESTERASE UR QL STRIP.AUTO: NEGATIVE
NITRITE UR QL STRIP.AUTO: NEGATIVE
P-R INTERVAL, ECG05: 180 MS
PH UR STRIP: 5 [PH] (ref 5–8)
PROT UR STRIP-MCNC: 100 MG/DL
Q-T INTERVAL, ECG07: 484 MS
QRS DURATION, ECG06: 218 MS
QTC CALCULATION (BEZET), ECG08: 518 MS
RBC #/AREA URNS HPF: NEGATIVE /HPF (ref 0–5)
SP GR UR REFRACTOMETRY: 1.01 (ref 1–1.03)
UROBILINOGEN UR QL STRIP.AUTO: 0.2 EU/DL (ref 0.2–1)
VENTRICULAR RATE, ECG03: 69 BPM
WBC URNS QL MICRO: ABNORMAL /HPF (ref 0–4)

## 2019-07-15 PROCEDURE — 96375 TX/PRO/DX INJ NEW DRUG ADDON: CPT

## 2019-07-15 PROCEDURE — 96374 THER/PROPH/DIAG INJ IV PUSH: CPT

## 2019-07-15 PROCEDURE — 74011250636 HC RX REV CODE- 250/636: Performed by: NURSE PRACTITIONER

## 2019-07-15 PROCEDURE — 74011250637 HC RX REV CODE- 250/637: Performed by: NURSE PRACTITIONER

## 2019-07-15 PROCEDURE — 74176 CT ABD & PELVIS W/O CONTRAST: CPT

## 2019-07-15 PROCEDURE — 81001 URINALYSIS AUTO W/SCOPE: CPT

## 2019-07-15 RX ORDER — METHOCARBAMOL 500 MG/1
500 TABLET, FILM COATED ORAL 4 TIMES DAILY
Qty: 20 TAB | Refills: 0 | Status: SHIPPED | OUTPATIENT
Start: 2019-07-15 | End: 2019-07-24

## 2019-07-15 RX ORDER — ONDANSETRON 2 MG/ML
8 INJECTION INTRAMUSCULAR; INTRAVENOUS
Status: COMPLETED | OUTPATIENT
Start: 2019-07-15 | End: 2019-07-15

## 2019-07-15 RX ORDER — ONDANSETRON 2 MG/ML
INJECTION INTRAMUSCULAR; INTRAVENOUS
Status: DISCONTINUED
Start: 2019-07-15 | End: 2019-07-15 | Stop reason: HOSPADM

## 2019-07-15 RX ORDER — MORPHINE SULFATE 4 MG/ML
4 INJECTION INTRAVENOUS
Status: COMPLETED | OUTPATIENT
Start: 2019-07-15 | End: 2019-07-15

## 2019-07-15 RX ORDER — OXYCODONE AND ACETAMINOPHEN 5; 325 MG/1; MG/1
1 TABLET ORAL
Qty: 15 TAB | Refills: 0 | Status: SHIPPED | OUTPATIENT
Start: 2019-07-15 | End: 2019-07-20

## 2019-07-15 RX ORDER — OXYCODONE AND ACETAMINOPHEN 5; 325 MG/1; MG/1
2 TABLET ORAL
Status: COMPLETED | OUTPATIENT
Start: 2019-07-15 | End: 2019-07-15

## 2019-07-15 RX ADMIN — OXYCODONE HYDROCHLORIDE AND ACETAMINOPHEN 2 TABLET: 5; 325 TABLET ORAL at 05:49

## 2019-07-15 RX ADMIN — ONDANSETRON 8 MG: 2 INJECTION INTRAMUSCULAR; INTRAVENOUS at 01:30

## 2019-07-15 RX ADMIN — MORPHINE SULFATE 4 MG: 4 INJECTION INTRAVENOUS at 01:30

## 2019-07-15 NOTE — DISCHARGE INSTRUCTIONS
Patient Education        Back Pain: Care Instructions  Your Care Instructions    Back pain has many possible causes. It is often related to problems with muscles and ligaments of the back. It may also be related to problems with the nerves, discs, or bones of the back. Moving, lifting, standing, sitting, or sleeping in an awkward way can strain the back. Sometimes you don't notice the injury until later. Arthritis is another common cause of back pain. Although it may hurt a lot, back pain usually improves on its own within several weeks. Most people recover in 12 weeks or less. Using good home treatment and being careful not to stress your back can help you feel better sooner. Follow-up care is a key part of your treatment and safety. Be sure to make and go to all appointments, and call your doctor if you are having problems. It's also a good idea to know your test results and keep a list of the medicines you take. How can you care for yourself at home? · Sit or lie in positions that are most comfortable and reduce your pain. Try one of these positions when you lie down:  ? Lie on your back with your knees bent and supported by large pillows. ? Lie on the floor with your legs on the seat of a sofa or chair. ? Lie on your side with your knees and hips bent and a pillow between your legs. ? Lie on your stomach if it does not make pain worse. · Do not sit up in bed, and avoid soft couches and twisted positions. Bed rest can help relieve pain at first, but it delays healing. Avoid bed rest after the first day of back pain. · Change positions every 30 minutes. If you must sit for long periods of time, take breaks from sitting. Get up and walk around, or lie in a comfortable position. · Try using a heating pad on a low or medium setting for 15 to 20 minutes every 2 or 3 hours. Try a warm shower in place of one session with the heating pad. · You can also try an ice pack for 10 to 15 minutes every 2 to 3 hours. Put a thin cloth between the ice pack and your skin. · Take pain medicines exactly as directed. ? If the doctor gave you a prescription medicine for pain, take it as prescribed. ? If you are not taking a prescription pain medicine, ask your doctor if you can take an over-the-counter medicine. · Take short walks several times a day. You can start with 5 to 10 minutes, 3 or 4 times a day, and work up to longer walks. Walk on level surfaces and avoid hills and stairs until your back is better. · Return to work and other activities as soon as you can. Continued rest without activity is usually not good for your back. · To prevent future back pain, do exercises to stretch and strengthen your back and stomach. Learn how to use good posture, safe lifting techniques, and proper body mechanics. When should you call for help? Call your doctor now or seek immediate medical care if:    · You have new or worsening numbness in your legs.     · You have new or worsening weakness in your legs. (This could make it hard to stand up.)     · You lose control of your bladder or bowels.    Watch closely for changes in your health, and be sure to contact your doctor if:    · You have a fever, lose weight, or don't feel well.     · You do not get better as expected. Where can you learn more? Go to http://jacquelyn-alberto.info/. Enter C346 in the search box to learn more about \"Back Pain: Care Instructions. \"  Current as of: September 20, 2018  Content Version: 11.9  © 0182-1794 [employer], [employer]orporated. Care instructions adapted under license by [employer] (which disclaims liability or warranty for this information). If you have questions about a medical condition or this instruction, always ask your healthcare professional. [place] [address] any warranty or liability for your use of this information.          Patient Education        Learning About Low Back Pain  What is low back pain?  Low back pain is pain that can occur anywhere below the ribs and above the legs. It is very common. Almost everyone has it at one time or another. Low back pain can be:  Acute. This is new pain that can last a few days to a few weeks--at the most a few months. Chronic. This pain can last for more than a few months. Sometimes it can last for years. What are some myths about low back pain? Here are some common myths about low back pain--and the facts:  Myth: \"I need to rest my back when I have back pain. \"  Fact: Staying active won't hurt you. It may help you get better faster. Myth: \"I need prescription pain medicine. \"  Fact: It's best to try to let time and being active heal your back. Opioid pain medicines--such as hydrocodone or oxycodone--usually don't work any better than over-the-counter medicines like ibuprofen or naproxen. And opioids can cause serious problems like addiction or overdose. Myth: \"I need a test like an X-ray or an MRI to diagnose my low back pain. \"  Fact: Getting a test right away won't help you get better faster. And it could lead you down a treatment path you may not need, since most people get better on their own. What causes low back pain? In most cases, there isn't a clear cause. This can be frustrating, because your back hurts and there's no obvious reason. Your back pain can be caused by:  Overuse or muscle strain. This can happen from playing sports, lifting heavy things, or not being physically fit. A herniated disc. This is a problem with the cushion between the bones in your back. Arthritis. With age, you may have changes in your bones that can narrow the space around your nerves. Other causes. In rare cases, the cause is a serious illness like an infection or cancer. But there are usually other symptoms too. What are the symptoms? Your symptoms depend on your body and the cause of your back pain. You may feel:  · Pain that's sharp or dull.  It may be in one small area or over a broad area. But even bad pain doesn't mean that it's caused by something serious. · Leg pain, numbness, or tingling. When a nerve gets squeezed--such as from a disc problem or arthritis--you may have symptoms in your leg or foot. You can even have leg symptoms from a back problem without having any pain in your back. How is low back pain diagnosed? A physical exam is the main way to diagnose low back pain. Your doctor may examine your back, check your nerves by testing your reflexes, and make sure that your muscles are strong. He or she also will ask questions about your back and overall health. Most people don't need any tests right away. Tests often don't show the reason for your pain. If your pain lasts more than 6 weeks or you have symptoms that your doctor is more concerned about, he or she may order tests. These may include an X-ray, a CT scan, or an MRI. In some cases, tests can help your doctor find a cause for your pain, especially for pain in one or both legs. How is low back pain treated? Most acute low back pain gets better on its own within a few weeks, no matter what the cause. Time and doing usual activities are all that most people need to feel better. Using heat or ice and taking over-the-counter pain medicine also can help while your body heals. If you aren't getting better on your own or your pain is very bad, your doctor may recommend:  · Physical therapy. · Spinal manipulation, such as by a chiropractor. · Acupuncture. · Massage. · Injections of steroid medicine in your back (especially for pain that involves your legs). If you have chronic low back pain, treatment will help you understand and manage your pain. Treatment may include:  · Staying active. This may include walking or doing back exercises. · Physical therapy. · Medicines. Some of these medicines are also used for other problems, like depression. · Pain management.  Your doctor may have you see a pain specialist.  · Counseling. Having chronic pain can be hard. It may help to talk to someone who can help you cope with your pain. Surgery isn't needed for most people. But it may help some types of low back pain. Follow-up care is a key part of your treatment and safety. Be sure to make and go to all appointments, and call your doctor if you are having problems. It's also a good idea to know your test results and keep a list of the medicines you take. When should you call for help? Call 911 anytime you think you may need emergency care. For example, call if:  · You can't move a leg at all. Call your doctor now or seek immediate medical care if:  · You have new or worse symptoms in your legs, belly, or buttocks. Symptoms may include:  ? Numbness or tingling. ? Weakness. ? Pain. · You lose bladder or bowel control. Watch closely for changes in your health, and be sure to contact your doctor if:  · Along with the back pain, you have a fever, lose weight, or don't feel well. · You do not get better as expected. Where can you learn more? Go to http://jacquelyn-alberto.info/. Enter A007 in the search box to learn more about \"Learning About Low Back Pain. \"  Current as of: September 20, 2018  Content Version: 11.9  © 8703-0607 [employer]. Care instructions adapted under license by [employer] (which disclaims liability or warranty for this information). If you have questions about a medical condition or this instruction, always ask your healthcare professional. [place] [address] any warranty or liability for your use of this information.

## 2019-07-15 NOTE — ED PROVIDER NOTES
12:17 AM Chadwick Jimenez is a 70 y.o. female with a history of heart disease hypertension CVA and occasional back pain who presents to ED with a complaint of right flank pain that radiates around to her right abdomen no nausea vomiting or diarrhea no fever reported she states the pain started last night she has not taken anything for the pain No other complaints, associated symptoms or modifying factors at this time. PCP: Brooklynn Stacy MD 
 
 
The history is provided by the patient. No  was used. Flank Pain This is a new problem. The current episode started 12 to 24 hours ago. The problem has not changed since onset. The problem occurs constantly. The pain is associated with no known injury. The pain is present in the lumbar spine. The quality of the pain is described as stabbing and shooting. The pain radiates to the right groin. The pain is at a severity of 7/10. The pain is moderate. The symptoms are aggravated by twisting. Associated symptoms include abdominal pain. Pertinent negatives include no chest pain, no fever, no numbness, no headaches, no abdominal swelling, no bowel incontinence, no perianal numbness, no bladder incontinence, no dysuria, no pelvic pain, no leg pain, no paresthesias, no paresis, no tingling and no weakness. She has tried nothing for the symptoms. Past Medical History:  
Diagnosis Date  Abnormal ankle brachial index 11/13/2014 Mild arterial disease in right leg. R VICTOR MANUEL 0.88. L VICTOR MANUEL 1.00.  Anemia  Arm DVT (deep venous thromboembolism), acute (HCC)   
 s/p anticoagulation  Atherosclerotic heart disease  Atrial fibrillation ([address]  AV block  CAD (coronary artery disease)  Cardiomyopathy, ischemic  Carotid artery stenosis  Carotid duplex 11/13/2014 Mild <50% bilateral ICA plaquing. Possible left vertebral occlusion.  Cerebral artery occlusion with cerebral infarction ([address]  stroke x 2  
  Chest pain  CVA (cerebral infarction)  Echocardiogram 2015 EF 55%. Apical inferior, apical septal hypk (new since study of 11), likely due to chronic V-pacing. Mild LVH. RVSP 30 mmHg. Mild LAE. Mild MR. Mild TR.  Gastritis  GERD (gastroesophageal reflux disease)  Heart failure [facility]  Hiatal hernia  Hyperlipidemia  Hypertension  Hypertensive cardiovascular disease  Intracranial aneurysm   
 left cavernous ICA 2mm aneurysm from head/neck CTA in   Long term (current) use of anticoagulants 3/10/2011  Lower extremity venous duplex 2015 No DVT bilaterally.  Nuclear cardiac imaging test 2015 Low risk. Sm apical infarction w/no ischemia vs apical thinning. Sm basal inferior defect, likely artifact. EF 56%. Inferoseptal, inferoapical WMA related to sternotomy or paced rhythm.  Pacemaker  PAD (peripheral artery disease) ([facility]  PVC's   
 chronic  S/P CABG x 3 2008 LIMA-LAD. SVG-OM. SVG-dRCA  S/P cardiac cath 2008 pRCA 100%. LM patent. Cx patent. OM1 100%. mLAD 95%. LVEDP 27-29mmHg. EF 20%. mod MR  
 Tilt table evaluation 1995 Positive for orthostatic hypotension  Vitamin D deficiency Past Surgical History:  
Procedure Laterality Date  CABG, ARTERY-VEIN, THREE  2008  CARDIAC SURG PROCEDURE UNLIST    
 medtronic dual-chamber cardioverted-defibrillator  HX  SECTION    
 x2  
 HX ENDOSCOPY  3/1/16  HX ENDOSCOPY  3/1/16 [address] [facility] [address]  HX HYSTERECTOMY   HX ORTHOPAEDIC    
 knee surgery  HX OTHER SURGICAL  2/10/16 Pacemaker Gen Change  HX PACEMAKER Defibrillator   HX TUMOR REMOVAL    
 removed from arm.  benign History reviewed. No pertinent family history. Social History Socioeconomic History  Marital status:  Spouse name: Not on file  Number of children: Not on file  Years of education: Not on file  Highest education level: Not on file Occupational History  Not on file Social Needs  Financial resource strain: Not on file  Food insecurity:  
  Worry: Not on file Inability: Not on file  Transportation needs:  
  Medical: Not on file Non-medical: Not on file Tobacco Use  Smoking status: Never Smoker  Smokeless tobacco: Never Used  Tobacco comment: just smoked 2 weeks in college Substance and Sexual Activity  Alcohol use: No  
 Drug use: No  
 Sexual activity: Never Lifestyle  Physical activity:  
  Days per week: Not on file Minutes per session: Not on file  Stress: Not on file Relationships  Social connections:  
  Talks on phone: Not on file Gets together: Not on file Attends Hoahaoism service: Not on file Active member of club or organization: Not on file Attends meetings of clubs or organizations: Not on file Relationship status: Not on file  Intimate partner violence:  
  Fear of current or ex partner: Not on file Emotionally abused: Not on file Physically abused: Not on file Forced sexual activity: Not on file Other Topics Concern [address] Service Not Asked  Blood Transfusions Not Asked  Caffeine Concern Not Asked  Occupational Exposure Not Asked [place] Hazards Not Asked  Sleep Concern Not Asked  Stress Concern Not Asked  Weight Concern Not Asked  Special Diet Not Asked  Back Care Not Asked  Exercise Not Asked  Bike Helmet Not Asked  Seat Belt Not Asked  Self-Exams Not Asked Social History Narrative  Not on file ALLERGIES: Nifedipine; Ace inhibitors; Codeine; Hydralazine; Lipitor [atorvastatin]; and Simvastatin Review of Systems Constitutional: Negative for fever. Cardiovascular: Negative for chest pain. Gastrointestinal: Positive for abdominal pain. Negative for bowel incontinence. Genitourinary: Positive for flank pain. Negative for bladder incontinence, dysuria and pelvic pain. Neurological: Negative for tingling, weakness, numbness, headaches and paresthesias. All other systems reviewed and are negative. Vitals:  
 07/14/19 2019 07/15/19 0133 07/15/19 0321 BP: 160/78 (!) 186/93 168/72 Pulse: 75 76 80 Resp: 18 14 14 Temp: 98.2 °F (36.8 °C)  98.9 °F (37.2 °C) SpO2: 100% 100% 100% Weight: 86.2 kg (190 lb) Height: 5' 4\" (1.626 m) Physical Exam  
Constitutional: She is oriented to person, place, and time. She appears well-developed and well-nourished. HENT:  
Head: Normocephalic and atraumatic. Eyes: Pupils are equal, round, and reactive to light. Conjunctivae and EOM are normal.  
Neck: Normal range of motion. Neck supple. Cardiovascular: Normal rate and regular rhythm. Pulmonary/Chest: Effort normal and breath sounds normal.  
Abdominal: Soft. Bowel sounds are normal. There is tenderness in the right lower quadrant. There is CVA tenderness. Musculoskeletal: Normal range of motion. Neurological: She is alert and oriented to person, place, and time. She has normal reflexes. Skin: Skin is warm and dry. Psychiatric: She has a normal mood and affect. Her behavior is normal. Judgment and thought content normal.  
Nursing note and vitals reviewed. MDM Number of Diagnoses or Management Options Acute right-sided low back pain without sciatica:  
Musculoskeletal pain:  
Diagnosis management comments: Patient with right flank pain that radiates around to her right abdomen I will order labs and CT of her abdomen patient's creatinine is elevated tonight I will get it without contrast. 
 
CT of abdomen is negative for any acute finding labs are negative and UA is negative only finding that was a positive finding was the creatinine was slightly elevated patient's creatinine does stay slightly elevated patient was given a 1 L bolus of normal saline solution. I suspect pain is related to musculoskeletal origin. Patient will be given Percocet for pain here in the emergency department given a prescription for Percocet and Robaxin she is to follow-up with her doctor in 1 to 2 days. Amount and/or Complexity of Data Reviewed Clinical lab tests: ordered and reviewed Review and summarize past medical records: yes Independent visualization of images, tracings, or specimens: yes Risk of Complications, Morbidity, and/or Mortality Presenting problems: moderate Diagnostic procedures: moderate Management options: moderate Patient Progress Patient progress: stable Procedures Vitals: 
Patient Vitals for the past 12 hrs: 
 Temp Pulse Resp BP SpO2  
07/15/19 0321 98.9 °F (37.2 °C) 80 14 168/72 100 % 07/15/19 0133  76 14 (!) 186/93 100 % 07/14/19 2019 98.2 °F (36.8 °C) 75 18 160/78 100 % Medications ordered:  
Medications  
ondansetron (ZOFRAN) 4 mg/2 mL injection (has no administration in time range)  
sodium chloride 0.9 % bolus infusion 1,000 mL (has no administration in time range)  
oxyCODONE-acetaminophen (PERCOCET) 5-325 mg per tablet 2 Tab (has no administration in time range) morphine injection 4 mg (4 mg IntraVENous Given 7/15/19 0130) ondansetron (ZOFRAN) injection 8 mg (8 mg IntraVENous Given 7/15/19 0130) Lab findings: 
Recent Results (from the past 12 hour(s)) EKG, 12 LEAD, INITIAL Collection Time: 07/14/19  8:44 PM  
Result Value Ref Range Ventricular Rate 69 BPM  
 Atrial Rate 69 BPM  
 P-R Interval 180 ms QRS Duration 218 ms Q-T Interval 484 ms QTC Calculation (Bezet) 518 ms Calculated P Axis 3 degrees Calculated R Axis -65 degrees Calculated T Axis 111 degrees Diagnosis Atrial-sensed ventricular-paced rhythm Abnormal ECG When compared with ECG of 21-MAY-2019 08:58, 
Previous ECG has undetermined rhythm, needs review CBC WITH AUTOMATED DIFF Collection Time: 07/14/19  8:56 PM  
Result Value Ref Range WBC 8.6 4.6 - 13.2 K/uL  
 RBC 3.85 (L) 4.20 - 5.30 M/uL  
 HGB 10.5 (L) 12.0 - 16.0 g/dL HCT 32.3 (L) 35.0 - 45.0 % MCV 83.9 74.0 - 97.0 FL  
 MCH 27.3 24.0 - 34.0 PG  
 MCHC 32.5 31.0 - 37.0 g/dL  
 RDW 14.4 11.6 - 14.5 % PLATELET 443 998 - 211 K/uL MPV 9.6 9.2 - 11.8 FL  
 NEUTROPHILS 74 (H) 40 - 73 % LYMPHOCYTES 16 (L) 21 - 52 % MONOCYTES 9 3 - 10 % EOSINOPHILS 1 0 - 5 % BASOPHILS 0 0 - 2 %  
 ABS. NEUTROPHILS 6.4 1.8 - 8.0 K/UL  
 ABS. LYMPHOCYTES 1.4 0.9 - 3.6 K/UL  
 ABS. MONOCYTES 0.7 0.05 - 1.2 K/UL  
 ABS. EOSINOPHILS 0.1 0.0 - 0.4 K/UL  
 ABS. BASOPHILS 0.0 0.0 - 0.1 K/UL  
 DF AUTOMATED METABOLIC PANEL, COMPREHENSIVE Collection Time: 07/14/19  8:56 PM  
Result Value Ref Range Sodium 134 (L) 136 - 145 mmol/L Potassium 3.7 3.5 - 5.5 mmol/L Chloride 103 100 - 108 mmol/L  
 CO2 22 21 - 32 mmol/L Anion gap 9 3.0 - 18 mmol/L Glucose 112 (H) 74 - 99 mg/dL BUN 42 (H) 7.0 - 18 MG/DL Creatinine 2.10 (H) 0.6 - 1.3 MG/DL  
 BUN/Creatinine ratio 20 12 - 20 GFR est AA 28 (L) >60 ml/min/1.73m2 GFR est non-AA 23 (L) >60 ml/min/1.73m2 Calcium 9.9 8.5 - 10.1 MG/DL Bilirubin, total 0.5 0.2 - 1.0 MG/DL  
 ALT (SGPT) 12 (L) 13 - 56 U/L  
 AST (SGOT) 11 (L) 15 - 37 U/L Alk. phosphatase 95 45 - 117 U/L Protein, total 7.7 6.4 - 8.2 g/dL Albumin 3.3 (L) 3.4 - 5.0 g/dL Globulin 4.4 (H) 2.0 - 4.0 g/dL A-G Ratio 0.8 0.8 - 1.7 LIPASE Collection Time: 07/14/19  8:56 PM  
Result Value Ref Range Lipase 116 73 - 393 U/L  
CARDIAC PANEL,(CK, CKMB & TROPONIN) Collection Time: 07/14/19  8:56 PM  
Result Value Ref Range CK 35 26 - 192 U/L  
 CK - MB <1.0 <3.6 ng/ml CK-MB Index  0.0 - 4.0 % CALCULATION NOT PERFORMED WHEN RESULT IS BELOW LINEAR LIMIT  Troponin-I, QT <0.02 0.0 - 0.045 NG/ML  
URINALYSIS W/ RFLX MICROSCOPIC  
 Collection Time: 07/15/19  5:09 AM  
Result Value Ref Range Color YELLOW Appearance CLEAR Specific gravity 1.013 1.005 - 1.030    
 pH (UA) 5.0 5.0 - 8.0 Protein 100 (A) NEG mg/dL Glucose NEGATIVE  NEG mg/dL Ketone NEGATIVE  NEG mg/dL Bilirubin NEGATIVE  NEG Blood NEGATIVE  NEG Urobilinogen 0.2 0.2 - 1.0 EU/dL Nitrites NEGATIVE  NEG Leukocyte Esterase NEGATIVE  NEG    
 
 
 
X-Ray, CT or other radiology findings or impressions: 
CT ABD PELV WO CONT Final Result IMPRESSION:   
1. No evidence of urolithiasis or obstructive uropathy. 2.  Opacities noted in the lung bases which may represent atelectasis or  
infiltrate. Correlate with clinical evaluation. 3. Unchanged infrarenal abdominal aortic aneurysm. XR CHEST PA LAT    (Results Pending) Disposition: 
 
Diagnosis: 1. Musculoskeletal pain 2. Acute right-sided low back pain without sciatica Disposition: to Home Follow-up Information Follow up With Specialties Details Why Contact Info Julian Kaur MD Family Practice In 2 days  [id number] 
[phone] Patient's Medications Start Taking METHOCARBAMOL (ROBAXIN) 500 MG TABLET    Take 1 Tab by mouth four (4) times daily. OXYCODONE-ACETAMINOPHEN (PERCOCET) 5-325 MG PER TABLET    Take 1 Tab by mouth every four (4) hours as needed for Pain for up to 5 days. Max Daily Amount: 6 Tabs. Continue Taking ALLOPURINOL (ZYLOPRIM) 100 MG TABLET    Take 1 Tab by mouth daily. AMLODIPINE (NORVASC) 5 MG TABLET    Take 1 Tab by mouth daily. ASPIRIN 81 MG TABLET    Take 81 mg by mouth daily. CARVEDILOL (COREG) 25 MG TABLET    TAKE 1 TABLET BY MOUTH (2) TIMES A DAY  
 DICLOFENAC (VOLTAREN) 1 % GEL    USE 4 GRAMS ON KNEE FOUR TIMES A DAY AS NEEDED  FUROSEMIDE (LASIX) 40 MG TABLET    TAKE 1 TABLET BY MOUTH EVERY DAY  
 HYDROCORTISONE (HYTONE) 2.5 % OINTMENT    APPLY LIGHTLY TO THE AFFECTED AREAS ON THE LEGS AND FEET TWICE A DAY. These Medications have changed No medications on file Stop Taking No medications on file Return to the ER if you are unable to obtain referral as directed. Rachael Eason's  results have been reviewed with her. She has been counseled regarding her diagnosis, treatment, and plan. She verbally conveys understanding and agreement of the signs, symptoms, diagnosis, treatment and prognosis and additionally agrees to follow up as discussed. She also agrees with the care-plan and conveys that all of her questions have been answered. I have also provided discharge instructions for her that include: educational information regarding their diagnosis and treatment, and list of reasons why they would want to return to the ED prior to their follow-up appointment, should her condition change. Tushar Pendleton ENP-C,FNP-C Dragon voice recognition was used to generate this report, which may have resulted in some phonetic based errors in grammar and contents. Even though attempts were made to correct all the mistakes, some may have been missed, and remained in the body of the document

## 2019-07-16 ENCOUNTER — PATIENT OUTREACH (OUTPATIENT)
Dept: CARDIOLOGY CLINIC | Age: 79
End: 2019-07-16

## 2019-07-17 ENCOUNTER — HOSPITAL ENCOUNTER (OUTPATIENT)
Dept: PHYSICAL THERAPY | Age: 79
Discharge: HOME | End: 2019-07-17
Payer: MEDICARE

## 2019-07-17 PROCEDURE — 97165 OT EVAL LOW COMPLEX 30 MIN: CPT

## 2019-07-17 PROCEDURE — 97530 THERAPEUTIC ACTIVITIES: CPT

## 2019-07-17 NOTE — PROGRESS NOTES
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]  Hand pain [M79.643]    SUBJECTIVE  Pain Level (0-10 scale): 0/10  Any medication changes, allergies to medications, adverse drug reactions, diagnosis change, or new procedure performed?: [x] No    [] Yes (see summary sheet for update)  Subjective functional status/changes:   [] No changes reported  No pain just numb    OBJECTIVE      20 min []Eval                  []Re-Eval       15 min Therapeutic Activity:  []  See flow sheet :   Rationale: improve sensation  to improve the patients ability to use hands  Sensory kit for home use   Stereognosis tasks    With   [] TE   [] TA   [] neuro   [] other: Patient Education: [x] Review HEP    [] Progressed/Changed HEP based on: sensory kit  [] positioning   [] body mechanics   [] transfers   [] heat/ice application   [] Splint wear/care   [] Sensory re-education   [] scar management      [] other:            Other Objective/Functional Measures:   Subjective: pt is a right hand dominant, 70 y.o.y/o, female who had sudden onset of numbness in both hands all digits when sick with CHF in May x 1 week at [address]. 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        Objective:  Sensation:touch localization WNL      Hand ROM WNL  Hand Strength:   Gross Grasp 3pt Pinch Lateral Pinch Tip Pinch   Right  42 8 14 6   Left 25 7 11 6     Nine-Hole Peg Test:  Left= ___43__seconds  Right=__33___seconds  Finger Opposition:Inaccurate left hand with and without vision to fifth digit  Stereognosis: Right  5/5 correct    Left  2/5 correct         ADLs Reports dropping things     Pain Level (0-10 scale) post treatment: 0/10    ASSESSMENT/Changes in Function:      [x]  See Plan of Care  []  See progress note/recertification  []  See Discharge Summary           PLAN  []  Upgrade activities as tolerated     []  Continue plan of care  []  Update interventions per flow sheet       []  Discharge due to:_  []  Other:_      Austen Matos OTR/L 7/17/2019  10:18 AM    Future Appointments   Date Time Provider Korey Monte   7/24/2019  8:40 AM Dante Barrett DO [address] [place] [address]   7/26/2019  8:30 AM CSI, BP HV [facility] PAZ SCHED   8/21/2019  8:00 AM [facility] IMAGING 1 2VV PAZ SCHED   8/21/2019 11:30 AM Yoanna Rivera MD 2VV PAZ SCHED   8/29/2019  8:30 AM Rogerio Mackey MD [facility]   8/29/2019 10:00 AM CSI, PACER [address]   2/14/2020  9:00 AM [facility] IMAGING 2 2VV PAZ SCHED   2/14/2020 10:00 AM [facility] IMAGING 1 2VV PAZ SCHED   2/14/2020 11:00 AM [facility] NONIMAGING 2VV PAZ SCHED   2/24/2020  9:00 AM Wild, [address] [facility], [place] [address] [address]

## 2019-07-23 ENCOUNTER — HOSPITAL ENCOUNTER (OUTPATIENT)
Dept: PHYSICAL THERAPY | Age: 79
Discharge: HOME | End: 2019-07-23
Payer: MEDICARE

## 2019-07-23 PROCEDURE — 97530 THERAPEUTIC ACTIVITIES: CPT

## 2019-07-23 NOTE — PROGRESS NOTES
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     OBJECTIVE    49 min Therapeutic Activity:  []  See flow sheet :   Rationale: increase ROM, improve coordination and improve sensation   to improve the patients ability to manipulate small items     Sensory Kit Review     FM Coordination HEP with active Patient demonstration of tasks including coin translation, coin stacking, nuts and bolts ect     1/4 in pegs translated with Right hand, palm <> digit for placement/removal         With   [] TE   [] TA   [] neuro   [] other: Patient Education: [x] Review HEP    [] Progressed/Changed HEP based on:   [] positioning   [] body mechanics   [] transfers   [] heat/ice application   [] Splint wear/care   [] Sensory re-education   [] scar management      [] other:            Other Objective/Functional Measures:     Cueing required for sensory kit     Difficulty with digit to palm translation of coins from table with left hand      Pain Level (0-10 scale) post treatment: 0/10    ASSESSMENT/Changes in Function: cueing required for sensory kit, check carry over next visit     Patient will continue to benefit from skilled OT services to modify and progress therapeutic interventions, address ROM deficits, address strength deficits and instruct in home and community integration to attain remaining goals. []  See Plan of Care  []  See progress note/recertification  []  See Discharge Summary         Progress towards goals / Updated goals:  Short Term Goals: To be accomplished in 2 weeks:  1. Patient will be independent in home exercise program for sensory re-education Cueing required for sensory kit 7/23/19  2. Patient will be independent in HEP for fine motor skills. Issued on this date 7/23/19  3. Patient will be familiar with strategies to improve performance with reduced sensation.     Long Term Goals: To be accomplished in 4 weeks:              1.  Patient will increase fine motor skills bilaterally by at least 20% with vision as assist   2. Patient will report reduced dropped items due to improved awareness of hands. 3.  Patient will increase left hand  by at least 10# for holding groceries. 4.  Patient will report improved ability to use hands for lifting and carrying as shown by increase in FOTO of at least 10 points.       PLAN  []  Upgrade activities as tolerated     [x]  Continue plan of care  []  Update interventions per flow sheet       []  Discharge due to:_  []  Other:_      ROBERT Jimenez 7/23/2019  1:10 PM    Future Appointments   Date Time Provider Korey Monte   7/23/2019  3:30 PM Jenny Ee BHSBQUX [facility]   7/24/2019  8:40 AM Joanne Gonzales DO [address] [place] [address]   7/26/2019  7:30 AM Jenny Ee AFGOCJG [facility]   7/26/2019  8:30 AM CSI, BP [facility] PAZ SCHED   7/30/2019  9:00 AM Adelso Morris, OTR/L MMCPTPB [facility]   8/1/2019  9:00 AM Jennymatt Shelton VEHAIWT [facility]   8/7/2019  9:30 AM Jennymatt Shelton RTZPAPV [facility]   8/9/2019  9:45 AM Adelso Morris OTR/L MMCPTPB [facility]   8/20/2019  9:00 AM Adelso Morris OTR/L MMCPTPB [facility]   8/21/2019  8:00 AM [facility] IMAGING 1 2VV PAZ SCHED   8/21/2019 11:30 AM Anna Medina MD 2VV PAZ SCHED   8/23/2019  9:00 AM Savanna Durant, Aleksandr Elmore TERXYBQ [facility]   8/29/2019  8:30 AM Patricia Victor MD [facility]   8/29/2019 10:00 AM CSI, PACER  [facility]   2/14/2020  9:00 AM [facility] IMAGING 2 2VV PAZ SCHED   2/14/2020 10:00 AM [facility] IMAGING 1 2VV PAZ SCHED   2/14/2020 11:00 AM [facility] NONIMAGING 2VV PAZ SCHED   2/24/2020  9:00 AM Wild, [address] [place] [address] [address]

## 2019-07-24 ENCOUNTER — OFFICE VISIT (OUTPATIENT)
Dept: CARDIOLOGY CLINIC | Age: 79
End: 2019-07-24

## 2019-07-24 ENCOUNTER — PATIENT OUTREACH (OUTPATIENT)
Dept: CARDIOLOGY CLINIC | Age: 79
End: 2019-07-24

## 2019-07-24 VITALS
OXYGEN SATURATION: 96 % | HEART RATE: 65 BPM | DIASTOLIC BLOOD PRESSURE: 66 MMHG | BODY MASS INDEX: 32.78 KG/M2 | WEIGHT: 192 LBS | SYSTOLIC BLOOD PRESSURE: 136 MMHG | HEIGHT: 64 IN

## 2019-07-24 DIAGNOSIS — N18.4 STAGE 4 CHRONIC KIDNEY DISEASE (HCC): ICD-10-CM

## 2019-07-24 DIAGNOSIS — E78.2 MIXED HYPERLIPIDEMIA: ICD-10-CM

## 2019-07-24 DIAGNOSIS — Z95.1 S/P CABG X 3: ICD-10-CM

## 2019-07-24 DIAGNOSIS — Z95.810 AICD (AUTOMATIC CARDIOVERTER/DEFIBRILLATOR) PRESENT: ICD-10-CM

## 2019-07-24 DIAGNOSIS — I50.42 CHRONIC COMBINED SYSTOLIC AND DIASTOLIC CONGESTIVE HEART FAILURE (HCC): ICD-10-CM

## 2019-07-24 DIAGNOSIS — I49.3 PVC'S (PREMATURE VENTRICULAR CONTRACTIONS): ICD-10-CM

## 2019-07-24 DIAGNOSIS — I42.9 CARDIOMYOPATHY, UNSPECIFIED TYPE (HCC): ICD-10-CM

## 2019-07-24 DIAGNOSIS — I48.0 PAROXYSMAL ATRIAL FIBRILLATION (HCC): ICD-10-CM

## 2019-07-24 DIAGNOSIS — I25.10 ATHEROSCLEROSIS OF NATIVE CORONARY ARTERY OF NATIVE HEART WITHOUT ANGINA PECTORIS: Primary | ICD-10-CM

## 2019-07-24 DIAGNOSIS — I65.23 BILATERAL CAROTID ARTERY STENOSIS: ICD-10-CM

## 2019-07-24 RX ORDER — METHOCARBAMOL 500 MG/1
TABLET, FILM COATED ORAL
Refills: 0 | COMMUNITY
Start: 2019-07-15 | End: 2019-08-29 | Stop reason: ALTCHOICE

## 2019-07-24 RX ORDER — OXYCODONE AND ACETAMINOPHEN 5; 325 MG/1; MG/1
TABLET ORAL
COMMUNITY
End: 2019-08-29 | Stop reason: ALTCHOICE

## 2019-07-24 NOTE — PROGRESS NOTES
Patient attended appointments with her cardiologist, Dr Marium Goodson. on 7/24/19, Nurse Navigator reviewed EMR and will follow up on next scheduled outreach.

## 2019-07-26 ENCOUNTER — HOSPITAL ENCOUNTER (OUTPATIENT)
Dept: PHYSICAL THERAPY | Age: 79
Discharge: HOME | End: 2019-07-26
Payer: MEDICARE

## 2019-07-26 PROCEDURE — 97110 THERAPEUTIC EXERCISES: CPT

## 2019-07-26 PROCEDURE — 97530 THERAPEUTIC ACTIVITIES: CPT

## 2019-07-26 NOTE — PROGRESS NOTES
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      OBJECTIVE      24 min Therapeutic Exercise:  [] See flow sheet :   Rationale: increase ROM and increase strength to improve the patients ability to grasp,     Medium Theraputty: 10/10 each hand     18 min Therapeutic Activity:  []  See flow sheet :   Rationale: increase ROM, improve coordination and Improve sensation  to improve the patients ability to manipulate small items    Grooved Pegs, untimed: B Hands         With   [] TE   [x] TA   [] neuro   [] other: Patient Education: [x] Review HEP    [] Progressed/Changed HEP based on:   [] positioning   [] body mechanics   [] transfers   [] heat/ice application   [] Splint wear/care   [] Sensory re-education   [] scar management      [] other:            Other Objective/Functional Measures:     Increased time required for putty exercise    Increased difficulty with Left hand with grooved pegs      Pain Level (0-10 scale) post treatment: 0/10    ASSESSMENT/Changes in Function: sensation continues to interfere with function     Patient will continue to benefit from skilled OT services to modify and progress therapeutic interventions, address ROM deficits, address strength deficits and instruct in home and community integration to attain remaining goals. []  See Plan of Care  []  See progress note/recertification  []  See Discharge Summary         Progress towards goals / Updated goals:  Short Term Goals: To be accomplished in 2 weeks:  1.  Patient will be independent in home exercise program for sensory re-education Cueing required for sensory kit 7/23/19  2.  Patient will be independent in HEP for fine motor skills. Issued on this date 7/23/19  3.  Patient will be familiar with strategies to improve performance with reduced sensation. Education on using visual skills to assist with function 7/26/19     Long Term Goals: To be accomplished in 4 weeks:              1.  Patient will increase fine motor skills bilaterally by at least 20% with vision as assist   2.  Patient will report reduced dropped items due to improved awareness of hands. 3.  Patient will increase left hand  by at least 10# for holding groceries.   4.  Patient will report improved ability to use hands for lifting and carrying as shown by increase in FOTO of at least 10 points.         PLAN  []  Upgrade activities as tolerated     [x]  Continue plan of care  []  Update interventions per flow sheet       []  Discharge due to:_  []  Other:_      SARAH Singer/L 7/26/2019  7:37 AM    Future Appointments   Date Time Provider Korey Monte   7/30/2019  9:00 AM Jacky Alcantara OTR/L MMCPTPB [facility]   8/1/2019  9:00 AM Rosalba Giles IGRRDRA [facility]   8/7/2019  9:30 AM Rosalba Giles ETXYDIU [facility]   8/9/2019  9:45 AM Jacky Alcantara OTR/L MMCPTPB [facility]   8/20/2019  9:00 AM Jacky Alcantara, OTR/L MMCPTPB [facility]   8/21/2019  8:00 AM [facility] IMAGING 1 2VV PAZ SCHED   8/21/2019 11:30 AM Jeb Nolan MD 2VV PAZ SCHED   8/23/2019  9:00 AM Rosalba Giles PDNHXIT [facility]   8/29/2019  8:30 AM Yuliya Dove MD [facility] [address]   8/29/2019 10:00 AM CSI, PACER HV [facility] SCHED   11/12/2019  8:40 AM CSI, PACER HV [facility] SCHED   11/12/2019  8:40 AM Jadon Marks DO [facility]   2/14/2020  9:00 AM [facility] IMAGING 2 2VV PAZ SCHED   2/14/2020 10:00 AM [facility] IMAGING 1 2VV PAZ SCHED   2/14/2020 11:00 AM [facility] NONIMAGING 2VV PAZ SCHED   2/24/2020  9:00 AM Jovany Jay

## 2019-07-30 ENCOUNTER — HOSPITAL ENCOUNTER (OUTPATIENT)
Dept: PHYSICAL THERAPY | Age: 79
Discharge: HOME | End: 2019-07-30
Payer: MEDICARE

## 2019-07-30 PROCEDURE — 97530 THERAPEUTIC ACTIVITIES: CPT

## 2019-07-30 NOTE — PROGRESS NOTES
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   My left hand is really numb today-woke     OBJECTIVE    Modality rationale: improve sensory function to improve the patients ability to identify touch   Min Type Additional Details    [] Estim:  []Unatt       []IFC  []Premod                        []Other:  []w/ice   []w/heat  Position:  Location:    [] Estim: []Att    []TENS instruct  []NMES                    []Other:  []w/US   []w/ice   []w/heat  Position:  Location:    []  Traction: [] Cervical       []Lumbar                       [] Prone          []Supine                       []Intermittent   []Continuous Lbs:  [] before manual  [] after manual    []  Ultrasound: []Continuous   [] Pulsed                           []1MHz   []3MHz W/cm2:  Location:    []  Iontophoresis with dexamethasone         Location: [] Take home patch   [] In clinic    []  Ice     []  heat  []  Ice massage  []  Laser   []  Anodyne Position:  Location:    []  Laser with stim  []  Other: Paraffin 10 mins Position:  Location:left hand during there act    []  Vasopneumatic Device Pressure:       [] lo [] med [] hi   Temperature: [] lo [] med [] hi       [x] Skin assessment post-treatment:  [x]intact []redness- no adverse reaction    []redness  adverse reaction:         45 min Therapeutic Activity:  []  See flow sheet :   Rationale: improve coordination and sensory awareness  to improve the patients ability to manipulate items with left hand  1/4 in pegs right hand placed individually with few drops, many drops with palm ot digit  Mini massager left hand round head   Med putty and pegs no vision left 1 peg, right 3 pegs       With   [] TE   [] TA   [] neuro   [] other: Patient Education: [x] Review HEP    [] Progressed/Changed HEP based on: mini massager  [] positioning   [] body mechanics   [] transfers   [] heat/ice application   [] Splint wear/care   [] Sensory re-education   [] scar management      [] other:            Other Objective/Functional Measures:   Rell to locate pegs with right with no vision, max difficulty with left hand     Pain Level (0-10 scale) post treatment: 0/10    ASSESSMENT/Changes in Function: Reduced numbness following mini massager    Patient will continue to benefit from skilled OT services to modify and progress therapeutic interventions, analyze and cue movement patterns, instruct in home and community integration and sensoty awareness to attain remaining goals. []  See Plan of Care  []  See progress note/recertification  []  See Discharge Summary         Progress towards goals / Updated goals:  *1.  Patient will be independent in home exercise program for sensory re-education Cueing required for sensory kit 7/23/19  2.  Patient will be independent in HEP for fine motor skills. Issued on this date 7/23/19  3.  Patient will be familiar with strategies to improve performance with reduced sensation. Education on using visual skills to assist with function 7/26/19     Long Term Goals: To be accomplished in 4 weeks:              1.  Patient will increase fine motor skills bilaterally by at least 20% with vision as assist   2.  Patient will report reduced dropped items due to improved awareness of hands.   3.  Patient will increase left hand  by at least 10# for holding groceries.   4.  Patient will report improved ability to use hands for lifting and carrying as shown by increase in FOTO of at least 10 points.     PLAN  []  Upgrade activities as tolerated     [x]  Continue plan of care  []  Update interventions per flow sheet       []  Discharge due to:_  []  Other:_      Sirisha Davidson OTR/L 7/30/2019  9:11 AM    Future Appointments   Date Time Provider Korey Monte   8/1/2019  9:00 AM Kaveh Cintron AOABWFT [facility]   8/7/2019  9:30 AM Kaveh Cintron NUMEXCZ [facility]   8/9/2019  9:45 AM Kaveh Cintron MMCPTPB [facility]   8/20/2019  9:00 AM Juana Ochoa OTR/L MMCPTPB [facility]   8/21/2019  8:00 AM [facility] IMAGING 1 2VV PAZ SCHED   8/21/2019 11:30 AM Nic Mckay MD [address] [address]   8/23/2019  9:00 AM Kaveh Cintron MMCPTPB [facility]   8/29/2019  8:30 AM Kyung Nunn MD [facility]   8/29/2019 10:00 AM CSI, PACER HV [address] [place] [address]   11/12/2019  8:40 AM CSI, PACER HV [address] [place] [address]   11/12/2019  8:40 AM Madeleine Abdi DO [address] [place] [address]   2/14/2020  9:00 AM [facility] IMAGING 2 2VV PAZ SCHED   2/14/2020 10:00 AM [facility] IMAGING 1 2VV PAZ SCHED   2/14/2020 11:00 AM [facility] NONIMAGING 2VV PAZ SCHED   2/24/2020  9:00 AM Jovany Jay

## 2019-08-01 ENCOUNTER — HOSPITAL ENCOUNTER (OUTPATIENT)
Dept: PHYSICAL THERAPY | Age: 79
Discharge: HOME | End: 2019-08-01
Payer: MEDICARE

## 2019-08-01 ENCOUNTER — HOSPITAL ENCOUNTER (OUTPATIENT)
Dept: LAB | Age: 79
Discharge: HOME | End: 2019-08-01
Payer: MEDICARE

## 2019-08-01 DIAGNOSIS — I42.9 CARDIOMYOPATHY, UNSPECIFIED TYPE (HCC): ICD-10-CM

## 2019-08-01 DIAGNOSIS — I50.42 CHRONIC COMBINED SYSTOLIC AND DIASTOLIC CONGESTIVE HEART FAILURE (HCC): ICD-10-CM

## 2019-08-01 DIAGNOSIS — I25.10 ATHEROSCLEROSIS OF NATIVE CORONARY ARTERY OF NATIVE HEART WITHOUT ANGINA PECTORIS: ICD-10-CM

## 2019-08-01 LAB
ANION GAP SERPL CALC-SCNC: 9 MMOL/L (ref 3–18)
BNP SERPL-MCNC: 868 PG/ML (ref 0–1800)
BUN SERPL-MCNC: 34 MG/DL (ref 7–18)
BUN/CREAT SERPL: 19 (ref 12–20)
CALCIUM SERPL-MCNC: 9.9 MG/DL (ref 8.5–10.1)
CHLORIDE SERPL-SCNC: 106 MMOL/L (ref 100–111)
CHOLEST SERPL-MCNC: 269 MG/DL
CO2 SERPL-SCNC: 25 MMOL/L (ref 21–32)
CREAT SERPL-MCNC: 1.77 MG/DL (ref 0.6–1.3)
GLUCOSE SERPL-MCNC: 97 MG/DL (ref 74–99)
HDLC SERPL-MCNC: 73 MG/DL (ref 40–60)
HDLC SERPL: 3.7 {RATIO} (ref 0–5)
LDLC SERPL CALC-MCNC: 169 MG/DL (ref 0–100)
LIPID PROFILE,FLP: ABNORMAL
POTASSIUM SERPL-SCNC: 4 MMOL/L (ref 3.5–5.5)
SODIUM SERPL-SCNC: 140 MMOL/L (ref 136–145)
TRIGL SERPL-MCNC: 135 MG/DL (ref ?–150)
VLDLC SERPL CALC-MCNC: 27 MG/DL

## 2019-08-01 PROCEDURE — 80061 LIPID PANEL: CPT

## 2019-08-01 PROCEDURE — 36415 COLL VENOUS BLD VENIPUNCTURE: CPT

## 2019-08-01 PROCEDURE — 83880 ASSAY OF NATRIURETIC PEPTIDE: CPT

## 2019-08-01 PROCEDURE — 97530 THERAPEUTIC ACTIVITIES: CPT

## 2019-08-01 PROCEDURE — 80048 BASIC METABOLIC PNL TOTAL CA: CPT

## 2019-08-01 PROCEDURE — 97018 PARAFFIN BATH THERAPY: CPT

## 2019-08-01 NOTE — PROGRESS NOTES
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 OBJECTIVE 
  
      
Modality rationale: improve sensory function to improve the patients ability to identify touch Min Type Additional Details   
  [] Estim:  []Unatt       []IFC  []Premod []Other:  []w/ice   []w/heat Position: Location:   
  [] Estim: []Att    []TENS instruct  []NMES []Other:  []w/US   []w/ice   []w/heat Position: Location:   
  []  Traction: [] Cervical       []Lumbar 
                     [] Prone          []Supine []Intermittent   []Continuous Lbs: 
[] before manual 
[] after manual   
  []  Ultrasound: []Continuous   [] Pulsed []1MHz   []3MHz W/cm2: 
Location:   
  []  Iontophoresis with dexamethasone Location: [] Take home patch  
[] In clinic   
  []  Ice     []  heat 
[]  Ice massage 
[]  Laser  
[]  Anodyne Position: Location:   
 10 []  Laser with stim 
[]  Other: Paraffin 10 mins Position: Location:left hand during there act   
  []  Vasopneumatic Device Pressure:       [] lo [] med [] hi  
Temperature: [] lo [] med [] hi   
[x] Skin assessment post-treatment:  [x]intact []redness- no adverse reaction  []redness  adverse reaction 40 min Therapeutic Activity:  []  See flow sheet :  
Rationale: increase ROM, improve coordination and improve sensation   to improve the patients ability to manipulate small items Medium putty with vision occluded: Left- 
Mini massager, left hand, round head 1/4 in pegs placed with Right tip pinch, removed digit to palm translation 35x With 
 [] TE 
 [] TA 
 [] neuro 
 [] other: Patient Education: [x] Review HEP [] Progressed/Changed HEP based on:  
[] positioning   [] body mechanics   [] transfers   [] heat/ice application  
[] Splint wear/care   [] Sensory re-education   [] scar management     
[] other:   
 
     
Other Objective/Functional Measures:  
 
Difficulty with translation tasks Pain Level (0-10 scale) post treatment: 0/10 ASSESSMENT/Changes in Function: improvement with left hand function Patient will continue to benefit from skilled OT services to modify and progress therapeutic interventions, address ROM deficits, address strength deficits and instruct in home and community integration to attain remaining goals. []  See Plan of Care 
[]  See progress note/recertification 
[]  See Discharge Summary Progress towards goals / Updated goals: 1.  Patient will be independent in home exercise program for sensory re-education Cueing required for sensory kit 7/23/19 2.  Patient will be independent in HEP for fine motor skills. Met 8/1/19 3.  Patient will be familiar with strategies to improve performance with reduced sensation. Education on using visual skills to assist with function 7/26/19 
  
Long Term Goals: To be accomplished in 4 weeks:             
1.  Patient will increase fine motor skills bilaterally by at least 20% with vision as assist  
2.  Patient will report reduced dropped items due to improved awareness of hands. 3.  Patient will increase left hand  by at least 10# for holding groceries. Progressing with in clinic tsks 8/1/19 4.  Patient will report improved ability to use hands for lifting and carrying as shown by increase in FOTO of at least 10 points.  PLAN 
[]  Upgrade activities as tolerated     [x]  Continue plan of care 
[]  Update interventions per flow sheet      
[]  Discharge due to:_ 
[]  Other:_ ROBERT Fonseca 8/1/2019  9:08 AM 
 
Future Appointments Date Time Provider Korey Mell 8/7/2019  9:30 AM Caitlin Canes LQQLNBL [facility]  
8/9/2019  9:45 AM Caitlin Canes MMCPTPB [facility]  
8/20/2019  9:00 AM ÓSCAR Camargo/L MMCPTPB [facility]  
8/21/2019  8:00 AM [facility] IMAGING 1 2VV PAZ SCHED  
8/21/2019 11:30 AM Juvencio Gonzalez MD 2VV PAZ SCHED  
8/23/2019  9:00 AM Caitlin Caniban FGUSMFF [facility]  
8/29/2019  8:30 AM Diaan Tracy MD [facility]  
8/29/2019 10:00 AM CSI, PACER HV [facility] PAZ SCHED  
11/12/2019  8:40 AM CSI, PACER HV [facility] PAZ SCHED  
11/12/2019  8:40 AM Kay Camacho DO [address] [place] [address]  
2/14/2020  9:00 AM [facility] IMAGING 2 2VV PAZ SCHED  
2/14/2020 10:00 AM [facility] IMAGING 1 2VV PAZ SCHED  
2/14/2020 11:00 AM [facility] NONIMAGING 2VV PAZ SCHED  
2/24/2020  9:00 AM Wild, [address] [facility] [place] [address] [address]

## 2019-08-06 ENCOUNTER — PATIENT OUTREACH (OUTPATIENT)
Dept: CARDIOLOGY CLINIC | Age: 79
End: 2019-08-06

## 2019-08-06 NOTE — PROGRESS NOTES
NN contacted the patient by telephone to perform CHF follow Up. Noted Priorities/Plan:  Daily weights, returning to baseline Daily Weight: Pt weighs 192 lbs today. Pt weighed 192 lbs on last clinic visit on 7/2/19 Zone: green Signs/Symptoms: Edema none; SOB none; orthopnea none. Pt states feeling well and no issues at this time. Goals  Attends follow-up appointments as directed. 5/28: Patient aware of upcoming appts with [facility], NP and cardiology. Patient made aware of appt with Dr. Artemio Keene, nephrology. 5/30: Patient attended PCP appt as scheduled.  Maintains daily weight.   
  5/28: Educated patient on reason/importance of daily weight. Reviewed proper technique and what to report. 6/3/19 pt reports weighing daily  Prevent complications post hospitalization. Daily weights Adhering to low salt diet Monitoring and reporting s/s of CHF exacerbation Medication adherence Needs addressed from pathway:  
Week 9-12 Provide Daily Disease Management 
(patient/caregiver initiated) ? Pt is maintaining Daily weight measurements (Before Breakfast 
? Reviewed Daily Zone Identification (symptom management; weight, edema, SOB, activity/sleep changes)-notify provider immediately as indicated ? Reviewed Cardiac Low-sodium Diet (No added sodium; 1500mg as indicated). Also included carbohydrate controlled diet education ? Reviewed importance of Fluid restriction ? Comorbidity Management ? Confirmed follow-up appointments and transportation. Additional Assessments ? Reinforced Fall precautions ? Activity tolerance assessment: pt reports at baseline ? Reviewed Energy conservation management and balancing rest w/ activity ? Labs/diagnostics per MD office ? Medication Therapy: no issues identified ? Diet/appetite assessment: pt reports normal appetitie ? Reviewed appropriate ED/Hospital utilization Psychosocial:  Reassurance and emotional support Education/Discharge Planning ? Verify DME needs; arrange home equipment: no needs at this time ? Arrange discharge follow-up appointments/transportation ? Advanced care planning (e.g.: Palliative Care; Hospice) ? H2H; Home Health  (eg: Telehealth) ? Patient/Caregiver HF education literacy ? Patient/Caregiver verifies support systems (meals/medication/transportation needs,  
? community resources Have you been to an ER/Hospital since discharge from [facility]? No   
 
Have you followed up with PCP/Cardiologist/Specialist?  
5/30/19 PCP appt w/ Dr Keisha Vizcaino 6/7/19 Card appt w/ Amanda Mccall BCassie 
6/13/19 Vascular appt w/ CELESTINE Nolen 
6/24/19 Rheumatology appt w/ Dr Meliza Rojas 7/24/19 Card appt w/ Dr Alonso Vidal 7/26/19 PCP appt w/ DEBRA Xie Transportation:  family/friends Diet: cardiac/no salt added Activity/ADLs: self. Medications Reconciled at this time:  no changes since last PCP visit Home health:  Company/Completion: n/a Social Support: family/friends Advanced Care Plan: on file, reviewed, and current Patient reminded that there is a physician on call 24 hours a day / 7 days a week (M-F 5pm to 8am and from Friday 5pm until Monday 8a for the weekend) should the patient have questions or concerns. Patient reminded to call 911 if situation is emergent or patient feels the situation is emergent. Pt verbalizes understanding.

## 2019-08-07 ENCOUNTER — HOSPITAL ENCOUNTER (OUTPATIENT)
Dept: PHYSICAL THERAPY | Age: 79
Discharge: HOME | End: 2019-08-07
Payer: MEDICARE

## 2019-08-07 PROCEDURE — 97018 PARAFFIN BATH THERAPY: CPT

## 2019-08-07 PROCEDURE — 97530 THERAPEUTIC ACTIVITIES: CPT

## 2019-08-07 PROCEDURE — 97110 THERAPEUTIC EXERCISES: CPT

## 2019-08-07 NOTE — PROGRESS NOTES
OT DAILY TREATMENT NOTE 10-18 Patient Name: Yisel Terrazas Date:2019 : 1940 [x]  Patient  Verified Payor: [place] MEDICARE / Plan: Orly Cota [facility] / Product Type: Medicare / In time:930  Out time:1014 Total Treatment Time (min): 44 Visit #: 6 of 8 Medicare/BCBS Only Total Timed Codes (min):  34 1:1 Treatment Time:  44 Treatment Area: Inflammatory polyarthropathy [M06.4] Hand pain [M79.643] SUBJECTIVE Pain Level (0-10 scale): 0/10 Any medication changes, allergies to medications, adverse drug reactions, diagnosis change, or new procedure performed?: [x] No    [] Yes (see summary sheet for update) Subjective functional status/changes:   [] No changes reported There is no real pain, just that numbess. Patient reports she will be out of town next week. OBJECTIVE 
 
  
      
Modality rationale: improve sensory function to improve the patients ability to identify touch Min Type Additional Details   
  [] Estim:  []Unatt       []IFC  []Premod []Other:  []w/ice   []w/heat Position: Location:   
  [] Estim: []Att    []TENS instruct  []NMES []Other:  []w/US   []w/ice   []w/heat Position: Location:   
  []  Traction: [] Cervical       []Lumbar 
                     [] Prone          []Supine []Intermittent   []Continuous Lbs: 
[] before manual 
[] after manual   
  []  Ultrasound: []Continuous   [] Pulsed []1MHz   []3MHz W/cm2: 
Location:   
  []  Iontophoresis with dexamethasone Location: [] Take home patch  
[] In clinic   
  []  Ice     []  heat 
[]  Ice massage 
[]  Laser  
[]  Anodyne Position: Location:   
10  []  Laser with stim 
[]  Other: Paraffin 10 mins Position: Location:B Hands 
   
  []  Vasopneumatic Device Pressure:       [] lo [] med [] hi  
Temperature: [] lo [] med [] hi   
  
[x] Skin assessment post-treatment:  [x]intact []redness- no adverse reaction  []redness  adverse reaction:  
 
 
24 min Therapeutic Exercise:  [] See flow sheet :  
Rationale: increase ROM and increase strength to improve the patients ability to  Recheck , Pinches Soft Theraputty: Right: 10/10, Left 10/10 
 
10 min Therapeutic Activity:  []  See flow sheet :  
Rationale: increase ROM and improve coordination  to improve the patients ability to manipulate small items Recheck 9 hole, FOTO With 
 [] TE 
 [] TA 
 [] neuro 
 [] other: Patient Education: [x] Review HEP [] Progressed/Changed HEP based on:  
[] positioning   [] body mechanics   [] transfers   [] heat/ice application  
[] Splint wear/care   [] Sensory re-education   [] scar management     
[] other:   
 
     
Other Objective/Functional Measures:  
 
 Measurements: Taken with Lionel Dynamometer, in Lbs Level 2 7/17 Eval 8/7 Change Right 42 51 +9 Left 25 32 +7 Pinch Measurements: Taken with Pinch Gauge, in Lbs (hand) 7/17 Eval 8/7 Change 3 pt Right 8 10 +2 Left  7 8 +1  
      
      
Lateral     
Right 14 13 -1 Left 11 13 +2  
      
      
Tip Right 6 9 +3 Left 6 7 +1  
         
         
 
9 Hole 7/17 Eval 8/7 Change Right 33 29 12% Left 43 37 14% FOTO: 79 (48) Pain Level (0-10 scale) post treatment: 0/10 ASSESSMENT/Changes in Function: FM and strength improving Patient will continue to benefit from skilled OT services to modify and progress therapeutic interventions, address ROM deficits, address strength deficits and instruct in home and community integration to attain remaining goals. []  See Plan of Care 
[]  See progress note/recertification 
[]  See Discharge Summary Progress towards goals / Updated goals: 1.  Patient will be independent in home exercise program for sensory re-education Cueing required for sensory kit 7/23/19 2.  Patient will be independent in HEP for fine motor skills. Met 8/1/19 3.  Patient will be familiar with strategies to improve performance with reduced sensation. Education on using visual skills to assist with function 7/26/19 
  
Long Term Goals: To be accomplished in 4 weeks:             
1.  Patient will increase fine motor skills bilaterally by at least 20% with vision as assist 12-14% improvement 8/7/19 2.  Patient will report reduced dropped items due to improved awareness of hands. Decrease in items dropped per Patient report 8/7/19 3.  Patient will increase left hand  by at least 10# for holding groceries. Progressing with in clinic tsks 8/1/19 4.  Patient will report improved ability to use hands for lifting and carrying as shown by increase in FOTO of at least 10 points.  +17, Goal Met 8/7/ 
 
PLAN 
[]  Upgrade activities as tolerated     [x]  Continue plan of care 
[]  Update interventions per flow sheet      
[]  Discharge due to:_ 
[]  Other:_ SARAH Cool/L 8/7/2019  9:40 AM 
 
Future Appointments Date Time Provider Korey Monte 8/9/2019  9:45 AM Kenard Habermann [facility]  
8/20/2019  9:00 AM Sammy Syed OTR/L [facility]  
8/21/2019  8:00 AM [facility] IM[facility] 1 2VV PAZ SCHED  
8/21/2019 11:30 [place] [address] [facility]  
8/23/2019  9:00 AM Sammy Syed OTR/L [facility]  
8/29/2019  8:30 AM Edd Mcgarry MD [facility]  
8/29/2019 10:00 AM CSI, PACER HV CS PAZ SCHED  
11/12/2019  8:40 AM CSI, PACER HV CS PAZ SCHED  
11/12/2019  8:40 AM Gissell Feldman DO [address] [place] [address]  
2/14/2020  9:00 AM [facility] IMAGING 2 2VV PAZ SCHED  
2/14/2020 10:00 AM [facility] IMAGING 1 2VV PAZ SCHED  
2/14/2020 11:00 AM [facility] NONIMAGING 2VV PAZ SCHED  
2/24/2020  9:00 AM Jovany Jay

## 2019-08-08 NOTE — PROGRESS NOTES
Per your last note' This lady appears to be doing reasonably well without any signs of overt heart failure but she does have stage IV chronic kidney disease and her creatinine has been slowly increasing so the question is whether or not we can decrease her diuretic therapy at all. I am going to recheck her BMP and BNP and then will make further determination after reviewing those lab tests.

## 2019-08-09 ENCOUNTER — HOSPITAL ENCOUNTER (OUTPATIENT)
Dept: PHYSICAL THERAPY | Age: 79
Discharge: HOME | End: 2019-08-09
Payer: MEDICARE

## 2019-08-09 PROCEDURE — 97530 THERAPEUTIC ACTIVITIES: CPT

## 2019-08-09 PROCEDURE — 97110 THERAPEUTIC EXERCISES: CPT

## 2019-08-09 NOTE — PROGRESS NOTES
OT DAILY TREATMENT NOTE 10-18 Patient Name: Ever Aguilar Date:2019 : 1940 [x]  Patient  Verified Payor: [place] MEDICARE / Plan: Manolo Guerrero / Product Type: Medicare / In time:939  Out time:1030 Total Treatment Time (min): 51 Visit #: 7 of 8 Medicare/BCBS Only Total Timed Codes (min):  43 1:1 Treatment Time:  51 Treatment Area: Inflammatory polyarthropathy [M06.4] Hand pain [M79.643] SUBJECTIVE Pain Level (0-10 scale): 0/10 Any medication changes, allergies to medications, adverse drug reactions, diagnosis change, or new procedure performed?: [x] No    [] Yes (see summary sheet for update) Subjective functional status/changes:   [] No changes reported I can almost use my right hand like it was. Patient will be out of town next week but will return to OT upon return. OBJECTIVE 
           
Modality rationale: improve sensory function to improve the patients ability to identify touch Min Type Additional Details    
  [] Estim:  []Unatt       []IFC  []Premod      
                 []Other:  []w/ice   []w/heat Position: Location:    
  [] Estim: []Att    []TENS instruct  []NMES   
                []Other:  []w/US   []w/ice   []w/heat Position: Location:    
  []  Traction: [] Cervical       []Lumbar 
                     [] Prone          []Supine 
                     []Intermittent   []Continuous Lbs: 
[] before manual 
[] after manual    
  []  Ultrasound: []Continuous   [] Pulsed 
                         []1MHz   []3MHz W/cm2: 
Location:    
  []  Iontophoresis with dexamethasone  
      Location: [] Take home patch  
[] In clinic    
8  []  Ice     [x]  heat 
[]  Ice massage []  Laser []  Anodyne Position: B Hands Location:    
 []  Laser with stim 
[]  Other:  Position: Location:B Hands 
     
  []  Vasopneumatic Device Pressure:       [] lo [] med [] hi  
Temperature: [] lo [] med [] hi    
  
 [x] Skin assessment post-treatment:  [x]intact []redness- no adverse reaction  []redness  adverse reaction:  
 
 
18 min Therapeutic Exercise:  [] See flow sheet :  
Rationale: increase ROM and increase strength to improve the patients ability to grasp Soft Theraputty: Right 10/10, Left 10/10 
1 in peg removal using gripper on 35# with Left hand - 25x, Right Hand 25x 25 min Therapeutic Activity:  []  See flow sheet :  
Rationale: increase ROM and improve coordination  to improve the patients ability to manipulate small items, use visual skills as assist with FM tasks Mini Massager- Left hand Grooved Pegs: untimed, B Hands - cueing to use visual assist when manipulating peg for placement - Mod difficulty with Left hand With 
 [] TE 
 [] TA 
 [] neuro 
 [] other: Patient Education: [x] Review HEP [] Progressed/Changed HEP based on:  
[] positioning   [] body mechanics   [] transfers   [] heat/ice application  
[] Splint wear/care   [] Sensory re-education   [] scar management     
[] other:   
 
     
Other Objective/Functional Measures:  
 
 Measurements: Taken with Lionel Dynamometer, in Lbs 
 Level 2 7/17 Eval 8/7 Change      
Right 42 51 +9      
Left 25 32 +7      
  
  
Pinch Measurements: Taken with Pinch Gauge, in Lbs (hand) 7/17 Eval 8/7 Change 3 pt        
Right 8 10 +2 Left  7 8 +1  
         
         
Lateral        
Right 14 13 -1 Left 11 13 +2  
         
         
Tip        
Right 6 9 +3 Left 6 7 +1  
         
         
  
9 Hole 
  7/17 Eval 8/7 Change      
Right 33 29 12%      
Left 43 37 14%      
     
FOTO: 67        (50) Pain Level (0-10 scale) post treatment: 0/10 ASSESSMENT/Changes in Function: Cueing required for visual assist during FM tasks Patient will continue to benefit from skilled OT services to modify and progress therapeutic interventions, address ROM deficits, address strength deficits and instruct in home and community integration to attain remaining goals. []  See Plan of Care 
[]  See progress note/recertification 
[]  See Discharge Summary Progress towards goals / Updated goals: 1.  Patient will be independent in home exercise program for sensory re-education Cueing required for sensory kit 7/23/19 2.  Patient will be independent in HEP for fine motor skills. Met 8/1/19 3.  Patient will be familiar with strategies to improve performance with reduced sensation. Met with some cueing for implementation 8/9/19 
  
Long Term Goals: To be accomplished in 4 weeks:             
1.  Patient will increase fine motor skills bilaterally by at least 20% with vision as assist 12-14% improvement 8/7/19 2.  Patient will report reduced dropped items due to improved awareness of hands. Decrease in items dropped per Patient report 8/7/19 3.  Patient will increase left hand  by at least 10# for holding groceries. Progressing with in clinic tsks 8/1/19 4.  Patient will report improved ability to use hands for lifting and carrying as shown by increase in FOTO of at least 10 points.  +17, Goal Met 8/7/19 PLAN 
[]  Upgrade activities as tolerated     [x]  Continue plan of care 
[]  Update interventions per flow sheet      
[]  Discharge due to:_ 
[]  Other:_ ROBERT Aparicio 8/9/2019  9:09 AM 
 
Future Appointments Date Time Provider Korey Monte 8/9/2019  9:45 AM Yris Garcia [facility]  
8/20/2019  9:00 AM ÓSCAR Acevedo/L [facility]  
8/21/2019  8:00 AM [facility] 1 2VV PAZ SCHED  
8/21/2019 11:30 AM Baltazar Martinez MD 2VV PAZ SCHED  
8/23/2019  9:00 AM ÓSCAR Acevdeo/L [facility]  
8/29/2019  8:30 AM Pati Oswald MD [facility]  
8/29/2019 10:00 AM CSI, PACER [facility] PAZ SCHED  
11/12/2019  8:40 AM CSI, PACER HV [facility] PAZ SCHED  
11/12/2019  8:40 AM Carolina Ruelas, DO [address] [place] [address]  
 2/14/2020  9:00 AM [facility] IMAGING 2 2VV PAZ SCHED  
2/14/2020 10:00 AM [facility] IMAGING 1 2VV PAZ SCHED  
2/14/2020 11:00 AM [facility] NONIMAGING 2VV PAZ SCHED  
2/24/2020  9:00 AM Wild, [address] [place] [address] [address]

## 2019-08-12 RX ORDER — AMLODIPINE BESYLATE 5 MG/1
5 TABLET ORAL DAILY
Qty: 5 TAB | Refills: 0 | Status: ON HOLD | OUTPATIENT
Start: 2019-08-12 | End: 2020-01-01

## 2019-08-12 RX ORDER — CARVEDILOL 25 MG/1
TABLET ORAL
Qty: 10 TAB | Refills: 0 | Status: SHIPPED | OUTPATIENT
Start: 2019-08-12 | End: 2020-01-01 | Stop reason: SDUPTHER

## 2019-08-12 RX ORDER — FUROSEMIDE 40 MG/1
TABLET ORAL
Qty: 5 TAB | Refills: 0 | Status: SHIPPED | OUTPATIENT
Start: 2019-08-12 | End: 2019-11-12 | Stop reason: SDUPTHER

## 2019-08-12 NOTE — TELEPHONE ENCOUNTER
Patient visiting in [place], [place] and left her medications behind at home. Requesting a 5 day supply.

## 2019-08-14 ENCOUNTER — PATIENT OUTREACH (OUTPATIENT)
Dept: CARDIOLOGY CLINIC | Age: 79
End: 2019-08-14

## 2019-08-14 NOTE — PROGRESS NOTES
Patient has graduated from the Disease Specific Care Management  program on 8/14/19. Patient's symptoms are stable at this time. Patient/family has the ability to self-manage. Care management goals have been completed at this time. No further nurse navigator follow up scheduled. Goals Addressed This Visit's Progress  COMPLETED: Attends follow-up appointments as directed. 5/28: Patient aware of upcoming appts with Mckenzie Bernal, NP and cardiology. Patient made aware of appt with Dr. Serenity Ford, nephrology. 5/30: Patient attended PCP appt as scheduled.  COMPLETED: Maintains daily weight.     
  5/28: Educated patient on reason/importance of daily weight. Reviewed proper technique and what to report. 6/3/19 pt reports weighing daily  COMPLETED: Prevent complications post hospitalization. Daily weights Adhering to low salt diet Monitoring and reporting s/s of CHF exacerbation Medication adherence Pt has nurse navigator's contact information for any further questions, concerns, or needs. Patients upcoming visits:   
Future Appointments Date Time Provider Korey Monte 8/20/2019  9:00 AM Janora Schwab, OTR/L [facility]  
8/21/2019  8:00 AM [facility] IMAG[facility] 1 2VV PAZ SCHED  
8/21/2019 11:30 AM Bill Mackey MD 2VV PAZ SCHED  
8/23/2019  9:00 AM Janora Schwab, OTR/L [facility]  
8/29/2019  8:30 AM Mohinder Andre MD [facility]  
8/29/2019 10:00 AM CSI, PACER HV [facility] PAZ SCHED  
11/12/2019  8:40 AM CSI, PACER HV [facility] PAZ SCHED  
11/12/2019  8:40 AM Sami Oh  [facility]  
2/14/2020  9:00 AM [facility] IMAGING 2 2VV PAZ SCHED  
2/14/2020 10:00 AM [facility] IMAGING 1 2VV PAZ SCHED  
2/14/2020 11:00 AM [facility] NONIMAGING 2VV PAZ SCHED  
2/24/2020  9:00 AM Pierre, [address] [place] [address] [address]

## 2019-08-14 NOTE — PROGRESS NOTES
[facility] Dimple Daugherty [address] [facility] 
[phone] fax Continued Plan of Care/ Re-certification for Occupational Therapy Services Patient name: Luba Rosario Start of Care: 19 Referral source: Zenaida Tolbert MD : 1940 Medical/Treatment Diagnosis: Inflammatory polyarthropathy [M06.4] Hand pain [M79.643] Payor: Brittany Neighbours / Plan: [place] MEDICARE PART A & B / Product Type: Medicare /  Onset Date:May 2019 Prior Hospitalization: see medical history Provider#: [id number] Medications: Verified on Patient Summary List   
Comorbidities: *Arthritis, CHF, HTN, vision loss, pacemaker, CVA affecting left side** 
Prior Level of Function:self care, some home care, some driving, Orthodox Visits from Start of Care: 7    Missed Visits: 0 The Plan of Care and following information is based on the patient's current status: 
 
Dynamometer, in Lbs 
 Level 2  Eval 8 Change      
Right 42 51 +9      
Left 25 32 +7      
  
  
Pinch Measurements: Taken with Pinch Gauge, in Lbs (hand)  Eval 8/ Change 3 pt        
Right 8 10 +2 Left  7 8 +1  
         
         
Lateral        
Right 14 13 -1 Left 11 13 +2  
         
         
Tip        
Right 6 9 +3 Left 6 7 +1  
         
         
  
9 Hole 
   Eval 8/7 Change      
Right 33 29 12%      
Left 43 37 14%      
     
FOTO: 67        (50)      
  
 
Goal:1.  Patient will be independent in home exercise program for sensory re-education Status at last note/certification:Did not have Current Status: met Mac Rodriguez will be independent in [facility] for fine motor skills Status at last note/certification:Did not have Current Status: met Mac Rodriguez will be familiar with strategies to improve performance with reduced sensation Status at last note/certification:Unaware Current Status: met  
 Saritha Willson will increase fine motor skills bilaterally by at least 20% with vision as assist  
Status at last note/certification:See chart Current Status: not met right improved 12%, left 14% Saritha Willson will report reduced dropped items due to improved awareness of hands Status at last note/certification:Frequent dropped Current Status: met Saritha Willson will increase left hand  by at least 10# for holding groceries Status at last note/certification:Left  25 Current Status: not met progressing increased 7 to 32# Saritha Willson will report improved ability to use hands for lifting and carrying as shown by increase in FOTO of at least 10 points Status at last note/certification:FOTO 50 Current Status: met Now 79 Key functional changes: Improved strength, fine motor skills, function Problems/ barriers to goal attainment: none Treatment Plan: Therapeutic exercise, Therapeutic activities, Physical agent/modality, Patient education and ADLs/IADLs Patient Goal (s) has been updated and includes: Better hand use Goals for this certification period to be accomplished in 4 weeks: *Saritha Willson will increase fine motor skills bilaterally by at least 20% with vision as assist (addiitonal improvement of 6-8% from last recheck) Saritha Willson will increase left hand  by at least 10# for holding groceries(additional 3#) Goal 3:  Patient will report no dropped items at home. Frequency / Duration: Patient to be seen 2 times per week for 4 weeks: 
 
Assessment / Recommendations:Patient will benefit from continued skilled occupational therapy to increase upper extremity function and functional independence. Certification Period: 8/16/19-9/14/19 TACOS Whitt 8/14/2019 9:01 AM 
 
________________________________________________________________________ I certify that the above Therapy Services are being furnished while the patient is under my care. I agree with the treatment plan and certify that this therapy is necessary. [de-identified] Signature:____________Date:_________TIME:________ 
 
[employer], Date and Time must be completed for valid certification ** Please sign and return to [facility] [place]  [address] [facility] 
          [phone] fax

## 2019-08-16 ENCOUNTER — TELEPHONE (OUTPATIENT)
Dept: CARDIOLOGY CLINIC | Age: 79
End: 2019-08-16

## 2019-08-16 NOTE — LETTER
10/16/2019 9:50 AM 
 
Ms. Vini Alicea [address] [facility] [facility] [address] Dear Ms. Eason, We have been unable to reach you by phone to notify you of your test results. Please call our office at [phone] and ask to speak with my nurse in order to explain these results to you and advise you of any recommendations.  
 
 
 
Sincerely, 
 
 
 
 
Leslie Downs, DO

## 2019-08-16 NOTE — PROGRESS NOTES
BNP is quite low shows no heart failure currently. Her renal function actually is a little bit better than it had been with creatinine down to 1.77 is about what it has been from 2.10 about a month ago. I do not know we decreased her Lasix or not but if she had been on Lasix 40 mg daily up until when she had a lab test 2 weeks ago she probably can decrease it to 20 mg daily and just watch her weight as long as she is not short of breath currently.  ES

## 2019-08-20 ENCOUNTER — DOCUMENTATION ONLY (OUTPATIENT)
Dept: VASCULAR SURGERY | Age: 79
End: 2019-08-20

## 2019-08-20 ENCOUNTER — HOSPITAL ENCOUNTER (OUTPATIENT)
Dept: PHYSICAL THERAPY | Age: 79
Discharge: HOME | End: 2019-08-20
Payer: MEDICARE

## 2019-08-20 DIAGNOSIS — I71.40 ABDOMINAL AORTIC ANEURYSM (AAA) WITHOUT RUPTURE: Primary | ICD-10-CM

## 2019-08-20 PROCEDURE — 97530 THERAPEUTIC ACTIVITIES: CPT

## 2019-08-20 PROCEDURE — 97110 THERAPEUTIC EXERCISES: CPT

## 2019-08-20 NOTE — PROGRESS NOTES
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]  Hand pain [M79.643]    SUBJECTIVE  Pain Level (0-10 scale): 0/10  Any medication changes, allergies to medications, adverse drug reactions, diagnosis change, or new procedure performed?: [x] No    [] Yes (see summary sheet for update)  Subjective functional status/changes:   [] No changes reported  No pain, but the numbness comes and goes  Better one day, bad the next    OBJECTIVE    Modality rationale: decrease pain and increase tissue extensibility to improve the patients ability to grasp   Min Type Additional Details    [] Estim:  []Unatt       []IFC  []Premod                        []Other:  []w/ice   []w/heat  Position:  Location:    [] Estim: []Att    []TENS instruct  []NMES                    []Other:  []w/US   []w/ice   []w/heat  Position:  Location:    []  Traction: [] Cervical       []Lumbar                       [] Prone          []Supine                       []Intermittent   []Continuous Lbs:  [] before manual  [] after manual    []  Ultrasound: []Continuous   [] Pulsed                           []1MHz   []3MHz W/cm2:  Location:    []  Iontophoresis with dexamethasone         Location: [] Take home patch   [] In clinic    []  Ice     []  heat  []  Ice massage  []  Laser   []  Anodyne Position:  Location:     10 []  Laser with stim  [x]  Other: Paraffin Position:  Location:both hands    []  Vasopneumatic Device Pressure:       [] lo [] med [] hi   Temperature: [] lo [] med [] hi       [x] Skin assessment post-treatment:  [x]intact []redness- no adverse reaction    []redness  adverse reaction: 10 min Therapeutic Exercise:  [] See flow sheet :   Rationale: increase ROM and reduce numbness to improve the patients ability to use hands  Median Nerve glide ( full arms) t with flossing o reduce numbness in both hands    25 min Therapeutic Activity:  []  See flow sheet :   Rationale: improve coordination  to improve the patients ability to identify items by touch  Rice box both hands       With   [] TE   [] TA   [] neuro   [] other: Patient Education: [x] Review HEP    [] Progressed/Changed HEP based on:   [] positioning   [] body mechanics   [] transfers   [] heat/ice application   [] Splint wear/care   [] Sensory re-education   [] scar management      [] other:            Other Objective/Functional Measures:   Some discomfort anterior shoulder with median nerve glide   Located 10/10 items in rice both hands  Pain Level (0-10 scale) post treatment: 0/10    ASSESSMENT/Changes in Function: Improving sensation    Patient will continue to benefit from skilled OT services to modify and progress therapeutic interventions, address strength deficits, analyze and cue movement patterns and instruct in home and community integration to attain remaining goals. []  See Plan of Care  []  See progress note/recertification  []  See Discharge Summary         Progress towards goals / Updated goals:  *Goal:1.  Patient will increase fine motor skills bilaterally by at least 20% with vision as assist (addiitonal improvement of 6-8% from last recheck)  Harrison Brink will increase left hand  by at least 10# for holding groceries(additional 3#)  Goal 3:  Patient will report no dropped items at home. **    PLAN  []  Upgrade activities as tolerated     [x]  Continue plan of care  []  Update interventions per flow sheet       []  Discharge due to:_  []  Other:_      ÓSCAR Castellanos/L 8/20/2019  8:31 AM    Future Appointments   Date Time Provider Korey Monte   8/20/2019  9:00 AM ÓSCAR [place]/L MMCPTPB [address] 8/21/2019  8:00 AM [facility] IMAGING 1 2VV PAZ SCHED   8/21/2019 11:30 AM Melanie Ortiz MD [address] [address]   8/23/2019  9:00 AM Katey Soto, OTR/L [facility]   8/29/2019  8:30 AM Brittany Aj MD [facility]   8/29/2019 10:00 AM CSI, PACER HV CS PAZ SCHED   11/12/2019  8:40 AM CSI, PACER HV [facility] PAZ SCHED   11/12/2019  8:40 AM Bertha Mckeon, DO [address] [place] [address]   2/14/2020  9:00 AM [facility] IMAGING 2 2VV PAZ SCHED   2/14/2020 10:00 AM [facility] IMAGING 1 2VV PAZ SCHED   2/14/2020 11:00 AM [facility] NONIMAGING 2VV PAZ SCHED   2/24/2020  9:00 AM Wild, [address], [address] [address] [address]

## 2019-08-20 NOTE — PROGRESS NOTES
Aorta duplex cancelled per CELESTINE Jay. CT just done in July with AAA measurement and no change in size.  Sent [facility] message to cancel appts for now per Iza and schedule patient 1 year out for AAA study and f/u in office with provider

## 2019-08-23 ENCOUNTER — HOSPITAL ENCOUNTER (OUTPATIENT)
Dept: PHYSICAL THERAPY | Age: 79
Discharge: HOME | End: 2019-08-23
Payer: MEDICARE

## 2019-08-23 PROCEDURE — 97110 THERAPEUTIC EXERCISES: CPT

## 2019-08-23 PROCEDURE — 97530 THERAPEUTIC ACTIVITIES: CPT

## 2019-08-23 NOTE — PROGRESS NOTES
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]  Hand pain [M79.643]    SUBJECTIVE  Pain Level (0-10 scale): 0/10  Any medication changes, allergies to medications, adverse drug reactions, diagnosis change, or new procedure performed?: [x] No    [] Yes (see summary sheet for update)  Subjective functional status/changes:   [] No changes reported  No pain, just numb  somedays feels better, some days right where I started from  Get up and squeeze a ball every morning    OBJECTIVE      15 min Therapeutic Exercise:  [] See flow sheet :   Rationale: increase ROM and increase strength to improve the patients ability to grasp  Both hands towel scrunch x 10   Gripper 35# x 25 each hand    25 min Therapeutic Activity:  []  See flow sheet :   Rationale: improve coordination  to improve the patients ability to grasp  Palced 25 1 in pegs both hands  Rice box both hands            With   [] TE   [] TA   [] neuro   [] other: Patient Education: [x] Review HEP    [] Progressed/Changed HEP based on: glove at night  [] positioning   [] body mechanics   [] transfers   [] heat/ice application   [] Splint wear/care   [] Sensory re-education   [] scar management      [] other: Don't squeeze ball in morning           Other Objective/Functional Measures:   Able to locate 8/10 with left hand, 10/10 items with right hand     Pain Level (0-10 scale) post treatment: 0/10    ASSESSMENT/Changes in Function: Improving hand strength,     Patient will continue to benefit from skilled OT services to modify and progress therapeutic interventions, address strength deficits, analyze and cue movement patterns and instruct in home and community integration to attain remaining goals. []  See Plan of Care  []  See progress note/recertification  []  See Discharge Summary         Progress towards goals / Updated goals:  *Goal:1.  Patient will increase fine motor skills bilaterally by at least 20% with vision as assist (addiitonal improvement of 6-8% from last recheck)  Goal:2.  Patient will increase left hand  by at least 10# for holding groceries(additional 3#)  Goal 3:  Patient will report no dropped items at home. *-reports almost never 8/23/19       PLAN  []  Upgrade activities as tolerated     []  Continue plan of care  []  Update interventions per flow sheet       []  Discharge due to:_  []  Other:_      ÓSCAR Umaña/L 8/23/2019  9:55 AM    Future Appointments   Date Time Provider Korey Monte   8/26/2019  8:00 AM Kenard Habermann ABLTKGS [facility]   8/29/2019  8:30 AM Edd Mcgarry MD [facility]   8/29/2019 10:00 AM CSI, PACER [address]   8/30/2019  7:30 AM Kenard Habermann IHJKPCU [facility]   9/3/2019  8:15 AM Sammy Syed OTR/L [facility]CP[facility]   9/5/2019  7:30 AM Kenard Habermann OBDGOFJ [facility]   9/10/2019  7:30 AM Sammy Syed OTR/L [facility]   9/13/2019  8:15 AM Kenard Habermann [facility]CP[facility]   11/12/2019  8:40 AM CSI, PACER [facility] PAZ SCHED   11/12/2019  8:40 AM Gissell Feldman DO [address] [place] [address]   2/14/2020  9:00 AM [facility] IMAGING 2 2VV PAZ SCHED   2/14/2020 10:00 AM [facility] IMAGING 1 2VV PAZ SCHED   2/14/2020 11:00 AM [facility] NONIMAGING 2VV PAZ SCHED   2/24/2020  9:00 AM Pierre [place], [place] [address] [address]

## 2019-08-26 ENCOUNTER — HOSPITAL ENCOUNTER (OUTPATIENT)
Dept: PHYSICAL THERAPY | Age: 79
Discharge: HOME | End: 2019-08-26
Payer: MEDICARE

## 2019-08-26 PROCEDURE — 97110 THERAPEUTIC EXERCISES: CPT

## 2019-08-26 PROCEDURE — 97530 THERAPEUTIC ACTIVITIES: CPT

## 2019-08-26 PROCEDURE — 97018 PARAFFIN BATH THERAPY: CPT

## 2019-08-26 NOTE — PROGRESS NOTES
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     OBJECTIVE            Modality rationale: decrease pain and increase tissue extensibility to improve the patients ability to grasp   Min Type Additional Details      [] Estim:  []Unatt       []IFC  []Premod                        []Other:  []w/ice   []w/heat  Position:  Location:      [] Estim: []Att    []TENS instruct  []NMES                    []Other:  []w/US   []w/ice   []w/heat  Position:  Location:      []  Traction: [] Cervical       []Lumbar                       [] Prone          []Supine                       []Intermittent   []Continuous Lbs:  [] before manual  [] after manual      []  Ultrasound: []Continuous   [] Pulsed                           []1MHz   []3MHz W/cm2:  Location:      []  Iontophoresis with dexamethasone         Location: [] Take home patch   [] In clinic      []  Ice     []  heat  []  Ice massage  []  Laser   []  Anodyne Position:  Location:       10 []  Laser with stim  [x]  Other: Paraffin Position:  Location:both hands      []  Vasopneumatic Device Pressure:       [] lo [] med [] hi   Temperature: [] lo [] med [] hi       [x] Skin assessment post-treatment:  [x]intact []redness- no adverse reaction    []redness  adverse reaction:        13 min Therapeutic Exercise:  [] See flow sheet :   Rationale: increase ROM and increase strength to improve the patients ability to , grasp    Towel Scrunch 10x , B Hands  1in peg removal using gripper, 35#  25x each hand     20 min Therapeutic Activity:  []  See flow sheet :   Rationale: increase ROM and improve coordination  to improve the patients ability to manipulate small items    1 in peg placed in sets of 3 into resistive peg board, 25x each hand   Grooved Pegs, B Hands    With   [] TE   [] TA   [] neuro   [] other: Patient Education: [x] Review HEP    [] Progressed/Changed HEP based on:   [] positioning   [] body mechanics   [] transfers   [] heat/ice application   [] Splint wear/care   [] Sensory re-education   [] scar management      [] other:            Other Objective/Functional Measures:     Difficulty with Left hand peg removal      Increased difficulty with left hand during grooved pegs     Pain Level (0-10 scale) post treatment: 0/10    ASSESSMENT/Changes in Function: Strength improving, ongoing difficulties with left hand fine motor manipulation     Patient will continue to benefit from skilled OT services to modify and progress therapeutic interventions, address ROM deficits, address strength deficits and instruct in home and community integration to attain remaining goals. []  See Plan of Care  []  See progress note/recertification  []  See Discharge Summary         Progress towards goals / Updated goals:  Criss Mcmanus will increase fine motor skills bilaterally by at least 20% with vision as assist (addiitonal improvement of 6-8% from last recheck) Progressing with in clinic activities 8/26/19  Goal:2.  Patient will increase left hand  by at least 10# for holding groceries(additional 3#)Progressing with in clinic activities 8/26/19   Goal 3:  Patient will report no dropped items at home. *-reports almost never 8/23/19    PLAN  []  Upgrade activities as tolerated     [x]  Continue plan of care  []  Update interventions per flow sheet       []  Discharge due to:_  []  Other:_      Micah Sal ,SMITH/L 8/26/2019  8:08 AM    Future Appointments   Date Time Provider Korey Mell   8/29/2019  8:30 AM Enedina Pallas, MD [facility] [address]   8/29/2019 10:00 AM CSI, PACER HV [address] [place] [address]   8/30/2019  7:30 AM Jenny Ee YZINTTC [facility]   9/3/2019  8:15 AM Adelso Morris, OTR/L [facility]CPTP[facility]   9/5/2019  7:30 AM Jenny Ee TGFAQVW [facility]   9/10/2019  7:30 AM Adelso Morris, OTR/L [facility]   9/13/2019  8:15 AM Jenny Ee [facility]   11/12/2019  8:40 AM CSI, PACER HV [facility] PAZ SCHED   11/12/2019  8:40 AM Joanne Gonzales DO [address] [place] [address]   2/14/2020  9:00 AM [facility] IMAGING 2 2VV PAZ SCHED   2/14/2020 10:00 AM [facility] IMAGING 1 2VV PAZ SCHED   2/14/2020 11:00 AM [facility] NONIMAGING 2VV PAZ SCHED   2/24/2020  9:00 AM Jovany Jay

## 2019-08-29 ENCOUNTER — CLINICAL SUPPORT (OUTPATIENT)
Dept: CARDIOLOGY CLINIC | Age: 79
End: 2019-08-29

## 2019-08-29 ENCOUNTER — OFFICE VISIT (OUTPATIENT)
Dept: INTERNAL MEDICINE CLINIC | Age: 79
End: 2019-08-29

## 2019-08-29 ENCOUNTER — HOSPITAL ENCOUNTER (OUTPATIENT)
Dept: LAB | Age: 79
Discharge: HOME | End: 2019-08-29
Payer: MEDICARE

## 2019-08-29 ENCOUNTER — DOCUMENTATION ONLY (OUTPATIENT)
Dept: INTERNAL MEDICINE CLINIC | Age: 79
End: 2019-08-29

## 2019-08-29 VITALS
WEIGHT: 190.4 LBS | BODY MASS INDEX: 32.5 KG/M2 | OXYGEN SATURATION: 99 % | HEART RATE: 60 BPM | HEIGHT: 64 IN | RESPIRATION RATE: 14 BRPM | TEMPERATURE: 97.1 F | DIASTOLIC BLOOD PRESSURE: 60 MMHG | SYSTOLIC BLOOD PRESSURE: 142 MMHG

## 2019-08-29 DIAGNOSIS — E78.2 MIXED HYPERLIPIDEMIA: ICD-10-CM

## 2019-08-29 DIAGNOSIS — R73.02 IMPAIRED GLUCOSE TOLERANCE: ICD-10-CM

## 2019-08-29 DIAGNOSIS — I65.23 BILATERAL CAROTID ARTERY STENOSIS: ICD-10-CM

## 2019-08-29 DIAGNOSIS — N18.30 CKD (CHRONIC KIDNEY DISEASE), STAGE III (HCC): ICD-10-CM

## 2019-08-29 DIAGNOSIS — M35.1 MIXED CONNECTIVE TISSUE DISEASE (HCC): Primary | ICD-10-CM

## 2019-08-29 DIAGNOSIS — Z12.11 SCREENING FOR COLON CANCER: ICD-10-CM

## 2019-08-29 DIAGNOSIS — N18.30 CKD (CHRONIC KIDNEY DISEASE) STAGE 3, GFR 30-59 ML/MIN (HCC): ICD-10-CM

## 2019-08-29 DIAGNOSIS — R92.8 ABNORMALITY OF LEFT BREAST ON SCREENING MAMMOGRAM: ICD-10-CM

## 2019-08-29 DIAGNOSIS — Z95.0 PACEMAKER: Primary | ICD-10-CM

## 2019-08-29 DIAGNOSIS — I42.9 CARDIOMYOPATHY, UNSPECIFIED TYPE (HCC): ICD-10-CM

## 2019-08-29 DIAGNOSIS — Z00.00 MEDICARE ANNUAL WELLNESS VISIT, SUBSEQUENT: ICD-10-CM

## 2019-08-29 DIAGNOSIS — I10 ESSENTIAL HYPERTENSION: ICD-10-CM

## 2019-08-29 DIAGNOSIS — Z86.73 HISTORY OF CVA (CEREBROVASCULAR ACCIDENT): ICD-10-CM

## 2019-08-29 DIAGNOSIS — I67.1 INTRACRANIAL ANEURYSM: ICD-10-CM

## 2019-08-29 DIAGNOSIS — E66.01 SEVERE OBESITY (BMI 35.0-39.9) WITH COMORBIDITY (HCC): ICD-10-CM

## 2019-08-29 DIAGNOSIS — R10.9 RIGHT FLANK PAIN: ICD-10-CM

## 2019-08-29 DIAGNOSIS — Z95.810 AICD (AUTOMATIC CARDIOVERTER/DEFIBRILLATOR) PRESENT: ICD-10-CM

## 2019-08-29 DIAGNOSIS — I50.42 CHRONIC COMBINED SYSTOLIC AND DIASTOLIC CONGESTIVE HEART FAILURE (HCC): ICD-10-CM

## 2019-08-29 DIAGNOSIS — M35.1 MIXED CONNECTIVE TISSUE DISEASE (HCC): ICD-10-CM

## 2019-08-29 DIAGNOSIS — Z71.89 ADVANCE CARE PLANNING: ICD-10-CM

## 2019-08-29 LAB
ANION GAP SERPL CALC-SCNC: 8 MMOL/L (ref 3–18)
BUN SERPL-MCNC: 38 MG/DL (ref 7–18)
BUN/CREAT SERPL: 23 (ref 12–20)
CALCIUM SERPL-MCNC: 10 MG/DL (ref 8.5–10.1)
CHLORIDE SERPL-SCNC: 107 MMOL/L (ref 100–111)
CO2 SERPL-SCNC: 26 MMOL/L (ref 21–32)
CREAT SERPL-MCNC: 1.66 MG/DL (ref 0.6–1.3)
CRP SERPL-MCNC: <0.3 MG/DL (ref 0–0.3)
GLUCOSE SERPL-MCNC: 89 MG/DL (ref 74–99)
POTASSIUM SERPL-SCNC: 4.2 MMOL/L (ref 3.5–5.5)
SODIUM SERPL-SCNC: 141 MMOL/L (ref 136–145)

## 2019-08-29 PROCEDURE — 86140 C-REACTIVE PROTEIN: CPT

## 2019-08-29 PROCEDURE — 80048 BASIC METABOLIC PNL TOTAL CA: CPT

## 2019-08-29 PROCEDURE — 36415 COLL VENOUS BLD VENIPUNCTURE: CPT

## 2019-08-29 RX ORDER — ALLOPURINOL 100 MG/1
TABLET ORAL
Refills: 0 | COMMUNITY
Start: 2019-05-23 | End: 2020-01-01 | Stop reason: ALTCHOICE

## 2019-08-29 RX ORDER — HYDROXYCHLOROQUINE SULFATE 200 MG/1
TABLET, FILM COATED ORAL
Refills: 2 | COMMUNITY
Start: 2019-07-02 | End: 2020-01-01

## 2019-08-29 NOTE — PROGRESS NOTES
Pt did not have her labs drawn. She had another appt to get to. She will go to [facility] to have them drawn after her appt.

## 2019-08-29 NOTE — PROGRESS NOTES
I have personally seen and evaluated the device findings. Interrogation reviewed and I agree with assessment.     Gertie Leyden

## 2019-08-29 NOTE — PROGRESS NOTES
Chief Complaint   Patient presents with    Annual Wellness Visit     ROOM  2       1. Have you been to the ER, urgent care clinic since your last visit? Hospitalized since your last visit? Yes When: 7-14-19 Where: [facility] ER Reason: Acute Right Side Back pain without Sciactica. 2. Have you seen or consulted any other health care providers outside of the [address] [place] [address] since your last visit? Include any pap smears or colon screening. No    Patient was given a copy of the Advanced Directive and understands to bring it in once completed. Health Maintenance Due   Topic Date Due    Shingrix Vaccine Age 49> (1 of 2) 04/02/1992    FOBT Q 1 YEAR, 18+  08/22/2018    Influenza Age 5 to Adult  08/01/2019    MEDICARE YEARLY EXAM  08/24/2019    GLAUCOMA SCREENING Q2Y  09/06/2019       This is the Subsequent Medicare Annual Wellness Exam, performed 12 months or more after the Initial AWV or the last Subsequent AWV    I have reviewed the patient's medical history in detail and updated the computerized patient record. History   Patient is a 82-year-old -American female with history of allergic rhinitis, gout, cataract, overactive bladder, hyperlipidemia, GERD, mixed connective tissue disease (positive FOREST, RNP Ab, Sjogren's Ab, anti-chromatin Ab, and Anti-Parnell Ab), chronic gastritis with bleeding in March 2016, history of CVA, vitamin D deficiency, dysphasia status post dilation, anemia, pacemaker for history of AV block, left cavernous ICA aneurysm, hypertension, atrial fibrillation,  statin intolerance, CHF, carotid stenosis, 3.1 cm fusiform dilation of the infrarenal abdominal aorta (followed by [facility]), peripheral artery disease, PVCs, DVT hx (LUE - postoperatively), and CAD status post CABG (followed by Kassy Chaves). Health Maintenance History  Immunizations reviewed:    Tdap pt declines this vaccination today   Pneumovax: pt declines this vaccination today Flu: pt declines this vaccination today  Zoster: pt declines this vaccination today    There is no immunization history on file for this patient. Health Maintenance Due   Topic Date Due    Shingrix Vaccine Age 50> (1 of 2) 04/02/1992    FOBT Q 1 YEAR, 18+  08/22/2018    Influenza Age 5 to Adult  08/01/2019    MEDICARE YEARLY EXAM  08/24/2019    GLAUCOMA SCREENING Q2Y  09/06/2019     Colonoscopy: Overdue     Eye exam: Up to date. Mammo: Up to date. Her last mammogram was indeterminate/abnormal. A f/u mammogram was encouraged but she never had it done. Dexascan: Up to date    Pelvic/Pap: No vaginal bleeding. Past Medical History:   Diagnosis Date    Abnormal ankle brachial index 11/13/2014    Mild arterial disease in right leg. R VICTOR MANUEL 0.88. L VICTOR MANUEL 1.00.  Anemia     Arm DVT (deep venous thromboembolism), acute ([facility])     s/p anticoagulation    Atherosclerotic heart disease     Atrial fibrillation ([facility])     AV block     CAD (coronary artery disease)     Cardiomyopathy, ischemic     Carotid artery stenosis     Carotid duplex 11/13/2014    Mild <50% bilateral ICA plaquing. Possible left vertebral occlusion.  Cerebral artery occlusion with cerebral infarction ([facility])     stroke x 2    Chest pain     CVA (cerebral infarction)     Echocardiogram 08/19/2015    EF 55%. Apical inferior, apical septal hypk (new since study of 12/19/11), likely due to chronic V-pacing. Mild LVH. RVSP 30 mmHg. Mild LAE. Mild MR. Mild TR.  Gastritis     GERD (gastroesophageal reflux disease)     Heart failure ([facility])     Hiatal hernia     Hyperlipidemia     Hypertension     Hypertensive cardiovascular disease     Intracranial aneurysm     left cavernous ICA 2mm aneurysm from head/neck CTA in 2014    Long term (current) use of anticoagulants 3/10/2011    Lower extremity venous duplex 01/26/2015    No DVT bilaterally.  Nuclear cardiac imaging test 09/24/2015    Low risk.   Sm apical infarction w/no ischemia vs apical thinning. Sm basal inferior defect, likely artifact. EF 56%. Inferoseptal, inferoapical WMA related to sternotomy or paced rhythm.  Pacemaker     PAD (peripheral artery disease) (HCC)     PVC's     chronic    S/P CABG x 3 2008    LIMA-LAD. SVG-OM. SVG-dRCA     S/P cardiac cath 2008    pRCA 100%. LM patent. Cx patent. OM1 100%. mLAD 95%. LVEDP 27-29mmHg. EF 20%. mod MR    Tilt table evaluation 1995    Positive for orthostatic hypotension    Vitamin D deficiency       Past Surgical History:   Procedure Laterality Date    CABG, ARTERY-VEIN, THREE  2008    CARDIAC SURG PROCEDURE UNLIST      medtronic dual-chamber cardioverted-defibrillator    HX  SECTION      x2    HX ENDOSCOPY  3/1/16    HX ENDOSCOPY  3/1/16    HX HEMORRHOIDECTOMY          HX HYSTERECTOMY          HX ORTHOPAEDIC      knee surgery    HX OTHER SURGICAL  2/10/16    Pacemaker Gen Change    HX PACEMAKER      Defibrillator     HX TUMOR REMOVAL      removed from arm.  benign     Current Outpatient Medications   Medication Sig Dispense Refill    carvedilol (COREG) 25 mg tablet TAKE 1 TABLET BY MOUTH (2) TIMES A DAY 10 Tab 0    furosemide (LASIX) 40 mg tablet TAKE 1 TABLET BY MOUTH EVERY DAY 5 Tab 0    amLODIPine (NORVASC) 5 mg tablet Take 1 Tab by mouth daily. 5 Tab 0    diclofenac (VOLTAREN) 1 % gel USE 4 GRAMS ON KNEE FOUR TIMES A DAY AS NEEDED  3    hydrocortisone (HYTONE) 2.5 % ointment APPLY LIGHTLY TO THE AFFECTED AREAS ON THE LEGS AND FEET TWICE A DAY. 2    aspirin 81 mg tablet Take 81 mg by mouth daily.       allopurinol (ZYLOPRIM) 100 mg tablet TAKE 1 TABLET BY MOUTH EVERY DAY **HOSPITAL  0    hydroxychloroquine (PLAQUENIL) 200 mg tablet TAKE 1 TABLET BY MOUTH TWICE A DAY WITH FOOD OR MILK  2     Allergies   Allergen Reactions    Nifedipine Nausea and Vomiting and Other (comments)     Dizzy    Ace Inhibitors Cough    Codeine Unknown (comments), Nausea Only and Nausea and Vomiting    Hydralazine Other (comments)     Dizziness and Fatigue    Lipitor [Atorvastatin] Nausea and Vomiting and Vertigo    Simvastatin Nausea and Vomiting     History reviewed. No pertinent family history. Social History     Tobacco Use    Smoking status: Never Smoker    Smokeless tobacco: Never Used    Tobacco comment: just smoked 2 weeks in college   Substance Use Topics    Alcohol use: No     Patient Active Problem List   Diagnosis Code    Atherosclerotic heart disease, s/p CABG X3 in 6/08, in the setting of severe left ventricular dysfunction, and s/p a dual-chamber I25.10    S/P CABG x 3 Z95.1    HTN (hypertension) I10    Atrial fibrillation ([facility]) I48.91    Carotid stenosis I65.29    PAD (peripheral artery disease) ([facility]) I73.9    Intracranial aneurysm - left cavernous ICA on 2014 head/neck CTA I67.1    Mixed hyperlipidemia E78.2    Gastroesophageal reflux disease without esophagitis K21.9    Chronic gastritis with bleeding - in February/March of 2016 per FOBT and EGD results K29.51    History of CVA (cerebrovascular accident) Z80.78    Vitamin D deficiency E55.9    Dysphagia s/p dilation R13.10    Anemia D64.9    Pacemaker (for h/o AV block) Z95.0    OAB (overactive bladder) N32.81    Cataract H26.9    Gout of left foot M10.9    Seasonal allergic rhinitis J30.2    Microalbuminuria R80.9    CKD (chronic kidney disease) stage 3, GFR 30-59 ml/min ([facility]) N18.3    Mixed connective tissue disease (HCC) M35.1    Severe obesity (BMI 35.0-39. 9) with comorbidity ([facility] E66.01    Generalized edema R60.1    Peripheral venous insufficiency I87.2    Chronic combined systolic and diastolic congestive heart failure (HCC) I50.42    Impaired glucose tolerance R73.02       Depression Risk Factor Screening:     3 most recent PHQ Screens 8/29/2019   Little interest or pleasure in doing things Not at all   Feeling down, depressed, irritable, or hopeless Not at all   Total Score PHQ 2 0     Alcohol Risk Factor Screening:   Patient does not drink Alcohol    Functional Ability and Level of Safety:   Hearing Loss  Hearing is good. Activities of Daily Living  The home contains: no safety equipment. Patient does total self care   She does not need assistance with ADLs/IADLs    Fall Risk  Fall Risk Assessment, last 12 mths 8/29/2019   Able to walk? Yes   Fall in past 12 months? Yes   Fall with injury? Yes   Number of falls in past 12 months 1   Fall Risk Score 2       Abuse Screen  Patient is not abused     ROS:  Gen: No fever/chlls  HEENT: No sore throat, eye pain, ear pain, or congestion. No HA  CV: No CP  Resp: No cough/SOB  GI: No abdominal pain  : No hematuria/dysuria  Derm: No rash  Neuro: No new paresthesias/weakness  Musc: No new myalgias/jt pain  Psych: No depression sx      Cognitive Screening   Evaluation of Cognitive Function:  Has your family/caregiver stated any concerns about your memory: no  Normal     Visit Vitals  /60 (BP 1 Location: Left arm, BP Patient Position: Sitting)   Pulse 60   Temp 97.1 °F (36.2 °C) (Oral)   Resp 14   Ht 5' 4\" (1.626 m)   Wt 190 lb 6.4 oz (86.4 kg)   SpO2 99%   BMI 32.68 kg/m²       General:  Alert, cooperative, no distress   Head:  Normocephalic, without obvious abnormality, atraumatic. Eyes:  Conjunctivae clear   Ears:  Clear external auditory canals   Nose: Nares normal. Septum midline. Mucosa normal. No drainage or sinus tenderness. Throat: Lips, mucosa, and tongue normal. Unremarkable oropharynx   Neck: Supple, symmetrical, trachea midline, no adenopathy. Lungs:   Clear to auscultation bilaterally. Heart:  No m/r/g:       Abdomen:   Soft, non-tender. Bowel sounds normal. No masses. Extremities: Extremities normal no edema. Pulses: +2 radial pulses b/l   Skin:  No rashes or lesions.    Lymph nodes: Cervical LN normal.   Neurologic:  Psych: Stable gait  Euthymic       3/3 short item recall: passed  Unremarkable clock drawing exercise    Patient Care Team   Patient Care Team:  Pati Oswald MD as PCP - General (Family Practice)  Carolina Ruelas DO (Cardiology)  Neelam Modi (Podiatry)  Cal Blandon MD as Physician (Urology)  Vicki Barry, [id number] (Physician Assistant)  Madisyn Sorenson MD (Rheumatology)    Assessment/Plan   Education and counseling provided:  Are appropriate based on today's review and evaluation    Diagnoses and all orders for this visit:    1. Abnormality of left breast on screening mammogram  -     SHIVA MAMMO BI DX INCL CAD; Future  -     US BREAST LT LIMITED=<3 QUAD; Future    2. Screening for colon cancer  -     COLOGUARD TEST (FECAL DNA COLORECTAL CANCER SCREENING)    3. Mixed connective tissue disease ([facility]  -     C REACTIVE PROTEIN, QT; Future  -     METABOLIC PANEL, BASIC; Future    4. CKD (chronic kidney disease), stage III (HCC)  -     METABOLIC PANEL, BASIC; Future    5. Medicare annual wellness visit, subsequent        Health Maintenance Due   Topic Date Due    Shingrix Vaccine Age 49> (1 of 2) 04/02/1992    FOBT Q 1 YEAR, 18+  08/22/2018    Influenza Age 5 to Adult  08/01/2019    MEDICARE YEARLY EXAM  08/24/2019    GLAUCOMA SCREENING Q2Y  09/06/2019       Health Maintenance:  - She declines all vaccinations that were recommended  - Colon cancer screening was discussed with her today. He wants to proceed with the cologuard. Ordering the cologuard. - I encouraged her to get a formal eye exam.  - Ordering a diagnostic mammogram and ultrasound. We discussed the need to have these studies especially given her last mammogram's results. All questions were answered.         Advance Care Planning (ACP) Provider Conversation Snapshot    Date of ACP Conversation: 09/02/19  Persons included in Conversation:  patient  Length of ACP Conversation in minutes:  <16 minutes (Non-Billable)    Authorized Decision Maker (if patient is incapable of making informed decisions): This person is:   Healthcare Agent/Medical Power of  under Advance Directive    For Patients with Decision Making Capacity:   Values/Goals: Exploration of values, goals, and preferences if recovery is not expected, even with continued medical treatment in the event of:  Imminent death  Severe, permanent brain injury    Conversation Outcomes / Follow-Up Plan:   Reviewed existing Advance Directive . She has no changes to make to her pre-existing advanced directive.       Dr. Gonzales Record  [facility] [address], [address] [place] [address] Str.  Phone: [phone]  Fax: [phone]

## 2019-08-29 NOTE — PATIENT INSTRUCTIONS
Health Maintenance Due   Topic Date Due    Shingrix Vaccine Age 49> (1 of 2) 04/02/1992    Pneumococcal 65+ years (1 of 2 - PCV13) 04/02/2007    COLONOSCOPY  01/25/2018    MEDICARE YEARLY EXAM  08/24/2019    GLAUCOMA SCREENING Q2Y  09/06/2019       Medicare Wellness Visit, Female     The best way to live healthy is to have a lifestyle where you eat a well-balanced diet, exercise regularly, limit alcohol use, and quit all forms of tobacco/nicotine, if applicable. Regular preventive services are another way to keep healthy. Preventive services (vaccines, screening tests, monitoring & exams) can help personalize your care plan, which helps you manage your own care. Screening tests can find health problems at the earliest stages, when they are easiest to treat. [facility] follows the current, evidence-based guidelines published by the [facility] Jero Damir ([facility]) when recommending preventive services for our patients. Because we follow these guidelines, sometimes recommendations change over time as research supports it. (For example, mammograms used to be recommended annually. Even though Medicare will still pay for an annual mammogram, the newer guidelines recommend a mammogram every two years for women of average risk.)  Of course, you and your doctor may decide to screen more often for some diseases, based on your risk and your health status. Preventive services for you include:  - Medicare offers their members a free annual wellness visit, which is time for you and your primary care provider to discuss and plan for your preventive service needs. Take advantage of this benefit every year!  -All adults over the age of 72 should receive the recommended pneumonia vaccines. Current USPSTF guidelines recommend a series of two vaccines for the best pneumonia protection.   -All adults should have a flu vaccine yearly and a tetanus vaccine every 10 years.  All adults age 61 and older should receive a shingles vaccine once in their lifetime.    -A bone mass density test is recommended when a woman turns 65 to screen for osteoporosis. This test is only recommended one time, as a screening. Some providers will use this same test as a disease monitoring tool if you already have osteoporosis. -All adults age 38-68 who are overweight should have a diabetes screening test once every three years.   -Other screening tests and preventive services for persons with diabetes include: an eye exam to screen for diabetic retinopathy, a kidney function test, a foot exam, and stricter control over your cholesterol.   -Cardiovascular screening for adults with routine risk involves an electrocardiogram (ECG) at intervals determined by your doctor.   -Colorectal cancer screenings should be done for adults age 54-65 with no increased risk factors for colorectal cancer. There are a number of acceptable methods of screening for this type of cancer. Each test has its own benefits and drawbacks. Discuss with your doctor what is most appropriate for you during your annual wellness visit. The different tests include: colonoscopy (considered the best screening method), a fecal occult blood test, a fecal DNA test, and sigmoidoscopy. -Breast cancer screenings are recommended every other year for women of normal risk, age 54-69.  -Cervical cancer screenings for women over age 72 are only recommended with certain risk factors.   -All adults born between [facility] should be screened once for Hepatitis C.      Here is a list of your current Health Maintenance items (your personalized list of preventive services) with a due date:    Health Maintenance Due   Topic Date Due    Shingrix Vaccine Age 50> (1 of 2) 04/02/1992    COLONOSCOPY  01/25/2018    MEDICARE YEARLY EXAM  08/24/2019    GLAUCOMA SCREENING Q2Y  09/06/2019          High Blood Pressure: Care Instructions  Overview    It's normal for blood pressure to go up and down throughout the day. But if it stays up, you have high blood pressure. Another name for high blood pressure is hypertension. Despite what a lot of people think, high blood pressure usually doesn't cause headaches or make you feel dizzy or lightheaded. It usually has no symptoms. But it does increase your risk of stroke, heart attack, and other problems. You and your doctor will talk about your risks of these problems based on your blood pressure. Your doctor will give you a goal for your blood pressure. Your goal will be based on your health and your age. Lifestyle changes, such as eating healthy and being active, are always important to help lower blood pressure. You might also take medicine to reach your blood pressure goal.  Follow-up care is a key part of your treatment and safety. Be sure to make and go to all appointments, and call your doctor if you are having problems. It's also a good idea to know your test results and keep a list of the medicines you take. How can you care for yourself at home? Medical treatment  · If you stop taking your medicine, your blood pressure will go back up. You may take one or more types of medicine to lower your blood pressure. Be safe with medicines. Take your medicine exactly as prescribed. Call your doctor if you think you are having a problem with your medicine. · Talk to your doctor before you start taking aspirin every day. Aspirin can help certain people lower their risk of a heart attack or stroke. But taking aspirin isn't right for everyone, because it can cause serious bleeding. · See your doctor regularly. You may need to see the doctor more often at first or until your blood pressure comes down. · If you are taking blood pressure medicine, talk to your doctor before you take decongestants or anti-inflammatory medicine, such as ibuprofen. Some of these medicines can raise blood pressure. · Learn how to check your blood pressure at home.   Lifestyle changes  · Stay at a healthy weight. This is especially important if you put on weight around the waist. Losing even 10 pounds can help you lower your blood pressure. · If your doctor recommends it, get more exercise. Walking is a good choice. Bit by bit, increase the amount you walk every day. Try for at least 30 minutes on most days of the week. You also may want to swim, bike, or do other activities. · Avoid or limit alcohol. Talk to your doctor about whether you can drink any alcohol. · Try to limit how much sodium you eat to less than 2,300 milligrams (mg) a day. Your doctor may ask you to try to eat less than 1,500 mg a day. · Eat plenty of fruits (such as bananas and oranges), vegetables, legumes, whole grains, and low-fat dairy products. · Lower the amount of saturated fat in your diet. Saturated fat is found in animal products such as milk, cheese, and meat. Limiting these foods may help you lose weight and also lower your risk for heart disease. · Do not smoke. Smoking increases your risk for heart attack and stroke. If you need help quitting, talk to your doctor about stop-smoking programs and medicines. These can increase your chances of quitting for good. When should you call for help? Call 911 anytime you think you may need emergency care. This may mean having symptoms that suggest that your blood pressure is causing a serious heart or blood vessel problem. Your blood pressure may be over 180/120.   For example, call 911 if:    · You have symptoms of a heart attack. These may include:  ? Chest pain or pressure, or a strange feeling in the chest.  ? Sweating. ? Shortness of breath. ? Nausea or vomiting. ? Pain, pressure, or a strange feeling in the back, neck, jaw, or upper belly or in one or both shoulders or arms. ? Lightheadedness or sudden weakness. ? A fast or irregular heartbeat.     · You have symptoms of a stroke.  These may include:  ? Sudden numbness, tingling, weakness, or loss of movement in your face, arm, or leg, especially on only one side of your body. ? Sudden vision changes. ? Sudden trouble speaking. ? Sudden confusion or trouble understanding simple statements. ? Sudden problems with walking or balance. ? A sudden, severe headache that is different from past headaches.     · You have severe back or belly pain.    Do not wait until your blood pressure comes down on its own. Get help right away.   Call your doctor now or seek immediate care if:    · Your blood pressure is much higher than normal (such as 180/120 or higher), but you don't have symptoms.     · You think high blood pressure is causing symptoms, such as:  ? Severe headache.  ? Blurry vision.    Watch closely for changes in your health, and be sure to contact your doctor if:    · Your blood pressure measures higher than your doctor recommends at least 2 times. That means the top number is higher or the bottom number is higher, or both.     · You think you may be having side effects from your blood pressure medicine. Where can you learn more? Go to http://jacquelyn-alberto.info/. Enter T062 in the search box to learn more about \"High Blood Pressure: Care Instructions. \"  Current as of: July 22, 2018  Content Version: 12.1  © 1439-1418 [employer]. Care instructions adapted under license by [employer] (which disclaims liability or warranty for this information). If you have questions about a medical condition or this instruction, always ask your healthcare professional. [place] [address] any warranty or liability for your use of this information. Medicare Wellness Visit, Female     The best way to live healthy is to have a lifestyle where you eat a well-balanced diet, exercise regularly, limit alcohol use, and quit all forms of tobacco/nicotine, if applicable. Regular preventive services are another way to keep healthy.  Preventive services (vaccines, screening tests, monitoring & exams) can help personalize your care plan, which helps you manage your own care. Screening tests can find health problems at the earliest stages, when they are easiest to treat. [facility] follows the current, evidence-based guidelines published by the [facility] Reich ([facility]) when recommending preventive services for our patients. Because we follow these guidelines, sometimes recommendations change over time as research supports it. (For example, mammograms used to be recommended annually. Even though Medicare will still pay for an annual mammogram, the newer guidelines recommend a mammogram every two years for women of average risk.)  Of course, you and your doctor may decide to screen more often for some diseases, based on your risk and your health status. Preventive services for you include:  - Medicare offers their members a free annual wellness visit, which is time for you and your primary care provider to discuss and plan for your preventive service needs. Take advantage of this benefit every year!  -All adults over the age of 72 should receive the recommended pneumonia vaccines. Current USPSTF guidelines recommend a series of two vaccines for the best pneumonia protection.   -All adults should have a flu vaccine yearly and a tetanus vaccine every 10 years. All adults age 61 and older should receive a shingles vaccine once in their lifetime.    -A bone mass density test is recommended when a woman turns 65 to screen for osteoporosis. This test is only recommended one time, as a screening. Some providers will use this same test as a disease monitoring tool if you already have osteoporosis.   -All adults age 38-68 who are overweight should have a diabetes screening test once every three years.   -Other screening tests and preventive services for persons with diabetes include: an eye exam to screen for diabetic retinopathy, a kidney function test, a foot exam, and stricter control over your cholesterol.   -Cardiovascular screening for adults with routine risk involves an electrocardiogram (ECG) at intervals determined by your doctor.   -Colorectal cancer screenings should be done for adults age 54-65 with no increased risk factors for colorectal cancer. There are a number of acceptable methods of screening for this type of cancer. Each test has its own benefits and drawbacks. Discuss with your doctor what is most appropriate for you during your annual wellness visit. The different tests include: colonoscopy (considered the best screening method), a fecal occult blood test, a fecal DNA test, and sigmoidoscopy. -Breast cancer screenings are recommended every other year for women of normal risk, age 54-69.  -Cervical cancer screenings for women over age 72 are only recommended with certain risk factors.   -All adults born between [facility] should be screened once for Hepatitis C.      Here is a list of your current Health Maintenance items (your personalized list of preventive services) with a due date:  Health Maintenance Due   Topic Date Due    Shingles Vaccine (1 of 2) 04/02/1992    Stool testing for trace blood  08/22/2018    Flu Vaccine  08/01/2019    Annual Well Visit  08/24/2019    Glaucoma Screening   09/06/2019

## 2019-08-29 NOTE — PROGRESS NOTES
INTERNISTS OF [facility]:  8/29/2019, MRN: [id number]      Jovan Esquivel is a 70 y.o. female and presents to clinic for chronic disease f/u and Annual Wellness Visit (ROOM  2)    Subjective:   Patient is a 82-year-old -American female with history of allergic rhinitis, gout, cataract, overactive bladder, hyperlipidemia, GERD, mixed connective tissue disease (positive FOREST, RNP Ab, Sjogren's Ab, anti-chromatin Ab, and Anti-Parnell Ab), chronic gastritis with bleeding in March 2016, history of CVA, vitamin D deficiency, dysphasia status post dilation, anemia, pacemaker for history of AV block, left cavernous ICA aneurysm, hypertension, atrial fibrillation,  statin intolerance, CHF, carotid stenosis, 3.1 cm fusiform dilation of the infrarenal abdominal aorta (followed by Chris Domingo &Vascular Surgery team), peripheral artery disease, PVCs, DVT hx (LUE - postoperatively), and CAD status post CABG (followed by Geovany Ross). 1. CHF/HTN/CAD: Follow-up with Dr. Cary Elizondo. She is taking carvedilol, Lasix, and amlodipine. No adverse side effects of taking his medicines. BP is 142/60 today. She is not checking her weight on a daily basis. No CP or shortness of breath. 2. Carotid Artery Stenosis, CVA, and Aneurysm Hx: Since her last appointment, she met with vascular surgery. Aggressive lifestyle modification measures and risk factor reduction measures were discussed with her per review of the vascular surgery note. The patient is to have a follow-up ultrasound in September. If her aneurysm is stable, they will follow her on a yearly basis. Since her June appointment with Vascular Surgery, the pt reports no stroke symptoms. She had a head CT earlier this summer as well. Findings are listed below. 6/10/19 Head CT: No acute intracranial pathology. Moderate white matter changes suggesting chronic small vessel ischemic disease which have somewhat progressed from prior comparison.  Chronic appearing lacunar infarctions in the basal ganglia have also progressed from prior comparison. There is moderate generalized volume loss. 3.  Prediabetes and HLD: Her last A1c was checked earlier this year and was 6. She is not regularly exercising. Her weight today is 190lbs. Her most recent lipid panel this yr shows an LDL of 169. +Statin intolerance. 4. CKD: Followed by: Glo Mcclain. Her next appointment with Nephrology: later this yr. Her last creatinine was in the 1.7 range. 5.  Right Flank Pain: Patient was evaluated last month in the emergency department after having right flank pain. An abdomen and pelvis CT was obtained at that time. Findings are listed below. Since then, her pain sx resolved. 7/15/19 Abdomen/Pelvis CT: No evidence of urolithiasis or obstructive uropathy. Opacities noted in the lung bases which may represent atelectasis or infiltrate. Correlate with clinical evaluation. Unchanged infrarenal abdominal aortic aneurysm. 6.  Mixed Connective Tissue Disease: Her last apt with Rheumatology: earlier this summer. She is not presently on any medication for this condition. Interestingly enough, she also has hyperuricemia. She is not on any gout medication. Today, she reports b/l hand numbness. She is participating in PT. Sx come and go.  tried to put the pt on plaquenil. She stopped it after 2 days 2/2 vomiting and generalized weakness. She was supposed to f/u with her this month but hasn't done so. Patient Active Problem List    Diagnosis Date Noted    Impaired glucose tolerance 06/12/2019    Chronic combined systolic and diastolic congestive heart failure ([address] 06/02/2019    Peripheral venous insufficiency 02/21/2019    Generalized edema 11/01/2018    Severe obesity (BMI 35.0-39. 9) with comorbidity ([address] 05/08/2018    Mixed connective tissue disease ([facility] 08/22/2017    Microalbuminuria 07/10/2017    CKD (chronic kidney disease) stage 3, GFR 30-59 ml/min ([facility]) 07/10/2017    Seasonal allergic rhinitis 11/23/2016    Gout of left foot 11/22/2016    Cataract 08/09/2016    OAB (overactive bladder) 07/06/2016    Mixed hyperlipidemia 07/05/2016    Gastroesophageal reflux disease without esophagitis 07/05/2016    Chronic gastritis with bleeding - in February/March of 2016 per FOBT and EGD results 07/05/2016    History of CVA (cerebrovascular accident) 07/05/2016    Vitamin D deficiency 07/05/2016    Dysphagia s/p dilation 07/05/2016    Anemia 07/05/2016    Pacemaker (for h/o AV block) 07/05/2016    Intracranial aneurysm - left cavernous ICA on 2014 head/neck CTA 12/01/2014    HTN (hypertension) 10/13/2014    Atrial fibrillation ([facility] 10/13/2014    Carotid stenosis 10/13/2014    PAD (peripheral artery disease) ([facility] 10/13/2014    Atherosclerotic heart disease, s/p CABG X3 in 6/08, in the setting of severe left ventricular dysfunction, and s/p a dual-chamber     S/P CABG x 3        Current Outpatient Medications   Medication Sig Dispense Refill    carvedilol (COREG) 25 mg tablet TAKE 1 TABLET BY MOUTH (2) TIMES A DAY 10 Tab 0    furosemide (LASIX) 40 mg tablet TAKE 1 TABLET BY MOUTH EVERY DAY 5 Tab 0    amLODIPine (NORVASC) 5 mg tablet Take 1 Tab by mouth daily. 5 Tab 0    diclofenac (VOLTAREN) 1 % gel USE 4 GRAMS ON KNEE FOUR TIMES A DAY AS NEEDED  3    hydrocortisone (HYTONE) 2.5 % ointment APPLY LIGHTLY TO THE AFFECTED AREAS ON THE LEGS AND FEET TWICE A DAY. 2    aspirin 81 mg tablet Take 81 mg by mouth daily.  allopurinol (ZYLOPRIM) 100 mg tablet TAKE 1 TABLET BY MOUTH EVERY DAY **HOSPITAL  0    hydroxychloroquine (PLAQUENIL) 200 mg tablet TAKE 1 TABLET BY MOUTH TWICE A DAY WITH FOOD OR MILK  2    methocarbamol (ROBAXIN) 500 mg tablet TAKE 1 TABLET BY MOUTH FOUR TIMES A DAY  0    oxyCODONE-acetaminophen (PERCOCET) 5-325 mg per tablet Take  by mouth every four (4) hours as needed for Pain.          Allergies   Allergen Reactions    Nifedipine Nausea and Vomiting and Other (comments)     Dizzy    Ace Inhibitors Cough    Codeine Unknown (comments), Nausea Only and Nausea and Vomiting    Hydralazine Other (comments)     Dizziness and Fatigue    Lipitor [Atorvastatin] Nausea and Vomiting and Vertigo    Simvastatin Nausea and Vomiting       Past Medical History:   Diagnosis Date    Abnormal ankle brachial index 11/13/2014    Mild arterial disease in right leg. R VICTOR MANUEL 0.88. L VICTOR MANUEL 1.00.  Anemia     Arm DVT (deep venous thromboembolism), acute ([facility])     s/p anticoagulation    Atherosclerotic heart disease     Atrial fibrillation ([facility])     AV block     CAD (coronary artery disease)     Cardiomyopathy, ischemic     Carotid artery stenosis     Carotid duplex 11/13/2014    Mild <50% bilateral ICA plaquing. Possible left vertebral occlusion.  Cerebral artery occlusion with cerebral infarction ([facility])     stroke x 2    Chest pain     CVA (cerebral infarction)     Echocardiogram 08/19/2015    EF 55%. Apical inferior, apical septal hypk (new since study of 12/19/11), likely due to chronic V-pacing. Mild LVH. RVSP 30 mmHg. Mild LAE. Mild MR. Mild TR.  Gastritis     GERD (gastroesophageal reflux disease)     Heart failure ([facility])     Hiatal hernia     Hyperlipidemia     Hypertension     Hypertensive cardiovascular disease     Intracranial aneurysm     left cavernous ICA 2mm aneurysm from head/neck CTA in 2014    Long term (current) use of anticoagulants 3/10/2011    Lower extremity venous duplex 01/26/2015    No DVT bilaterally.  Nuclear cardiac imaging test 09/24/2015    Low risk. Sm apical infarction w/no ischemia vs apical thinning. Sm basal inferior defect, likely artifact. EF 56%. Inferoseptal, inferoapical WMA related to sternotomy or paced rhythm.  Pacemaker     PAD (peripheral artery disease) ([facility])     PVC's     chronic    S/P CABG x 3 06/08/2008    LIMA-LAD. SVG-OM.  SVG-dRCA     S/P cardiac cath 06/08/2008 pRCA 100%. LM patent. Cx patent. OM1 100%. mLAD 95%. LVEDP 27-29mmHg. EF 20%. mod MR    Tilt table evaluation 1995    Positive for orthostatic hypotension    Vitamin D deficiency        Past Surgical History:   Procedure Laterality Date    CABG, ARTERY-VEIN, THREE  2008    CARDIAC SURG PROCEDURE UNLIST      medtronic dual-chamber cardioverted-defibrillator    HX  SECTION      x2    HX ENDOSCOPY  3/1/16    HX ENDOSCOPY  3/1/16    HX HEMORRHOIDECTOMY      1985    HX HYSTERECTOMY          HX ORTHOPAEDIC      knee surgery    HX OTHER SURGICAL  2/10/16    Pacemaker Gen Change    HX PACEMAKER      Defibrillator     HX TUMOR REMOVAL      removed from arm.  benign       No family history on file. Social History     Tobacco Use    Smoking status: Never Smoker    Smokeless tobacco: Never Used    Tobacco comment: just smoked 2 weeks in college   Substance Use Topics    Alcohol use: No       ROS   Review of Systems   Constitutional: Negative for chills and fever. HENT: Negative for ear pain and sore throat. Eyes: Negative for blurred vision and pain. Respiratory: Negative for cough and shortness of breath. Cardiovascular: Negative for chest pain. Gastrointestinal: Negative for abdominal pain, blood in stool and melena. Genitourinary: Negative for dysuria and hematuria. Musculoskeletal: Positive for joint pain (off/on). Negative for myalgias. Skin: Negative for rash. Neurological: Negative for tingling, focal weakness and headaches. Endo/Heme/Allergies: Does not bruise/bleed easily. Psychiatric/Behavioral: Negative for substance abuse.        Objective     Vitals:    19 0836   BP: 161/70   Pulse: 61   Resp: 14   Temp: 97.1 °F (36.2 °C)   TempSrc: Oral   SpO2: 99%   Weight: 190 lb 6.4 oz (86.4 kg)   Height: 5' 4\" (1.626 m)   PainSc:   0 - No pain       Physical Exam   Constitutional: She is oriented to person, place, and time and well-developed, well-nourished, and in no distress. HENT:   Head: Normocephalic and atraumatic. Right Ear: External ear normal.   Left Ear: External ear normal.   Nose: Nose normal.   Mouth/Throat: Oropharynx is clear and moist. No oropharyngeal exudate. Eyes: Pupils are equal, round, and reactive to light. Conjunctivae and EOM are normal. Right eye exhibits no discharge. Left eye exhibits no discharge. No scleral icterus. Neck: Neck supple. Cardiovascular: Normal rate and intact distal pulses. Exam reveals no gallop and no friction rub. No murmur heard. Irregularly irregular hr   Pulmonary/Chest: Effort normal and breath sounds normal. No respiratory distress. She has no wheezes. She has no rales. Abdominal: Soft. Bowel sounds are normal. She exhibits no distension. There is no tenderness. There is no rebound and no guarding. Musculoskeletal: She exhibits no edema (Bue) or tenderness (BUE). Lymphadenopathy:     She has no cervical adenopathy. Neurological: She is alert and oriented to person, place, and time. She exhibits normal muscle tone. Gait normal.   Skin: Skin is warm and dry. No erythema. Psychiatric: Affect normal.   Nursing note and vitals reviewed.       LABS   Data Review:   Lab Results   Component Value Date/Time    WBC 8.6 07/14/2019 08:56 PM    HGB 10.5 (L) 07/14/2019 08:56 PM    HCT 32.3 (L) 07/14/2019 08:56 PM    PLATELET 230 59/10/0277 08:56 PM    MCV 83.9 07/14/2019 08:56 PM       Lab Results   Component Value Date/Time    Sodium 140 08/01/2019 10:32 AM    Potassium 4.0 08/01/2019 10:32 AM    Chloride 106 08/01/2019 10:32 AM    CO2 25 08/01/2019 10:32 AM    Anion gap 9 08/01/2019 10:32 AM    Glucose 97 08/01/2019 10:32 AM    BUN 34 (H) 08/01/2019 10:32 AM    Creatinine 1.77 (H) 08/01/2019 10:32 AM    BUN/Creatinine ratio 19 08/01/2019 10:32 AM    GFR est AA 34 (L) 08/01/2019 10:32 AM    GFR est non-AA 28 (L) 08/01/2019 10:32 AM    Calcium 9.9 08/01/2019 10:32 AM       Lab Results Component Value Date/Time    Cholesterol, total 269 (H) 08/01/2019 10:32 AM    HDL Cholesterol 73 (H) 08/01/2019 10:32 AM    LDL, calculated 169 (H) 08/01/2019 10:32 AM    VLDL, calculated 27 08/01/2019 10:32 AM    Triglyceride 135 08/01/2019 10:32 AM    CHOL/HDL Ratio 3.7 08/01/2019 10:32 AM       Lab Results   Component Value Date/Time    Hemoglobin A1c 6.0 (H) 05/30/2019 12:04 PM       Assessment/Plan:   1. Mixed Connective Tissue Disease:  - I encouraged her to f/u with Rheumatology for treatment. - We discussed how there are other treatments if she cannot tolerate plaquenil.  - Checking a CRP and BMP    ORDERS:  - C REACTIVE PROTEIN, QT; Future  - METABOLIC PANEL, BASIC; Future    2. Right Flank Pain: Resolved. Observation. 3. Prediabetes and HLD: +H/o statin intolerance. - I encouraged her to reduce her CVD risk and weight by limiting her caloric intake and processed food intake. I will recheck her weight at her f/u apt. 4. CKD (chronic kidney disease), stage III:  - Checking a BMP  - I encouraged her to continue following up with Nephrology    ORDERS:  - METABOLIC PANEL, BASIC; Future    5. HTN, CAD, and CHF: Stable. - C/w rx as prescribed. 6. Carotid Artery Stenosis, Aneurysm Hx, and CVA Hx:   - C/w rx as prescribed. - Continue to f/u with Vascular Surgery. Health Maintenance Due   Topic Date Due    Shingrix Vaccine Age 49> (1 of 2) 04/02/1992    COLONOSCOPY  01/25/2018    MEDICARE YEARLY EXAM  08/24/2019    GLAUCOMA SCREENING Q2Y  09/06/2019         Lab review: labs are reviewed in the EHR and ordered as mentioned above    I have discussed the diagnosis with the patient and the intended plan as seen in the above orders. The patient has received an after-visit summary and questions were answered concerning future plans. I have discussed medication side effects and warnings with the patient as well.  I have reviewed the plan of care with the patient, accepted their input and they are in agreement with the treatment goals. All questions were answered. The patient understands the plan of care. Handouts provided today with above information. Pt instructed if symptoms worsen to call the office or report to the ED for continued care. Greater than 50% of the visit time was spent in counseling and/or coordination of care. Voice recognition was used to generate this report, which may have resulted in some phonetic based errors in grammar and contents. Even though attempts were made to correct all the mistakes, some may have been missed, and remained in the body of the document.           Julián Roe MD

## 2019-08-30 ENCOUNTER — HOSPITAL ENCOUNTER (OUTPATIENT)
Dept: PHYSICAL THERAPY | Age: 79
Discharge: HOME | End: 2019-08-30
Payer: MEDICARE

## 2019-08-30 PROCEDURE — 97110 THERAPEUTIC EXERCISES: CPT

## 2019-08-30 PROCEDURE — 97530 THERAPEUTIC ACTIVITIES: CPT

## 2019-08-30 NOTE — PROGRESS NOTES
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]  Hand pain [M79.643]    SUBJECTIVE  Pain Level (0-10 scale): 0/10  Any medication changes, allergies to medications, adverse drug reactions, diagnosis change, or new procedure performed?: [x] No    [] Yes (see summary sheet for update)  Subjective functional status/changes:   [] No changes reported  Shortened session due to patient request to leave early due to conflicting medical appointment     OBJECTIVE      22 min Therapeutic Exercise:  [] See flow sheet :   Rationale: increase ROM and increase strength to improve the patients ability to     Medium Theraputty: B Hands 10/10  1 in peg removal with gripper: 35# 25x each hand     10 min Therapeutic Activity:  []  See flow sheet :   Rationale: increase ROM and improve coordination  to improve the patients ability to manipulate small items     1 in peg placed into resistive pegboard in sets of 3 25x each hand   1/4 in pegs  Translated palm <> digit for placement/removal - Right hand 35x    With   [] TE   [] TA   [] neuro   [] other: Patient Education: [x] Review HEP    [] Progressed/Changed HEP based on:   [] positioning   [] body mechanics   [] transfers   [] heat/ice application   [] Splint wear/care   [] Sensory re-education   [] scar management      [] other:            Other Objective/Functional Measures:     Increased difficulty with left hand for 1 in peg removal on this date      Pain Level (0-10 scale) post treatment: 0/10    ASSESSMENT/Changes in Function: ongoing sensation deficits impacting left hand function     Patient will continue to benefit from skilled OT services to modify and progress therapeutic interventions, address ROM deficits, address strength deficits and instruct in home and community integration to attain remaining goals. []  See Plan of Care  []  See progress note/recertification  []  See Discharge Summary         Progress towards goals / Updated goals:  Rosalva Golden will increase fine motor skills bilaterally by at least 20% with vision as assist (addiitonal improvement of 6-8% from last recheck) Progressing with in clinic activities 8/30/19  Goal:2.  Patient will increase left hand  by at least 10# for holding groceries(additional 3#)Progressing with in clinic activities 8/30/19   Goal 3:  Patient will report no dropped items at home. *-reports almost never 8/23/19       PLAN  []  Upgrade activities as tolerated     [x]  Continue plan of care  []  Update interventions per flow sheet       []  Discharge due to:_  []  Other:_      SARAH Duque/L 8/30/2019  7:41 AM    Future Appointments   Date Time Provider Korey Monte   9/3/2019  8:15 AM [place] Seek, OTR/L [id number] [facility]   9/5/2019  7:30 AM Merle Bush [id number] [facility]   9/10/2019  7:30 AM [place] Seek, OTR/L [id number]   9/13/2019  8:15 AM Merle Davis [id number] [facility]   11/12/2019  8:40 AM CSI, PACER [facility] PAZ SCHED   11/12/2019  8:40 AM Evan Magaña  [facility]   2/14/2020  9:00 AM [facility] IMAGING 2 2VV PAZ SCHED   2/14/2020 10:00 AM [facility] IMAGING 1 2VV PAZ SCHED   2/14/2020 11:00 AM [facility] NONIMAGING 2VV PAZ SCHED   2/24/2020  9:00 AM [place] 2VV PAZ SCHED   2/28/2020  8:00 AM Florentino Cheng MD [facility]

## 2019-09-03 ENCOUNTER — HOSPITAL ENCOUNTER (OUTPATIENT)
Dept: PHYSICAL THERAPY | Age: 79
Discharge: HOME | End: 2019-09-03
Payer: MEDICARE

## 2019-09-03 PROCEDURE — 97530 THERAPEUTIC ACTIVITIES: CPT

## 2019-09-03 PROCEDURE — 97110 THERAPEUTIC EXERCISES: CPT

## 2019-09-03 PROCEDURE — 97018 PARAFFIN BATH THERAPY: CPT

## 2019-09-03 NOTE — PROGRESS NOTES
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     OBJECTIVE    Modality rationale: decrease pain and increase tissue extensibility to improve the patients ability to grasp   Min Type Additional Details    [] Estim:  []Unatt       []IFC  []Premod                        []Other:  []w/ice   []w/heat  Position:  Location:    [] Estim: []Att    []TENS instruct  []NMES                    []Other:  []w/US   []w/ice   []w/heat  Position:  Location:    []  Traction: [] Cervical       []Lumbar                       [] Prone          []Supine                       []Intermittent   []Continuous Lbs:  [] before manual  [] after manual    []  Ultrasound: []Continuous   [] Pulsed                           []1MHz   []3MHz W/cm2:  Location:    []  Iontophoresis with dexamethasone         Location: [] Take home patch   [] In clinic    []  Ice     []  heat  []  Ice massage  []  Laser   []  Anodyne Position:  Location:    []  Laser with stim  []  Other: Paraffin 10 mins  Position:  Location:both hands    []  Vasopneumatic Device Pressure:       [] lo [] med [] hi   Temperature: [] lo [] med [] hi       [x] Skin assessment post-treatment:  [x]intact []redness- no adverse reaction []redness  adverse reaction:       15 min Therapeutic Exercise:  [] See flow sheet :   Rationale: increase strength to improve the patients ability to grasp  Right hand 55# x 25 , left hand 35# x 25  towel scrunch x 10 both hands    17 min Therapeutic Activity:  []  See flow sheet :   Rationale: improve coordination  to improve the patients ability to manipulate items  Sets of 3 1 inch pegs placed 25 each hand  Med putty and pegs left hand no vision      With   [] TE   [] TA   [] neuro   [] other: Patient Education: [x] Review HEP    [] Progressed/Changed HEP based on: carpal tunnel syndrome  [] positioning   [] body mechanics   [] transfers   [] heat/ice application   [] Splint wear/care   [] Sensory re-education   [] scar management      [] other: Carpal            Other Objective/Functional Measures:   Able to use gripper at 55# for right hand     Pain Level (0-10 scale) post treatment: 0/10    ASSESSMENT/Changes in Function: Improving strength right    Patient will continue to benefit from skilled OT services to modify and progress therapeutic interventions, address strength deficits, analyze and cue movement patterns and instruct in home and community integration to attain remaining goals. []  See Plan of Care  []  See progress note/recertification  []  See Discharge Summary         Progress towards goals / Updated goals:  *Shaji Power will increase fine motor skills bilaterally by at least 20% with vision as assist (addiitonal improvement of 6-8% from last recheck) Progressing with in clinic activities 8/30/19  Goal:2.  Patient will increase left hand  by at least 10# for holding groceries(additional 3#)Progressing with in clinic activities 8/30/19, changed to 55# gripper right 9/3/19   Goal 3:  Patient will report no dropped items at home. *-reports almost never 8/23/19**    PLAN  []  Upgrade activities as tolerated     [x]  Continue plan of care  []  Update interventions per flow sheet []  Discharge due to:_  []  Other:_      Prudence Harshad, OTR/L 9/3/2019  8:16 AM    Future Appointments   Date Time Provider Korey Mell   9/5/2019  7:30 AM Vasquez Gallagher ADPRSLZ [facility]   9/10/2019  7:30 AM Carrol Mahan, OTR/L MMCPTPB [facility]   9/13/2019  8:15 AM Vasquez Gallagher YNVMCRA [facility]   11/12/2019  8:40 AM [facility], PACER [facility] PAZ SCHED   11/12/2019  8:40 AM Ezequiel Hernandez,  [address] [place] [address]   2/14/2020  9:00 AM [facility] IMAGING 2 2VV PAZ SCHED   2/14/2020 10:00 AM [facility] IMAGING 1 2VV PAZ SCHED   2/14/2020 11:00 AM [facility] NONIMAGING 2VV PAZ SCHED   2/24/2020  9:00 AM [place] 2VV PAZ SCHED   2/28/2020  8:00 AM Ty Morris MD [facility]

## 2019-09-08 NOTE — PROGRESS NOTES
Please let her know that her inflammatory marker is low. Meanwhile, she continues to have acute renal injury from dehydration. Her BUN is up at 38 and her creatinine is elevated at 1.66 - consistent with dehydration. Have her continue to f/u with her Nephrologist and to stay hydrated by drinking 8 cups of water per day.      Dr. Isauro Castellanos  [facility], [address] [place] [address], [address] [place] [address] Str.  Phone: [phone]  Fax: [phone]

## 2019-09-09 ENCOUNTER — TELEPHONE (OUTPATIENT)
Dept: INTERNAL MEDICINE CLINIC | Age: 79
End: 2019-09-09

## 2019-09-09 LAB — COLOGUARD TEST, EXTERNAL: POSITIVE

## 2019-09-09 NOTE — TELEPHONE ENCOUNTER
----- Message from Carmencita Klein MD sent at 9/8/2019 11:31 AM EDT -----  Please let her know that her inflammatory marker is low. Meanwhile, she continues to have acute renal injury from dehydration. Her BUN is up at 38 and her creatinine is elevated at 1.66 - consistent with dehydration. Have her continue to f/u with her Nephrologist and to stay hydrated by drinking 8 cups of water per day.      Dr. Tio Hurtado  [facility] [address], [facility]  Phone: [phone]  Fax: [phone]

## 2019-09-10 ENCOUNTER — HOSPITAL ENCOUNTER (OUTPATIENT)
Dept: PHYSICAL THERAPY | Age: 79
Discharge: HOME | End: 2019-09-10
Payer: MEDICARE

## 2019-09-10 PROCEDURE — 97018 PARAFFIN BATH THERAPY: CPT

## 2019-09-10 PROCEDURE — 97530 THERAPEUTIC ACTIVITIES: CPT

## 2019-09-12 ENCOUNTER — TELEPHONE (OUTPATIENT)
Dept: INTERNAL MEDICINE CLINIC | Age: 79
End: 2019-09-12

## 2019-09-12 NOTE — TELEPHONE ENCOUNTER
Patient contacted, patient identified with two identifiers (Name & ). Patient aware of cologuard results per DR. Hicks and verbalizes understanding. Patient previously seen by DR. Salazar and results faxed. Patient will call to schedule a f/u appt to discuss results.

## 2019-09-13 ENCOUNTER — HOSPITAL ENCOUNTER (OUTPATIENT)
Dept: PHYSICAL THERAPY | Age: 79
Discharge: HOME | End: 2019-09-13
Payer: MEDICARE

## 2019-09-13 PROCEDURE — 97018 PARAFFIN BATH THERAPY: CPT

## 2019-09-13 PROCEDURE — 97530 THERAPEUTIC ACTIVITIES: CPT

## 2019-09-13 NOTE — PROGRESS NOTES
[facility]  [place] [employer] [facility]. [facility]  [phone] fax      Patient Name: Sammy Arevalo  : 1940      Occupational Therapy Discharge Instructions        Continue with home exercise program for 1-3 times a day     Continue with    [] Ice  as needed    [x] Heat           Follow up with MD:     [] Upon completion of therapy     [x] As needed      Recommendations:    []   Return to activity with home program                    [x]  Return to activity with the following modifications : Use Vision to assist with Fine Motor tasks                              [x]  Practice Hand coordination activities                                      []  Wear Splint as prescribed:                    [] Return to/Join local gym        Additional comments:  - Continue with Fine Motor tasks  - Theraputty       Please call with any questions or concerns. Thank you for your participation.          ROBERT Adam  2019  8:41 AM

## 2019-09-13 NOTE — PROGRESS NOTES
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      OBJECTIVE    Modality rationale: decrease pain and increase tissue extensibility to improve the patients ability to grasp   Min Type Additional Details      [] Estim:  []Unatt       []IFC  []Premod                        []Other:  []w/ice   []w/heat  Position:  Location:      [] Estim: []Att    []TENS instruct  []NMES                    []Other:  []w/US   []w/ice   []w/heat  Position:  Location:      []  Traction: [] Cervical       []Lumbar                       [] Prone          []Supine                       []Intermittent   []Continuous Lbs:  [] before manual  [] after manual      []  Ultrasound: []Continuous   [] Pulsed                           []1MHz   []3MHz W/cm2:  Location:      []  Iontophoresis with dexamethasone         Location: [] Take home patch   [] In clinic      []  Ice     []  heat  []  Ice massage  []  Laser   []  Anodyne Position:  Location:    10  []  Laser with stim  [x]  Other: Paraffin 10 mins Position:  Location:both hands      []  Vasopneumatic Device Pressure:       [] lo [] med [] hi   Temperature: [] lo [] med [] hi       [x] Skin assessment post-treatment:  [x]intact []redness- no adverse reaction     27 min Therapeutic Activity:  []  See flow sheet :   Rationale: improve sensation  to improve the patients ability to sensation  Med putty and pegs both hands no vision  Review DC HEP  Review DC Instructions  FOTO       With   [] TE   [] TA   [] neuro   [] other: Patient Education: [x] Review HEP    [] Progressed/Changed HEP based on:   [] positioning   [] body mechanics   [] transfers   [] heat/ice application   [] Splint wear/care   [] Sensory re-education   [] scar management      [] other:            Other Objective/Functional Measures: FOTO: 63 (63)       Pain Level (0-10 scale) post treatment: 0/10    ASSESSMENT/Changes in Function: numbness continues     []  See Plan of Care  []  See progress note/recertification  [x]  See Discharge Summary         Progress towards goals / Updated goals:  1.  Finalize home program for sensory re-education, strengthening, fine motor skills.   Status at Last Note: Goal Met, 9/13/19    2.  Provide further insight into sensory disability and accommodations to reduce injury risk  Status at Last Note: Re-education provided with Patient verbalizing understanding 9/13/19    PLAN  []  Upgrade activities as tolerated     []  Continue plan of care  []  Update interventions per flow sheet       [x]  Discharge due to:_ Progressing towards goals  []  Other:_      SARAH Morgan/L 9/13/2019  8:14 AM    Future Appointments   Date Time Provider Korey Monte   9/16/2019  8:30 AM [facility] SHIVA RM 2 2D HBVRMAM HBV   9/16/2019  9:00 AM [facility] SHIVA US RM 1 HBVRUS HBV   11/12/2019  8:40 AM CSI, PACER [facility] PAZ SCHED   11/12/2019  8:40 AM Karlene Carlson DO [address] [place] [address]   2/14/2020  9:00 AM [facility] IMAGING 2 2VV PAZ SCHED   2/14/2020 10:00 AM [facility] IMAGING 1 2VV PAZ SCHED   2/14/2020 11:00 AM [facility] NONIMAGING 2VV PAZ SCHED   2/24/2020  9:00 AM [place], [place], [address] 2VV PAZ SCHED   2/28/2020  8:00 AM Noris Orr MD [facility]

## 2019-09-16 ENCOUNTER — HOSPITAL ENCOUNTER (OUTPATIENT)
Dept: LAB | Age: 79
Discharge: HOME | End: 2019-09-16
Payer: MEDICARE

## 2019-09-16 ENCOUNTER — HOSPITAL ENCOUNTER (OUTPATIENT)
Dept: MAMMOGRAPHY | Age: 79
Discharge: HOME | End: 2019-09-16
Attending: INTERNAL MEDICINE
Payer: MEDICARE

## 2019-09-16 ENCOUNTER — HOSPITAL ENCOUNTER (OUTPATIENT)
Dept: ULTRASOUND IMAGING | Age: 79
Discharge: HOME | End: 2019-09-16
Attending: INTERNAL MEDICINE
Payer: MEDICARE

## 2019-09-16 DIAGNOSIS — N39.0 URINARY TRACT INFECTION, SITE NOT SPECIFIED: ICD-10-CM

## 2019-09-16 DIAGNOSIS — D50.9 IRON DEFICIENCY ANEMIA, UNSPECIFIED: ICD-10-CM

## 2019-09-16 DIAGNOSIS — R80.9 PROTEINURIA: ICD-10-CM

## 2019-09-16 DIAGNOSIS — N18.30 CHRONIC KIDNEY DISEASE, STAGE III (MODERATE) (HCC): ICD-10-CM

## 2019-09-16 DIAGNOSIS — M10.9 GOUT, UNSPECIFIED: ICD-10-CM

## 2019-09-16 DIAGNOSIS — D64.9 ANEMIA, UNSPECIFIED: ICD-10-CM

## 2019-09-16 DIAGNOSIS — N25.81 SECONDARY HYPERPARATHYROIDISM OF RENAL ORIGIN (HCC): ICD-10-CM

## 2019-09-16 DIAGNOSIS — I12.9 MALIGNANT HYPERTENSIVE KIDNEY DISEASE WITH CHRONIC KIDNEY DISEASE STAGE I THROUGH STAGE IV, OR UNSPECIFIED(403.00): ICD-10-CM

## 2019-09-16 DIAGNOSIS — R92.8 ABNORMALITY OF LEFT BREAST ON SCREENING MAMMOGRAM: ICD-10-CM

## 2019-09-16 DIAGNOSIS — I25.10 CORONARY ATHEROSCLEROSIS OF NATIVE CORONARY ARTERY: ICD-10-CM

## 2019-09-16 DIAGNOSIS — E55.9 AVITAMINOSIS D: ICD-10-CM

## 2019-09-16 DIAGNOSIS — N28.1 ACQUIRED CYST OF KIDNEY: ICD-10-CM

## 2019-09-16 DIAGNOSIS — E66.9 OBESITY, UNSPECIFIED: ICD-10-CM

## 2019-09-16 LAB
25(OH)D3 SERPL-MCNC: 12 NG/ML (ref 30–100)
ALBUMIN SERPL-MCNC: 3.7 G/DL (ref 3.4–5)
ANION GAP SERPL CALC-SCNC: 4 MMOL/L (ref 3–18)
BUN SERPL-MCNC: 43 MG/DL (ref 7–18)
BUN/CREAT SERPL: 24 (ref 12–20)
CALCIUM SERPL-MCNC: 10 MG/DL (ref 8.5–10.1)
CALCIUM SERPL-MCNC: 10.2 MG/DL (ref 8.5–10.1)
CHLORIDE SERPL-SCNC: 108 MMOL/L (ref 100–111)
CO2 SERPL-SCNC: 25 MMOL/L (ref 21–32)
CREAT SERPL-MCNC: 1.81 MG/DL (ref 0.6–1.3)
CREAT UR-MCNC: 38 MG/DL (ref 30–125)
FERRITIN SERPL-MCNC: 246 NG/ML (ref 8–388)
GLUCOSE SERPL-MCNC: 90 MG/DL (ref 74–99)
HCT VFR BLD AUTO: 32.5 % (ref 35–45)
HGB BLD-MCNC: 10.3 G/DL (ref 12–16)
IRON SATN MFR SERPL: 16 %
IRON SERPL-MCNC: 40 UG/DL (ref 50–175)
MICROALBUMIN UR-MCNC: 8.73 MG/DL (ref 0–3)
MICROALBUMIN/CREAT UR-RTO: 230 MG/G (ref 0–30)
PHOSPHATE SERPL-MCNC: 3.6 MG/DL (ref 2.5–4.9)
POTASSIUM SERPL-SCNC: 4.3 MMOL/L (ref 3.5–5.5)
PTH-INTACT SERPL-MCNC: 356.3 PG/ML (ref 18.4–88)
SODIUM SERPL-SCNC: 137 MMOL/L (ref 136–145)
TIBC SERPL-MCNC: 252 UG/DL (ref 250–450)

## 2019-09-16 PROCEDURE — 83970 ASSAY OF PARATHORMONE: CPT

## 2019-09-16 PROCEDURE — 82043 UR ALBUMIN QUANTITATIVE: CPT

## 2019-09-16 PROCEDURE — 85018 HEMOGLOBIN: CPT

## 2019-09-16 PROCEDURE — 83540 ASSAY OF IRON: CPT

## 2019-09-16 PROCEDURE — 80069 RENAL FUNCTION PANEL: CPT

## 2019-09-16 PROCEDURE — 36415 COLL VENOUS BLD VENIPUNCTURE: CPT

## 2019-09-16 PROCEDURE — 82728 ASSAY OF FERRITIN: CPT

## 2019-09-16 PROCEDURE — 77066 DX MAMMO INCL CAD BI: CPT

## 2019-09-16 PROCEDURE — 82306 VITAMIN D 25 HYDROXY: CPT

## 2019-09-18 NOTE — PROGRESS NOTES
[facility] [address] [facility] 
[phone] fax Occupational Therapy Discharge Summary Patient name: Cathleen Odom Start of Care: 19 Referral source: Jeremiah Johnson MD : 1940 Medical/Treatment Diagnosis: Inflammatory polyarthropathy [M06.4] Hand pain [M79.643] Payor: Monda Ganser / Plan: [place] MEDICARE PART A & B / Product Type: Medicare /  Onset Date:may 2019 Prior Hospitalization: see medical history Provider#: [id number] Medications: Verified on Patient Summary List   
Comorbidities: * Arthritis, CHF, HTN, vision loss, pacemaker, CVA affecting left side** 
Prior Level of Function:*I self care some home care some driving [employer]** Visits from Start of Care: 15    Missed Visits: 0 Reporting Period : 19 to 19 Summary of Care:Patient was seen for modalities, therapeutic exercises and activities to improve hand function. S/he has HEP. Goal:1.  Finalize home program for sensory re-education, strengthening, fine motor skills. Status at last note/certification:Needed update Status at discharge: met 2.  Provide further insight into sensory disability and accommodations to reduce injury risk Status at Discharge: Met Re-education provided with Patient verbalizing understanding 19 ASSESSMENT/Changes in Function: patient improved in strength and fine motor skills. She continues to report tingling in digits. ASSESSMENT/RECOMMENDATIONS: 
[x]Discontinue therapy: [x]Patient has reached or is progressing toward set goals []Patient is non-compliant or has abdicated 
    []Due to lack of appreciable progress towards set goals ÓSCAR Wilks/L 2019 2:01 PM

## 2019-10-16 NOTE — TELEPHONE ENCOUNTER
Unable to reach patient by phone. Voicemail left for patient to return call to office.    Letter being sent

## 2019-10-28 ENCOUNTER — TELEPHONE (OUTPATIENT)
Dept: CARDIOLOGY CLINIC | Age: 79
End: 2019-10-28

## 2019-10-28 NOTE — TELEPHONE ENCOUNTER
Patient calling back after receiving letter in mail about being unable to be reached. Patient is aware of lab results. Patient is confused about decreasing lasix after her nephrologist instructs her to increase her fluids.      Verbal order and read back per Remedios Pritchard, DO  Yes decrease lasix , monitor weight, find out what weight she is comfortable with, if goes up 3 lbs have patient call back for instructions on medications       Left message on machine

## 2019-11-12 ENCOUNTER — CLINICAL SUPPORT (OUTPATIENT)
Dept: CARDIOLOGY CLINIC | Age: 79
End: 2019-11-12

## 2019-11-12 ENCOUNTER — OFFICE VISIT (OUTPATIENT)
Dept: CARDIOLOGY CLINIC | Age: 79
End: 2019-11-12

## 2019-11-12 VITALS
HEART RATE: 60 BPM | WEIGHT: 198 LBS | SYSTOLIC BLOOD PRESSURE: 136 MMHG | BODY MASS INDEX: 33.8 KG/M2 | OXYGEN SATURATION: 100 % | HEIGHT: 64 IN | DIASTOLIC BLOOD PRESSURE: 54 MMHG

## 2019-11-12 DIAGNOSIS — I42.9 CARDIOMYOPATHY, UNSPECIFIED TYPE (HCC): Primary | ICD-10-CM

## 2019-11-12 DIAGNOSIS — Z95.810 AICD (AUTOMATIC CARDIOVERTER/DEFIBRILLATOR) PRESENT: ICD-10-CM

## 2019-11-12 DIAGNOSIS — Z95.1 S/P CABG X 3: ICD-10-CM

## 2019-11-12 DIAGNOSIS — I42.9 CARDIOMYOPATHY, UNSPECIFIED TYPE (HCC): ICD-10-CM

## 2019-11-12 DIAGNOSIS — I50.42 CHRONIC COMBINED SYSTOLIC AND DIASTOLIC CONGESTIVE HEART FAILURE (HCC): ICD-10-CM

## 2019-11-12 DIAGNOSIS — R60.0 EDEMA OF BOTH LEGS: ICD-10-CM

## 2019-11-12 DIAGNOSIS — N18.4 STAGE 4 CHRONIC KIDNEY DISEASE (HCC): ICD-10-CM

## 2019-11-12 DIAGNOSIS — R06.02 SOB (SHORTNESS OF BREATH): Primary | ICD-10-CM

## 2019-11-12 DIAGNOSIS — I25.10 ATHEROSCLEROSIS OF NATIVE CORONARY ARTERY OF NATIVE HEART WITHOUT ANGINA PECTORIS: ICD-10-CM

## 2019-11-12 DIAGNOSIS — E78.2 MIXED HYPERLIPIDEMIA: ICD-10-CM

## 2019-11-12 RX ORDER — FUROSEMIDE 40 MG/1
60 TABLET ORAL DAILY
Qty: 45 TAB | Refills: 5 | Status: SHIPPED | OUTPATIENT
Start: 2019-11-12 | End: 2019-11-15 | Stop reason: SDUPTHER

## 2019-11-12 RX ORDER — ROSUVASTATIN CALCIUM 10 MG/1
10 TABLET, COATED ORAL
Qty: 30 TAB | Refills: 5 | Status: SHIPPED | OUTPATIENT
Start: 2019-11-12 | End: 2020-01-01

## 2019-11-12 NOTE — PATIENT INSTRUCTIONS
Complete labwork now START taking Lasix 60mg daily Repeat labs in 2 weeks Please weigh yourself daily in the morning. Weight before breakfast and after using the bathroom. Call the office for a weight gain or loss of 3lb or more.

## 2019-11-14 ENCOUNTER — HOSPITAL ENCOUNTER (OUTPATIENT)
Dept: LAB | Age: 79
Discharge: HOME | End: 2019-11-14
Payer: MEDICARE

## 2019-11-14 DIAGNOSIS — R06.02 SOB (SHORTNESS OF BREATH): ICD-10-CM

## 2019-11-14 DIAGNOSIS — I42.9 CARDIOMYOPATHY, UNSPECIFIED TYPE (HCC): ICD-10-CM

## 2019-11-14 LAB
ANION GAP SERPL CALC-SCNC: 6 MMOL/L (ref 3–18)
BNP SERPL-MCNC: 1324 PG/ML (ref 0–1800)
BUN SERPL-MCNC: 35 MG/DL (ref 7–18)
BUN/CREAT SERPL: 22 (ref 12–20)
CALCIUM SERPL-MCNC: 10 MG/DL (ref 8.5–10.1)
CHLORIDE SERPL-SCNC: 110 MMOL/L (ref 100–111)
CO2 SERPL-SCNC: 25 MMOL/L (ref 21–32)
CREAT SERPL-MCNC: 1.61 MG/DL (ref 0.6–1.3)
GLUCOSE SERPL-MCNC: 86 MG/DL (ref 74–99)
POTASSIUM SERPL-SCNC: 4.5 MMOL/L (ref 3.5–5.5)
SODIUM SERPL-SCNC: 141 MMOL/L (ref 136–145)

## 2019-11-14 PROCEDURE — 36415 COLL VENOUS BLD VENIPUNCTURE: CPT

## 2019-11-14 PROCEDURE — 83880 ASSAY OF NATRIURETIC PEPTIDE: CPT

## 2019-11-14 PROCEDURE — 80048 BASIC METABOLIC PNL TOTAL CA: CPT

## 2019-11-14 NOTE — PROGRESS NOTES
This lady's BNP is upper normal, but OptiVol was high and we increased Lasix. Please be sure we have her set up for repeat BMP and BNP within 2 weeks and let her know when you call her with results and see if she is losing weight.  ES

## 2019-11-15 ENCOUNTER — TELEPHONE (OUTPATIENT)
Dept: CARDIOLOGY CLINIC | Age: 79
End: 2019-11-15

## 2019-11-15 DIAGNOSIS — R06.02 SOB (SHORTNESS OF BREATH): ICD-10-CM

## 2019-11-15 DIAGNOSIS — I10 ESSENTIAL HYPERTENSION: Primary | ICD-10-CM

## 2019-11-15 RX ORDER — FUROSEMIDE 40 MG/1
60 TABLET ORAL DAILY
Qty: 45 TAB | Refills: 5 | Status: SHIPPED | OUTPATIENT
Start: 2019-11-15 | End: 2020-01-01 | Stop reason: SDUPTHER

## 2019-11-15 NOTE — TELEPHONE ENCOUNTER
----- Message from Pollie Bumpers, DO sent at 11/14/2019  2:14 PM EST -----  This lady's BNP is upper normal, but OptiVol was high and we increased Lasix. Please be sure we have her set up for repeat BMP and BNP within 2 weeks and let her know when you call her with results and see if she is losing weight.  ES

## 2019-11-30 ENCOUNTER — HOSPITAL ENCOUNTER (OUTPATIENT)
Dept: LAB | Age: 79
Discharge: HOME | End: 2019-11-30
Payer: MEDICARE

## 2019-11-30 DIAGNOSIS — R06.02 SOB (SHORTNESS OF BREATH): ICD-10-CM

## 2019-11-30 DIAGNOSIS — I10 ESSENTIAL HYPERTENSION: ICD-10-CM

## 2019-11-30 LAB
ANION GAP SERPL CALC-SCNC: 5 MMOL/L (ref 3–18)
BNP SERPL-MCNC: 1208 PG/ML (ref 0–1800)
BUN SERPL-MCNC: 30 MG/DL (ref 7–18)
BUN/CREAT SERPL: 17 (ref 12–20)
CALCIUM SERPL-MCNC: 9.3 MG/DL (ref 8.5–10.1)
CHLORIDE SERPL-SCNC: 105 MMOL/L (ref 100–111)
CO2 SERPL-SCNC: 28 MMOL/L (ref 21–32)
CREAT SERPL-MCNC: 1.76 MG/DL (ref 0.6–1.3)
GLUCOSE SERPL-MCNC: 89 MG/DL (ref 74–99)
POTASSIUM SERPL-SCNC: 4.3 MMOL/L (ref 3.5–5.5)
SODIUM SERPL-SCNC: 138 MMOL/L (ref 136–145)

## 2019-11-30 PROCEDURE — 80048 BASIC METABOLIC PNL TOTAL CA: CPT

## 2019-11-30 PROCEDURE — 83880 ASSAY OF NATRIURETIC PEPTIDE: CPT

## 2019-11-30 PROCEDURE — 36415 COLL VENOUS BLD VENIPUNCTURE: CPT

## 2019-12-03 NOTE — PROGRESS NOTES
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 I would increase her Lasix from 40 mg to 60 mg a day and I am going to get a BMP and a BMP as baseline and I am also going to have her weigh daily and give us a call within a week to let us know if her weight is come down any. 
  
We did check her dual-chamber Medtronic AICD today and she has 5 years remaining on the battery with no ventricular high rates. She is a paced 44% of time and V paced 100% of time, but her OptiVol is quite elevated today.

## 2019-12-10 ENCOUNTER — TELEPHONE (OUTPATIENT)
Dept: CARDIOLOGY CLINIC | Age: 79
End: 2019-12-10

## 2019-12-10 NOTE — TELEPHONE ENCOUNTER
I called and placed a message on the patient's answering machine for her to call the office for the results of her laboratory tests. Her BNP was mildly elevated for age and her renal function was a little worse still with chronic kidney disease stage III. Check to see about her weight and how she is feeling. If she is feeling OK and her weight is stable we can probably leave things as they are, however if her weight is continuing to drop we should probably check a BMP and a BNP one more time.  ES

## 2019-12-10 NOTE — TELEPHONE ENCOUNTER
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 I would increase her Lasix from 40 mg to 60 mg a day and I am going to get a BMP and a BMP as baseline and I am also going to have her weigh daily and give us a call within a week to let us know if her weight is come down any.     We did check her dual-chamber Medtronic AICD today and she has 5 years remaining on the battery with no ventricular high rates. She is a paced 44% of time and V paced 100% of time, but her OptiVol is quite elevated today.

## 2020-01-01 ENCOUNTER — HOSPITAL ENCOUNTER (OUTPATIENT)
Dept: LAB | Age: 80
Discharge: HOME | DRG: 312 | End: 2020-10-14
Payer: MEDICARE

## 2020-01-01 ENCOUNTER — VIRTUAL VISIT (OUTPATIENT)
Dept: INTERNAL MEDICINE CLINIC | Age: 80
End: 2020-01-01

## 2020-01-01 ENCOUNTER — OFFICE VISIT (OUTPATIENT)
Dept: INTERNAL MEDICINE CLINIC | Age: 80
End: 2020-01-01
Payer: MEDICARE

## 2020-01-01 ENCOUNTER — HOSPITAL ENCOUNTER (INPATIENT)
Age: 80
LOS: 2 days | Discharge: HOME | DRG: 312 | End: 2020-10-18
Attending: EMERGENCY MEDICINE | Admitting: STUDENT IN AN ORGANIZED HEALTH CARE EDUCATION/TRAINING PROGRAM
Payer: MEDICARE

## 2020-01-01 ENCOUNTER — PATIENT OUTREACH (OUTPATIENT)
Dept: CASE MANAGEMENT | Age: 80
End: 2020-01-01

## 2020-01-01 ENCOUNTER — HOSPITAL ENCOUNTER (EMERGENCY)
Age: 80
Discharge: HOME | End: 2020-07-18
Attending: EMERGENCY MEDICINE
Payer: MEDICARE

## 2020-01-01 ENCOUNTER — OFFICE VISIT (OUTPATIENT)
Dept: INTERNAL MEDICINE CLINIC | Age: 80
End: 2020-01-01

## 2020-01-01 ENCOUNTER — TELEPHONE (OUTPATIENT)
Dept: INTERNAL MEDICINE CLINIC | Age: 80
End: 2020-01-01

## 2020-01-01 ENCOUNTER — VIRTUAL VISIT (OUTPATIENT)
Dept: INTERNAL MEDICINE CLINIC | Age: 80
End: 2020-01-01
Payer: MEDICARE

## 2020-01-01 ENCOUNTER — HOSPITAL ENCOUNTER (OUTPATIENT)
Dept: LAB | Age: 80
Discharge: HOME | End: 2020-06-26
Payer: MEDICARE

## 2020-01-01 ENCOUNTER — PATIENT OUTREACH (OUTPATIENT)
Dept: INTERNAL MEDICINE CLINIC | Age: 80
End: 2020-01-01

## 2020-01-01 ENCOUNTER — TELEPHONE (OUTPATIENT)
Dept: VASCULAR SURGERY | Age: 80
End: 2020-01-01

## 2020-01-01 ENCOUNTER — TELEPHONE (OUTPATIENT)
Dept: CARDIOLOGY CLINIC | Age: 80
End: 2020-01-01

## 2020-01-01 ENCOUNTER — APPOINTMENT (OUTPATIENT)
Dept: CT IMAGING | Age: 80
DRG: 312 | End: 2020-01-01
Attending: EMERGENCY MEDICINE
Payer: MEDICARE

## 2020-01-01 ENCOUNTER — HOSPITAL ENCOUNTER (OUTPATIENT)
Dept: ULTRASOUND IMAGING | Age: 80
Discharge: HOME | DRG: 312 | End: 2020-10-14
Attending: INTERNAL MEDICINE
Payer: MEDICARE

## 2020-01-01 ENCOUNTER — HOSPITAL ENCOUNTER (OUTPATIENT)
Dept: LAB | Age: 80
Discharge: HOME | End: 2020-09-21
Payer: MEDICARE

## 2020-01-01 ENCOUNTER — APPOINTMENT (OUTPATIENT)
Dept: GENERAL RADIOLOGY | Age: 80
DRG: 312 | End: 2020-01-01
Attending: EMERGENCY MEDICINE
Payer: MEDICARE

## 2020-01-01 ENCOUNTER — OFFICE VISIT (OUTPATIENT)
Dept: VASCULAR SURGERY | Age: 80
End: 2020-01-01

## 2020-01-01 ENCOUNTER — TRANSCRIBE ORDER (OUTPATIENT)
Dept: SCHEDULING | Age: 80
End: 2020-01-01

## 2020-01-01 ENCOUNTER — APPOINTMENT (OUTPATIENT)
Dept: NON INVASIVE DIAGNOSTICS | Age: 80
DRG: 312 | End: 2020-01-01
Attending: NURSE PRACTITIONER
Payer: MEDICARE

## 2020-01-01 ENCOUNTER — APPOINTMENT (OUTPATIENT)
Dept: GENERAL RADIOLOGY | Age: 80
End: 2020-01-01
Attending: NURSE PRACTITIONER
Payer: MEDICARE

## 2020-01-01 ENCOUNTER — TRANSCRIBE ORDER (OUTPATIENT)
Dept: REGISTRATION | Age: 80
End: 2020-01-01

## 2020-01-01 ENCOUNTER — OFFICE VISIT (OUTPATIENT)
Dept: CARDIOLOGY CLINIC | Age: 80
End: 2020-01-01

## 2020-01-01 ENCOUNTER — HOSPITAL ENCOUNTER (OUTPATIENT)
Dept: LAB | Age: 80
Discharge: HOME | End: 2020-01-07
Payer: MEDICARE

## 2020-01-01 ENCOUNTER — HOSPITAL ENCOUNTER (OUTPATIENT)
Dept: MAMMOGRAPHY | Age: 80
Discharge: HOME | End: 2020-09-11
Attending: INTERNAL MEDICINE
Payer: MEDICARE

## 2020-01-01 VITALS
HEART RATE: 68 BPM | HEIGHT: 64 IN | WEIGHT: 198 LBS | SYSTOLIC BLOOD PRESSURE: 130 MMHG | RESPIRATION RATE: 16 BRPM | OXYGEN SATURATION: 100 % | DIASTOLIC BLOOD PRESSURE: 70 MMHG | BODY MASS INDEX: 33.8 KG/M2

## 2020-01-01 VITALS
SYSTOLIC BLOOD PRESSURE: 143 MMHG | RESPIRATION RATE: 18 BRPM | WEIGHT: 150 LBS | BODY MASS INDEX: 25.61 KG/M2 | HEART RATE: 66 BPM | TEMPERATURE: 97.5 F | DIASTOLIC BLOOD PRESSURE: 69 MMHG | HEIGHT: 64 IN | OXYGEN SATURATION: 100 %

## 2020-01-01 VITALS
DIASTOLIC BLOOD PRESSURE: 72 MMHG | OXYGEN SATURATION: 97 % | WEIGHT: 144 LBS | SYSTOLIC BLOOD PRESSURE: 118 MMHG | BODY MASS INDEX: 24.59 KG/M2 | HEART RATE: 59 BPM | HEIGHT: 64 IN

## 2020-01-01 VITALS
HEIGHT: 64 IN | TEMPERATURE: 97.3 F | HEART RATE: 65 BPM | RESPIRATION RATE: 12 BRPM | DIASTOLIC BLOOD PRESSURE: 61 MMHG | SYSTOLIC BLOOD PRESSURE: 136 MMHG | WEIGHT: 143 LBS | BODY MASS INDEX: 24.41 KG/M2 | OXYGEN SATURATION: 100 %

## 2020-01-01 VITALS
HEIGHT: 64 IN | RESPIRATION RATE: 12 BRPM | SYSTOLIC BLOOD PRESSURE: 132 MMHG | DIASTOLIC BLOOD PRESSURE: 71 MMHG | OXYGEN SATURATION: 99 % | WEIGHT: 140 LBS | HEART RATE: 65 BPM | TEMPERATURE: 97.2 F | BODY MASS INDEX: 23.9 KG/M2

## 2020-01-01 VITALS
BODY MASS INDEX: 26.09 KG/M2 | WEIGHT: 152 LBS | DIASTOLIC BLOOD PRESSURE: 76 MMHG | OXYGEN SATURATION: 100 % | HEART RATE: 69 BPM | SYSTOLIC BLOOD PRESSURE: 126 MMHG | TEMPERATURE: 98.2 F | RESPIRATION RATE: 17 BRPM

## 2020-01-01 VITALS
WEIGHT: 170 LBS | SYSTOLIC BLOOD PRESSURE: 120 MMHG | HEART RATE: 60 BPM | RESPIRATION RATE: 12 BRPM | DIASTOLIC BLOOD PRESSURE: 63 MMHG | HEIGHT: 64 IN | TEMPERATURE: 95.5 F | OXYGEN SATURATION: 100 % | BODY MASS INDEX: 29.02 KG/M2

## 2020-01-01 DIAGNOSIS — N18.4 CHRONIC KIDNEY DISEASE, STAGE IV (SEVERE) (HCC): ICD-10-CM

## 2020-01-01 DIAGNOSIS — N17.9 ACUTE RENAL FAILURE, UNSPECIFIED ACUTE RENAL FAILURE TYPE (HCC): ICD-10-CM

## 2020-01-01 DIAGNOSIS — M35.1 MIXED CONNECTIVE TISSUE DISEASE (HCC): ICD-10-CM

## 2020-01-01 DIAGNOSIS — E78.2 MIXED HYPERLIPIDEMIA: Primary | ICD-10-CM

## 2020-01-01 DIAGNOSIS — I50.9 CONGESTIVE HEART FAILURE, UNSPECIFIED HF CHRONICITY, UNSPECIFIED HEART FAILURE TYPE (HCC): ICD-10-CM

## 2020-01-01 DIAGNOSIS — M1A.0720 CHRONIC GOUT OF LEFT FOOT, UNSPECIFIED CAUSE: ICD-10-CM

## 2020-01-01 DIAGNOSIS — R73.9 HYPERGLYCEMIA: ICD-10-CM

## 2020-01-01 DIAGNOSIS — Z71.89 ADVANCE CARE PLANNING: ICD-10-CM

## 2020-01-01 DIAGNOSIS — Z12.31 ENCOUNTER FOR SCREENING MAMMOGRAM FOR BREAST CANCER: ICD-10-CM

## 2020-01-01 DIAGNOSIS — R09.89 DECREASED RADIAL PULSE: ICD-10-CM

## 2020-01-01 DIAGNOSIS — N18.4 STAGE 4 CHRONIC KIDNEY DISEASE (HCC): ICD-10-CM

## 2020-01-01 DIAGNOSIS — I10 ESSENTIAL HYPERTENSION, MALIGNANT: ICD-10-CM

## 2020-01-01 DIAGNOSIS — I50.42 CHRONIC COMBINED SYSTOLIC AND DIASTOLIC CONGESTIVE HEART FAILURE (HCC): ICD-10-CM

## 2020-01-01 DIAGNOSIS — I25.10 ATHEROSCLEROSIS OF NATIVE CORONARY ARTERY OF NATIVE HEART WITHOUT ANGINA PECTORIS: ICD-10-CM

## 2020-01-01 DIAGNOSIS — N17.9 ACUTE RENAL FAILURE, UNSPECIFIED ACUTE RENAL FAILURE TYPE (HCC): Primary | ICD-10-CM

## 2020-01-01 DIAGNOSIS — N18.30 STAGE 3 CHRONIC KIDNEY DISEASE, UNSPECIFIED WHETHER STAGE 3A OR 3B CKD (HCC): ICD-10-CM

## 2020-01-01 DIAGNOSIS — N18.30 CKD (CHRONIC KIDNEY DISEASE) STAGE 3, GFR 30-59 ML/MIN (HCC): ICD-10-CM

## 2020-01-01 DIAGNOSIS — N17.9 AKI (ACUTE KIDNEY INJURY) (HCC): Primary | ICD-10-CM

## 2020-01-01 DIAGNOSIS — N18.4 CHRONIC KIDNEY DISEASE, STAGE IV (SEVERE) (HCC): Primary | ICD-10-CM

## 2020-01-01 DIAGNOSIS — I48.91 ATRIAL FIBRILLATION, UNSPECIFIED TYPE (HCC): ICD-10-CM

## 2020-01-01 DIAGNOSIS — I65.23 BILATERAL CAROTID ARTERY STENOSIS: ICD-10-CM

## 2020-01-01 DIAGNOSIS — I73.9 PAD (PERIPHERAL ARTERY DISEASE) (HCC): ICD-10-CM

## 2020-01-01 DIAGNOSIS — I10 ESSENTIAL HYPERTENSION: ICD-10-CM

## 2020-01-01 DIAGNOSIS — N17.9 AKI (ACUTE KIDNEY INJURY) (HCC): ICD-10-CM

## 2020-01-01 DIAGNOSIS — E66.01 SEVERE OBESITY (BMI 35.0-39.9) WITH COMORBIDITY (HCC): ICD-10-CM

## 2020-01-01 DIAGNOSIS — D64.9 ANEMIA, UNSPECIFIED TYPE: ICD-10-CM

## 2020-01-01 DIAGNOSIS — I25.10 CORONARY ARTERY DISEASE INVOLVING NATIVE HEART WITHOUT ANGINA PECTORIS, UNSPECIFIED VESSEL OR LESION TYPE: ICD-10-CM

## 2020-01-01 DIAGNOSIS — Z95.810 AICD (AUTOMATIC CARDIOVERTER/DEFIBRILLATOR) PRESENT: ICD-10-CM

## 2020-01-01 DIAGNOSIS — N64.4 BREAST PAIN: ICD-10-CM

## 2020-01-01 DIAGNOSIS — E78.2 MIXED HYPERLIPIDEMIA: ICD-10-CM

## 2020-01-01 DIAGNOSIS — Z00.00 MEDICARE ANNUAL WELLNESS VISIT, SUBSEQUENT: ICD-10-CM

## 2020-01-01 DIAGNOSIS — R06.02 SOB (SHORTNESS OF BREATH): ICD-10-CM

## 2020-01-01 DIAGNOSIS — N18.4 CKD (CHRONIC KIDNEY DISEASE) STAGE 4, GFR 15-29 ML/MIN (HCC): ICD-10-CM

## 2020-01-01 DIAGNOSIS — M25.561 CHRONIC PAIN OF BOTH KNEES: ICD-10-CM

## 2020-01-01 DIAGNOSIS — R60.0 BILATERAL EDEMA OF LOWER EXTREMITY: ICD-10-CM

## 2020-01-01 DIAGNOSIS — R80.1 PERSISTENT PROTEINURIA: ICD-10-CM

## 2020-01-01 DIAGNOSIS — Z95.0 PACEMAKER: ICD-10-CM

## 2020-01-01 DIAGNOSIS — R20.0 NUMBNESS IN BOTH HANDS: ICD-10-CM

## 2020-01-01 DIAGNOSIS — E21.3 HYPERPARATHYROIDISM (HCC): ICD-10-CM

## 2020-01-01 DIAGNOSIS — M35.1 MIXED CONNECTIVE TISSUE DISEASE (HCC): Primary | ICD-10-CM

## 2020-01-01 DIAGNOSIS — N39.0 URINARY TRACT INFECTION WITHOUT HEMATURIA, SITE UNSPECIFIED: ICD-10-CM

## 2020-01-01 DIAGNOSIS — I71.40 ABDOMINAL AORTIC ANEURYSM (AAA) WITHOUT RUPTURE: Primary | ICD-10-CM

## 2020-01-01 DIAGNOSIS — Z12.31 ENCOUNTER FOR SCREENING MAMMOGRAM FOR BREAST CANCER: Primary | ICD-10-CM

## 2020-01-01 DIAGNOSIS — R55 SYNCOPE, UNSPECIFIED SYNCOPE TYPE: Primary | ICD-10-CM

## 2020-01-01 DIAGNOSIS — I42.9 CARDIOMYOPATHY, UNSPECIFIED TYPE (HCC): ICD-10-CM

## 2020-01-01 DIAGNOSIS — M10.9 GOUT: ICD-10-CM

## 2020-01-01 DIAGNOSIS — Z95.1 S/P CABG X 3: ICD-10-CM

## 2020-01-01 DIAGNOSIS — M25.562 CHRONIC PAIN OF BOTH KNEES: ICD-10-CM

## 2020-01-01 DIAGNOSIS — Z87.440 HISTORY OF UTI: ICD-10-CM

## 2020-01-01 DIAGNOSIS — G89.29 OTHER CHRONIC PAIN: Primary | ICD-10-CM

## 2020-01-01 DIAGNOSIS — N18.30 CHRONIC KIDNEY DISEASE, STAGE III (MODERATE) (HCC): ICD-10-CM

## 2020-01-01 DIAGNOSIS — G89.29 CHRONIC PAIN OF BOTH KNEES: ICD-10-CM

## 2020-01-01 DIAGNOSIS — I10 ESSENTIAL HYPERTENSION: Primary | ICD-10-CM

## 2020-01-01 DIAGNOSIS — R11.2 NON-INTRACTABLE VOMITING WITH NAUSEA, UNSPECIFIED VOMITING TYPE: ICD-10-CM

## 2020-01-01 LAB
14.3.3 ETA, RHEUM. ARTHRITIS: <0.2 NG/ML
25(OH)D3 SERPL-MCNC: 15.6 NG/ML (ref 30–100)
25(OH)D3 SERPL-MCNC: 19.3 NG/ML (ref 30–100)
ALBUMIN SERPL ELPH-MCNC: 3.1 G/DL (ref 2.9–4.4)
ALBUMIN SERPL-MCNC: 3 G/DL (ref 3.4–5)
ALBUMIN SERPL-MCNC: 3.1 G/DL (ref 3.4–5)
ALBUMIN SERPL-MCNC: 3.4 G/DL (ref 3.4–5)
ALBUMIN SERPL-MCNC: 3.4 G/DL (ref 3.4–5)
ALBUMIN/GLOB SERPL: 0.5 {RATIO} (ref 0.8–1.7)
ALBUMIN/GLOB SERPL: 0.8 {RATIO} (ref 0.8–1.7)
ALBUMIN/GLOB SERPL: 0.8 {RATIO} (ref 0.8–1.7)
ALBUMIN/GLOB SERPL: 1.2 {RATIO} (ref 0.7–1.7)
ALP SERPL-CCNC: 78 U/L (ref 45–117)
ALP SERPL-CCNC: 78 U/L (ref 45–117)
ALP SERPL-CCNC: 79 U/L (ref 45–117)
ALPHA1 GLOB SERPL ELPH-MCNC: 0.3 G/DL (ref 0–0.4)
ALPHA2 GLOB SERPL ELPH-MCNC: 0.8 G/DL (ref 0.4–1)
ALT SERPL-CCNC: 18 U/L (ref 13–56)
ALT SERPL-CCNC: 6 U/L (ref 13–56)
ALT SERPL-CCNC: 8 U/L (ref 13–56)
ANION GAP SERPL CALC-SCNC: 10 MMOL/L (ref 3–18)
ANION GAP SERPL CALC-SCNC: 10 MMOL/L (ref 3–18)
ANION GAP SERPL CALC-SCNC: 15 MMOL/L (ref 3–18)
ANION GAP SERPL CALC-SCNC: 8 MMOL/L (ref 3–18)
ANION GAP SERPL CALC-SCNC: 9 MMOL/L (ref 3–18)
APPEARANCE UR: ABNORMAL
APPEARANCE UR: ABNORMAL
APTT PPP: 26.7 SEC (ref 23–36.4)
AST SERPL-CCNC: 11 U/L (ref 10–38)
AST SERPL-CCNC: 19 U/L (ref 10–38)
AST SERPL-CCNC: 9 U/L (ref 10–38)
ATRIAL RATE: 60 BPM
B-GLOBULIN SERPL ELPH-MCNC: 0.9 G/DL (ref 0.7–1.3)
BACTERIA SPEC CULT: ABNORMAL
BACTERIA URNS QL MICRO: ABNORMAL /HPF
BACTERIA URNS QL MICRO: ABNORMAL /HPF
BASOPHILS # BLD: 0 K/UL (ref 0–0.1)
BASOPHILS NFR BLD: 0 % (ref 0–2)
BASOPHILS NFR BLD: 1 % (ref 0–2)
BILIRUB SERPL-MCNC: 0.2 MG/DL (ref 0.2–1)
BILIRUB SERPL-MCNC: 0.2 MG/DL (ref 0.2–1)
BILIRUB SERPL-MCNC: 0.5 MG/DL (ref 0.2–1)
BILIRUB UR QL: NEGATIVE
BILIRUB UR QL: NEGATIVE
BNP SERPL-MCNC: 3041 PG/ML (ref 0–1800)
BNP SERPL-MCNC: 4230 PG/ML (ref 0–1800)
BUN SERPL-MCNC: 36 MG/DL (ref 7–18)
BUN SERPL-MCNC: 38 MG/DL (ref 7–18)
BUN SERPL-MCNC: 40 MG/DL (ref 7–18)
BUN SERPL-MCNC: 40 MG/DL (ref 7–18)
BUN SERPL-MCNC: 41 MG/DL (ref 7–18)
BUN SERPL-MCNC: 52 MG/DL (ref 7–18)
BUN SERPL-MCNC: 78 MG/DL (ref 7–18)
BUN/CREAT SERPL: 14 (ref 12–20)
BUN/CREAT SERPL: 14 (ref 12–20)
BUN/CREAT SERPL: 15 (ref 12–20)
BUN/CREAT SERPL: 15 (ref 12–20)
BUN/CREAT SERPL: 17 (ref 12–20)
BUN/CREAT SERPL: 19 (ref 12–20)
BUN/CREAT SERPL: 29 (ref 12–20)
C3 SERPL-MCNC: 134 MG/DL (ref 82–167)
C4 SERPL-MCNC: 31 MG/DL (ref 14–44)
CALCIUM SERPL-MCNC: 10 MG/DL (ref 8.5–10.1)
CALCIUM SERPL-MCNC: 10.1 MG/DL (ref 8.5–10.1)
CALCIUM SERPL-MCNC: 10.2 MG/DL (ref 8.5–10.1)
CALCIUM SERPL-MCNC: 10.3 MG/DL (ref 8.5–10.1)
CALCIUM SERPL-MCNC: 9.3 MG/DL (ref 8.5–10.1)
CALCIUM SERPL-MCNC: 9.5 MG/DL (ref 8.5–10.1)
CALCIUM SERPL-MCNC: 9.6 MG/DL (ref 8.5–10.1)
CALCIUM SERPL-MCNC: 9.6 MG/DL (ref 8.5–10.1)
CALCIUM SERPL-MCNC: 9.9 MG/DL (ref 8.5–10.1)
CALCULATED P AXIS, ECG09: 94 DEGREES
CALCULATED R AXIS, ECG10: -72 DEGREES
CALCULATED T AXIS, ECG11: 107 DEGREES
CC UR VC: ABNORMAL
CCP IGA+IGG SERPL IA-ACNC: <20 UNITS
CH50 SERPL-ACNC: >60 U/ML
CHLORIDE SERPL-SCNC: 100 MMOL/L (ref 100–111)
CHLORIDE SERPL-SCNC: 101 MMOL/L (ref 100–111)
CHLORIDE SERPL-SCNC: 103 MMOL/L (ref 100–111)
CHLORIDE SERPL-SCNC: 104 MMOL/L (ref 100–111)
CHLORIDE SERPL-SCNC: 104 MMOL/L (ref 100–111)
CHLORIDE SERPL-SCNC: 106 MMOL/L (ref 100–111)
CHLORIDE SERPL-SCNC: 107 MMOL/L (ref 100–111)
CHOLEST SERPL-MCNC: 288 MG/DL
CK MB CFR SERPL CALC: NORMAL % (ref 0–4)
CK MB CFR SERPL CALC: NORMAL % (ref 0–4)
CK MB SERPL-MCNC: <1 NG/ML (ref 5–25)
CK MB SERPL-MCNC: <1 NG/ML (ref 5–25)
CK SERPL-CCNC: 29 U/L (ref 26–192)
CK SERPL-CCNC: 62 U/L (ref 26–192)
CO2 SERPL-SCNC: 17 MMOL/L (ref 21–32)
CO2 SERPL-SCNC: 20 MMOL/L (ref 21–32)
CO2 SERPL-SCNC: 20 MMOL/L (ref 21–32)
CO2 SERPL-SCNC: 22 MMOL/L (ref 21–32)
CO2 SERPL-SCNC: 22 MMOL/L (ref 21–32)
CO2 SERPL-SCNC: 23 MMOL/L (ref 21–32)
CO2 SERPL-SCNC: 24 MMOL/L (ref 21–32)
COLOR UR: YELLOW
COLOR UR: YELLOW
CREAT SERPL-MCNC: 2.3 MG/DL (ref 0.6–1.3)
CREAT SERPL-MCNC: 2.41 MG/DL (ref 0.6–1.3)
CREAT SERPL-MCNC: 2.66 MG/DL (ref 0.6–1.3)
CREAT SERPL-MCNC: 2.7 MG/DL (ref 0.6–1.3)
CREAT SERPL-MCNC: 2.7 MG/DL (ref 0.6–1.3)
CREAT SERPL-MCNC: 2.78 MG/DL (ref 0.6–1.3)
CREAT SERPL-MCNC: 2.8 MG/DL (ref 0.6–1.3)
CRP SERPL-MCNC: 9 MG/DL (ref 0–0.3)
CRP SERPL-MCNC: <0.3 MG/DL (ref 0–0.3)
DIAGNOSIS, 93000: NORMAL
DIFFERENTIAL METHOD BLD: ABNORMAL
ECHO AO ROOT DIAM: 3.25 CM
ECHO LA AREA 4C: 12.97 CM2
ECHO LA VOL 2C: 53.3 ML (ref 22–52)
ECHO LA VOL 4C: 32.11 ML (ref 22–52)
ECHO LA VOL BP: 49.1 ML (ref 22–52)
ECHO LA VOL/BSA BIPLANE: 29.21 ML/M2 (ref 16–28)
ECHO LA VOLUME INDEX A2C: 31.71 ML/M2 (ref 16–28)
ECHO LA VOLUME INDEX A4C: 19.1 ML/M2 (ref 16–28)
ECHO LV E' LATERAL VELOCITY: 5.51 CM/S
ECHO LV E' SEPTAL VELOCITY: 6.25 CM/S
ECHO LV INTERNAL DIMENSION DIASTOLIC: 5.21 CM (ref 3.9–5.3)
ECHO LV INTERNAL DIMENSION SYSTOLIC: 3.82 CM
ECHO LV IVSD: 1.25 CM (ref 0.6–0.9)
ECHO LV MASS 2D: 209.3 G (ref 67–162)
ECHO LV MASS INDEX 2D: 124.5 G/M2 (ref 43–95)
ECHO LV POSTERIOR WALL DIASTOLIC: 0.86 CM (ref 0.6–0.9)
ECHO LVOT CARDIAC OUTPUT: 3.24 LITER/MINUTE
ECHO LVOT DIAM: 2.05 CM
ECHO LVOT PEAK GRADIENT: 2.99 MMHG
ECHO LVOT PEAK VELOCITY: 86.4 CM/S
ECHO LVOT SV: 53 ML
ECHO LVOT VTI: 16.02 CM
ECHO MV A VELOCITY: 85.36 CM/S
ECHO MV E DECELERATION TIME (DT): 0.24 S
ECHO MV E VELOCITY: 45.3 CM/S
ECHO MV E/A RATIO: 0.53
ECHO MV E/E' LATERAL: 8.22
ECHO MV E/E' RATIO (AVERAGED): 7.73
ECHO MV E/E' SEPTAL: 7.25
ECHO RV TAPSE: 1.61 CM (ref 1.5–2)
ECHO TV REGURGITANT MAX VELOCITY: 282.61 CM/S
ECHO TV REGURGITANT PEAK GRADIENT: 31.95 MMHG
ENA SM AB SER-ACNC: 0.3 AI (ref 0–0.9)
EOSINOPHIL # BLD: 0.1 K/UL (ref 0–0.4)
EOSINOPHIL # BLD: 0.1 K/UL (ref 0–0.4)
EOSINOPHIL # BLD: 0.2 K/UL (ref 0–0.4)
EOSINOPHIL # BLD: 0.2 K/UL (ref 0–0.4)
EOSINOPHIL NFR BLD: 1 % (ref 0–5)
EOSINOPHIL NFR BLD: 3 % (ref 0–5)
EOSINOPHIL NFR BLD: 3 % (ref 0–5)
EOSINOPHIL NFR BLD: 4 % (ref 0–5)
EPITH CASTS URNS QL MICRO: ABNORMAL /LPF (ref 0–5)
EPITH CASTS URNS QL MICRO: ABNORMAL /LPF (ref 0–5)
ERYTHROCYTE [DISTWIDTH] IN BLOOD BY AUTOMATED COUNT: 14.3 % (ref 11.6–14.5)
ERYTHROCYTE [DISTWIDTH] IN BLOOD BY AUTOMATED COUNT: 14.4 % (ref 11.6–14.5)
ERYTHROCYTE [DISTWIDTH] IN BLOOD BY AUTOMATED COUNT: 14.4 % (ref 11.6–14.5)
ERYTHROCYTE [DISTWIDTH] IN BLOOD BY AUTOMATED COUNT: 14.6 % (ref 11.6–14.5)
ERYTHROCYTE [DISTWIDTH] IN BLOOD BY AUTOMATED COUNT: 14.6 % (ref 11.6–14.5)
ERYTHROCYTE [DISTWIDTH] IN BLOOD BY AUTOMATED COUNT: 15 % (ref 11.6–14.5)
ERYTHROCYTE [SEDIMENTATION RATE] IN BLOOD: 40 MM/HR (ref 0–30)
FERRITIN SERPL-MCNC: 496 NG/ML (ref 8–388)
FERRITIN SERPL-MCNC: 525 NG/ML (ref 8–388)
GAMMA GLOB SERPL ELPH-MCNC: 0.6 G/DL (ref 0.4–1.8)
GLOBULIN SER CALC-MCNC: 3.6 G/DL (ref 2–4)
GLOBULIN SER CALC-MCNC: 3.7 G/DL (ref 2–4)
GLOBULIN SER CALC-MCNC: 5.5 G/DL (ref 2–4)
GLOBULIN SER-MCNC: 2.6 G/DL (ref 2.2–3.9)
GLUCOSE BLD STRIP.AUTO-MCNC: 120 MG/DL (ref 70–110)
GLUCOSE SERPL-MCNC: 101 MG/DL (ref 74–99)
GLUCOSE SERPL-MCNC: 113 MG/DL (ref 74–99)
GLUCOSE SERPL-MCNC: 89 MG/DL (ref 74–99)
GLUCOSE SERPL-MCNC: 93 MG/DL (ref 74–99)
GLUCOSE SERPL-MCNC: 96 MG/DL (ref 74–99)
GLUCOSE SERPL-MCNC: 97 MG/DL (ref 74–99)
GLUCOSE SERPL-MCNC: 98 MG/DL (ref 74–99)
GLUCOSE UR STRIP.AUTO-MCNC: NEGATIVE MG/DL
GLUCOSE UR STRIP.AUTO-MCNC: NEGATIVE MG/DL
HBA1C MFR BLD: 5.4 % (ref 4.2–5.6)
HBV SURFACE AB SER QL IA: NEGATIVE
HBV SURFACE AB SERPL IA-ACNC: <3.1 MIU/ML
HBV SURFACE AG SER QL: <0.1 INDEX
HBV SURFACE AG SER QL: NEGATIVE
HCT VFR BLD AUTO: 27.4 % (ref 35–45)
HCT VFR BLD AUTO: 27.6 % (ref 35–45)
HCT VFR BLD AUTO: 29.1 % (ref 35–45)
HCT VFR BLD AUTO: 29.3 % (ref 35–45)
HCT VFR BLD AUTO: 29.7 % (ref 35–45)
HCT VFR BLD AUTO: 29.8 % (ref 35–45)
HCV AB SER IA-ACNC: <0.02 INDEX
HCV AB SERPL QL IA: NEGATIVE
HCV COMMENT,HCGAC: NORMAL
HDLC SERPL-MCNC: 58 MG/DL (ref 40–60)
HDLC SERPL: 5 {RATIO} (ref 0–5)
HEP BS AB COMMENT,HBSAC: ABNORMAL
HGB BLD-MCNC: 10.1 G/DL (ref 12–16)
HGB BLD-MCNC: 8.8 G/DL (ref 12–16)
HGB BLD-MCNC: 9.1 G/DL (ref 12–16)
HGB BLD-MCNC: 9.4 G/DL (ref 12–16)
HGB BLD-MCNC: 9.6 G/DL (ref 12–16)
HGB BLD-MCNC: 9.7 G/DL (ref 12–16)
HGB UR QL STRIP: ABNORMAL
HGB UR QL STRIP: NEGATIVE
HISTONE IGG SER IA-ACNC: 1.5 UNITS (ref 0–0.9)
HYALINE CASTS URNS QL MICRO: ABNORMAL /LPF (ref 0–2)
IGA SERPL-MCNC: 269 MG/DL (ref 64–422)
IGG SERPL-MCNC: 788 MG/DL (ref 586–1602)
IGM SERPL-MCNC: 25 MG/DL (ref 26–217)
INR PPP: 1 (ref 0.8–1.2)
INR PPP: 1.1 (ref 0.8–1.2)
INTERPRETATION SERPL IEP-IMP: ABNORMAL
IRON SATN MFR SERPL: 10 % (ref 20–50)
IRON SATN MFR SERPL: 11 % (ref 20–50)
IRON SERPL-MCNC: 18 UG/DL (ref 50–175)
IRON SERPL-MCNC: 28 UG/DL (ref 50–175)
KAPPA LC FREE SER-MCNC: 73.5 MG/L (ref 3.3–19.4)
KAPPA LC FREE SER-MCNC: 84.2 MG/L (ref 3.3–19.4)
KAPPA LC FREE/LAMBDA FREE SER: 1.39 {RATIO} (ref 0.26–1.65)
KAPPA LC FREE/LAMBDA FREE SER: 1.53 {RATIO} (ref 0.26–1.65)
KETONES UR QL STRIP.AUTO: NEGATIVE MG/DL
KETONES UR QL STRIP.AUTO: NEGATIVE MG/DL
LAMBDA LC FREE SERPL-MCNC: 48 MG/L (ref 5.7–26.3)
LAMBDA LC FREE SERPL-MCNC: 60.4 MG/L (ref 5.7–26.3)
LDLC SERPL CALC-MCNC: 193.4 MG/DL (ref 0–100)
LEFT 1ST DIGIT BP: 136 MMHG
LEFT ARM BP: 166 MMHG
LEFT PROX RADIAL A BP: 162 MMHG
LEFT PROX ULNAR A BP: 172 MMHG
LEFT WBI: 1.03
LEUKOCYTE ESTERASE UR QL STRIP.AUTO: ABNORMAL
LEUKOCYTE ESTERASE UR QL STRIP.AUTO: ABNORMAL
LIPID PROFILE,FLP: ABNORMAL
LVOT MG: 1.39 MMHG
LYMPHOCYTES # BLD: 1.3 K/UL (ref 0.9–3.6)
LYMPHOCYTES # BLD: 1.4 K/UL (ref 0.9–3.6)
LYMPHOCYTES # BLD: 1.4 K/UL (ref 0.9–3.6)
LYMPHOCYTES # BLD: 2.1 K/UL (ref 0.9–3.6)
LYMPHOCYTES NFR BLD: 18 % (ref 21–52)
LYMPHOCYTES NFR BLD: 27 % (ref 21–52)
LYMPHOCYTES NFR BLD: 32 % (ref 21–52)
LYMPHOCYTES NFR BLD: 37 % (ref 21–52)
M PROTEIN SERPL ELPH-MCNC: ABNORMAL G/DL
MAGNESIUM SERPL-MCNC: 2.3 MG/DL (ref 1.6–2.6)
MCH RBC QN AUTO: 26.5 PG (ref 24–34)
MCH RBC QN AUTO: 27 PG (ref 24–34)
MCH RBC QN AUTO: 27.1 PG (ref 24–34)
MCH RBC QN AUTO: 27.2 PG (ref 24–34)
MCH RBC QN AUTO: 27.2 PG (ref 24–34)
MCH RBC QN AUTO: 27.4 PG (ref 24–34)
MCHC RBC AUTO-ENTMCNC: 32.1 G/DL (ref 31–37)
MCHC RBC AUTO-ENTMCNC: 32.3 G/DL (ref 31–37)
MCHC RBC AUTO-ENTMCNC: 32.7 G/DL (ref 31–37)
MCHC RBC AUTO-ENTMCNC: 32.8 G/DL (ref 31–37)
MCHC RBC AUTO-ENTMCNC: 33 G/DL (ref 31–37)
MCHC RBC AUTO-ENTMCNC: 33.9 G/DL (ref 31–37)
MCV RBC AUTO: 81 FL (ref 74–97)
MCV RBC AUTO: 82.5 FL (ref 74–97)
MCV RBC AUTO: 82.6 FL (ref 74–97)
MCV RBC AUTO: 82.7 FL (ref 74–97)
MCV RBC AUTO: 83 FL (ref 74–97)
MCV RBC AUTO: 83.9 FL (ref 74–97)
MONOCYTES # BLD: 0.2 K/UL (ref 0.05–1.2)
MONOCYTES # BLD: 0.3 K/UL (ref 0.05–1.2)
MONOCYTES # BLD: 0.3 K/UL (ref 0.05–1.2)
MONOCYTES # BLD: 0.5 K/UL (ref 0.05–1.2)
MONOCYTES NFR BLD: 5 % (ref 3–10)
MONOCYTES NFR BLD: 5 % (ref 3–10)
MONOCYTES NFR BLD: 7 % (ref 3–10)
MONOCYTES NFR BLD: 7 % (ref 3–10)
NEUTS SEG # BLD: 2.5 K/UL (ref 1.8–8)
NEUTS SEG # BLD: 3.2 K/UL (ref 1.8–8)
NEUTS SEG # BLD: 3.3 K/UL (ref 1.8–8)
NEUTS SEG # BLD: 5.6 K/UL (ref 1.8–8)
NEUTS SEG NFR BLD: 55 % (ref 40–73)
NEUTS SEG NFR BLD: 56 % (ref 40–73)
NEUTS SEG NFR BLD: 65 % (ref 40–73)
NEUTS SEG NFR BLD: 74 % (ref 40–73)
NITRITE UR QL STRIP.AUTO: NEGATIVE
NITRITE UR QL STRIP.AUTO: POSITIVE
P-R INTERVAL, ECG05: 80 MS
PH UR STRIP: 5 [PH] (ref 5–8)
PH UR STRIP: 5.5 [PH] (ref 5–8)
PHOSPHATE SERPL-MCNC: 3 MG/DL (ref 2.5–4.9)
PHOSPHATE SERPL-MCNC: 3.5 MG/DL (ref 2.5–4.9)
PHOSPHATE SERPL-MCNC: 4 MG/DL (ref 2.5–4.9)
PLATELET # BLD AUTO: 241 K/UL (ref 135–420)
PLATELET # BLD AUTO: 245 K/UL (ref 135–420)
PLATELET # BLD AUTO: 246 K/UL (ref 135–420)
PLATELET # BLD AUTO: 338 K/UL (ref 135–420)
PLATELET # BLD AUTO: 378 K/UL (ref 135–420)
PLATELET # BLD AUTO: 448 K/UL (ref 135–420)
PMV BLD AUTO: 8.8 FL (ref 9.2–11.8)
PMV BLD AUTO: 8.8 FL (ref 9.2–11.8)
PMV BLD AUTO: 8.9 FL (ref 9.2–11.8)
PMV BLD AUTO: 9 FL (ref 9.2–11.8)
PMV BLD AUTO: 9.2 FL (ref 9.2–11.8)
PMV BLD AUTO: 9.4 FL (ref 9.2–11.8)
POTASSIUM SERPL-SCNC: 3.6 MMOL/L (ref 3.5–5.5)
POTASSIUM SERPL-SCNC: 3.7 MMOL/L (ref 3.5–5.5)
POTASSIUM SERPL-SCNC: 3.7 MMOL/L (ref 3.5–5.5)
POTASSIUM SERPL-SCNC: 4 MMOL/L (ref 3.5–5.5)
POTASSIUM SERPL-SCNC: 4 MMOL/L (ref 3.5–5.5)
POTASSIUM SERPL-SCNC: 4.1 MMOL/L (ref 3.5–5.5)
POTASSIUM SERPL-SCNC: 4.8 MMOL/L (ref 3.5–5.5)
PROT SERPL-MCNC: 5.7 G/DL (ref 6–8.5)
PROT SERPL-MCNC: 6.6 G/DL (ref 6.4–8.2)
PROT SERPL-MCNC: 6.8 G/DL (ref 6.4–8.2)
PROT SERPL-MCNC: 8.5 G/DL (ref 6.4–8.2)
PROT UR STRIP-MCNC: NEGATIVE MG/DL
PROT UR STRIP-MCNC: NEGATIVE MG/DL
PROTHROMBIN TIME: 13.3 SEC (ref 11.5–15.2)
PROTHROMBIN TIME: 14.1 SEC (ref 11.5–15.2)
PTH RELATED PROT SERPL-SCNC: <2 PMOL/L
PTH-INTACT SERPL-MCNC: 300.4 PG/ML (ref 18.4–88)
PTH-INTACT SERPL-MCNC: 419.1 PG/ML (ref 18.4–88)
PTH-INTACT SERPL-MCNC: 442.2 PG/ML (ref 18.4–88)
PTH-INTACT SERPL-MCNC: 481.8 PG/ML (ref 18.4–88)
Q-T INTERVAL, ECG07: 478 MS
QRS DURATION, ECG06: 196 MS
QTC CALCULATION (BEZET), ECG08: 478 MS
RBC # BLD AUTO: 3.32 M/UL (ref 4.2–5.3)
RBC # BLD AUTO: 3.34 M/UL (ref 4.2–5.3)
RBC # BLD AUTO: 3.47 M/UL (ref 4.2–5.3)
RBC # BLD AUTO: 3.53 M/UL (ref 4.2–5.3)
RBC # BLD AUTO: 3.59 M/UL (ref 4.2–5.3)
RBC # BLD AUTO: 3.68 M/UL (ref 4.2–5.3)
RBC #/AREA URNS HPF: ABNORMAL /HPF (ref 0–5)
RBC #/AREA URNS HPF: ABNORMAL /HPF (ref 0–5)
RHEUMATOID FACT SERPL-ACNC: <10 IU/ML
RHEUMATOID FACT SERPL-ACNC: <14 UNITS/ML
RIGHT 3RD DIGIT BP: 141 MMHG
RIGHT ARM BP: 167 MMHG
RIGHT DBI 3RD DIGIT: 0.84
RIGHT PROX RADIAL A BP: 255 MMHG
RIGHT PROX ULNAR A BP: 199 MMHG
RIGHT WBI: 1.53
SERVICE CMNT-IMP: ABNORMAL
SODIUM SERPL-SCNC: 131 MMOL/L (ref 136–145)
SODIUM SERPL-SCNC: 132 MMOL/L (ref 136–145)
SODIUM SERPL-SCNC: 135 MMOL/L (ref 136–145)
SODIUM SERPL-SCNC: 136 MMOL/L (ref 136–145)
SODIUM SERPL-SCNC: 137 MMOL/L (ref 136–145)
SP GR UR REFRACTOMETRY: 1.01 (ref 1–1.03)
SP GR UR REFRACTOMETRY: 1.01 (ref 1–1.03)
TIBC SERPL-MCNC: 174 UG/DL (ref 250–450)
TIBC SERPL-MCNC: 245 UG/DL (ref 250–450)
TRIGL SERPL-MCNC: 183 MG/DL (ref ?–150)
TROPONIN I SERPL-MCNC: <0.02 NG/ML (ref 0–0.04)
URATE SERPL-MCNC: 12.7 MG/DL (ref 2.6–7.2)
URATE SERPL-MCNC: 9.8 MG/DL (ref 2.6–7.2)
UROBILINOGEN UR QL STRIP.AUTO: 0.2 EU/DL (ref 0.2–1)
UROBILINOGEN UR QL STRIP.AUTO: 1 EU/DL (ref 0.2–1)
VENTRICULAR RATE, ECG03: 60 BPM
VLDLC SERPL CALC-MCNC: 36.6 MG/DL
WBC # BLD AUTO: 4.1 K/UL (ref 4.6–13.2)
WBC # BLD AUTO: 4.4 K/UL (ref 4.6–13.2)
WBC # BLD AUTO: 4.9 K/UL (ref 4.6–13.2)
WBC # BLD AUTO: 5.7 K/UL (ref 4.6–13.2)
WBC # BLD AUTO: 5.9 K/UL (ref 4.6–13.2)
WBC # BLD AUTO: 7.5 K/UL (ref 4.6–13.2)
WBC URNS QL MICRO: ABNORMAL /HPF (ref 0–4)
WBC URNS QL MICRO: ABNORMAL /HPF (ref 0–4)

## 2020-01-01 PROCEDURE — 74011000250 HC RX REV CODE- 250: Performed by: NURSE PRACTITIONER

## 2020-01-01 PROCEDURE — 36415 COLL VENOUS BLD VENIPUNCTURE: CPT

## 2020-01-01 PROCEDURE — 80069 RENAL FUNCTION PANEL: CPT

## 2020-01-01 PROCEDURE — 80053 COMPREHEN METABOLIC PANEL: CPT

## 2020-01-01 PROCEDURE — 86431 RHEUMATOID FACTOR QUANT: CPT

## 2020-01-01 PROCEDURE — 74011250636 HC RX REV CODE- 250/636: Performed by: INTERNAL MEDICINE

## 2020-01-01 PROCEDURE — 85652 RBC SED RATE AUTOMATED: CPT

## 2020-01-01 PROCEDURE — A9270 NON-COVERED ITEM OR SERVICE: HCPCS | Performed by: NURSE PRACTITIONER

## 2020-01-01 PROCEDURE — 74011250636 HC RX REV CODE- 250/636: Performed by: EMERGENCY MEDICINE

## 2020-01-01 PROCEDURE — G8752 SYS BP LESS 140: HCPCS | Performed by: INTERNAL MEDICINE

## 2020-01-01 PROCEDURE — 1090F PRES/ABSN URINE INCON ASSESS: CPT | Performed by: INTERNAL MEDICINE

## 2020-01-01 PROCEDURE — G8400 PT W/DXA NO RESULTS DOC: HCPCS | Performed by: INTERNAL MEDICINE

## 2020-01-01 PROCEDURE — 74011636637 HC RX REV CODE- 636/637: Performed by: NURSE PRACTITIONER

## 2020-01-01 PROCEDURE — 82397 CHEMILUMINESCENT ASSAY: CPT

## 2020-01-01 PROCEDURE — 82550 ASSAY OF CK (CPK): CPT

## 2020-01-01 PROCEDURE — 85610 PROTHROMBIN TIME: CPT

## 2020-01-01 PROCEDURE — 83970 ASSAY OF PARATHORMONE: CPT

## 2020-01-01 PROCEDURE — 86235 NUCLEAR ANTIGEN ANTIBODY: CPT

## 2020-01-01 PROCEDURE — G8427 DOCREV CUR MEDS BY ELIG CLIN: HCPCS | Performed by: INTERNAL MEDICINE

## 2020-01-01 PROCEDURE — G0463 HOSPITAL OUTPT CLINIC VISIT: HCPCS | Performed by: INTERNAL MEDICINE

## 2020-01-01 PROCEDURE — 84550 ASSAY OF BLOOD/URIC ACID: CPT

## 2020-01-01 PROCEDURE — 77063 BREAST TOMOSYNTHESIS BI: CPT

## 2020-01-01 PROCEDURE — 74011250637 HC RX REV CODE- 250/637: Performed by: INTERNAL MEDICINE

## 2020-01-01 PROCEDURE — 99285 EMERGENCY DEPT VISIT HI MDM: CPT

## 2020-01-01 PROCEDURE — 99232 SBSQ HOSP IP/OBS MODERATE 35: CPT | Performed by: EMERGENCY MEDICINE

## 2020-01-01 PROCEDURE — 85027 COMPLETE CBC AUTOMATED: CPT

## 2020-01-01 PROCEDURE — G8536 NO DOC ELDER MAL SCRN: HCPCS | Performed by: INTERNAL MEDICINE

## 2020-01-01 PROCEDURE — 86803 HEPATITIS C AB TEST: CPT

## 2020-01-01 PROCEDURE — 86706 HEP B SURFACE ANTIBODY: CPT

## 2020-01-01 PROCEDURE — 71045 X-RAY EXAM CHEST 1 VIEW: CPT

## 2020-01-01 PROCEDURE — 86140 C-REACTIVE PROTEIN: CPT

## 2020-01-01 PROCEDURE — 85730 THROMBOPLASTIN TIME PARTIAL: CPT

## 2020-01-01 PROCEDURE — 74011250636 HC RX REV CODE- 250/636: Performed by: NURSE PRACTITIONER

## 2020-01-01 PROCEDURE — 3288F FALL RISK ASSESSMENT DOCD: CPT | Performed by: INTERNAL MEDICINE

## 2020-01-01 PROCEDURE — 83540 ASSAY OF IRON: CPT

## 2020-01-01 PROCEDURE — 71046 X-RAY EXAM CHEST 2 VIEWS: CPT

## 2020-01-01 PROCEDURE — 86162 COMPLEMENT TOTAL (CH50): CPT

## 2020-01-01 PROCEDURE — G8420 CALC BMI NORM PARAMETERS: HCPCS | Performed by: INTERNAL MEDICINE

## 2020-01-01 PROCEDURE — 74011250637 HC RX REV CODE- 250/637: Performed by: NURSE PRACTITIONER

## 2020-01-01 PROCEDURE — 96374 THER/PROPH/DIAG INJ IV PUSH: CPT

## 2020-01-01 PROCEDURE — 1100F PTFALLS ASSESS-DOCD GE2>/YR: CPT | Performed by: INTERNAL MEDICINE

## 2020-01-01 PROCEDURE — 83735 ASSAY OF MAGNESIUM: CPT

## 2020-01-01 PROCEDURE — 82306 VITAMIN D 25 HYDROXY: CPT

## 2020-01-01 PROCEDURE — 83520 IMMUNOASSAY QUANT NOS NONAB: CPT

## 2020-01-01 PROCEDURE — 74011000250 HC RX REV CODE- 250: Performed by: EMERGENCY MEDICINE

## 2020-01-01 PROCEDURE — 65660000000 HC RM CCU STEPDOWN

## 2020-01-01 PROCEDURE — 83883 ASSAY NEPHELOMETRY NOT SPEC: CPT

## 2020-01-01 PROCEDURE — 70450 CT HEAD/BRAIN W/O DYE: CPT

## 2020-01-01 PROCEDURE — 80061 LIPID PANEL: CPT

## 2020-01-01 PROCEDURE — 85025 COMPLETE CBC W/AUTO DIFF WBC: CPT

## 2020-01-01 PROCEDURE — 2709999900 HC NON-CHARGEABLE SUPPLY

## 2020-01-01 PROCEDURE — 82784 ASSAY IGA/IGD/IGG/IGM EACH: CPT

## 2020-01-01 PROCEDURE — 76770 US EXAM ABDO BACK WALL COMP: CPT

## 2020-01-01 PROCEDURE — 83880 ASSAY OF NATRIURETIC PEPTIDE: CPT

## 2020-01-01 PROCEDURE — 97162 PT EVAL MOD COMPLEX 30 MIN: CPT

## 2020-01-01 PROCEDURE — 99239 HOSP IP/OBS DSCHRG MGMT >30: CPT | Performed by: FAMILY MEDICINE

## 2020-01-01 PROCEDURE — 93005 ELECTROCARDIOGRAM TRACING: CPT

## 2020-01-01 PROCEDURE — 99232 SBSQ HOSP IP/OBS MODERATE 35: CPT | Performed by: PHYSICIAN ASSISTANT

## 2020-01-01 PROCEDURE — 87077 CULTURE AEROBIC IDENTIFY: CPT

## 2020-01-01 PROCEDURE — G8432 DEP SCR NOT DOC, RNG: HCPCS | Performed by: INTERNAL MEDICINE

## 2020-01-01 PROCEDURE — 80048 BASIC METABOLIC PNL TOTAL CA: CPT

## 2020-01-01 PROCEDURE — 99214 OFFICE O/P EST MOD 30 MIN: CPT | Performed by: INTERNAL MEDICINE

## 2020-01-01 PROCEDURE — 94762 N-INVAS EAR/PLS OXIMTRY CONT: CPT

## 2020-01-01 PROCEDURE — 99284 EMERGENCY DEPT VISIT MOD MDM: CPT

## 2020-01-01 PROCEDURE — 83036 HEMOGLOBIN GLYCOSYLATED A1C: CPT

## 2020-01-01 PROCEDURE — 81001 URINALYSIS AUTO W/SCOPE: CPT

## 2020-01-01 PROCEDURE — 96375 TX/PRO/DX INJ NEW DRUG ADDON: CPT

## 2020-01-01 PROCEDURE — 82962 GLUCOSE BLOOD TEST: CPT

## 2020-01-01 PROCEDURE — 99441 PR PHYS/QHP TELEPHONE EVALUATION 5-10 MIN: CPT | Performed by: INTERNAL MEDICINE

## 2020-01-01 PROCEDURE — 96361 HYDRATE IV INFUSION ADD-ON: CPT

## 2020-01-01 PROCEDURE — 93306 TTE W/DOPPLER COMPLETE: CPT

## 2020-01-01 PROCEDURE — 87340 HEPATITIS B SURFACE AG IA: CPT

## 2020-01-01 PROCEDURE — G8754 DIAS BP LESS 90: HCPCS | Performed by: INTERNAL MEDICINE

## 2020-01-01 PROCEDURE — 99222 1ST HOSP IP/OBS MODERATE 55: CPT | Performed by: NURSE PRACTITIONER

## 2020-01-01 PROCEDURE — 86160 COMPLEMENT ANTIGEN: CPT

## 2020-01-01 PROCEDURE — 87186 SC STD MICRODIL/AGAR DIL: CPT

## 2020-01-01 PROCEDURE — 82728 ASSAY OF FERRITIN: CPT

## 2020-01-01 PROCEDURE — 87086 URINE CULTURE/COLONY COUNT: CPT

## 2020-01-01 RX ORDER — ONDANSETRON 2 MG/ML
4 INJECTION INTRAMUSCULAR; INTRAVENOUS
Status: DISCONTINUED | OUTPATIENT
Start: 2020-01-01 | End: 2020-01-01 | Stop reason: HOSPADM

## 2020-01-01 RX ORDER — ONDANSETRON 8 MG/1
8 TABLET, ORALLY DISINTEGRATING ORAL
Status: COMPLETED | OUTPATIENT
Start: 2020-01-01 | End: 2020-01-01

## 2020-01-01 RX ORDER — BISACODYL 5 MG
5 TABLET, DELAYED RELEASE (ENTERIC COATED) ORAL DAILY PRN
Status: DISCONTINUED | OUTPATIENT
Start: 2020-01-01 | End: 2020-01-01 | Stop reason: HOSPADM

## 2020-01-01 RX ORDER — DICLOFENAC SODIUM 10 MG/G
GEL TOPICAL
Qty: 100 G | Refills: 11 | Status: SHIPPED | OUTPATIENT
Start: 2020-01-01 | End: 2020-01-01

## 2020-01-01 RX ORDER — CARVEDILOL 25 MG/1
25 TABLET ORAL 2 TIMES DAILY WITH MEALS
Status: DISCONTINUED | OUTPATIENT
Start: 2020-01-01 | End: 2020-01-01 | Stop reason: HOSPADM

## 2020-01-01 RX ORDER — SODIUM CHLORIDE 9 MG/ML
50 INJECTION, SOLUTION INTRAVENOUS CONTINUOUS
Status: DISCONTINUED | OUTPATIENT
Start: 2020-01-01 | End: 2020-01-01

## 2020-01-01 RX ORDER — OXYCODONE AND ACETAMINOPHEN 5; 325 MG/1; MG/1
1 TABLET ORAL
Qty: 20 TAB | Refills: 0 | Status: SHIPPED | OUTPATIENT
Start: 2020-01-01 | End: 2020-01-01

## 2020-01-01 RX ORDER — CYANOCOBALAMIN (VITAMIN B-12) 500 MCG
600 TABLET ORAL DAILY
Status: DISCONTINUED | OUTPATIENT
Start: 2020-01-01 | End: 2020-01-01 | Stop reason: HOSPADM

## 2020-01-01 RX ORDER — FUROSEMIDE 40 MG/1
60 TABLET ORAL DAILY
Qty: 45 TAB | Refills: 5 | Status: ON HOLD | OUTPATIENT
Start: 2020-01-01 | End: 2020-01-01

## 2020-01-01 RX ORDER — PREDNISONE 20 MG/1
60 TABLET ORAL
Status: COMPLETED | OUTPATIENT
Start: 2020-01-01 | End: 2020-01-01

## 2020-01-01 RX ORDER — GUAIFENESIN 100 MG/5ML
81 LIQUID (ML) ORAL DAILY
Status: DISCONTINUED | OUTPATIENT
Start: 2020-01-01 | End: 2020-01-01 | Stop reason: HOSPADM

## 2020-01-01 RX ORDER — SODIUM CHLORIDE 0.9 % (FLUSH) 0.9 %
5-40 SYRINGE (ML) INJECTION AS NEEDED
Status: DISCONTINUED | OUTPATIENT
Start: 2020-01-01 | End: 2020-01-01 | Stop reason: HOSPADM

## 2020-01-01 RX ORDER — CARVEDILOL 25 MG/1
25 TABLET ORAL 2 TIMES DAILY WITH MEALS
Status: DISCONTINUED | OUTPATIENT
Start: 2020-01-01 | End: 2020-01-01

## 2020-01-01 RX ORDER — CARVEDILOL 25 MG/1
TABLET ORAL
Qty: 60 TAB | Refills: 5 | Status: SHIPPED | OUTPATIENT
Start: 2020-01-01 | End: 2020-01-01

## 2020-01-01 RX ORDER — CYANOCOBALAMIN (VITAMIN B-12) 500 MCG
600 TABLET ORAL DAILY
Qty: 100 TAB | Refills: 0 | Status: SHIPPED | OUTPATIENT
Start: 2020-01-01

## 2020-01-01 RX ORDER — AMLODIPINE BESYLATE 10 MG/1
10 TABLET ORAL DAILY
Status: DISCONTINUED | OUTPATIENT
Start: 2020-01-01 | End: 2020-01-01

## 2020-01-01 RX ORDER — ONDANSETRON 2 MG/ML
4 INJECTION INTRAMUSCULAR; INTRAVENOUS
Status: COMPLETED | OUTPATIENT
Start: 2020-01-01 | End: 2020-01-01

## 2020-01-01 RX ORDER — PREDNISONE 10 MG/1
TABLET ORAL
Qty: 1 PACKAGE | Refills: 0 | Status: SHIPPED | OUTPATIENT
Start: 2020-01-01 | End: 2020-01-01

## 2020-01-01 RX ORDER — ACETAMINOPHEN 325 MG/1
650 TABLET ORAL
Status: DISCONTINUED | OUTPATIENT
Start: 2020-01-01 | End: 2020-01-01 | Stop reason: HOSPADM

## 2020-01-01 RX ORDER — PROMETHAZINE HYDROCHLORIDE 25 MG/1
12.5 TABLET ORAL
Status: DISCONTINUED | OUTPATIENT
Start: 2020-01-01 | End: 2020-01-01 | Stop reason: HOSPADM

## 2020-01-01 RX ORDER — AMLODIPINE BESYLATE 10 MG/1
10 TABLET ORAL DAILY
Status: DISCONTINUED | OUTPATIENT
Start: 2020-01-01 | End: 2020-01-01 | Stop reason: HOSPADM

## 2020-01-01 RX ORDER — CARVEDILOL 25 MG/1
TABLET ORAL
Qty: 180 TAB | Refills: 1 | Status: SHIPPED | OUTPATIENT
Start: 2020-01-01

## 2020-01-01 RX ORDER — ROSUVASTATIN CALCIUM 20 MG/1
20 TABLET, COATED ORAL
Qty: 30 TAB | Refills: 6 | Status: SHIPPED | OUTPATIENT
Start: 2020-01-01

## 2020-01-01 RX ORDER — OXYCODONE AND ACETAMINOPHEN 5; 325 MG/1; MG/1
2 TABLET ORAL
Status: COMPLETED | OUTPATIENT
Start: 2020-01-01 | End: 2020-01-01

## 2020-01-01 RX ORDER — SODIUM CHLORIDE 0.9 % (FLUSH) 0.9 %
5-40 SYRINGE (ML) INJECTION EVERY 8 HOURS
Status: DISCONTINUED | OUTPATIENT
Start: 2020-01-01 | End: 2020-01-01 | Stop reason: HOSPADM

## 2020-01-01 RX ORDER — FUROSEMIDE 40 MG/1
40 TABLET ORAL DAILY
COMMUNITY

## 2020-01-01 RX ORDER — AMLODIPINE BESYLATE 10 MG/1
10 TABLET ORAL DAILY
COMMUNITY

## 2020-01-01 RX ORDER — ACETAMINOPHEN 650 MG/1
650 SUPPOSITORY RECTAL
Status: DISCONTINUED | OUTPATIENT
Start: 2020-01-01 | End: 2020-01-01 | Stop reason: HOSPADM

## 2020-01-01 RX ORDER — ROSUVASTATIN CALCIUM 20 MG/1
20 TABLET, COATED ORAL
Status: DISCONTINUED | OUTPATIENT
Start: 2020-01-01 | End: 2020-01-01 | Stop reason: HOSPADM

## 2020-01-01 RX ORDER — PREDNISONE 20 MG/1
40 TABLET ORAL
Qty: 10 TAB | Refills: 0 | Status: SHIPPED | OUTPATIENT
Start: 2020-01-01 | End: 2020-01-01

## 2020-01-01 RX ORDER — ONDANSETRON 8 MG/1
8 TABLET, ORALLY DISINTEGRATING ORAL
Qty: 20 TAB | Refills: 0 | Status: ON HOLD | OUTPATIENT
Start: 2020-01-01 | End: 2020-01-01

## 2020-01-01 RX ADMIN — ROSUVASTATIN CALCIUM 20 MG: 20 TABLET, FILM COATED ORAL at 22:32

## 2020-01-01 RX ADMIN — SODIUM CHLORIDE 50 ML/HR: 900 INJECTION, SOLUTION INTRAVENOUS at 06:17

## 2020-01-01 RX ADMIN — Medication 10 ML: at 12:50

## 2020-01-01 RX ADMIN — SODIUM CHLORIDE 50 ML/HR: 900 INJECTION, SOLUTION INTRAVENOUS at 12:59

## 2020-01-01 RX ADMIN — SODIUM CHLORIDE 500 ML: 9 INJECTION, SOLUTION INTRAVENOUS at 10:45

## 2020-01-01 RX ADMIN — OXYCODONE HYDROCHLORIDE AND ACETAMINOPHEN 2 TABLET: 5; 325 TABLET ORAL at 02:16

## 2020-01-01 RX ADMIN — AMLODIPINE BESYLATE 10 MG: 10 TABLET ORAL at 09:01

## 2020-01-01 RX ADMIN — CEFTRIAXONE SODIUM 1 G: 1 INJECTION, POWDER, FOR SOLUTION INTRAMUSCULAR; INTRAVENOUS at 08:54

## 2020-01-01 RX ADMIN — ONDANSETRON 4 MG: 2 INJECTION INTRAMUSCULAR; INTRAVENOUS at 10:46

## 2020-01-01 RX ADMIN — CARVEDILOL 25 MG: 25 TABLET, FILM COATED ORAL at 08:54

## 2020-01-01 RX ADMIN — Medication 10 ML: at 21:13

## 2020-01-01 RX ADMIN — CARVEDILOL 25 MG: 25 TABLET, FILM COATED ORAL at 09:01

## 2020-01-01 RX ADMIN — ASPIRIN 81 MG: 81 TABLET, CHEWABLE ORAL at 09:01

## 2020-01-01 RX ADMIN — Medication 10 ML: at 22:32

## 2020-01-01 RX ADMIN — WATER 1 G: 1 INJECTION INTRAMUSCULAR; INTRAVENOUS; SUBCUTANEOUS at 12:35

## 2020-01-01 RX ADMIN — Medication 10 ML: at 06:18

## 2020-01-01 RX ADMIN — Medication 10 ML: at 15:11

## 2020-01-01 RX ADMIN — CARVEDILOL 25 MG: 25 TABLET, FILM COATED ORAL at 17:40

## 2020-01-01 RX ADMIN — ROSUVASTATIN CALCIUM 20 MG: 20 TABLET, FILM COATED ORAL at 21:10

## 2020-01-01 RX ADMIN — AMLODIPINE BESYLATE 10 MG: 10 TABLET ORAL at 08:54

## 2020-01-01 RX ADMIN — Medication 10 ML: at 05:12

## 2020-01-01 RX ADMIN — ONDANSETRON 8 MG: 8 TABLET, ORALLY DISINTEGRATING ORAL at 02:16

## 2020-01-01 RX ADMIN — PREDNISONE 60 MG: 20 TABLET ORAL at 02:16

## 2020-01-01 RX ADMIN — ASPIRIN 81 MG: 81 TABLET, CHEWABLE ORAL at 08:54

## 2020-01-01 RX ADMIN — CEFTRIAXONE SODIUM 1 G: 1 INJECTION, POWDER, FOR SOLUTION INTRAMUSCULAR; INTRAVENOUS at 09:01

## 2020-01-21 NOTE — PROGRESS NOTES
Complex Case Management Date/Time:  2020 2:09 PM 
 
Method of communication with patient:phone Ambulator Care Manager [facility]) contacted the patient by telephone to perform Ambulatory Care Coordination. Verified name and  (PHI) with patient as identifiers. Provided introduction to self, and explanation of the Ambulatory Care Manager's role. Reviewed most recent clinic visit w/ patient who verbalized understanding. Patient given an opportunity to ask questions. Top Challenges reviewed with the patient 1. Spoke with patient 2. Stated she is doing better then last few days. Stated she has been ill possibly from something she ate, Suffering from diarrhea for past few days. Symptoms have subsided and feeling better at present. 3. No issues with meds at present. 4. Will follow per [facility] protocal  
 
The patient agrees to contact the PCP office or the [address] [place] [address] for questions related to their healthcare. Provided contact information for future reference. Disease Specific:   N/A Home Health Active: No 
 
DME Active: No 
 
Barriers to care? None Advance Care Planning:  
Does patient have an Advance Directive:  reviewed and current Medication(s):  
Medication reconciliation was not performed with patient, who verbalizes understanding of administration of home medications. There were no barriers to obtaining medications identified at this time. Referral to Pharm D needed: no  
 
Current Outpatient Medications Medication Sig  furosemide (LASIX) 40 mg tablet Take 1.5 Tabs by mouth daily. TAKE 1 TABLET BY MOUTH EVERY DAY  rosuvastatin (CRESTOR) 10 mg tablet Take 1 Tab by mouth nightly.  allopurinol (ZYLOPRIM) 100 mg tablet TAKE 1 TABLET BY MOUTH EVERY DAY [facility]  hydroxychloroquine (PLAQUENIL) 200 mg tablet TAKE 1 TABLET BY MOUTH TWICE A DAY WITH FOOD OR MILK  carvedilol (COREG) 25 mg tablet TAKE 1 TABLET BY MOUTH (2) TIMES A DAY  
  amLODIPine (NORVASC) 5 mg tablet Take 1 Tab by mouth daily.  diclofenac (VOLTAREN) 1 % gel USE 4 GRAMS ON KNEE FOUR TIMES A DAY AS NEEDED  hydrocortisone (HYTONE) 2.5 % ointment APPLY LIGHTLY TO THE AFFECTED AREAS ON THE LEGS AND FEET TWICE A DAY.  aspirin 81 mg tablet Take 81 mg by mouth daily. No current facility-administered medications for this visit. [facility] follow up appointment(s):  
Future Appointments Date Time Provider Korey Mell 2/13/2020  9:00 AM CSI, PACER  [facility]  
2/14/2020  9:00 AM [facility] IMAGING 2 2VV PAZ SCHED  
2/14/2020 10:00 AM [facility] IMAGING 1 2VV PAZ SCHED  
2/14/2020 11:00 AM [facility] NONIMAGING 2VV PAZ SCHED  
2/24/2020  9:00 AM [place] [place] [facility]  
2/28/2020  8:00 AM Sade Mac MD [facility]  
5/13/2020  8:20 AM CSI, PACER  [facility]  
5/13/2020  8:40 AM Mckay Yang  [facility] Non-[facility] follow up appointment(s):  
 
Goals  Attend follow up appointments on schedule 1-21-20  Patient will attend all scheduled appointments through 3-21-20  Knowledge and adherence of prescribed medication (ie. action, side effects, missed dose, etc.). 1-21-20 Pt will take all medications prescribed to be evaluated on each outreach through 3-21-20  Prepare patients and caregivers for end of life decisions (ie. need for hospice, pain management, symptom relief, advance directives etc.)   
  1-21-20  Pt will complete an ACP and have it scanned into their EMR by 
  3-21-20

## 2020-01-29 NOTE — PROGRESS NOTES
Complex Case Management Date/Time:  2020 2:09 PM 
 
Method of communication with patient:phone Ambulator Care Manager [facility]) contacted the patient by telephone to perform Ambulatory Care Coordination. Verified name and  (PHI) with patient as identifiers. Provided introduction to self, and explanation of the Ambulatory Care Manager's role. Reviewed most recent clinic visit w/ patient who verbalized understanding. Patient given an opportunity to ask questions. Top Challenges reviewed with the patient 1. Spoke with patient 2. Stated she is doing better. No further diarrhea. 3. C/O \"having a cold\". Instructed to drink plenty of fluids, watch for fever and to rest. Avoid contact with others as possible. 4. Med rec done 5. Will continue to follow patient per [facility] protocal  
 
The patient agrees to contact the PCP office or the [address] [place] [address] for questions related to their healthcare. Provided contact information for future reference. Disease Specific:   N/A Home Health Active: No 
 
DME Active: No 
 
Barriers to care? None Advance Care Planning:  
Does patient have an Advance Directive:  reviewed and current Medication(s):  
Medication reconciliation was performed with patient, who verbalizes understanding of administration of home medications. There were no barriers to obtaining medications identified at this time. Referral to Pharm D needed: no  
 
Current Outpatient Medications Medication Sig  furosemide (LASIX) 40 mg tablet Take 1.5 Tabs by mouth daily. TAKE 1 TABLET BY MOUTH EVERY DAY  rosuvastatin (CRESTOR) 10 mg tablet Take 1 Tab by mouth nightly.  allopurinol (ZYLOPRIM) 100 mg tablet TAKE 1 TABLET BY MOUTH EVERY DAY [facility]  hydroxychloroquine (PLAQUENIL) 200 mg tablet TAKE 1 TABLET BY MOUTH TWICE A DAY WITH FOOD OR MILK  carvedilol (COREG) 25 mg tablet TAKE 1 TABLET BY MOUTH (2) TIMES A DAY  
  amLODIPine (NORVASC) 5 mg tablet Take 1 Tab by mouth daily.  diclofenac (VOLTAREN) 1 % gel USE 4 GRAMS ON KNEE FOUR TIMES A DAY AS NEEDED  hydrocortisone (HYTONE) 2.5 % ointment APPLY LIGHTLY TO THE AFFECTED AREAS ON THE LEGS AND FEET TWICE A DAY.  aspirin 81 mg tablet Take 81 mg by mouth daily. No current facility-administered medications for this visit. [facility] follow up appointment(s):  
Future Appointments Date Time Provider Korey Monte 2/13/2020  9:00 AM CSNIVIA MEDINA  [address] [facility]  
2/14/2020  9:00 AM [facility] IMAGING 2 2VV PAZ SCHED  
2/14/2020 10:00 AM [facility] IMAGING 1 2VV PAZ SCHED  
2/14/2020 11:00 AM [facility] NONIMAGING 2VV PAZ SCHED  
2/24/2020  9:00 AM [place]za alcantara, [address] 2VV PAZ SCHED  
2/28/2020  8:00 AM Yaima Chávez MD [facility]  
5/13/2020  8:20 AM CSI, PACER [address]  
5/13/2020  8:40 AM Kory Raphael  [facility] Non-[facility] follow up appointment(s):  
 
Goals  Attend follow up appointments on schedule 1-21-20  Patient will attend all scheduled appointments through 3-21-20  Knowledge and adherence of prescribed medication (ie. action, side effects, missed dose, etc.). 1-21-20 Pt will take all medications prescribed to be evaluated on each outreach through 3-21-20  Prepare patients and caregivers for end of life decisions (ie. need for hospice, pain management, symptom relief, advance directives etc.)   
  1-21-20  Pt will complete an ACP and have it scanned into their EMR by 
  3-21-20

## 2020-02-03 NOTE — PROGRESS NOTES
Attempted to contact patient via phone for ACM transition of care. No voice mail available due to not being set up, mail box full or busy signal. Will contact patient at a later date

## 2020-02-14 NOTE — PROGRESS NOTES
Patient attended appointment with Vascular Surgery on 2-14-20. Ambulatory Care Manager reviewed EMR and will follow up on next scheduled outreach.

## 2020-02-24 NOTE — PROGRESS NOTES
1. Have you been to an emergency room or urgent care clinic since your last visit? NO    Hospitalized since your last visit? If yes, where, when, and reason for visit? NO  2. Have you seen or consulted any other health care providers outside of the [facility] since your last visit including any procedures, health maintenance items.  If yes, where, when and reason for visit? no

## 2020-02-24 NOTE — PROGRESS NOTES
Dl Nguyen    Chief Complaint   Patient presents with    Carotid Artery Stenosis    Leg Pain       History and Physical    Dl Nguyen is a 70 y.o. female with mild bilateral carotid artery stenosis as well as moderate PAD. She also has history of ~3 cm AAA. She presents today for her annual follow up visit. She states that in April of last year she was hospitalized and diagnosed with acute systolic heart failure. ECHO showed EF of 36-40%. She was also found to have CKD stage 3. She states that she is slowly improving but still has significant fatigue at times. She states that during her admission she developed numbness in both of her hands. She states that she was seen by Rheumatology as she has history of RA but has not had any improvement in her symptoms. She denies pain in the hands. She states that she will drop things and not realize that she dropped them until she looks down and sees it on the floor. The left hand seems to be the worse. She has chronic left knee pain and has refused TKR. She also now complains of some right low back and hip pain which started several days ago and she believes this has been aggravated by a long car ride to [place] she did yesterday. She does have some mild claudication symptoms but does not feel this is lifestyle limiting as of yet. She does not do much walking any more. She denies any skin changes or ulcers. She did have a fall last year resulting in a wound to her lateral right calve which was healed by the wound care clinic at [facility]. No rest pain. No fever/chills. No abdominal pain. No CP or SOB at this time. Follow up ultrasounds show \"Fusiform infrarenal aneurysm with maximal diameter 3.1 x 3.1 cm aorta and bilateral iliac arteries are patent with multiphasic signals. Celiac, superior mesenteric, inferior mesenteric arteries are patent\". \"Diffuse heterogeneous plaque bilateral internal carotid arteries with less than 50% stenosis.  Right vertebral artery is antegrade. Left vertebral artery not well visualized, possible occlusion. Right subclavian appears normal with triphasic waveforms. Left subclavian appears normal with biphasic waveforms\". \"Moderate to severe arterial insufficiency bilateral lower extremity. Superficial femoral to popliteal artery atherosclerosis notable bilateral. Infrapopliteal disease present as well bilateral\". Past Medical History:   Diagnosis Date    Abnormal ankle brachial index 11/13/2014    Mild arterial disease in right leg. R VICTOR MANUEL 0.88. L VICTOR MANUEL 1.00.  Anemia     Arm DVT (deep venous thromboembolism), acute ([facility])     s/p anticoagulation    Atherosclerotic heart disease     Atrial fibrillation ([facility])     AV block     CAD (coronary artery disease)     Cardiomyopathy, ischemic     Carotid artery stenosis     Carotid duplex 11/13/2014    Mild <50% bilateral ICA plaquing. Possible left vertebral occlusion.  Cerebral artery occlusion with cerebral infarction ([facility])     stroke x 2    Chest pain     CVA (cerebral infarction)     Echocardiogram 08/19/2015    EF 55%. Apical inferior, apical septal hypk (new since study of 12/19/11), likely due to chronic V-pacing. Mild LVH. RVSP 30 mmHg. Mild LAE. Mild MR. Mild TR.  Gastritis     GERD (gastroesophageal reflux disease)     Heart failure ([facility])     Hiatal hernia     Hyperlipidemia     Hypertension     Hypertensive cardiovascular disease     Intracranial aneurysm     left cavernous ICA 2mm aneurysm from head/neck CTA in 2014    Long term (current) use of anticoagulants 3/10/2011    Lower extremity venous duplex 01/26/2015    No DVT bilaterally.  Nuclear cardiac imaging test 09/24/2015    Low risk. Sm apical infarction w/no ischemia vs apical thinning. Sm basal inferior defect, likely artifact. EF 56%. Inferoseptal, inferoapical WMA related to sternotomy or paced rhythm.     Pacemaker     PAD (peripheral artery disease) ([facility])     PVC's chronic    S/P CABG x 3 2008    LIMA-LAD. SVG-OM. SVG-dRCA     S/P cardiac cath 2008    pRCA 100%. LM patent. Cx patent. OM1 100%. mLAD 95%. LVEDP 27-29mmHg. EF 20%. mod MR    Tilt table evaluation 1995    Positive for orthostatic hypotension    Vitamin D deficiency      Past Surgical History:   Procedure Laterality Date    CABG, ARTERY-VEIN, THREE  2008    CARDIAC SURG PROCEDURE UNLIST      medtronic dual-chamber cardioverted-defibrillator    HX  SECTION      x2    HX ENDOSCOPY  3/1/16    HX ENDOSCOPY  3/1/16    HX HEMORRHOIDECTOMY      1985    HX HYSTERECTOMY          HX ORTHOPAEDIC      knee surgery    HX OTHER SURGICAL  2/10/16    Pacemaker Gen Change    HX PACEMAKER      Defibrillator     HX TUMOR REMOVAL      removed from arm.  benign     Patient Active Problem List   Diagnosis Code    Atherosclerotic heart disease, s/p CABG X3 in , in the setting of severe left ventricular dysfunction, and s/p a dual-chamber I25.10    S/P CABG x 3 Z95.1    HTN (hypertension) I10    Atrial fibrillation ([facility]) I48.91    Carotid stenosis I65.29    PAD (peripheral artery disease) ([facility]) I73.9    Intracranial aneurysm - left cavernous ICA on  head/neck CTA I67.1    Mixed hyperlipidemia E78.2    Gastroesophageal reflux disease without esophagitis K21.9    Chronic gastritis with bleeding - in February/March of  per FOBT and EGD results K29.51    History of CVA (cerebrovascular accident) Z80.78    Vitamin D deficiency E55.9    Dysphagia s/p dilation R13.10    Anemia D64.9    Pacemaker (for h/o AV block) Z95.0    OAB (overactive bladder) N32.81    Cataract H26.9    Gout of left foot M10.9    Seasonal allergic rhinitis J30.2    Microalbuminuria R80.9    CKD (chronic kidney disease) stage 3, GFR 30-59 ml/min ([facility]) N18.3    Mixed connective tissue disease ([facility]) M35.1    Severe obesity (BMI 35.0-39. 9) with comorbidity ([facility] [address] E66.01    Generalized edema R60.1    Peripheral venous insufficiency I87.2    Chronic combined systolic and diastolic congestive heart failure (HCC) I50.42    Impaired glucose tolerance R73.02     Current Outpatient Medications   Medication Sig Dispense Refill    furosemide (LASIX) 40 mg tablet Take 1.5 Tabs by mouth daily. TAKE 1 TABLET BY MOUTH EVERY DAY 45 Tab 5    rosuvastatin (CRESTOR) 10 mg tablet Take 1 Tab by mouth nightly. 30 Tab 5    allopurinol (ZYLOPRIM) 100 mg tablet TAKE 1 TABLET BY MOUTH EVERY DAY **HOSPITAL  0    hydroxychloroquine (PLAQUENIL) 200 mg tablet TAKE 1 TABLET BY MOUTH TWICE A DAY WITH FOOD OR MILK  2    carvedilol (COREG) 25 mg tablet TAKE 1 TABLET BY MOUTH (2) TIMES A DAY 10 Tab 0    amLODIPine (NORVASC) 5 mg tablet Take 1 Tab by mouth daily. 5 Tab 0    diclofenac (VOLTAREN) 1 % gel USE 4 GRAMS ON KNEE FOUR TIMES A DAY AS NEEDED  3    hydrocortisone (HYTONE) 2.5 % ointment APPLY LIGHTLY TO THE AFFECTED AREAS ON THE LEGS AND FEET TWICE A DAY. 2    aspirin 81 mg tablet Take 81 mg by mouth daily. Allergies   Allergen Reactions    Nifedipine Nausea and Vomiting and Other (comments)     Dizzy    Ace Inhibitors Cough    Codeine Unknown (comments), Nausea Only and Nausea and Vomiting    Hydralazine Other (comments)     Dizziness and Fatigue    Lipitor [Atorvastatin] Nausea and Vomiting and Vertigo    Simvastatin Nausea and Vomiting       Physical Exam:    Visit Vitals  /70 (BP 1 Location: Left arm, BP Patient Position: Sitting)   Pulse 68   Resp 16   Ht 5' 4\" (1.626 m)   Wt 198 lb (89.8 kg)   SpO2 100%   BMI 33.99 kg/m²      General: elderly female in no acute distress   HEENT: EOMI, no scleral icterus is noted. No carotid bruit appreciated bilaterally. CV: RRR, +GWEN. Decreased radial pulse on left. 2+ on right. Pulmonary: No increased work or breathing is noted. CTA bilaterally  Abdomen: obese, nondistended. Extremities: Warm and well perfused bilaterally. Pt has +BLE edema, L>R. Compression stockings in place. Neuro: Cranial nerves II through XII are grossly intact. Decreased hand  strength on left compared to right. Integument: No ulcerations are identified visibly      Impression and Plan:  Jovani Cosme is a 70 y.o. female with mild bilateral carotid artery stenosis as well as moderate-severe PAD. She also has history of ~3 cm AAA. She presents today for her annual follow up visit. Imaging was reviewed with patient in the office today as above. I discussed that as far as her mild carotid artery stenosis and small AAA are concerned we can continue to monitor these with routine surveillance and repeat studies in 1 years time. We will obtain a WBI to assess her underlying perfusion in the upper extremities due to her symptoms of numbness in the hands and decreased pulse on the left. I discussed that if her arterial studies look okay then I would recommend evaluation by neurology. She expresses understanding to this. I did discuss the possibility of angiogram intervention for her left leg pain and claudication however seeing as her symptoms are not lifestyle limiting at this current time we both agree to continue with conservative management and routine surveillance. She is understanding of the signs and symptoms of worsening arterial insufficiency and will certainly call the office sooner as needed. Otherwise we will repeat her arterial study in 1 years time as well. Patient expresses understanding to all of this and agrees to the plan. We reviewed the plan with the patient and the patient understands. We also gave the patient appropriate instructions on their disease process and when to call back. Greater than 50% of this visit was spent with face to face discussion. [address] [place] [address]      PLEASE NOTE:  This document has been produced using voice recognition software. Unrecognized errors in transcription may be present.

## 2020-02-25 NOTE — PROGRESS NOTES
Patient attended appointment with Miroslava Lamar - Vascular Surgery on 2-24-20. Ambulatory Care Manager reviewed EMR and will follow up on next scheduled outreach.

## 2020-02-28 NOTE — PATIENT INSTRUCTIONS
Health Maintenance Due Topic Date Due  Shingrix Vaccine Age 50> (1 of 2) [birth year]02/1992 Chronic Kidney Disease: Care Instructions Your Care Instructions Chronic kidney disease happens when your kidneys don't work as well as they should. Your kidneys have a few important jobs. They remove waste from your blood. This waste leaves your body in your urine. They also balance your body's fluids and chemicals. When your kidneys don't work well, extra waste and fluid can build up. This can poison the body and sometimes cause death. The most common causes of this disease are diabetes and high blood pressure. In some cases, the disease develops in 2 to 3 months. But it usually develops over many years. If you take medicine and make healthy changes to your lifestyle, you may be able to prevent the disease from getting worse. But if your kidney damage gets worse, you may need dialysis or a kidney transplant. Dialysis uses a machine to filter waste from the blood. A transplant is surgery to give you a healthy kidney from another person. Follow-up care is a key part of your treatment and safety. Be sure to make and go to all appointments, and call your doctor if you are having problems. It's also a good idea to know your test results and keep a list of the medicines you take. How can you care for yourself at home? 
 Treatments and appointments 
  · Be safe with medicines. Take your medicines exactly as prescribed. Call your doctor if you have any problems with your medicine. You also may take medicine to control your blood pressure or to treat diabetes. Many people who have diabetes take blood pressure medicine.  
  · If you have diabetes, do your best to keep your blood sugar in your target range. You may do this by eating healthy food and exercising. You may also take medicines.  
  · Go to your dialysis appointments if you have this treatment.   · Do not take ibuprofen (Advil, Motrin), naproxen (Aleve), or similar medicines, unless your doctor tells you to. These may make the disease worse.  
  · Do not take any vitamins, over-the-counter medicines, or herbal products without talking to your doctor first.  
  · Do not smoke or use other tobacco products. Smoking can reduce blood flow to the kidneys. If you need help quitting, talk to your doctor about stop-smoking programs and medicines. These can increase your chances of quitting for good.  
  · Do not drink alcohol or use illegal drugs.  
  · Talk to your doctor about an exercise plan. Exercise helps lower your blood pressure. It also makes you feel better.  
  · If you have an advance directive, let your doctor know. It may include a living will and a durable power of  for health care. If you don't have one, you may want to prepare one. It lets your doctor and loved ones know your health care wishes if you become unable to speak for yourself. Diet 
  · Talk to a registered dietitian. He or she can help you make a meal plan that is right for you. Most people with kidney disease need to limit salt (sodium), fluids, and protein. Some also have to limit potassium and phosphorus.  
  · You may have to give up many foods you like. But try to focus on the fact that this will help you stay healthy for as long as possible.  
  · If you have a hard time eating enough, talk to your doctor or dietitian about ways to add calories to your diet.  
  · Your diet may change as your disease changes. See your doctor for regular testing. And work with a dietitian to change your diet as needed. When should you call for help? Call 911 anytime you think you may need emergency care. For example, call if: 
  · You passed out (lost consciousness).  
 Call your doctor now or seek immediate medical care if: 
  · You have less urine than normal or no urine.   · You have trouble urinating or can urinate only very small amounts.  
  · You are confused or have trouble thinking clearly.  
  · You feel weaker or more tired than usual.  
  · You are very thirsty, lightheaded, or dizzy.  
  · You have nausea and vomiting.  
  · You have new swelling of your arms or feet, or your swelling is worse.  
  · You have blood in your urine.  
  · You have new or worse trouble breathing.  
 Watch closely for changes in your health, and be sure to contact your doctor if: 
  · You have any problems with your medicine or other treatment. Where can you learn more? Go to http://jacquelyn-alberto.info/. Enter N276 in the search box to learn more about \"Chronic Kidney Disease: Care Instructions. \" Current as of: October 31, 2018 Content Version: 12.2 © 2641-3147 [employer]. Care instructions adapted under license by [employer] (which disclaims liability or warranty for this information). If you have questions about a medical condition or this instruction, always ask your healthcare professional. [place] [address] any warranty or liability for your use of this information.

## 2020-02-28 NOTE — PROGRESS NOTES
INTERNISTS OF [facility]: 
2/28/2020, MRN: [id number] Marcos Hernandez is a 70 y.o. female and presents to clinic for Hypertension (follow up  ROOM  12) Subjective:  
Patient is a 53-year-old -American female with history of allergic rhinitis, gout, cataract, overactive bladder, hyperlipidemia, GERD, mixed connective tissue disease (positive FOREST, RNP Ab, Sjogren's Ab, anti-chromatin Ab, and Anti-Parnell Ab), chronic gastritis with bleeding in March 2016, history of CVA, vitamin D deficiency, dysphasia status post dilation, anemia, pacemaker for history of AV block, left cavernous ICA aneurysm, hypertension, atrial fibrillation,  statin intolerance, CHF, carotid stenosis, 3.1 cm fusiform dilation of the infrarenal abdominal aorta (followed by Chris Domingo &Vascular Surgery team), peripheral artery disease, PVCs, DVT hx (LUE - postoperatively), and CAD status post CABG (followed by ). 1. AF/HLD/AV Block/HTN: BP is 120/63 today since her last appointment, she saw Mane Ely. She is to continue taking all medication as prescribed. Her pacemaker is working well. She is taking Lasix, carvedilol, Norvasc, Crestor, and aspirin. She took Coumadin in the past given history of atrial fibrillation and DVT but she had a difficult time maintaining a therapeutic INR. As result, she was placed simply on aspirin. No bleeding. No CP. No palpitations. She gave up soda and a lot of fried chicken. She is eating more salads and fruits. +Lost weight. Weight is 170lbs. Down from 198lbs. +H/o elevated BGs noted on random BMPs. 2. CKD: Last apt with Nephrology: October 2019. Urinary sx: none. On lasix as mentioned above. 3. Mixed Connective Tissue Disease: +Positive FOREST, RNP Ab, Sjogren's Ab, anti-chromatin Ab, and Anti-Parnell Ab. On plaquenil. No adverse side effects from this rx. She was unable to tolerate plaquenil. She used to see Rheumatology but stopped going after having a bad experience.  She continues to have hand pain and \"they are numb. \" \"Sometimes she will drop things. \" 4. AAA, Carotid Artery Stenosis, and PAD: Since her last appointment, she met with Vascular Surgery who is recommending daily studies to assess her known AAA and PAD.  
 
2/14/20 Aorta PVL: Fusiform infrarenal aneurysm with maximal diameter 3.1 x 3.1 cm aorta and bilateral iliac arteries are patent with multiphasic signals. Celiac, superior mesenteric, inferior mesenteric arteries are patent 2/14/20 Carotid PVL: Diffuse heterogeneous plaque bilateral internal carotid arteries with less than 50% stenosis. Right vertebral artery is antegrade. Left vertebral artery not well visualized, possible occlusion. Right subclavian appears normal with triphasic waveforms. Left  
subclavian appears normal with biphasic waveforms. 2/14/20 BLE Arterial PVL: Moderate to severe arterial insufficiency bilateral lower extremity. Superficial femoral to popliteal artery atherosclerosis notable bilateral. Infrapopliteal disease present as well bilateral. 
 
5. Gout: She had a gout attack over the \"holidays\" after eating steamed shrimp. Her rheumatologist stopped allopurinol. She was given a rx for colchicine in place of allopurinol. 6. B/l Knee Pain: She has pain in her knees have improved with her recent weight loss. She applies topical voltaren to the affected areas prn for sx relief. She needs a refill. Patient Active Problem List  
 Diagnosis Date Noted  Impaired glucose tolerance 06/12/2019  Chronic combined systolic and diastolic congestive heart failure ([facility] 06/02/2019  Peripheral venous insufficiency 02/21/2019  Generalized edema 11/01/2018  Severe obesity (BMI 35.0-39. 9) with comorbidity ([facility] 05/08/2018  Mixed connective tissue disease ([facility] 08/22/2017  Microalbuminuria 07/10/2017  CKD (chronic kidney disease) stage 3, GFR 30-59 ml/min ([facility]) 07/10/2017  Seasonal allergic rhinitis 11/23/2016  Gout of left foot 11/22/2016  Cataract 08/09/2016  
 OAB (overactive bladder) 07/06/2016  Mixed hyperlipidemia 07/05/2016  Gastroesophageal reflux disease without esophagitis 07/05/2016  Chronic gastritis with bleeding - in February/March of 2016 per FOBT and EGD results 07/05/2016  History of CVA (cerebrovascular accident) 07/05/2016  Vitamin D deficiency 07/05/2016  Dysphagia s/p dilation 07/05/2016  Anemia 07/05/2016  Pacemaker (for h/o AV block) 07/05/2016  Intracranial aneurysm - left cavernous ICA on 2014 head/neck CTA 12/01/2014  
 HTN (hypertension) 10/13/2014  Atrial fibrillation ([facility] 10/13/2014  Carotid stenosis 10/13/2014  PAD (peripheral artery disease) ([facility] 10/13/2014  Atherosclerotic heart disease, s/p CABG X3 in 6/08, in the setting of severe left ventricular dysfunction, and s/p a dual-chamber  S/P CABG x 3 Current Outpatient Medications Medication Sig Dispense Refill  furosemide (LASIX) 40 mg tablet Take 1.5 Tabs by mouth daily. TAKE 1 TABLET BY MOUTH EVERY DAY 45 Tab 5  carvedilol (COREG) 25 mg tablet TAKE 1 TABLET BY MOUTH (2) TIMES A DAY 10 Tab 0  
 amLODIPine (NORVASC) 5 mg tablet Take 1 Tab by mouth daily. 5 Tab 0  
 diclofenac (VOLTAREN) 1 % gel USE 4 GRAMS ON KNEE FOUR TIMES A DAY AS NEEDED  3  
 hydrocortisone (HYTONE) 2.5 % ointment APPLY LIGHTLY TO THE AFFECTED AREAS ON THE LEGS AND FEET TWICE A DAY. 2  
 aspirin 81 mg tablet Take 81 mg by mouth daily.  rosuvastatin (CRESTOR) 10 mg tablet Take 1 Tab by mouth nightly. 30 Tab 5  hydroxychloroquine (PLAQUENIL) 200 mg tablet TAKE 1 TABLET BY MOUTH TWICE A DAY WITH FOOD OR MILK  2 Allergies Allergen Reactions  Nifedipine Nausea and Vomiting and Other (comments) Dizzy  Ace Inhibitors Cough  Codeine Unknown (comments), Nausea Only and Nausea and Vomiting  Hydralazine Other (comments) Dizziness and Fatigue  Lipitor [Atorvastatin] Nausea and Vomiting and Vertigo  Simvastatin Nausea and Vomiting Past Medical History:  
Diagnosis Date  Abnormal ankle brachial index 11/13/2014 Mild arterial disease in right leg. R VICTOR MANUEL 0.88. L VICTOR MANUEL 1.00.  Anemia  Arm DVT (deep venous thromboembolism), acute (HCC)   
 s/p anticoagulation  Atherosclerotic heart disease  Atrial fibrillation ([address]  AV block  CAD (coronary artery disease)  Cardiomyopathy, ischemic  Carotid artery stenosis  Carotid duplex 11/13/2014 Mild <50% bilateral ICA plaquing. Possible left vertebral occlusion.  Cerebral artery occlusion with cerebral infarction ([address] stroke x 2  Chest pain  CVA (cerebral infarction)  Echocardiogram 08/19/2015 EF 55%. Apical inferior, apical septal hypk (new since study of 12/19/11), likely due to chronic V-pacing. Mild LVH. RVSP 30 mmHg. Mild LAE. Mild MR. Mild TR.  Gastritis  GERD (gastroesophageal reflux disease)  Heart failure ([facility]  Hiatal hernia  Hyperlipidemia  Hypertension  Hypertensive cardiovascular disease  Intracranial aneurysm   
 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  Nuclear cardiac imaging test 09/24/2015 Low risk. Sm apical infarction w/no ischemia vs apical thinning. Sm basal inferior defect, likely artifact. EF 56%. Inferoseptal, inferoapical WMA related to sternotomy or paced rhythm.  Pacemaker  PAD (peripheral artery disease) ([facility]  PVC's   
 chronic  S/P CABG x 3 06/08/2008 LIMA-LAD. SVG-OM. SVG-dRCA  S/P cardiac cath 06/08/2008 pRCA 100%. LM patent. Cx patent. OM1 100%. mLAD 95%. LVEDP 27-29mmHg. EF 20%. mod MR  
 Tilt table evaluation 06/01/1995 Positive for orthostatic hypotension  Vitamin D deficiency Past Surgical History:  
Procedure Laterality Date  CABG, ARTERY-VEIN, THREE  2008  CARDIAC SURG PROCEDURE UNLIST    
 medtronic dual-chamber cardioverted-defibrillator  HX  SECTION    
 x2  
 HX ENDOSCOPY  3/1/16  HX ENDOSCOPY  3/1/16 [address]  HX HYSTERECTOMY 1980  HX ORTHOPAEDIC    
 knee surgery  HX OTHER SURGICAL  2/10/16 Pacemaker Gen Change  HX PACEMAKER Defibrillator   HX TUMOR REMOVAL    
 removed from arm.  benign No family history on file. Social History Tobacco Use  Smoking status: Never Smoker  Smokeless tobacco: Never Used  Tobacco comment: just smoked 2 weeks in college Substance Use Topics  Alcohol use: No  
 
 
ROS Review of Systems Constitutional: Negative for chills and fever. HENT: Negative for ear pain and sore throat. Eyes: Negative for blurred vision and pain. Respiratory: Negative for cough and shortness of breath. Cardiovascular: Negative for chest pain. Gastrointestinal: Negative for abdominal pain, blood in stool and melena. Genitourinary: Negative for dysuria and hematuria. Musculoskeletal: Positive for joint pain. Negative for myalgias. Skin: Negative for rash. Neurological: Negative for tingling, focal weakness and headaches. Endo/Heme/Allergies: Does not bruise/bleed easily. Psychiatric/Behavioral: Negative for substance abuse. Objective Vitals:  
 20 0801 BP: 120/63 Pulse: 60 Resp: 12 Temp: 95.5 °F (35.3 °C) TempSrc: Oral  
SpO2: 100% Weight: 170 lb (77.1 kg) Height: 5' 4\" (1.626 m) PainSc:   0 - No pain Physical Exam 
Vitals signs and nursing note reviewed. HENT:  
   Head: Normocephalic and atraumatic. Right Ear: External ear normal.  
   Left Ear: External ear normal.  
   Nose: Nose normal.  
   Mouth/Throat:  
   Pharynx: No oropharyngeal exudate. Eyes:  
   General: No scleral icterus. Right eye: No discharge. Left eye: No discharge. Conjunctiva/sclera: Conjunctivae normal.  
Neck: Musculoskeletal: Neck supple. Cardiovascular:  
   Rate and Rhythm: Normal rate and regular rhythm. Heart sounds: Normal heart sounds. No murmur. No friction rub. No gallop. Pulmonary:  
   Effort: Pulmonary effort is normal. No respiratory distress. Breath sounds: Normal breath sounds. No wheezing or rales. Abdominal:  
   General: Bowel sounds are normal. There is no distension. Palpations: Abdomen is soft. There is no mass. Tenderness: There is no abdominal tenderness. There is no guarding or rebound. Musculoskeletal:     
   General: Swelling (much less asymmetrical edema in her BLE - improvement in chronic assymetrical edema) present. No tenderness (Bue). Lymphadenopathy:  
   Cervical: No cervical adenopathy. Skin: 
   General: Skin is warm and dry. Findings: No erythema. Neurological:  
   Mental Status: She is alert and oriented to person, place, and time. Motor: No abnormal muscle tone. Gait: Gait is intact. Gait normal.  
Psychiatric:     
   Mood and Affect: Mood and affect normal.  
 
4/5 b/l  strength LABS Data Review:  
Lab Results Component Value Date/Time WBC 8.6 07/14/2019 08:56 PM  
 HGB 10.3 (L) 09/16/2019 11:49 AM  
 HCT 32.5 (L) 09/16/2019 11:49 AM  
 PLATELET 630 82/19/6365 08:56 PM  
 MCV 83.9 07/14/2019 08:56 PM  
 
 
Lab Results Component Value Date/Time  Sodium 138 11/30/2019 08:54 AM  
 Potassium 4.3 11/30/2019 08:54 AM  
 Chloride 105 11/30/2019 08:54 AM  
 CO2 28 11/30/2019 08:54 AM  
 Anion gap 5 11/30/2019 08:54 AM  
 Glucose 89 11/30/2019 08:54 AM  
 BUN 30 (H) 11/30/2019 08:54 AM  
 Creatinine 1.76 (H) 11/30/2019 08:54 AM  
 BUN/Creatinine ratio 17 11/30/2019 08:54 AM  
 GFR est AA 34 (L) 11/30/2019 08:54 AM  
 GFR est non-AA 28 (L) 11/30/2019 08:54 AM  
 Calcium 9.3 11/30/2019 08:54 AM  
 
 
 Lab Results Component Value Date/Time Cholesterol, total 269 (H) 08/01/2019 10:32 AM  
 HDL Cholesterol 73 (H) 08/01/2019 10:32 AM  
 LDL, calculated 169 (H) 08/01/2019 10:32 AM  
 VLDL, calculated 27 08/01/2019 10:32 AM  
 Triglyceride 135 08/01/2019 10:32 AM  
 CHOL/HDL Ratio 3.7 08/01/2019 10:32 AM  
 
 
Lab Results Component Value Date/Time Hemoglobin A1c 6.0 (H) 05/30/2019 12:04 PM  
 
 
Assessment/Plan: 1. AF/HTN/AV Block/HLD: Stable. - C/w r x as prescribed. - Continue to f/u with Cardiology - Checking a CBC, CMP, urine protein screen, A1c, and a lipid panel just before her follow-up appointment. I congratulated her on her weight loss. I encouraged her to continue maintaining a heart healthy diet, reducing her processed food intake. I will recheck her weight at her follow-up appointment. 2. Gout: Stable. - Dietary counseling provided. I encouraged her to avoid foods that aggravate gout. Checking a uric acid level just before her f/u apt. 3. CKD:  
- Continue to f/u with Nephrology. Checking labs 1 wk before her f/u apt (CMP, urine protein screen, and CBC) 4. Mixed Connective Tissue Disease: 
- Checking labs 1 wk before her f/u apt (FOREST, CRP, CMP, and a CBC) - Continue to f/u with Nephrology  I encouraged her to establish care with a new rheumatologist. 
 
5. AAA, Carotid Stenosis, and PAD: Stable. - C/w rx as prescribed. - Continue to f/u with Vascular Surgery Health Maintenance Due Topic Date Due  Shingrix Vaccine Age 50> (1 of 2) 04[birth year]02/1990 Lab review: labs are reviewed in the EHR I have discussed the diagnosis with the patient and the intended plan as seen in the above orders. The patient has received an after-visit summary and questions were answered concerning future plans. I have discussed medication side effects and warnings with the patient as well.  I have reviewed the plan of care with the patient, accepted their input and they are in agreement with the treatment goals. All questions were answered. The patient understands the plan of care. Handouts provided today with above information. Pt instructed if symptoms worsen to call the office or report to the ED for continued care. Greater than 50% of the visit time was spent in counseling and/or coordination of care. Voice recognition was used to generate this report, which may have resulted in some phonetic based errors in grammar and contents. Even though attempts were made to correct all the mistakes, some may have been missed, and remained in the body of the document.  
 
 
 
 
Lisa Fermin MD

## 2020-02-28 NOTE — PROGRESS NOTES
Ammy Gonzalez presents today for Chief Complaint Patient presents with  Hypertension  
  follow up  ROOM  12 Is someone accompanying this pt? no 
 
Is the patient using any DME equipment during [address]? no 
 
Depression Screening: 
3 most recent PHQ Screens 2/28/2020 Little interest or pleasure in doing things Not at all Feeling down, depressed, irritable, or hopeless Not at all Total Score PHQ 2 0 Learning Assessment: 
Learning Assessment 2/28/2020 PRIMARY LEARNER Patient HIGHEST LEVEL OF EDUCATION - PRIMARY LEARNER  > 4 YEARS OF COLLEGE  
BARRIERS PRIMARY LEARNER NONE  
CO-LEARNER CAREGIVER No  
PRIMARY LANGUAGE ENGLISH  
LEARNER PREFERENCE PRIMARY DEMONSTRATION  
  -  
  -  
ANSWERED BY patient RELATIONSHIP SELF Abuse Screening: 
Abuse Screening Questionnaire 2/28/2020 Do you ever feel afraid of your partner? Mars Wood Are you in a relationship with someone who physically or mentally threatens you? Mars Wood Is it safe for you to go home? Alisa Zacarias Fall Risk Fall Risk Assessment, last 12 mths 2/28/2020 Able to walk? Yes Fall in past 12 months? No  
Fall with injury? -  
Number of falls in past 12 months - Fall Risk Score - Health Maintenance reviewed and discussed and ordered per Provider. Health Maintenance Due Topic Date Due  Shingrix Vaccine Age 50> (1 of 2) 04[birth year]02/1990 Vladimir Cutler Coordination of Care: 1. Have you been to the ER, urgent care clinic since your last visit? Hospitalized since your last visit? no 
 
2. Have you seen or consulted any other health care providers outside of the [address] [place] [address] since your last visit? Include any pap smears or colon screening.  no

## 2020-03-09 NOTE — ACP (ADVANCE CARE PLANNING)
Pt returned phone call. Reviewed arterial study. Normal arterial flow in BUE at rest.   She is to schedule appt with Rheumatology in near future. Will call our office sooner as needed, otherwise will f/u as scheduled.

## 2020-03-09 NOTE — TELEPHONE ENCOUNTER
Called pt to review results of arterial study. LM for pt.     \"No evidence of hemodynamically significant arterial disease is identified within bilateral upper extremities at rest\"    Consider Neurology eval.

## 2020-03-10 NOTE — PROGRESS NOTES
Complex Case Management Date/Time:  3/10/2020 2:09 PM 
 
Method of communication with patient:phone Ambulator Care Manager [facility]) contacted the patient by telephone to perform Ambulatory Care Coordination. Verified name and  (PHI) with patient as identifiers. Provided introduction to self, and explanation of the Ambulatory Care Manager's role. Reviewed most recent clinic visit w/ patient who verbalized understanding. Patient given an opportunity to ask questions. Top Challenges reviewed with the patient 1. Spoke with patient 2. Stated she is doing better. 3. No longer having s/s of cold. 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 Reviewed precautions to avoid mary grace flu orcolds 4. Med rec done. Using Voltaren Gel 1% on knee 4 x day prn. States she is using it everyday for discomfort. Verified from Pharmacy that she may get more then one tube at a time. 5. Will continue to follow patient per AC protocal  
 
The patient agrees to contact the PCP office or the [address] [place] [address] for questions related to their healthcare. Provided contact information for future reference. Disease Specific:   N/A Home Health Active: No 
 
DME Active: No 
 
Barriers to care? None Advance Care Planning:  
Does patient have an Advance Directive:  reviewed and current Medication(s):  
Medication reconciliation was performed with patient, who verbalizes understanding of administration of home medications. There were no barriers to obtaining medications identified at this time. Referral to Pharm D needed: no  
 
Current Outpatient Medications Medication Sig  
 diclofenac (VOLTAREN) 1 % gel Use 4g on the knee four time a day as needed  furosemide (LASIX) 40 mg tablet Take 1.5 Tabs by mouth daily. TAKE 1 TABLET BY MOUTH EVERY DAY  carvedilol (COREG) 25 mg tablet TAKE 1 TABLET BY MOUTH (2) TIMES A DAY  amLODIPine (NORVASC) 5 mg tablet Take 1 Tab by mouth daily.  hydrocortisone (HYTONE) 2.5 % ointment APPLY LIGHTLY TO THE AFFECTED AREAS ON THE LEGS AND FEET TWICE A DAY.  aspirin 81 mg tablet Take 81 mg by mouth daily. No current facility-administered medications for this visit. [facility] follow up appointment(s):  
Future Appointments Date Time Provider Korey Mell 5/13/2020  8:20 AM [facility], PACER [facility] PAZ SCHED  
5/13/2020  8:40 AM Mckay Yang DO [address] [place] [address]  
8/27/2020  8:30 AM Sade Mac MD [facility] [address]  
2/25/2021  8:00 AM [facility] NONIMAGING 2VV PAZ SCHED  
2/25/2021  9:00 AM [facility] IMAGING 1 2VV PAZ SCHED  
2/25/2021 10:00 AM [facility] IMAGING 1 2VV PAZ SCHED  
2/25/2021  1:00 PM Jovany Jay Non-[facility] follow up appointment(s):  
 
Goals  Attend follow up appointments on schedule 1-21-20  Patient will attend all scheduled appointments through 3-21-20  Knowledge and adherence of prescribed medication (ie. action, side effects, missed dose, etc.). 1-21-20 Pt will take all medications prescribed to be evaluated on each outreach through 3-21-20  Prepare patients and caregivers for end of life decisions (ie. need for hospice, pain management, symptom relief, advance directives etc.)   
  1-21-20  Pt will complete an ACP and have it scanned into their EMR by 
  3-21-20

## 2020-03-13 NOTE — TELEPHONE ENCOUNTER
Patient calling to requesting to see a Rheumatologist that is closer. Patient stated she no longer wants to go to the one in [place]. Patient stated that she called Dr. Kyle Cline group and they told her she needs a referral from her PCP. Advised patient that I will send the request to Dr. Regan Torres.

## 2020-03-15 NOTE — TELEPHONE ENCOUNTER
Referral placed to .     Dr. Birder Cushing  [facility], [address] [place] [address], [address]  Phone: [phone]  Fax: [phone]

## 2020-03-17 NOTE — PROGRESS NOTES
Chief Complaint   Patient presents with    Well Woman     History and Physical:  Patient's last menstrual period was 2020.       Kerrie Crooks is a 43 y.o.  female who presents today for her routine annual GYN exam. The patient has no Gynecology complaints today. No bowel or bladder complaints. Due for mammogram, will schedule      Allergies: Review of patient's allergies indicates:  No Known Allergies    History reviewed. No pertinent past medical history.    Past Surgical History:   Procedure Laterality Date    BUNIONECTOMY      Left foot surgery  2007    VAGINAL DELIVERY         MEDS:   Current Outpatient Medications on File Prior to Visit   Medication Sig Dispense Refill    dapsone 7.5 % GlwP Thin film to entire face QAM 90 g 1    spironolactone (ALDACTONE) 25 MG tablet Take 1 tablet (25 mg total) by mouth 2 (two) times daily. 60 tablet 11    tazarotene (TAZORAC) 0.05 % gel APPLY A THIN LAYER INTO THE ENTIRE FACE AT BEDTIME 90 g 5    tretinoin (RETIN-A) 0.1 % cream Apply topically every evening. 45 g 5    [DISCONTINUED] GIANVI, 28, 3-0.02 mg per tablet TAKE 1 TABLET BY MOUTH EVERY DAY 84 tablet 0    amoxicillin-clavulanate 875-125mg (AUGMENTIN) 875-125 mg per tablet Take 1 tablet by mouth 2 (two) times daily. (Patient not taking: Reported on 2019) 14 tablet 0     No current facility-administered medications on file prior to visit.        OB History        2    Para   2    Term   2            AB        Living   2       SAB        TAB        Ectopic        Multiple   0    Live Births   2                 Social History     Socioeconomic History    Marital status:      Spouse name: Not on file    Number of children: Not on file    Years of education: Not on file    Highest education level: Not on file   Occupational History    Not on file   Social Needs    Financial resource strain: Not on file    Food insecurity:     Worry: Not on file     Inability: Not  [facility]  [address] [place] [address]  [phone] fax    Plan of Care/Statement of Necessity for Occupational Therapy Services    Patient name: Sammy Arevalo Start of Care: 2019   Referral source: Jenna Navarrete MD : [id number]    Medical Diagnosis: 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 Assessment/ key information: 70year old RHD female who had sudden onset of numbness in both hands and was hospitalized with CHF x 1 week. She reported the numbness was not a result of any injury and crept up on her affecting all fingers. She states she has no pain but drops things frequently. She has full AROM of digits with  of right 42 and left 25. Pinches on right are 6-14#, left are 6-11#. Her fine motor skills are slowed at 33 for right and 43 for left hand. She has difficulty identifying objects by touch with reduced speed on right and poor accuracy and speed on left hand. Her FOTO is 50/100 reflecting moderate impairment in function. She will benefit from brief skilled occupational therapy to improve hand function and reduce sensory loss as possible.         Evaluation Complexity: History LOW Complexity : Brief history review  Examination LOW Complexity : 1-3 performance deficits relating to physical, cognitive , or psychosocial skils that result in activity limitations and / or participation on file    Transportation needs:     Medical: Not on file     Non-medical: Not on file   Tobacco Use    Smoking status: Never Smoker    Smokeless tobacco: Never Used   Substance and Sexual Activity    Alcohol use: Yes     Alcohol/week: 4.0 standard drinks     Types: 4 Cans of beer per week    Drug use: No    Sexual activity: Yes     Partners: Male     Birth control/protection: None, OCP   Lifestyle    Physical activity:     Days per week: Not on file     Minutes per session: Not on file    Stress: Not on file   Relationships    Social connections:     Talks on phone: Not on file     Gets together: Not on file     Attends Latter-day service: Not on file     Active member of club or organization: Not on file     Attends meetings of clubs or organizations: Not on file     Relationship status: Not on file   Other Topics Concern    Are you pregnant or think you may be? Not Asked    Breast-feeding Not Asked   Social History Narrative    Not on file       Family History   Problem Relation Age of Onset    Breast cancer Paternal Aunt     Cancer Maternal Grandmother     Cancer Maternal Aunt     Colon cancer Neg Hx     Ovarian cancer Neg Hx     Diabetes Neg Hx     Hypertension Neg Hx     Melanoma Neg Hx     Psoriasis Neg Hx     Lupus Neg Hx     Eczema Neg Hx          Past medical and surgical history reviewed.   I have reviewed the patient's medical history in detail and updated the computerized patient record.        Review of System:   General: no chills, fever, night sweats, weight gain or weight loss  Psychological: no depression or suicidal ideation  Breasts: no new or changing breast lumps, nipple discharge or masses.  Respiratory: no cough, shortness of breath, or wheezing  Cardiovascular: no chest pain or dyspnea on exertion  Gastrointestinal: no abdominal pain, change in bowel habits, or black or bloody stools  Genito-Urinary: no incontinence, urinary frequency/urgency or vulvar/vaginal symptoms,  restrictions  Clinical Decision Making LOW Complexity : No comorbidities that affect functional and no verbal or physical assistance needed to complete eval tasks   Overall Complexity Rating: LOW     Problem List: Decreased strength, Decreased coordination/prehension, Decreased ADL/functional abilities  and Sensability     Treatment Plan may include any combination of the following: Therapeutic exercise, Therapeutic activities, Physical agent/modality, Patient education and ADLs/IADLs    Patient / Family readiness to learn indicated by: asking questions, trying to perform skills and interest    Persons(s) to be included in education:   patient (P)    Barriers to Learning/Limitations: None    Patient Goal (s): loss of numbness    Patient Self Reported Health Status: good    Rehabilitation Potential: good    Short Term Goals: To be accomplished in 2 weeks:  1. Patient will be independent in home exercise program for sensory re-educatoin  2. Patient will be independent in HEP for fine motor skills. 3.  Patient will be familiar with strategies to improve performance with reduced sensation. Long Term Goals: To be accomplished in 4 weeks:   1. Patient will increase fine motor skills bilaterally by at least 20% with vision as assist  2. Patient will report reduced dropped items due to improved awareness of hands. 3.  Patient will increase left hand  by at least 10# for holding groceries. 4.  Patient will report improved ability to use hands for lifting and carrying as shown by increase in FOTO of at least 10 points.         Frequency / Duration: Patient to be seen 2 times per week for 4 weeks:    Patient/ Caregiver education and instruction: Diagnosis, prognosis, self care, activity modification and exercises  [x]  Plan of care has been reviewed with SMITH    Certification Period: 7/17/19-8/15/19    TACOS Salinas 7/17/2019 2:12 PM      ________________________________________________________________________    I certify that the above Therapy Services are being furnished while the patient is under my care. I agree with the treatment plan and certify that this therapy is necessary.     Physician's Signature:____________Date:_________TIME:________    ** Signature, Date and Time must be completed for valid certification **    Please sign and return to [facility]   [address] [place] [address]  [phone] fax pelvic pain or abnormal vaginal bleeding.  Musculoskeletal: no gait disturbance or muscular weakness      Physical Exam:   /60   Wt 53 kg (116 lb 13.5 oz)   LMP 02/17/2020   BMI 18.86 kg/m²   Constitutional: She appears alert and responsive. She appears well-developed, well-groomed, and well-nourished. No distress. Thin  HENT:   Head: Normocephalic and atraumatic.   Eyes: Conjunctivae and EOM are normal. No scleral icterus.   Neck: Symmetrical. Normal range of motion. Neck supple. No tracheal deviation present. THYROID: without masses or tenderness.  Cardiovascular: Normal rate, no rhythm abnormality noted. Extremities without swelling or edema, warm.    Pulmonary/Chest: Normal respiratory Effort. No distress or retractions. She exhibits no tenderness.  Breasts: are symmetrical.   Right breast exhibits no inverted nipple, no mass, no nipple discharge, no skin change and no tenderness.   Left breast exhibits no inverted nipple, no mass, no nipple discharge, no skin change and no tenderness.  Abdominal: Soft. She exhibits no distension, hernias or masses. There is no tenderness. No enlargement of liver edge or spleen.  There is no rebound and no guarding.   Genitourinary:    External rectal exam shows no thrombosed external hemorrhoids, no lesions.     Pelvic exam was performed with patient supine.   No labial fusion, and symmetrical.    There is no rash, lesion or injury on the right labia.   There is no rash, lesion or injury on the left labia.   No bleeding and no signs of injury around the vaginal introitus, urethral meatus is normal size and without prolapse or lesions, urethra well supported. The cervix is visualized with no discharge, lesions or friability.   No vaginal discharge found.   No significant Cystocele, Enterocele or rectocele, and cervix and uterus well supported.   Bimanual exam:   The urethra is normal to palpation and there are no palpable vaginal wall masses.   Uterus is not deviated,  not enlarged, not fixed, normal shape and not tender.   Cervix exhibits no motion tenderness.    Right adnexum displays no mass or nodularity and no tenderness.   Left adnexum displays no mass or nodularity and no tenderness.  Musculoskeletal: Normal range of motion.   Lymphadenopathy: No inguinal adenopathy present.   Neurological: She is alert and oriented to person, place, and time. Coordination normal.   Skin: Skin is warm and dry. She is not diaphoretic. No rashes, lesions or ulcers.   Psychiatric: She has a normal mood and affect, oriented to person, place, and time.      Assessment:   Normal annual GYN exam  1. Pap smear for cervical cancer screening  Liquid-Based Pap Smear, Screening    HPV High Risk Genotypes, PCR   2. Encounter for contraceptive management, unspecified type  drospirenone-ethinyl estradioL (GIANVI, 28,) 3-0.02 mg per tablet   doing well on oral contraceptive pills     Plan:   PAP  Mammogram scheduled  Refill oral contraceptive pills   Follow up in 1 year.  Patient informed will be contacted with results within 2 weeks. Encouraged to please call back or email if she has not heard from us by then.

## 2020-04-08 NOTE — PROGRESS NOTES
Complex Case Management Date/Time:  2020 2:09 PM 
 
Method of communication with patient:phone Ambulator Care Manager [facility]) contacted the patient by telephone to perform Ambulatory Care Coordination. Verified name and  (PHI) with patient as identifiers. Provided introduction to self, and explanation of the Ambulatory Care Manager's role. Reviewed most recent clinic visit w/ patient who verbalized understanding. Patient given an opportunity to ask questions. Top Challenges reviewed with the patient 1. Spoke with patient 2. Stated she is doing well. Encouraged patient to avoid individuals who are or may be sick, or showing s/s of illness. Wash hands well and use good hygiene. Report any signs of illness to MD or ACM as soon as possible. Patient has not traveled out of the country nor come into contact with anyone who has in the past 30 days. Advised patient to avoid contact with anyone who may be showing signs of illness or who states they have been out of the country. ? ACM closing episode at this time. ? ACP has been reviewed and information sent to patient as needed. ? Use of MyChart has been discussed with patient/family understanding its use. ? Med rec has been completed and up to date at time of closing episode. Importance of taking all medications as prescribed and on time, maintaining an adequate supply of medication, and how to obtain refills. ? Goals are updated and met to the best of patient's ability. ? Importance of keeping all scheduled appointments and how to make those appointments discussed with patient/family understanding. ? Review of symptoms and disease process discussed as need, with patient/family showing understanding. ? No further ACM contacts scheduled ? Patient/family has ACM contact information for any further questions, concerns or needs ? Next follow up MD appointment is 20 with Dr. Ellen Valentino The patient agrees to contact the PCP office or the [address] [place] [address] for questions related to their healthcare. Provided contact information for future reference. Disease Specific:   N/A Home Health Active: No 
 
DME Active: No 
 
Barriers to care? None Advance Care Planning:  
Does patient have an Advance Directive:  reviewed and current Medication(s):  
Medication reconciliation was performed with patient, who verbalizes understanding of administration of home medications. There were no barriers to obtaining medications identified at this time. Referral to Pharm D needed: no  
 
Current Outpatient Medications Medication Sig  
 diclofenac (VOLTAREN) 1 % gel Use 4g on the knee four time a day as needed  furosemide (LASIX) 40 mg tablet Take 1.5 Tabs by mouth daily. TAKE 1 TABLET BY MOUTH EVERY DAY  carvedilol (COREG) 25 mg tablet TAKE 1 TABLET BY MOUTH (2) TIMES A DAY  amLODIPine (NORVASC) 5 mg tablet Take 1 Tab by mouth daily.  hydrocortisone (HYTONE) 2.5 % ointment APPLY LIGHTLY TO THE AFFECTED AREAS ON THE LEGS AND FEET TWICE A DAY.  aspirin 81 mg tablet Take 81 mg by mouth daily. No current facility-administered medications for this visit. [facility] follow up appointment(s):  
Future Appointments Date Time Provider Korey [place] 5/13/2020  8:20 AM [facility], PACER [facility]Z SCHED  
5/13/2020  8:40 AM Bouchra Jimenez DO [address] [place] [address]  
8/27/2020  8:30 AM Radha Heard MD [facility]  
2/25/2021  8:00 AM [facility] NONIMAGING [facility] PAZ SCHED  
2/25/2021  9:00 AM [facility] IMAGING 1 2[facility] PAZ SCHED  
2/25/2021 10:00 AM [facility] IMAGING 1 2VV PAZ SCHED  
2/25/2021  1:00 PM Jovany Jay [facility] follow up appointment(s):  
 
Goals  Attend follow up appointments on schedule 1-21-20  Patient will attend all scheduled appointments through 3-21-20   Knowledge and adherence of prescribed medication (ie. action, side effects, missed dose, etc.). 1-21-20 Pt will take all medications prescribed to be evaluated on each outreach through 3-21-20  Prepare patients and caregivers for end of life decisions (ie. need for hospice, pain management, symptom relief, advance directives etc.)   
  1-21-20  Pt will complete an ACP and have it scanned into their EMR by 
  3-21-20

## 2020-07-17 NOTE — ED TRIAGE NOTES
Patient states she had gout last week in feet. Both ankles swollen and warm.   Pt states her md will not giver her her gout medicine due to messing with her kidneys

## 2020-07-18 NOTE — ED NOTES
I performed a brief evaluation, including history and physical, of the patient here in triage and I have determined that pt will need further treatment and evaluation from the FT provider. I have placed initial orders to help in expediting patients care.      July 17, 2020 at 11:33 PM - Lyn Fothergill, NP        Visit Vitals  /76   Pulse 69   Temp 98.2 °F (36.8 °C)   Resp 17   Wt 68.9 kg (152 lb)   SpO2 100%   BMI 26.09 kg/m²          Ed Marilyn ENP-C, FNP-C

## 2020-07-18 NOTE — PROGRESS NOTES
Patient contacted regarding recent discharge and COVID-19 risk. Discussed COVID-19 related testing which was not done at this time. Test results were not done. Patient informed of results, if available? n/a    Care Transition Nurse/ Ambulatory Care Manager contacted the patient by telephone to perform post discharge assessment. Verified name and  with patient as identifiers. Patient has following risk factors of: heart failure. CTN/ACM reviewed discharge instructions, medical action plan and red flags related to discharge diagnosis. Reviewed and educated them on any new and changed medications related to discharge diagnosis. Advised obtaining a 90-day supply of all daily and as-needed medications. Education provided regarding infection prevention, and signs and symptoms of COVID-19 and when to seek medical attention with patient who verbalized understanding. Discussed exposure protocols and quarantine from [address] you at higher risk for severe illness  and given an opportunity for questions and concerns. The patient agrees to contact the COVID-19 hotline [phone] or PCP office for questions related to their healthcare. CTN/ACM provided contact information for future reference. From CDC: Are you at higher risk for severe illness?  Wash your hands often.  Avoid close contact (6 feet, which is about two arm lengths) with people who are sick.  Put distance between yourself and other people if COVID-19 is spreading in your community.  Clean and disinfect frequently touched surfaces.  Avoid all cruise travel and non-essential air travel.  Call your healthcare professional if you have concerns about COVID-19 and your underlying condition or if you are sick.     For more information on steps you can take to protect yourself, see CDC's How to Protect Yourself      Patient/family/caregiver given information for Delma Gibson and agrees to enroll no      Plan for follow-up call in 7-14 days based on severity of symptoms and risk factors.

## 2020-07-18 NOTE — ED PROVIDER NOTES
[facility]  Emergency Department Treatment Report        1:59 AM Vini Alicea is a 68 y.o. female with a history of chronic pain chronic gout hypertension, chronic congestive heart failure, renal failure who presents to ED with ankle pain foot pain knee pain joint pain. Patient states that she sees her doctor and her nephrologist and will give her any medicine because they said her kidney function is in danger. Patient has not seen pain management states it is getting very difficult for her to walk because of her pain. No fevers been reported no trauma no new injury. No other complaints, associated symptoms or modifying factors at this time. PCP: Eligio Camp MD      The history is provided by the patient. No  was used. Past Medical History:   Diagnosis Date    Abnormal ankle brachial index 11/13/2014    Mild arterial disease in right leg. R VICTOR MANUEL 0.88. L VICTOR MANUEL 1.00.  Anemia     Arm DVT (deep venous thromboembolism), acute ([facility])     s/p anticoagulation    Atherosclerotic heart disease     Atrial fibrillation ([facility])     AV block     CAD (coronary artery disease)     Cardiomyopathy, ischemic     Carotid artery stenosis     Carotid duplex 11/13/2014    Mild <50% bilateral ICA plaquing. Possible left vertebral occlusion.  Cerebral artery occlusion with cerebral infarction ([facility])     stroke x 2    Chest pain     CVA (cerebral infarction)     Echocardiogram 08/19/2015    EF 55%. Apical inferior, apical septal hypk (new since study of 12/19/11), likely due to chronic V-pacing. Mild LVH. RVSP 30 mmHg. Mild LAE. Mild MR. Mild TR.       Gastritis     GERD (gastroesophageal reflux disease)     Heart failure ([facility])     Hiatal hernia     Hyperlipidemia     Hypertension     Hypertensive cardiovascular disease     Intracranial aneurysm     left cavernous ICA 2mm aneurysm from head/neck CTA in 2014    Long term (current) use of anticoagulants 3/10/2011    Lower extremity venous duplex 2015    No DVT bilaterally.  Nuclear cardiac imaging test 2015    Low risk. Sm apical infarction w/no ischemia vs apical thinning. Sm basal inferior defect, likely artifact. EF 56%. Inferoseptal, inferoapical WMA related to sternotomy or paced rhythm.  Pacemaker     PAD (peripheral artery disease) ([facility]CC)     PVC's     chronic    S/P CABG x 3 2008    LIMA-LAD. SVG-OM. SVG-dRCA     S/P cardiac cath 2008    pRCA 100%. LM patent. Cx patent. OM1 100%. mLAD 95%. LVEDP 27-29mmHg. EF 20%. mod MR    Tilt table evaluation 1995    Positive for orthostatic hypotension    Vitamin D deficiency        Past Surgical History:   Procedure Laterality Date    CABG, ARTERY-VEIN, THREE  2008    CARDIAC SURG PROCEDURE UNLIST      medtronic dual-chamber cardioverted-defibrillator    HX  SECTION      x2    HX ENDOSCOPY  3/1/16    HX ENDOSCOPY  3/1/16    HX HEMORRHOIDECTOMY          HX HYSTERECTOMY          HX ORTHOPAEDIC      knee surgery    HX OTHER SURGICAL  2/10/16    Pacemaker Gen Change    HX PACEMAKER      Defibrillator     HX TUMOR REMOVAL      removed from arm.  benign         History reviewed. No pertinent family history.     Social History     Socioeconomic History    Marital status:      Spouse name: Not on file    Number of children: Not on file    Years of education: Not on file    Highest education level: Not on file   Occupational History    Not on file   Social Needs    Financial resource strain: Not on file    Food insecurity     Worry: Not on file     Inability: Not on file    Transportation needs     Medical: Not on file     Non-medical: Not on file   Tobacco Use    Smoking status: Never Smoker    Smokeless tobacco: Never Used    Tobacco comment: just smoked 2 weeks in college   Substance and Sexual Activity    Alcohol use: No    Drug use: No    Sexual activity: Never   Lifestyle  Physical activity     Days per week: Not on file     Minutes per session: Not on file    Stress: Not on file   Relationships    Social connections     Talks on phone: Not on file     Gets together: Not on file     Attends Sabianism service: Not on file     Active member of club or organization: Not on file     Attends meetings of clubs or organizations: Not on file     Relationship status: Not on file    Intimate partner violence     Fear of current or ex partner: Not on file     Emotionally abused: Not on file     Physically abused: Not on file     Forced sexual activity: Not on file   Other Topics Concern     Service Not Asked    Blood Transfusions Not Asked    Caffeine Concern Not Asked    Occupational Exposure Not Asked   Colie Maizes Hazards Not Asked    Sleep Concern Not Asked    Stress Concern Not Asked    Weight Concern Not Asked    Special Diet Not Asked    Back Care Not Asked    Exercise Not Asked    Bike Helmet Not Asked   [address] Not Asked    Self-Exams Not Asked   Social History Narrative    Not on file         ALLERGIES: Nifedipine; Ace inhibitors; Codeine; Hydralazine; Lipitor [atorvastatin]; Plaquenil [hydroxychloroquine]; and Simvastatin    Review of Systems   Constitutional: Negative for fever. Respiratory: Negative for shortness of breath. Cardiovascular: Negative for chest pain. Gastrointestinal: Negative for abdominal pain, nausea and vomiting. Musculoskeletal: Positive for arthralgias and gait problem. Negative for back pain, joint swelling, myalgias, neck pain and neck stiffness. Skin: Negative for color change. Neurological: Negative for dizziness. All other systems reviewed and are negative. Vitals:    07/17/20 1955   BP: 126/76   Pulse: 69   Resp: 17   Temp: 98.2 °F (36.8 °C)   SpO2: 100%   Weight: 68.9 kg (152 lb)            Physical Exam  Vitals signs and nursing note reviewed. Constitutional:       Appearance: She is well-developed. HENT:      Head: Normocephalic and atraumatic. Eyes:      Conjunctiva/sclera: Conjunctivae normal.      Pupils: Pupils are equal, round, and reactive to light. Neck:      Musculoskeletal: Normal range of motion and neck supple. Cardiovascular:      Rate and Rhythm: Normal rate and regular rhythm. Pulses: Normal pulses. Pulmonary:      Effort: Pulmonary effort is normal.      Breath sounds: Normal breath sounds. Abdominal:      General: Bowel sounds are normal.      Palpations: Abdomen is soft. Musculoskeletal: Normal range of motion. General: Tenderness present. Comments: Minor joint pain to her ankles and her knees no erythema no swelling no bruising no sign of infection no sign of septic joint. Skin:     General: Skin is warm and dry. Neurological:      Mental Status: She is alert and oriented to person, place, and time. Deep Tendon Reflexes: Reflexes are normal and symmetric. Psychiatric:         Behavior: Behavior normal.         Thought Content: Thought content normal.         Judgment: Judgment normal.          MDM  Number of Diagnoses or Management Options  Other chronic pain: minor  Diagnosis management comments: I suspect patient is suffering from chronic pain. Labs are negative patient's BNP is elevated but it is within her normal limits for her normal elevation and her chronic congestive heart failure. I do not suspect an infective process at this time I do not suspect anything new with her chronic pain. I suggested to patient that she follow-up with pain management to manage her chronic pain. Otherwise I think it is reasonable to let patient go home tonight and I will treat her with Percocet here in the emergency department and prednisone Zofran for nausea because she states she gets nauseous sometimes with Percocet. I will also send a prescription for the same to the pharmacy.     Patient agrees to this plan and states that she would just like something for pain since she is having trouble walking because of her pain. No other acute illnesses suspected patient discharged home in no distress. Amount and/or Complexity of Data Reviewed  Clinical lab tests: ordered and reviewed  Tests in the radiology section of CPT®: ordered and reviewed  Review and summarize past medical records: yes  Independent visualization of images, tracings, or specimens: yes    Risk of Complications, Morbidity, and/or Mortality  Presenting problems: moderate  Diagnostic procedures: moderate  Management options: moderate    Patient Progress  Patient progress: stable         Procedures            Vitals:  Patient Vitals for the past 12 hrs:   Temp Pulse Resp BP SpO2   07/17/20 1955 98.2 °F (36.8 °C) 69 17 126/76 100 %       Medications ordered:   Medications   oxyCODONE-acetaminophen (PERCOCET) 5-325 mg per tablet 2 Tab (2 Tabs Oral Given 7/18/20 0216)   predniSONE (DELTASONE) tablet 60 mg (60 mg Oral Given 7/18/20 0216)   ondansetron (ZOFRAN ODT) tablet 8 mg (8 mg Oral Given 7/18/20 0216)         Lab findings:  Recent Results (from the past 12 hour(s))   CBC WITH AUTOMATED DIFF    Collection Time: 07/17/20 11:59 PM   Result Value Ref Range    WBC 7.5 4.6 - 13.2 K/uL    RBC 3.68 (L) 4.20 - 5.30 M/uL    HGB 10.1 (L) 12.0 - 16.0 g/dL    HCT 29.8 (L) 35.0 - 45.0 %    MCV 81.0 74.0 - 97.0 FL    MCH 27.4 24.0 - 34.0 PG    MCHC 33.9 31.0 - 37.0 g/dL    RDW 14.3 11.6 - 14.5 %    PLATELET 619 728 - 689 K/uL    MPV 9.4 9.2 - 11.8 FL    NEUTROPHILS 74 (H) 40 - 73 %    LYMPHOCYTES 18 (L) 21 - 52 %    MONOCYTES 7 3 - 10 %    EOSINOPHILS 1 0 - 5 %    BASOPHILS 0 0 - 2 %    ABS. NEUTROPHILS 5.6 1.8 - 8.0 K/UL    ABS. LYMPHOCYTES 1.4 0.9 - 3.6 K/UL    ABS. MONOCYTES 0.5 0.05 - 1.2 K/UL    ABS. EOSINOPHILS 0.1 0.0 - 0.4 K/UL    ABS.  BASOPHILS 0.0 0.0 - 0.1 K/UL    DF AUTOMATED     PROTHROMBIN TIME + INR    Collection Time: 07/17/20 11:59 PM   Result Value Ref Range    Prothrombin time 14.1 11.5 - 15.2 sec INR 1.1 0.8 - 1.2     METABOLIC PANEL, COMPREHENSIVE    Collection Time: 07/17/20 11:59 PM   Result Value Ref Range    Sodium 131 (L) 136 - 145 mmol/L    Potassium 4.8 3.5 - 5.5 mmol/L    Chloride 101 100 - 111 mmol/L    CO2 20 (L) 21 - 32 mmol/L    Anion gap 10 3.0 - 18 mmol/L    Glucose 101 (H) 74 - 99 mg/dL    BUN 78 (H) 7.0 - 18 MG/DL    Creatinine 2.70 (H) 0.6 - 1.3 MG/DL    BUN/Creatinine ratio 29 (H) 12 - 20      GFR est AA 21 (L) >60 ml/min/1.73m2    GFR est non-AA 17 (L) >60 ml/min/1.73m2    Calcium 10.3 (H) 8.5 - 10.1 MG/DL    Bilirubin, total 0.5 0.2 - 1.0 MG/DL    ALT (SGPT) 18 13 - 56 U/L    AST (SGOT) 19 10 - 38 U/L    Alk.  phosphatase 78 45 - 117 U/L    Protein, total 8.5 (H) 6.4 - 8.2 g/dL    Albumin 3.0 (L) 3.4 - 5.0 g/dL    Globulin 5.5 (H) 2.0 - 4.0 g/dL    A-G Ratio 0.5 (L) 0.8 - 1.7     MAGNESIUM    Collection Time: 07/17/20 11:59 PM   Result Value Ref Range    Magnesium 2.3 1.6 - 2.6 mg/dL   NT-PRO BNP    Collection Time: 07/17/20 11:59 PM   Result Value Ref Range    NT pro-BNP 3,041 (H) 0 - 1,800 PG/ML   CARDIAC PANEL,(CK, CKMB & TROPONIN)    Collection Time: 07/17/20 11:59 PM   Result Value Ref Range    CK - MB <1.0 <3.6 ng/ml    CK-MB Index  0.0 - 4.0 %     CALCULATION NOT PERFORMED WHEN RESULT IS BELOW LINEAR LIMIT    CK 62 26 - 192 U/L    Troponin-I, QT <0.02 0.0 - 0.045 NG/ML   URINALYSIS W/ RFLX MICROSCOPIC    Collection Time: 07/18/20  1:40 AM   Result Value Ref Range    Color YELLOW      Appearance CLOUDY      Specific gravity 1.013 1.005 - 1.030      pH (UA) 5.0 5.0 - 8.0      Protein Negative NEG mg/dL    Glucose Negative NEG mg/dL    Ketone Negative NEG mg/dL    Bilirubin Negative NEG      Blood Negative NEG      Urobilinogen 0.2 0.2 - 1.0 EU/dL    Nitrites Negative NEG      Leukocyte Esterase MODERATE (A) NEG     URINE MICROSCOPIC ONLY    Collection Time: 07/18/20  1:40 AM   Result Value Ref Range    WBC 10 to 14 0 - 4 /hpf    RBC 0 to 2 0 - 5 /hpf    Epithelial cells FEW 0 - 5 /lpf    Bacteria 1+ (A) NEG /hpf         X-Ray, CT or other radiology findings or impressions:  XR CHEST PA LAT    (Results Pending)       No acute finding per my read    Disposition:    Diagnosis:   1. Other chronic pain        Disposition: to Home      Follow-up Information     Follow up With Specialties Details Why Contact Leah    Jessica Frank, [address] [facility] [address]  [phone]             Patient's Medications   Start Taking    ONDANSETRON (ZOFRAN ODT) 8 MG DISINTEGRATING TABLET    Take 1 Tab by mouth every eight (8) hours as needed for Nausea for up to 12 doses. OXYCODONE-ACETAMINOPHEN (PERCOCET) 5-325 MG PER TABLET    Take 1 Tab by mouth every four (4) hours as needed for Pain for up to 7 days. Max Daily Amount: 6 Tabs. PREDNISONE (STERAPRED DS) 10 MG DOSE PACK    6 day dose pack per package instructions   Continue Taking    AMLODIPINE (NORVASC) 5 MG TABLET    Take 1 Tab by mouth daily. ASPIRIN 81 MG TABLET    Take 81 mg by mouth daily. CARVEDILOL (COREG) 25 MG TABLET    TAKE 1 TABLET BY MOUTH (2) TIMES A DAY    DICLOFENAC (VOLTAREN) 1 % GEL    Use 4g on the knee four time a day as needed    FUROSEMIDE (LASIX) 40 MG TABLET    Take 1.5 Tabs by mouth daily. TAKE 1 TABLET BY MOUTH EVERY DAY    HYDROCORTISONE (HYTONE) 2.5 % OINTMENT    APPLY LIGHTLY TO THE AFFECTED AREAS ON THE LEGS AND FEET TWICE A DAY. These Medications have changed    No medications on file   Stop Taking    No medications on file       Return to the ER if you are unable to obtain referral as directed. Rachael Eason's  results have been reviewed with her. She has been counseled regarding her diagnosis, treatment, and plan. She verbally conveys understanding and agreement of the signs, symptoms, diagnosis, treatment and prognosis and additionally agrees to follow up as discussed.   She also agrees with the care-plan and conveys that all of her questions have been answered. I have also provided discharge instructions for her that include: educational information regarding their diagnosis and treatment, and list of reasons why they would want to return to the ED prior to their follow-up appointment, should her condition change. Surekha SWAIN,ABAP-C      Dragon voice recognition was used to generate this report, which may have resulted in some phonetic based errors in grammar and contents.  Even though attempts were made to correct all the mistakes, some may have been missed, and remained in the body of the document

## 2020-08-27 NOTE — PROGRESS NOTES
Ajit Martin is a 68 y.o. female who was seen by synchronous (real-time) audio-phone only technology on 8/27/2020. Assessment & Plan: 1. HTN, CVA Hx, and AF:  
- C/w rx as prescribed. - Checking labs. ORDERS: 
- METABOLIC PANEL, COMPREHENSIVE; Future - C REACTIVE PROTEIN, QT; Future - CBC WITH AUTOMATED DIFF; Future 
- NT-PRO BNP; Future 2. PAD, Aneurysm Hx, and Carotid Artery Stenosis: - I encouraged her to f/u with Vascular Surgery for a check up 3. Chronic gout and Mixed Connective Tissue Disease:  
- Checking labs - I encouraged her to schedule an apt with her Rheumatology team 
 
ORDERS: 
- METABOLIC PANEL, COMPREHENSIVE; Future - METABOLIC PANEL, COMPREHENSIVE; Future - C REACTIVE PROTEIN, QT; Future - CBC WITH AUTOMATED DIFF; Future 4. JAS/CKD and HTN: +Chronic anemia. +H/o elevated PTH (secondary?). Creatinine is up to 2.7. BUN is high at 78. - Checking labs. - I encouraged her to follow up with her Nephrologist. 
- C/w rx as prescribed. ORDERS: 
- NT-PRO BNP; Future - METABOLIC PANEL, COMPREHENSIVE; Future 
- HEMOGLOBIN A1C W/O EAG; Future - C REACTIVE PROTEIN, QT; Future 
- PTH INTACT; Future - CBC WITH AUTOMATED DIFF; Future 
- IRON PROFILE; Future - FERRITIN; Future - VITAMIN D, 25 HYDROXY; Future - URINALYSIS W/ RFLX MICROSCOPIC; Future - MICROALBUMIN, UR, RAND W/ MICROALB/CREAT RATIO; Future 5. Prediabetes: - Low carb diet recommended. Checking labs. 6. Health Maintenance: - Mammogram ordered for breast cancer screening ORDERS: 
- SHIVA MAMMO BI SCREENING INCL CAD; Future Lab review: labs are reviewed in the EHR and ordered as mentioned above I have discussed the diagnosis with the patient and the intended plan as seen in the above orders. I have discussed medication side effects and warnings with the patient as well.  I have reviewed the plan of care with the patient, accepted their input and they are in agreement with the treatment goals. All questions were answered. The patient understands the plan of care. Pt instructed if symptoms worsen to call the office or report to the ED for continued care. Greater than 50% of the visit time was spent in counseling and/or coordination of care. I spent 25 min with the pt for this portion of this phone only encounter. Voice recognition was used to generate this report, which may have resulted in some phonetic based errors in grammar and contents. Even though attempts were made to correct all the mistakes, some may have been missed, and remained in the body of the document. Subjective:  
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 1. Ankle/Knee Pain: She was seen in the ED in July for ankle and knee pain. Since then, she reports: she completed a course of oxycodone and prednisone, given by the ED staff. Since then, her pain is slowly improving. 2. Prediabetes: Diet: her appetite has been poor so she is eating less. Taking: no rx. She is diet controlled. 3. CKD/HTN: Last apt with Nephrology: 3 wks ago per pt hx. She is taking carvedilol, Lasix, and Norvasc. +Chronic anemia. No bleeding.  Her creatinine increased to 2.7 per her July labs. Her BUN was 78 at that time. She saw her eye doctor earlier this year. No rx were prescribed. She was told she has cataracts. 4. Mixed Connective Tissue Disease: Followed by Rheumatology. Her last apt with rheumatology: she hasn't see them this year per her hx.  
 
5. CVA Hx and CHF/AF Hx: She is presently asymptomatic. She takes an aspirin daily. Her weight is 136lbs. No edema in her legs. No SOB. BNP was checked last month and in the 3K range. 6. Carotid Artery Stenosis, PAD, and Aneurysm Hx: Followed by Vascular Surgery. Last apt with them: earlier this year. The patient had an unremarkable arterial study earlier this year of her legs. Per our records, she met with the vascular surgery team in February. Per vascular surgery notes, the patient is to have a follow-up study given her history of bilateral carotid artery stenosis and moderate to severe PAD, in a year. Her aneurysm is to be also reassessed in a year. Prior to Admission medications Medication Sig Start Date End Date Taking? Authorizing Provider  
diclofenac (VOLTAREN) 1 % gel APPLY 4G ON THE KNEE FOUR TIME A DAY AS NEEDED 8/9/20   Rosa Mireles MD  
[facility]) 10 mg dose pack 6 day dose pack per package instructions 7/18/20   Waqas Sagastume NP  
ondansetron (ZOFRAN ODT) 8 mg disintegrating tablet Take 1 Tab by mouth every eight (8) hours as needed for Nausea for up to 12 doses. 7/18/20   Waqas Sagastume NP  
carvediloL (COREG) 25 mg tablet TAKE 1 TABLET BY MOUTH (2) TIMES A DAY 4/20/20   Kristen Avelra NP  
furosemide (LASIX) 40 mg tablet Take 1.5 Tabs by mouth daily. TAKE 1 TABLET BY MOUTH EVERY DAY 4/20/20   Trice Avelar NP  
amLODIPine (NORVASC) 5 mg tablet Take 1 Tab by mouth daily.  8/12/19   Kristen Avelar NP  
hydrocortisone (HYTONE) 2.5 % ointment APPLY LIGHTLY TO THE AFFECTED AREAS ON THE LEGS AND FEET TWICE A DAY. 2/2/18   Provider, Historical  
aspirin 81 mg tablet Take 81 mg by mouth daily. Provider, Historical  
 
Allergies Allergen Reactions  Nifedipine Nausea and Vomiting and Other (comments) Dizzy  Ace Inhibitors Cough  Codeine Unknown (comments), Nausea Only and Nausea and Vomiting  Hydralazine Other (comments) Dizziness and Fatigue  Lipitor [Atorvastatin] Nausea and Vomiting and Vertigo  Plaquenil [Hydroxychloroquine] Other (comments)  Simvastatin Nausea and Vomiting Past Medical History:  
Diagnosis Date  Abnormal ankle brachial index 11/13/2014 Mild arterial disease in right leg. R VICTOR MANUEL 0.88. L VICTOR MANUEL 1.00.  Anemia  Arm DVT (deep venous thromboembolism), acute (HCC)   
 s/p anticoagulation  Atherosclerotic heart disease  Atrial fibrillation ([address]  AV block  CAD (coronary artery disease)  Cardiomyopathy, ischemic  Carotid artery stenosis  Carotid duplex 11/13/2014 Mild <50% bilateral ICA plaquing. Possible left vertebral occlusion.  Cerebral artery occlusion with cerebral infarction ([address] stroke x 2  Chest pain  CVA (cerebral infarction)  Echocardiogram 08/19/2015 EF 55%. Apical inferior, apical septal hypk (new since study of 12/19/11), likely due to chronic V-pacing. Mild LVH. RVSP 30 mmHg. Mild LAE. Mild MR. Mild TR.  Gastritis  GERD (gastroesophageal reflux disease)  Heart failure ([address]  Hiatal hernia  Hyperlipidemia  Hypertension  Hypertensive cardiovascular disease  Intracranial aneurysm   
 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  Nuclear cardiac imaging test 09/24/2015 Low risk. Sm apical infarction w/no ischemia vs apical thinning.   Sm basal inferior defect, likely artifact. EF 56%. Inferoseptal, inferoapical WMA related to sternotomy or paced rhythm.  Pacemaker  PAD (peripheral artery disease) ([address]  PVC's   
 chronic  S/P CABG x 3 2008 [facility]. SVG-OM. SVG-dRCA  S/P cardiac cath 2008 pRCA 100%. LM patent. Cx patent. OM1 100%. mLAD 95%. LVEDP 27-29mmHg. EF 20%. mod MR  
 Tilt table evaluation 1995 Positive for orthostatic hypotension  Vitamin D deficiency Past Surgical History:  
Procedure Laterality Date  CABG, ARTERY-VEIN, THREE  2008  CARDIAC SURG PROCEDURE UNLIST    
 medtronic dual-chamber cardioverted-defibrillator  HX  SECTION    
 x2  
 HX ENDOSCOPY  3/1/16  HX ENDOSCOPY  3/1/16 [facility]  HX HYSTERECTOMY   HX ORTHOPAEDIC    
 knee surgery  HX OTHER SURGICAL  2/10/16 Pacemaker Gen Change  HX PACEMAKER Defibrillator   HX TUMOR REMOVAL    
 removed from arm.  benign No family history on file. Social History Socioeconomic History  Marital status:  Spouse name: Not on file  Number of children: Not on file  Years of education: Not on file  Highest education level: Not on file Tobacco Use  Smoking status: Never Smoker  Smokeless tobacco: Never Used  Tobacco comment: just smoked 2 weeks in college Substance and Sexual Activity  Alcohol use: No  
 Drug use: No  
 Sexual activity: Never Other Topics Concern ROS: 
Gen: No fever/chills HEENT: No sore throat, eye pain, ear pain, or congestion. No HA 
CV: No CP Resp: No cough/SOB 
GI: No abdominal pain : No hematuria/dysuria Derm: No rash Neuro: No new paresthesias/weakness Musc: No new myalgias/jt pain Psych: No depression sx LABS: 
Lab Results Component Value Date/Time  Sodium 131 (L) 2020 11:59 PM  
 Potassium 4.8 2020 11:59 PM  
 Chloride 101 2020 11:59 PM  
 CO2 20 (L) 07/17/2020 11:59 PM  
 Anion gap 10 07/17/2020 11:59 PM  
 Glucose 101 (H) 07/17/2020 11:59 PM  
 BUN 78 (H) 07/17/2020 11:59 PM  
 Creatinine 2.70 (H) 07/17/2020 11:59 PM  
 BUN/Creatinine ratio 29 (H) 07/17/2020 11:59 PM  
 GFR est AA 21 (L) 07/17/2020 11:59 PM  
 GFR est non-AA 17 (L) 07/17/2020 11:59 PM  
 Calcium 10.3 (H) 07/17/2020 11:59 PM  
 
 
Lab Results Component Value Date/Time Cholesterol, total 269 (H) 08/01/2019 10:32 AM  
 HDL Cholesterol 73 (H) 08/01/2019 10:32 AM  
 LDL, calculated 169 (H) 08/01/2019 10:32 AM  
 VLDL, calculated 27 08/01/2019 10:32 AM  
 Triglyceride 135 08/01/2019 10:32 AM  
 CHOL/HDL Ratio 3.7 08/01/2019 10:32 AM  
 
 
Lab Results Component Value Date/Time WBC 7.5 07/17/2020 11:59 PM  
 HGB 10.1 (L) 07/17/2020 11:59 PM  
 HCT 29.8 (L) 07/17/2020 11:59 PM  
 PLATELET 895 10/82/6141 11:59 PM  
 MCV 81.0 07/17/2020 11:59 PM  
 
 
Lab Results Component Value Date/Time Hemoglobin A1c 6.0 (H) 05/30/2019 12:04 PM  
 
 
Lab Results Component Value Date/Time TSH 1.87 07/10/2017 11:17 AM  
 
 
 
 
Due to this being a [employer] only evaluation, many elements of the physical examination are unable to be assessed. The pt was seen by synchronous (real-time) audio-phone only technology, and/or her healthcare decision maker, is aware that this patient-initiated, Telehealth encounter is a billable service, with coverage as determined by her insurance carrier. She is aware that she may receive a bill and has provided verbal consent to proceed: Yes. The pt is being evaluated by a phone only visit encounter for concerns as above. A caregiver was present when appropriate. Due to this being a TeleHealth encounter (During [facility] public health emergency), evaluation of the following organ systems was limited: Vitals/Constitutional/EENT/Resp/CV/GI//MS/Neuro/Skin/Heme-Lymph-Imm.   
Pursuant to the emergency declaration under the [address] and the National Emergencies Act, [facility] waiver authority and the [employer] and [employer] Act, this Virtual phone only Visit was conducted, with patient's (and/or legal guardian's) consent, to reduce the patient's risk of exposure to COVID-19 and provide necessary medical care. Services were provided through a phone only synchronous discussion virtually to substitute for in-person clinic visit. Patient and provider were located at their individual homes. We discussed the expected course, resolution and complications of the diagnosis(es) in detail. Medication risks, benefits, costs, interactions, and alternatives were discussed as indicated. I advised her to contact the office if her condition worsens, changes or fails to improve as anticipated. She expressed understanding with the diagnosis(es) and plan.   
 
Vladimir Valdivia MD

## 2020-08-28 NOTE — TELEPHONE ENCOUNTER
Pt calling, says she called to schedule her mammo but was told the doctor entered the wrong order. Needs to have a diagnostic mammo like last year.

## 2020-08-28 NOTE — TELEPHONE ENCOUNTER
Order placed for a diagnostic mammogram per request.    Dr. Rosario Argueta  [facility] [place], [address] [facility], [address] [place] [address] Str.  Phone: [phone]  Fax: [phone]

## 2020-09-01 NOTE — TELEPHONE ENCOUNTER
----- Message from Gen Jacobs MD sent at 8/28/2020  4:36 PM EDT -----  Regarding: F/u apts needed  Please schedule for labs next week. Please schedule for a follow up apt in 2-3 weeks (30 min).      Thanks,  Dr. Claudia Baxter  [facility], [address] [place] [address], [address] [place] [address] Str.  Phone: [phone]  Fax: [phone]

## 2020-09-08 NOTE — ACP (ADVANCE CARE PLANNING)
Advance Care Planning Advance Care Planning (ACP) Provider Conversation Date of ACP Conversation: 09/07/20 Persons included in Conversation:  patient Authorized Decision Maker (if patient is incapable of making informed decisions): This person is:  
Next of Kin by law (only applies in absence of above) For Patients with Decision Making Capacity:  
Values/Goals: Exploration of values, goals, and preferences if recovery is not expected, even with continued medical treatment in the event of:  Imminent death Severe, permanent brain injury Conversation Outcomes / Follow-Up Plan:  
Recommended completion of Advance Directive. She has no changes to make to her preexisting advance directive, which we reviewed today. Dr. Brien Mcmanus [facility] [address] [place] [address], [facility], [facility] [address] Phone: [phone] Fax: [phone]

## 2020-09-08 NOTE — PROGRESS NOTES
INTERNISTS [facility]: 
09/07/20, [id number] The Subsequent Medicare Annual Wellness Exam PROGRESS NOTE This is a Subsequent [facility] Wellness Exam (AWV). I have reviewed the patient's medical history in detail and updated the computerized patient record. Bradley Gonzalez is a 68 y.o.  female and presents for an annual wellness exam. 
 
SUBJECTIVE Past Medical History:  
Diagnosis Date  Abnormal ankle brachial index 11/13/2014 Mild arterial disease in right leg. R VICTOR MANUEL 0.88. L VICTOR MANUEL 1.00.  Anemia  Arm DVT (deep venous thromboembolism), acute (HCC)   
 s/p anticoagulation  Atherosclerotic heart disease  Atrial fibrillation ([address]  AV block  CAD (coronary artery disease)  Cardiomyopathy, ischemic  Carotid artery stenosis  Carotid duplex 11/13/2014 Mild <50% bilateral ICA plaquing. Possible left vertebral occlusion.  Cerebral artery occlusion with cerebral infarction ([address] stroke x 2  Chest pain  CVA (cerebral infarction)  Echocardiogram 08/19/2015 EF 55%. Apical inferior, apical septal hypk (new since study of 12/19/11), likely due to chronic V-pacing. Mild LVH. RVSP 30 mmHg. Mild LAE. Mild MR. Mild TR.  Gastritis  GERD (gastroesophageal reflux disease)  Heart failure ([address]  Hiatal hernia  Hyperlipidemia  Hypertension  Hypertensive cardiovascular disease  Intracranial aneurysm   
 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  Nuclear cardiac imaging test 09/24/2015 Low risk. Sm apical infarction w/no ischemia vs apical thinning. Sm basal inferior defect, likely artifact. EF 56%. Inferoseptal, inferoapical WMA related to sternotomy or paced rhythm.  Pacemaker  PAD (peripheral artery disease) ([address]  PVC's   
 chronic  S/P CABG x 3 2008 [place]-[facility]. SVG-OM. SVG-dRCA  S/P cardiac cath 2008 pRCA 100%. LM patent. Cx patent. OM1 100%. mLAD 95%. LVEDP 27-29mmHg. EF 20%. mod MR  
 Tilt table evaluation 1995 Positive for orthostatic hypotension  Vitamin D deficiency Past Surgical History:  
Procedure Laterality Date  CABG, ARTERY-VEIN, THREE  2008  CARDIAC SURG PROCEDURE UNLIST    
 medtronic dual-chamber cardioverted-defibrillator  HX  SECTION    
 x2  
 HX ENDOSCOPY  3/1/16  HX ENDOSCOPY  3/1/16 [facility]  HX HYSTERECTOMY   HX ORTHOPAEDIC    
 knee surgery  HX OTHER SURGICAL  2/10/16 Pacemaker Gen Change  HX PACEMAKER Defibrillator   HX TUMOR REMOVAL    
 removed from arm.  benign Current Outpatient Medications Medication Sig Dispense Refill  diclofenac (VOLTAREN) 1 % gel APPLY 4G ON THE KNEE FOUR TIME A DAY AS NEEDED 400 g 11  
 ondansetron (ZOFRAN ODT) 8 mg disintegrating tablet Take 1 Tab by mouth every eight (8) hours as needed for Nausea for up to 12 doses. 20 Tab 0  carvediloL (COREG) 25 mg tablet TAKE 1 TABLET BY MOUTH (2) TIMES A DAY 60 Tab 5  furosemide (LASIX) 40 mg tablet Take 1.5 Tabs by mouth daily. TAKE 1 TABLET BY MOUTH EVERY DAY 45 Tab 5  
 amLODIPine (NORVASC) 5 mg tablet Take 1 Tab by mouth daily. 5 Tab 0  
 hydrocortisone (HYTONE) 2.5 % ointment APPLY LIGHTLY TO THE AFFECTED AREAS ON THE LEGS AND FEET TWICE A DAY. 2  
 aspirin 81 mg tablet Take 81 mg by mouth daily. Allergies Allergen Reactions  Nifedipine Nausea and Vomiting and Other (comments) Dizzy  Ace Inhibitors Cough  Codeine Unknown (comments), Nausea Only and Nausea and Vomiting  Hydralazine Other (comments) Dizziness and Fatigue  Lipitor [Atorvastatin] Nausea and Vomiting and Vertigo  Plaquenil [Hydroxychloroquine] Other (comments)  Simvastatin Nausea and Vomiting No family history on file. Social History Tobacco Use  Smoking status: Never Smoker  Smokeless tobacco: Never Used  Tobacco comment: just smoked 2 weeks in college Substance Use Topics  Alcohol use: No  
 
Patient Active Problem List  
Diagnosis Code  Atherosclerotic heart disease, s/p CABG X3 in 6/08, in the setting of severe left ventricular dysfunction, and s/p a dual-chamber I25.10  
 S/P CABG x 3 Z95.1  HTN (hypertension) I10  
 Atrial fibrillation ([facility]) I48.91  
 Carotid stenosis I65.29  
 PAD (peripheral artery disease) ([facility]) I73.9  Intracranial aneurysm - left cavernous ICA on 2014 head/neck CTA I67.1  Mixed hyperlipidemia E78.2  Gastroesophageal reflux disease without esophagitis K21.9  Chronic gastritis with bleeding - in February/March of 2016 per FOBT and EGD results K29.51  
 History of CVA (cerebrovascular accident) Z80.78  
 Vitamin D deficiency E55.9  Dysphagia s/p dilation R13.10  Anemia D64.9  
 Pacemaker (for h/o AV block) Z95.0  
 OAB (overactive bladder) N32.81  
 Cataract H26.9  Gout of left foot M10.9  Seasonal allergic rhinitis J30.2  Microalbuminuria R80.9  CKD (chronic kidney disease) stage 3, GFR 30-59 ml/min ([facility]) N18.3  Mixed connective tissue disease ([facility] M35.1  Severe obesity (BMI 35.0-39. 9) with comorbidity ([facility]) E66.01  
 Generalized edema R60.1  Peripheral venous insufficiency I87.2  Chronic combined systolic and diastolic congestive heart failure ([facility]) I50.42  
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 Health Maintenance History Immunizations reviewed: Tdap over-due, pt declines this vaccination today Pneumovax: over-due, pt declines this vaccination today Flu: over-due, pt declines this vaccination today Zoster: over-due, pt declines this vaccination today There is no immunization history on file for this patient. Colonoscopy: UTD. No bleeding. Eye exam: UTD Mammo: UTD Dexascan: UTD Pelvic/Pap: No bleeding. Review of Systems Constitutional: Negative for chills and fever. HENT: Negative for ear pain and sore throat. Eyes: Negative for blurred vision and pain. Respiratory: Negative for shortness of breath. Cardiovascular: Negative for chest pain. Gastrointestinal: Negative for abdominal pain, blood in stool and melena. Genitourinary: Negative for dysuria and hematuria. Musculoskeletal: Positive for joint pain (off/on). Negative for myalgias. Skin: Negative for rash. Neurological: Negative for headaches. Endo/Heme/Allergies: Does not bruise/bleed easily. Psychiatric/Behavioral: Negative for depression and substance abuse. Depression Risk Factor Screening:  
  
Patient Health Questionnaire (PHQ-2) Over the last 2 weeks, how often have you been bothered by any of the following problems? · Little interest or pleasure in doing things? · Not at all. [0] · Feeling down, depressed, or hopeless? · Not at all. [0] Total Score: 0/6 PHQ-2 Assessment Scoring: A score of 2 or more requires further screening with the PHQ-9 Alcohol Risk Factor Screening:  
Women: On any occasion during the past 3 months, have you had more than 3 drinks containing alcohol? no 
 
Do you average more than 7 drinks per week? no 
 
Tobacco Use Screening:  
 
Social History Tobacco Use Smoking Status Never Smoker Smokeless Tobacco Never Used Tobacco Comment  
 just smoked 2 weeks in college Hearing Loss Hearing is unchanged Activities of Daily Living Self-care. Requires assistance with: no ADLs Fall Risk  
no falls within the past year Abuse Screen None Additional Examination Findings: There were no vitals filed for this visit. There is no height or weight on file to calculate BMI. Evaluation of Cognitive Function: 
Mood/affect: Euthymic Appearance: Well-groomed Family member/caregiver input: She is not with a family member during her phone only visit. Neuro:   Alert, conversant, appropriate, following commands Psych: Affect, mood and judgment appropriate Dementia Screen: Three Item Recall: Unremarkable. LABS Data Review:  
Lab Results Component Value Date/Time Sodium 131 (L) 07/17/2020 11:59 PM  
 Potassium 4.8 07/17/2020 11:59 PM  
 Chloride 101 07/17/2020 11:59 PM  
 CO2 20 (L) 07/17/2020 11:59 PM  
 Anion gap 10 07/17/2020 11:59 PM  
 Glucose 101 (H) 07/17/2020 11:59 PM  
 BUN 78 (H) 07/17/2020 11:59 PM  
 Creatinine 2.70 (H) 07/17/2020 11:59 PM  
 BUN/Creatinine ratio 29 (H) 07/17/2020 11:59 PM  
 GFR est AA 21 (L) 07/17/2020 11:59 PM  
 GFR est non-AA 17 (L) 07/17/2020 11:59 PM  
 Calcium 10.3 (H) 07/17/2020 11:59 PM  
 
 
Lab Results Component Value Date/Time WBC 7.5 07/17/2020 11:59 PM  
 HGB 10.1 (L) 07/17/2020 11:59 PM  
 HCT 29.8 (L) 07/17/2020 11:59 PM  
 PLATELET 876 46/59/5921 11:59 PM  
 MCV 81.0 07/17/2020 11:59 PM  
 
 
Lab Results Component Value Date/Time Hemoglobin A1c 6.0 (H) 05/30/2019 12:04 PM  
 
 
Lab Results Component Value Date/Time  Cholesterol, total 269 (H) 08/01/2019 10:32 AM  
 HDL Cholesterol 73 (H) 08/01/2019 10:32 AM  
 LDL, calculated 169 (H) 08/01/2019 10:32 AM  
 VLDL, calculated 27 08/01/2019 10:32 AM  
 Triglyceride 135 08/01/2019 10:32 AM  
 CHOL/HDL Ratio 3.7 08/01/2019 10:32 AM  
 
 
Patient Care Team: 
Kyle Mcrae MD as PCP - General (Family Medicine) Kyle Mcrae MD as PCP - [address] [place] [address]  [facility] Hernan Rosario DO (Cardiology) Lorelei Hernandez DPM (Podiatry) Lonzell Bamberger, MD as Physician (Urology) [facility], [address] [place] [address] (Physician Assistant) Rachele Barrera MD (Rheumatology) End-of-life planning Advanced Directive in the case than an injury or illness causes the patient to be unable to make health care decisions was discussed with the patient. Advice/Referrals/Counselling/Plan:  
Education and counseling provided: 
Are appropriate based on today's review and evaluation End-of-Life planning (with patient's consent) Pneumococcal Vaccine Influenza Vaccine Screening Mammography Screening Pap and pelvic (covered once every 2 years) Colorectal cancer screening tests Cardiovascular screening blood test 
Bone mass measurement (DEXA) Screening for glaucoma Diabetes screening test 
Include in education list (weight loss, physical activity, smoking cessation, fall prevention, and nutrition) ICD-10-CM ICD-9-CM 1. JAS (acute kidney injury) ([facility]  F05.8 173.2 METABOLIC PANEL, COMPREHENSIVE  
   CBC WITH AUTOMATED DIFF  
   URINALYSIS W/ RFLX MICROSCOPIC 2. Essential hypertension  I10 401.9 3. Atrial fibrillation, unspecified type ([facility])  O29.00 348.59 METABOLIC PANEL, COMPREHENSIVE  
   C REACTIVE PROTEIN, QT  
   CBC WITH AUTOMATED DIFF 4. PAD (peripheral artery disease) ([facility])  I73.9 443.9 5. Atherosclerosis of native coronary artery of native heart without angina pectoris  I25.10 414.01   
6. Chronic combined systolic and diastolic congestive heart failure ([facility])  I50.42 428.42 NT-PRO BNP  
  428.0 7. Chronic gout of left foot, unspecified cause  L2R.9354 880.98 METABOLIC PANEL, COMPREHENSIVE URIC ACID 8. Mixed connective tissue disease ([place] [address]  O42.4 350.6 METABOLIC PANEL, COMPREHENSIVE  
   C REACTIVE PROTEIN, QT  
   CBC WITH AUTOMATED DIFF 9. CKD (chronic kidney disease) stage 3, GFR 30-59 ml/min ([facility])  N18.3 585.3 NT-PRO BNP  
   METABOLIC PANEL, COMPREHENSIVE  
   HEMOGLOBIN A1C W/O EAG  
   C REACTIVE PROTEIN, QT  
   PTH INTACT  
   CBC WITH AUTOMATED DIFF  
   IRON PROFILE FERRITIN  
   VITAMIN D, 25 HYDROXY  
   URINALYSIS W/ RFLX MICROSCOPIC MICROALBUMIN, UR, RAND W/ MICROALB/CREAT RATIO 10. Anemia, unspecified type  Q22.4 582.6 METABOLIC PANEL, COMPREHENSIVE  
   CBC WITH AUTOMATED DIFF  
   IRON PROFILE FERRITIN 11. Severe obesity (BMI 35.0-39. 9) with comorbidity ([place] [address]  E66.01 278.01   
12. Hyperparathyroidism ([place] [address]  B01.6 816.92 METABOLIC PANEL, COMPREHENSIVE  
   C REACTIVE PROTEIN, QT  
13. Hyperglycemia  B04.2 260.86 METABOLIC PANEL, COMPREHENSIVE  
   HEMOGLOBIN A1C W/O EAG 14. Encounter for screening mammogram for breast cancer  Z12.31 V76.12 SHIVA MAMMO BI SCREENING INCL CAD  
 
reviewed diet, exercise and weight control. Brief written plan, checklist 
 
Assessment/Plan:   
Health Maintenance: - I encouraged her to get all recommended vaccinations and screenings - Mammogram ordered. ORDERS: 
- SHIVA MAMMO BI SCREENING INCL CAD; Future Lab review: labs are reviewed in the EHR Advance Care Planning Advance Care Planning (ACP) Provider Conversation Date of ACP Conversation: 09/07/20 Persons included in Conversation:  patient Authorized Decision Maker (if patient is incapable of making informed decisions): This person is:  
Next of Kin by law (only applies in absence of above) For Patients with Decision Making Capacity:  
Values/Goals: Exploration of values, goals, and preferences if recovery is not expected, even with continued medical treatment in the event of:  Imminent death Severe, permanent brain injury Conversation Outcomes / Follow-Up Plan:  
Recommended completion of Advance Directive. She has no changes to make to her preexisting advance directive, which we reviewed today. Due to this being a TeleHealth  Phone only evaluation, many elements of the physical examination are unable to be assessed. The pt was seen by synchronous (real-time) audio-phone only technology, and/or her healthcare decision maker, is aware that this patient-initiated, Telehealth encounter is a billable service, with coverage as determined by her insurance carrier. She is aware that she may receive a bill and has provided verbal consent to proceed: Yes. The pt is being evaluated by a phone only visit encounter for concerns as above. A caregiver was present when appropriate. Due to this being a TeleHealth encounter (During BEWRK-26 public health emergency), evaluation of the following organ systems was limited: Vitals/Constitutional/EENT/Resp/CV/GI//MS/Neuro/Skin/Heme-Lymph-Imm. Pursuant to the emergency declaration under the [address] [place] [address], [address] [place] [address] waiver authority and the Surph[employer] and [employer] General Act, this Virtual phone only Visit was conducted, with patient's (and/or legal guardian's) consent, to reduce the patient's risk of exposure to COVID-19 and provide necessary medical care. Services were provided through a phone only synchronous discussion virtually to substitute for in-person clinic visit. Patient and provider were located at their individual homes.

## 2020-09-08 NOTE — PROGRESS NOTES
HPI: I saw Rajan Lehman in my office today in cardiovascular evaluation regarding her ischemic cardiomyopathy. Ms. Nikhil Vera is a very pleasant 67 year old obese  female with history of hypertension, hyperlipidemia, gastroesophageal reflux disease, and a CVA in the past. She also has history of coronary artery disease, status post coronary artery bypass grafting surgery x three in June of 2008 by Dr. Miguel Ángel Valdez with the following bypasses:      1. Left internal mammary artery to the LAD. 2. Reverse saphenous vein graft to the first obtuse marginal branch of the circumflex. 3. Reverse saphenous vein graft to the distal right coronary artery.      She developed a deep vein thrombosis in her left arm after surgery and had to be on Coumadin for a number of months, but due to the fact that she was not having any paroxysmal atrial fibrillation and we were having trouble keeping her INRs controlled, we decided to take her off Coumadin a couple of years ago. She has had some problems with AV block and a dual chamber pacemaker was originally placed in October of 2008 with a generator change in February of 2016 by my associate Dr. Martina Horne.       She comes in today relates that she is doing reasonably well. She is having some increased shortness of breath and lower extremity edema, but denies any anginal type chest pain and relates that in general she is feeling reasonably well. She does have history of stage IV chronic kidney disease which is going to make any failure management somewhat difficult. Encounter Diagnoses   Name Primary?     Atherosclerosis of native coronary artery of native heart without angina pectoris     S/P CABG x 3 in June of 2008     SOB (shortness of breath)     Cardiomyopathy, unspecified type ([address]     Chronic combined systolic and diastolic congestive heart failure (HCC)     Stage 4 chronic kidney disease ([address]     Essential hypertension     Mixed hyperlipidemia Yes    AICD (automatic cardioverter/defibrillator) present        Discussion: This patient appears to be doing reasonably well from a cardiovascular view point have a component of failure for which we could increase her Lasix from 60 mg a day to a higher dose, but for now she is fairly comfortable despite the fact that she does have a component of heart failure and with her stage IV chronic kidney disease I am not going to push Lasix further at this time unless her shortness of breath issues worsen. Have asked her to weigh herself every morning after voiding and call our office if her weight goes up by more than 3 pounds over her current weight levels or if her shortness of breath on exertion worsens. We did check her dual-chamber Medtronic AICD today and she has 3.6 years remaining on the battery with no ventricular high rates. She is A paced 57.8% of time and V paced 100% of time, but her OptiVol is quite elevated today. Her most recent lipid profile which was completed on August 1, 2019 was quite high with a total cholesterol of 289, triglycerides 135, HDL of 73, LDL of 169, and VLDL of 27. She has had problems with nausea and vomiting on both Zocor and Lipitor. I would like to repeat her cholesterol and will make further recommendations after reviewing that test.    We will check her device again in 3 months and will see her here in the office in 6 months. I will either see her myself in 6 months or she may be seen by Dr. Thuan Damon since I will be retiring at about that time    PCP:  Julee Saeed MD       Past Medical History:   Diagnosis Date    Abnormal ankle brachial index 11/13/2014    Mild arterial disease in right leg. R VICTOR MANUEL 0.88. L VICTOR MANUEL 1.00.     Anemia     Arm DVT (deep venous thromboembolism), acute (HCC)     s/p anticoagulation    Atherosclerotic heart disease     Atrial fibrillation (HCC)     AV block     CAD (coronary artery disease)     Cardiomyopathy, ischemic     Carotid artery stenosis     Carotid duplex 2014    Mild <50% bilateral ICA plaquing. Possible left vertebral occlusion.  Cerebral artery occlusion with cerebral infarction ([facility])     stroke x 2    Chest pain     CVA (cerebral infarction)     Echocardiogram 2015    EF 55%. Apical inferior, apical septal hypk (new since study of 11), likely due to chronic V-pacing. Mild LVH. RVSP 30 mmHg. Mild LAE. Mild MR. Mild TR.  Gastritis     GERD (gastroesophageal reflux disease)     Heart failure ([facility])     Hiatal hernia     Hyperlipidemia     Hypertension     Hypertensive cardiovascular disease     Intracranial aneurysm     left cavernous ICA 2mm aneurysm from head/neck CTA in     Long term (current) use of anticoagulants 3/10/2011    Lower extremity venous duplex 2015    No DVT bilaterally.  Nuclear cardiac imaging test 2015    Low risk. Sm apical infarction w/no ischemia vs apical thinning. Sm basal inferior defect, likely artifact. EF 56%. Inferoseptal, inferoapical WMA related to sternotomy or paced rhythm.  Pacemaker     PAD (peripheral artery disease) ([facility])     PVC's     chronic    S/P CABG x 3 2008    LIMA-LAD. SVG-OM. SVG-dRCA     S/P cardiac cath 2008    pRCA 100%. LM patent. Cx patent. OM1 100%. mLAD 95%. LVEDP 27-29mmHg. EF 20%. mod MR    Tilt table evaluation 1995    Positive for orthostatic hypotension    Vitamin D deficiency          Past Surgical History:   Procedure Laterality Date    CABG, ARTERY-VEIN, THREE  2008    CARDIAC SURG PROCEDURE UNLIST      medtronic dual-chamber cardioverted-defibrillator    HX  SECTION      x2    HX ENDOSCOPY  3/1/16    HX ENDOSCOPY  3/1/16    HX HEMORRHOIDECTOMY      1985    HX HYSTERECTOMY          HX ORTHOPAEDIC      knee surgery    HX OTHER SURGICAL  2/10/16    Pacemaker Gen Change    HX PACEMAKER      Defibrillator     HX TUMOR REMOVAL      removed from arm. benign     Current Outpatient Medications   Medication Sig    diclofenac (VOLTAREN) 1 % gel APPLY 4G ON THE KNEE FOUR TIME A DAY AS NEEDED    ondansetron (ZOFRAN ODT) 8 mg disintegrating tablet Take 1 Tab by mouth every eight (8) hours as needed for Nausea for up to 12 doses.  carvediloL (COREG) 25 mg tablet TAKE 1 TABLET BY MOUTH (2) TIMES A DAY    furosemide (LASIX) 40 mg tablet Take 1.5 Tabs by mouth daily. TAKE 1 TABLET BY MOUTH EVERY DAY    amLODIPine (NORVASC) 5 mg tablet Take 1 Tab by mouth daily.  hydrocortisone (HYTONE) 2.5 % ointment APPLY LIGHTLY TO THE AFFECTED AREAS ON THE LEGS AND FEET TWICE A DAY.  aspirin 81 mg tablet Take 81 mg by mouth daily. No current facility-administered medications for this visit. Allergies   Allergen Reactions    Nifedipine Nausea and Vomiting and Other (comments)     Dizzy    Ace Inhibitors Cough    Codeine Unknown (comments), Nausea Only and Nausea and Vomiting    Hydralazine Other (comments)     Dizziness and Fatigue    Lipitor [Atorvastatin] Nausea and Vomiting and Vertigo    Plaquenil [Hydroxychloroquine] Other (comments)    Simvastatin Nausea and Vomiting         Social History     Socioeconomic History    Marital status:      Spouse name: Not on file    Number of children: Not on file    Years of education: Not on file    Highest education level: Not on file   Tobacco Use    Smoking status: Never Smoker    Smokeless tobacco: Never Used    Tobacco comment: just smoked 2 weeks in college   Substance and Sexual Activity    Alcohol use: No    Drug use: No    Sexual activity: Never   Other Topics Concern       No family history on file. Review of Systems:   Constitutional: Positive for malaise/fatigue and weight loss. Negative for chills and fever. Respiratory: Positive for shortness of breath. Negative for cough, hemoptysis and wheezing. Cardiovascular: Positive for orthopnea and leg swelling.  Negative for chest pain and palpitations. Gastrointestinal: Negative. Musculoskeletal: Positive for joint pain. Negative for falls and myalgias. Neurological: Negative for dizziness. Physical Exam:   The patient is an alert, oriented, well developed, well nourished 68 y.o.  female who was in no acute distress at the time of my examination. Visit Vitals  /72   Pulse (!) 59   Ht 5' 4\" (1.626 m)   Wt 144 lb (65.3 kg)   SpO2 97%   BMI 24.72 kg/m²      BP Readings from Last 3 Encounters:   09/08/20 118/72   07/17/20 126/76   02/28/20 120/63        Wt Readings from Last 3 Encounters:   09/08/20 144 lb (65.3 kg)   07/17/20 152 lb (68.9 kg)   02/28/20 170 lb (77.1 kg)       HEENT: Conjunctivae white, mucosa moist, no pallor or cyanosis. Neck: Supple without masses, tenderness or thyromegaly. No jugular venous distention. Carotid upstrokes are full bilaterally, without bruits. Chest: symmetrical with good excursion. Cardiovascular: Well-healed incision in left upper chest over pacer-defibrillator insertion site with some keloid and some tenderness over the site. Mid-sternotomy scar with some keloid. [place] is displaced between the MCL and AAL. No lifts, heaves, or thrills felt on palpation. Normal S1 and S2, with a grade I-II/VI apical systolic murmur without radiation and without appreciable diastolic murmur, rubs, clicks or gallops   Lungs: Clear to auscultation in all fields. Abdomen: Soft, with no masses, tenderness or organomegaly. Extremities: Thick lower legs with some decrease in peripheral pulses with trace edema. Review of Data: Please refer to past medical history for most recent cardiac testing.     Lab Results   Component Value Date/Time    Cholesterol, total 269 (H) 08/01/2019 10:32 AM    HDL Cholesterol 73 (H) 08/01/2019 10:32 AM    LDL, calculated 169 (H) 08/01/2019 10:32 AM    VLDL, calculated 27 08/01/2019 10:32 AM    Triglyceride 135 08/01/2019 10:32 AM    CHOL/HDL Ratio 3.7 08/01/2019 10:32 AM       Results for orders placed or performed in visit on 05/08/18   AMB POC EKG ROUTINE W/ 12 LEADS, INTER & REP     Status: None    Narrative    Normal sinus mechanism with atrial sensing and ventricular pacing and occasional PAC's. Compared to the EKG of October 24, 2017 there was a little interval change. Ladonna Robledo D.O., FDANIELLAC. [facility]  [facility]  [address] [place] [address]. [address]    PLEASE NOTE:  This document has been produced using voice recognition software. Unrecognized errors in transcription may be present.

## 2020-09-08 NOTE — PATIENT INSTRUCTIONS
Medicare Wellness Visit, Female The best way to live healthy is to have a lifestyle where you eat a well-balanced diet, exercise regularly, limit alcohol use, and quit all forms of tobacco/nicotine, if applicable. Regular preventive services are another way to keep healthy. Preventive services (vaccines, screening tests, monitoring & exams) can help personalize your care plan, which helps you manage your own care. Screening tests can find health problems at the earliest stages, when they are easiest to treat. [facility] follows the current, evidence-based guidelines published by the [facility] Jero Reich ([facility]) when recommending preventive services for our patients. Because we follow these guidelines, sometimes recommendations change over time as research supports it. (For example, mammograms used to be recommended annually. Even though Medicare will still pay for an annual mammogram, the newer guidelines recommend a mammogram every two years for women of average risk). Of course, you and your doctor may decide to screen more often for some diseases, based on your risk and your co-morbidities (chronic disease you are already diagnosed with). Preventive services for you include: - Medicare offers their members a free annual wellness visit, which is time for you and your primary care provider to discuss and plan for your preventive service needs. Take advantage of this benefit every year! 
-All adults over the age of 72 should receive the recommended pneumonia vaccines. Current USPSTF guidelines recommend a series of two vaccines for the best pneumonia protection.  
-All adults should have a flu vaccine yearly and a tetanus vaccine every 10 years.  
-All adults age 48 and older should receive the shingles vaccines (series of two vaccines). -All adults age 38-68 who are overweight should have a diabetes screening test once every three years. -All adults born between [birth year] and 1963 should be screened once for Hepatitis C. 
-Other screening tests and preventive services for persons with diabetes include: an eye exam to screen for diabetic retinopathy, a kidney function test, a foot exam, and stricter control over your cholesterol.  
-Cardiovascular screening for adults with routine risk involves an electrocardiogram (ECG) at intervals determined by your doctor.  
-Colorectal cancer screenings should be done for adults age 54-65 with no increased risk factors for colorectal cancer. There are a number of acceptable methods of screening for this type of cancer. Each test has its own benefits and drawbacks. Discuss with your doctor what is most appropriate for you during your annual wellness visit. The different tests include: colonoscopy (considered the best screening method), a fecal occult blood test, a fecal DNA test, and sigmoidoscopy. 
 
-A bone mass density test is recommended when a woman turns 65 to screen for osteoporosis. This test is only recommended one time, as a screening. Some providers will use this same test as a disease monitoring tool if you already have osteoporosis. -Breast cancer screenings are recommended every other year for women of normal risk, age 54-69. 
-Cervical cancer screenings for women over age 72 are only recommended with certain risk factors. Here is a list of your current Health Maintenance items (your personalized list of preventive services) with a due date: 
Health Maintenance Due Topic Date Due  Shingles Vaccine (1 of 2) 04/02/1992  Pneumococcal Vaccine (1 of 1 - PPSV23) 04/02/2007  Pneumococcal Vaccine 65+ years at Risk (1 of 2 - PCV13) 04/02/2007  Yearly Flu Vaccine (1) 09/01/2020 Larisa Nuñez Annual Well Visit  08/29/2020

## 2020-09-08 NOTE — PATIENT INSTRUCTIONS
6 month follow up with Dr. Kaycee Fields or Rui Munson  3 month in office device check  Weigh daily every morning after voiding, call our office if weigh goes up by 3 pounds  Notify our office if shortness of breath, chest pain

## 2020-09-16 NOTE — TELEPHONE ENCOUNTER
----- Message from Javi Rogel sent at 9/16/2020 11:47 AM EDT -----  Regarding: labs  Patient's daughter called stating her mom feels patient needs lab before her appt on 9/25/20

## 2020-09-16 NOTE — TELEPHONE ENCOUNTER
Left message that labs are needed and pt stated she was getting them at [place]. They should call us back if they need appt here.

## 2020-09-22 NOTE — PROGRESS NOTES
I will discuss her results with her at her upcoming apt. Her vitamin D level is better but still low at 19.3 (it was 12 a year ago). Iron is low at 18. TIBC is 174. Iron saturation is 10 (low). Her calcium is up to 10.3. Her PTH is up at 300.4. BNP is 4230, up from 3041 two months ago. Her creatinine is 2.3, down from two months ago (at which time it was 2.7). BUN is 40. Her LFTs are normal. Her CRP is very high at 9! Ferritin is 525. Uric acid is very high at 12.7, up from 9.8 eight months ago. The A1C is 5.4, down from 6 a year ago. Her hemoglobin is 9.6. MCV is WNL. Platelets are 476. WBC is WNL. Her last hemoglobin was 10.1 two months ago and 10.3 a year ago. Please fax these results to her Rheumatology and Nephrology teams. Dr. Aimee Ho [facility] [address] [place] [address], [address] [facility], [facility] [address] Phone: [phone] Fax: [phone]

## 2020-09-25 NOTE — TELEPHONE ENCOUNTER
Spoke with DR. Sahni's nurse, she states that DR. Ana Nolasco is not in the office on Fridays. She was asked to give message to another provider on call there for DR. Annamarie Gallegos to speak with them. 's number was given.

## 2020-09-25 NOTE — PROGRESS NOTES
INTERNISTS OF [facility]: 
9/25/2020, MRN: [id number] Cynthia Husbands is a 68 y.o. female and presents to clinic for No chief complaint on file. Subjective:  
Patient is a 53-year-old -American female with history of allergic rhinitis, gout, cataract, overactive bladder, hyperlipidemia, GERD, mixed connective tissue disease (positive FOREST, RNP Ab, Sjogren's Ab, anti-chromatin Ab, and Anti-Parnell Ab), chronic gastritis with bleeding in March 2016, history of CVA, vitamin D deficiency, dysphasia status post dilation, anemia, pacemaker for history of AV block, left cavernous ICA aneurysm, hypertension, atrial fibrillation,  statin intolerance, CHF, carotid stenosis, 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 1. Acute on Chronic Renal Failure and MCT: Earlier this year, her creatinine komal to the 2 range. She is followed by Nephrology given her h/o CKD. She has suspected mixed connective tissue disease, per positive serologies (FOREST, RNP Ab, Sjogren's Ab, anti-chromatin AB, and Anti-smith Ab). She was followed by Rheumatology but has not seen them yet this year. F/u labs ordered at time of her last apt, show: Her CRP is very high at 9. Her creatinine is 2.3, down from 2.7 two months ago. BUN is very high at 40. Her BNP was 4230, up from 3041. SOB/CP/cough: none. Edema: none. +Weight loss. Her vitamin D level was recently checked and though better at 19, it is still low. Her PTH is high at 300.4. She takes lasix, coreg, and norvasc for BP control. +ASA given her h/o CAD. 2. Anemia: IIron is low at 18. TIBC is 174. Iron saturation is 10 (low). Ferritin is 525. Her hemoglobin is 9.6. MCV is WNL. Platelets are 110. WBC is WNL. Her last hemoglobin was 10.1 two months ago and 10.3 a year ago. No bleeding hx. 3. Hyperuricemia: +H/o gout. Her uric acid level is high at 12.7. Flare ups: yes. The patient is reporting right hand swelling and pain over the past week. No alleviating factors are known. She has a history of gout and suspected MCT as mentioned above. Patient Active Problem List  
 Diagnosis Date Noted  Impaired glucose tolerance 06/12/2019  Chronic combined systolic and diastolic congestive heart failure ([facility] 06/02/2019  Peripheral venous insufficiency 02/21/2019  Generalized edema 11/01/2018  Severe obesity (BMI 35.0-39. 9) with comorbidity ([facility] 05/08/2018  Mixed connective tissue disease ([facility] 08/22/2017  Microalbuminuria 07/10/2017  CKD (chronic kidney disease) stage 3, GFR 30-59 ml/min ([facility]) 07/10/2017  Seasonal allergic rhinitis 11/23/2016  Gout of left foot 11/22/2016  Cataract 08/09/2016  
 OAB (overactive bladder) 07/06/2016  Mixed hyperlipidemia 07/05/2016  Gastroesophageal reflux disease without esophagitis 07/05/2016  Chronic gastritis with bleeding - in February/March of 2016 per FOBT and EGD results 07/05/2016  History of CVA (cerebrovascular accident) 07/05/2016  Vitamin D deficiency 07/05/2016  Dysphagia s/p dilation 07/05/2016  Anemia 07/05/2016  Pacemaker (for h/o AV block) 07/05/2016  Intracranial aneurysm - left cavernous ICA on 2014 head/neck CTA 12/01/2014  
 HTN (hypertension) 10/13/2014  Atrial fibrillation ([facility] 10/13/2014  Carotid stenosis 10/13/2014  PAD (peripheral artery disease) ([facility] 10/13/2014  Atherosclerotic heart disease, s/p CABG X3 in 6/08, in the setting of severe left ventricular dysfunction, and s/p a dual-chamber  S/P CABG x 3 Current Outpatient Medications Medication Sig Dispense Refill  diclofenac (VOLTAREN) 1 % gel APPLY 4G ON THE KNEE FOUR TIME A DAY AS NEEDED 400 g 11  
 ondansetron (ZOFRAN ODT) 8 mg disintegrating tablet Take 1 Tab by mouth every eight (8) hours as needed for Nausea for up to 12 doses. 20 Tab 0  carvediloL (COREG) 25 mg tablet TAKE 1 TABLET BY MOUTH (2) TIMES A DAY 60 Tab 5  furosemide (LASIX) 40 mg tablet Take 1.5 Tabs by mouth daily. TAKE 1 TABLET BY MOUTH EVERY DAY 45 Tab 5  
 amLODIPine (NORVASC) 5 mg tablet Take 1 Tab by mouth daily. 5 Tab 0  
 hydrocortisone (HYTONE) 2.5 % ointment APPLY LIGHTLY TO THE AFFECTED AREAS ON THE LEGS AND FEET TWICE A DAY. 2  
 aspirin 81 mg tablet Take 81 mg by mouth daily. Allergies Allergen Reactions  Nifedipine Nausea and Vomiting and Other (comments) Dizzy  Ace Inhibitors Cough  Codeine Unknown (comments), Nausea Only and Nausea and Vomiting  Hydralazine Other (comments) Dizziness and Fatigue  Lipitor [Atorvastatin] Nausea and Vomiting and Vertigo  Plaquenil [Hydroxychloroquine] Other (comments)  Simvastatin Nausea and Vomiting Past Medical History:  
Diagnosis Date  Abnormal ankle brachial index 11/13/2014 Mild arterial disease in right leg. R VICTOR MANUEL 0.88. L VICTOR MANUEL 1.00.  Anemia  Arm DVT (deep venous thromboembolism), acute (HCC)   
 s/p anticoagulation  Atherosclerotic heart disease  Atrial fibrillation ([address]  AV block  CAD (coronary artery disease)  Cardiomyopathy, ischemic  Carotid artery stenosis  Carotid duplex 11/13/2014 Mild <50% bilateral ICA plaquing. Possible left vertebral occlusion.  Cerebral artery occlusion with cerebral infarction ([address] stroke x 2  Chest pain  CVA (cerebral infarction)  Echocardiogram 08/19/2015 EF 55%. Apical inferior, apical septal hypk (new since study of 12/19/11), likely due to chronic V-pacing. Mild LVH. RVSP 30 mmHg. Mild LAE. Mild MR. Mild TR.  Gastritis  GERD (gastroesophageal reflux disease)  Heart failure ([address]  Hiatal hernia  Hyperlipidemia  Hypertension  Hypertensive cardiovascular disease  Intracranial aneurysm   
 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  Nuclear cardiac imaging test 2015 Low risk. Sm apical infarction w/no ischemia vs apical thinning. Sm basal inferior defect, likely artifact. EF 56%. Inferoseptal, inferoapical WMA related to sternotomy or paced rhythm.  Pacemaker  PAD (peripheral artery disease) ([address]  PVC's   
 chronic  S/P CABG x 3 2008 LIMA-LAD. SVG-OM. SVG-dRCA  S/P cardiac cath 2008 pRCA 100%. LM patent. Cx patent. OM1 100%. mLAD 95%. LVEDP 27-29mmHg. EF 20%. mod MR  
 Tilt table evaluation 1995 Positive for orthostatic hypotension  Vitamin D deficiency Past Surgical History:  
Procedure Laterality Date  CABG, ARTERY-VEIN, THREE  2008  CARDIAC SURG PROCEDURE UNLIST    
 medtronic dual-chamber cardioverted-defibrillator  HX  SECTION    
 x2  
 HX ENDOSCOPY  3/1/16  HX ENDOSCOPY  3/1/16 [facility]  HX HYSTERECTOMY   HX ORTHOPAEDIC    
 knee surgery  HX OTHER SURGICAL  2/10/16 Pacemaker Gen Change  HX PACEMAKER Defibrillator   HX TUMOR REMOVAL    
 removed from arm.  benign No family history on file. Social History Tobacco Use  Smoking status: Never Smoker  Smokeless tobacco: Never Used  Tobacco comment: just smoked 2 weeks in college Substance Use Topics  Alcohol use: No  
 
 
ROS Review of Systems Constitutional: Positive for malaise/fatigue and weight loss. Negative for chills and fever. HENT: Negative for ear pain and sore throat. Eyes: Negative for blurred vision and pain. Respiratory: Negative for cough and shortness of breath. Cardiovascular: Negative for chest pain. Gastrointestinal: Negative for abdominal pain, blood in stool and melena. Genitourinary: Negative for dysuria and hematuria. Musculoskeletal: Positive for joint pain and myalgias. +Right hand Skin: Negative for rash. Neurological: Negative for headaches. Endo/Heme/Allergies: Does not bruise/bleed easily. Psychiatric/Behavioral: Negative for substance abuse. Objective There were no vitals filed for this visit. Physical Exam 
Vitals signs and nursing note reviewed. Constitutional:   
   Comments: She has lost a considerable amt of weight since I last saw her in the office HENT:  
   Head: Normocephalic and atraumatic. Right Ear: External ear normal.  
   Left Ear: External ear normal.  
Eyes:  
   General: No scleral icterus. Right eye: No discharge. Left eye: No discharge. Conjunctiva/sclera: Conjunctivae normal.  
Neck: Musculoskeletal: Neck supple. Cardiovascular:  
   Rate and Rhythm: Normal rate and regular rhythm. Heart sounds: Normal heart sounds. No murmur. No friction rub. No gallop. Pulmonary:  
   Effort: Pulmonary effort is normal. No respiratory distress. Breath sounds: Rales (b/l basilar crackles) present. No wheezing. Abdominal:  
   General: Bowel sounds are normal. There is no distension. Palpations: Abdomen is soft. There is no mass. Tenderness: There is no abdominal tenderness. There is no guarding or rebound. Musculoskeletal:     
   General: No swelling (Ble) or tenderness (Bue except along her right hand in which she has no erythema or increased warmth; there is swelling along her wrists and MCP jts). Lymphadenopathy:  
   Cervical: No cervical adenopathy. Skin: 
   General: Skin is warm and dry. Findings: No erythema. Neurological:  
   Mental Status: She is alert and oriented to person, place, and time. Motor: No abnormal muscle tone. Gait: Gait is intact.  Gait normal.  
 Psychiatric:     
   Mood and Affect: Mood and affect normal.  
 
 
 
LABS Data Review:  
Lab Results Component Value Date/Time WBC 4.9 09/21/2020 09:50 AM  
 HGB 9.6 (L) 09/21/2020 09:50 AM  
 HCT 29.3 (L) 09/21/2020 09:50 AM  
 PLATELET 050 (H) 86/69/3674 09:50 AM  
 MCV 83.0 09/21/2020 09:50 AM  
 
 
Lab Results Component Value Date/Time Sodium 136 09/21/2020 09:50 AM  
 Potassium 4.0 09/21/2020 09:50 AM  
 Chloride 104 09/21/2020 09:50 AM  
 CO2 24 09/21/2020 09:50 AM  
 Anion gap 8 09/21/2020 09:50 AM  
 Glucose 97 09/21/2020 09:50 AM  
 BUN 40 (H) 09/21/2020 09:50 AM  
 Creatinine 2.30 (H) 09/21/2020 09:50 AM  
 BUN/Creatinine ratio 17 09/21/2020 09:50 AM  
 GFR est AA 25 (L) 09/21/2020 09:50 AM  
 GFR est non-AA 21 (L) 09/21/2020 09:50 AM  
 Calcium 10.1 09/21/2020 09:50 AM  
 Calcium 10.3 (H) 09/21/2020 09:50 AM  
 
 
Lab Results Component Value Date/Time Cholesterol, total 288 (H) 09/21/2020 09:44 AM  
 HDL Cholesterol 58 09/21/2020 09:44 AM  
 LDL, calculated 193.4 (H) 09/21/2020 09:44 AM  
 VLDL, calculated 36.6 09/21/2020 09:44 AM  
 Triglyceride 183 (H) 09/21/2020 09:44 AM  
 CHOL/HDL Ratio 5.0 09/21/2020 09:44 AM  
 
 
Lab Results Component Value Date/Time Hemoglobin A1c 5.4 09/21/2020 09:50 AM  
 
 
Assessment/Plan: 1. MCT and Acute/Chronic Renal Failure: +Elevated CRP! Creatinine in the 2 range. BUN is 40. +Elevated BNP but in the setting of JAS. +Secondary hyperparathyroidism and vitamin D deficiency. - Strongly encouraged better hydration. 
- Strongly encouraged her to f/u with Nephrology. Biopsy? Will defer vitamin D repletion to her Nephrology team.  Although please fax her results of her recent labs to her Nephrology team, if not heard back with any recommendations or instructions. We will call their office and schedule patient for an urgent visit for evaluation. I consider stopping her Lasix and putting her on isosorbide.   I discussed this with the cardiologist, on-call for Erlinda Pulido, who per their office is on vacation for the next week. Given her history of CHF, I will hold off on this medication change for now. I will see her back in the office in a week to assess her weight interpulmonary exam. For now, I am ordering a course of steroids.  Although we faxed her results to her Rheumatology team, we have not heard back from them further instructions or recommendations. We will call their office and have her scheduled for an urgent evaluation/appointment. 2. Anemia: Likely from #1. 
- F/u with Nephrology as mentioned above as I suspect her anemia is likely from CKD and MCT. 3. Gout: Uric acid is 12. 7! - In the setting of #1, I would recommend holding rx. 4. Health Maintenance: 
- Pt declines all vaccinations. Health Maintenance Due Topic Date Due  Shingrix Vaccine Age 50> (1 of 2) 04/02/1992  Pneumococcal 65+ years (1 of 1 - PPSV23) 04/02/2007  Pneumococcal 65+ yrs at Risk Vaccine (1 of 2 - PCV13) 04/02/2007  Flu Vaccine (1) 09/01/2020 Lab review: labs are reviewed in the EHR I have discussed the diagnosis with the patient and the intended plan as seen in the above orders. The patient has received an after-visit summary and questions were answered concerning future plans. I have discussed medication side effects and warnings with the patient as well. I have reviewed the plan of care with the patient, accepted their input and they are in agreement with the treatment goals. All questions were answered. The patient understands the plan of care. Handouts provided today with above information. Pt instructed if symptoms worsen to call the office or report to the ED for continued care. Greater than 50% of the visit time was spent in counseling and/or coordination of care.    
 
Voice recognition was used to generate this report, which may have resulted in some phonetic based errors in grammar and contents. Even though attempts were made to correct all the mistakes, some may have been missed, and remained in the body of the document.  
 
 
 
 
Jamal Garrison MD

## 2020-09-25 NOTE — PATIENT INSTRUCTIONS
Acute Kidney Injury: Care Instructions Your Care Instructions Acute kidney injury (JAS) is a sudden decrease in kidney function. This can happen over a period of hours, days or, in some cases, weeks. JAS used to be called acute renal failure, but kidney failure doesn't always happen with JAS. Common causes of JAS are dehydration, blood loss, and medicines. When JAS happens, the kidneys have trouble removing waste and excess fluids from the body. The waste and fluids build up and become harmful. Kidney function may return to normal if the cause of JAS is treated quickly. Your chance of a full recovery depends on what caused the problem, how quickly the cause was treated, and what other medical problems you have. A machine may be used to help your kidneys remove waste and fluids for a short period of time. This is called dialysis. Follow-up care is a key part of your treatment and safety. Be sure to make and go to all appointments, and call your doctor if you are having problems. It's also a good idea to know your test results and keep a list of the medicines you take. How can you care for yourself at home? · Talk to your doctor about how much fluid you should drink. · Eat a balanced diet. Talk to your doctor or a dietitian about what type of diet may be best for you. You may need to limit sodium, potassium, and phosphorus. · If you need dialysis, follow the instructions and schedule for dialysis that your doctor gives you. · Do not smoke. Smoking can make your condition worse. If you need help quitting, talk to your doctor about stop-smoking programs and medicines. These can increase your chances of quitting for good. · Do not drink alcohol. · Review all of your medicines with your doctor. Do not take any medicines, including nonsteroidal anti-inflammatory drugs (NSAIDs) such as ibuprofen (Advil, Motrin) or naproxen (Aleve), unless your doctor says it is safe for you to do so. · Make sure that anyone treating you for any health problem knows that you have had JAS. When should you call for help? Call 911 anytime you think you may need emergency care. For example, call if: 
  · You passed out (lost consciousness). Call your doctor now or seek immediate medical care if: 
  · You have new or worse nausea and vomiting.  
  · You have much less urine than normal, or you have no urine.  
  · You are feeling confused or cannot think clearly.  
  · You have new or more blood in your urine.  
  · You have new swelling.  
  · You are dizzy or lightheaded, or feel like you may faint. Watch closely for changes in your health, and be sure to contact your doctor if: 
  · You do not get better as expected. Where can you learn more? Go to http://jacquelynDiscoverlyalberto.info/ Enter U808 in the search box to learn more about \"Acute Kidney Injury: Care Instructions. \" Current as of: April 15, 2020               Content Version: 12.6 © 2006-2020 [employer]. Care instructions adapted under license by [employer] (which disclaims liability or warranty for this information). If you have questions about a medical condition or this instruction, always ask your healthcare professional. [address] any warranty or liability for your use of this information. Diet for Chronic Kidney Disease (Before Dialysis): Care Instructions Your Care Instructions When you have chronic kidney disease, you need to change your diet to avoid foods that make your kidneys worse. You may need to limit salt, fluids, and protein. You also may need to limit minerals such as potassium and phosphorus. A diet for chronic kidney disease takes planning. A dietitian who specializes in kidney disease can help you plan meals that meet your needs. These guidelines are for people who are not on dialysis.  Talk with your doctor or dietitian to make sure your diet is right for your condition. Do not change your diet without talking to your doctor or dietitian. Follow-up care is a key part of your treatment and safety. Be sure to make and go to all appointments, and call your doctor if you are having problems. It's also a good idea to know your test results and keep a list of the medicines you take. How can you care for yourself at home? · Work with your doctor or dietitian to create a food plan. · Do not skip meals or go for many hours without eating. If you do not feel very hungry, try to eat 4 or 5 small meals instead of 1 or 2 big meals. · If you have a hard time eating enough, talk to your doctor or dietitian about ways you can add calories to your diet. · Do not take any vitamins or minerals, supplements, or herbal products without talking to your doctor first. 
· Check with your doctor about whether it is safe for you to drink alcohol. To get the right amount of protein · Ask your doctor or dietitian how much protein you can have each day. Most people with chronic kidney disease need to limit the amount of protein they eat. But you still need some protein to stay healthy. · Include all sources of protein in your daily protein count. Besides meat, poultry, fish, and eggs, protein is found in milk and milk products, beans and nuts, breads, cereals, and vegetables. To limit salt · Read food labels on cans and food packages. The labels tell you how much sodium is in each serving. Make sure that you look at the serving size. If you eat more than the serving size, you will get more sodium than what is listed on the label. · Do not add salt to your food. Avoid foods that list salt, sodium, or monosodium glutamate (MSG) as an ingredient. · Buy foods that are labeled \"no salt added,\" \"sodium-free,\" or \"low-sodium. \" Foods labeled \"reduced-sodium\" and \"light sodium\" may still have too much sodium. · Limit processed foods, fast food, and restaurant foods. These types of food are very high in sodium. · Avoid salted pretzels, chips, popcorn, and other salted snacks. · Avoid smoked, cured, salted, and canned meat, fish, and poultry. This includes ham, taveras, hot dogs, and luncheon meats. · You may use lemon, herbs, and spices to flavor your meals. To limit potassium · Choose low-potassium fruits such as applesauce, pineapple, grapes, blueberries, and raspberries. · Choose low-potassium vegetables such as lettuce, green beans, cucumber, and radishes. · Choose low-potassium foods such as hummus, spaghetti, macaroni, rice, tortillas, and bagels. · Limit or avoid high-potassium foods such as milk, bananas, oranges, cantaloupe, potatoes, spinach, tomatoes, broccoli, and sweet potatoes. · Do not use a salt substitute or lite salt unless your doctor says it is okay. Most salt substitutes and lite salts are high in potassium. To limit phosphorus · Follow your food plan to know how much milk and milk products you can have. · Limit nuts, peanut butter, seeds, lentils, beans, organ meats, and sardines. · Avoid cola drinks. · Avoid bran breads or bran cereals. If you need to limit fluids · Know how much fluid you can drink. Every day fill a pitcher with that amount of water. If you drink another fluid (such as coffee) that day, pour an equal amount of water out of the pitcher. · Count foods that are liquid at room temperature, such as gelatin dessert and ice cream, as fluids. Where can you learn more? Go to http://jacquelyn-alberto.info/ Enter U263 in the search box to learn more about \"Diet for Chronic Kidney Disease (Before Dialysis): Care Instructions. \" Current as of: August 22, 2019               Content Version: 12.6 © 9736-9368 [employer].   
Care instructions adapted under license by [employer] (which disclaims liability or warranty for this information). If you have questions about a medical condition or this instruction, always ask your healthcare professional. [address] any warranty or liability for your use of this information.

## 2020-09-25 NOTE — PROGRESS NOTES
Balwinder Ceron presents today for Chief Complaint Patient presents with  Follow-up  Hypertension  Labs Coordination of Care: 1. Have you been to the ER, urgent care clinic since your last visit? Hospitalized since your last visit? no 
 
2. Have you seen or consulted any other health care providers outside of the [address] [place] [address] since your last visit? Include any pap smears or colon screening. no 
 
 
Advance Directive: 1. Do you have an advance directive in place? Patient Reply:no 2. If not, would you like material regarding how to put one in place?  Patient Reply: no

## 2020-09-25 NOTE — TELEPHONE ENCOUNTER
Tried reaching DR. Alise Rothman office at [phone], they close at 1 pm on Fridays, and will reopen on Monday at [facility]. Per DR. Svetlana Vogt the patient will need an appointment ASAP with DR. Yaneth Sky. Patient made aware that we will be calling her on Monday with that information. Tried reaching DR. Manpreet Jones at [phone] per DR. Svetlana Vogt.  would like to speak with DR. Mathew Ngo regarding the patient. Okay to give DR. Vera F 935. Kurt's cell number. Left message with DR. Sahni's nurse. Patient also aware that we will call her about following up with Rheumatology.

## 2020-09-25 NOTE — TELEPHONE ENCOUNTER
Received call from Dr. Lina Hicks's office stating she needs to speak to Dr. Roderick Lange regarding mutual patient Kizzy Lopez. Informed Dr. Roderick Lange not in the office today and she request to speak to one of the providers in office today. Spoke with Dr. Humberto Regalado regarding this matter and he called to spoke to Dr. Julisa Almazan.

## 2020-09-27 PROBLEM — R80.9 MICROALBUMINURIA: Status: RESOLVED | Noted: 2017-07-10 | Resolved: 2020-01-01

## 2020-09-27 PROBLEM — R60.1 GENERALIZED EDEMA: Status: RESOLVED | Noted: 2018-11-01 | Resolved: 2020-01-01

## 2020-09-28 NOTE — TELEPHONE ENCOUNTER
Spoke with DR. Boyd's office they stated that they would need to review lab results and contact patient. I informed them that these have already been faxed but I would fax them again.  I also requested that they call the office back if they did not receive the fax by 5 pm.

## 2020-09-29 NOTE — TELEPHONE ENCOUNTER
Patient made aware of lab results and Dr. Zeina Ron remarks. Patient has agreed to try Crestor 20mg daily. Order will be sent to [facility] pharmacy. Made aware to let our office know if she has any problems with this medication.

## 2020-09-29 NOTE — TELEPHONE ENCOUNTER
----- Message from Loida Leigh DO sent at 9/29/2020  1:25 PM EDT -----  This lady has a very high cholesterol with known coronary artery disease status post coronary artery bypass grafting surgery. She has had trouble taking both Zocor and Lipitor. I do not know whether or not she will be willing to try Crestor but if she is I would try Crestor 20 mg daily first.  If she refuses to take statin drugs then I would consider a PCSK9 inhibitor. If she is unwilling to take a PCSK9 inhibitor or cannot afford it then I would consider Zetia or preferably Nexlizet. Please let the patient know.  ES

## 2020-09-29 NOTE — PROGRESS NOTES
This lady has a very high cholesterol with known coronary artery disease status post coronary artery bypass grafting surgery. She has had trouble taking both Zocor and Lipitor. I do not know whether or not she will be willing to try Crestor but if she is I would try Crestor 20 mg daily first.  If she refuses to take statin drugs then I would consider a PCSK9 inhibitor. If she is unwilling to take a PCSK9 inhibitor or cannot afford it then I would consider Zetia or preferably Nexlizet. Please let the patient know.  ES

## 2020-09-29 NOTE — TELEPHONE ENCOUNTER
Spoke with DR. Boyd's office, judd system was down and could not give me the exact date. They have scheduled to see the patient next week. She will call back to give me more details once their system is back up.

## 2020-10-01 NOTE — PROGRESS NOTES
I have personally seen and evaluated the device findings. Interrogation reviewed and I agree with assessment.     Salina Gutierrez

## 2020-10-02 NOTE — PROGRESS NOTES
Vik Roach presents today for Chief Complaint Patient presents with  Follow-up 1 week f/u Coordination of Care: 1. Have you been to the ER, urgent care clinic since your last visit? Hospitalized since your last visit? no 
 
2. Have you seen or consulted any other health care providers outside of the [address] [place] [address] since your last visit? Include any pap smears or colon screening. no 
 
 
Advance Directive: 1. Do you have an advance directive in place? Patient Reply:no 2. If not, would you like material regarding how to put one in place?  Patient Reply: no

## 2020-10-02 NOTE — PROGRESS NOTES
INTERNISTS OF [facility]: 
10/2/2020, MRN: [id number] Kentrell Delaney is a 68 y.o. female and presents to clinic for Follow-up (1 week f/u) Subjective:  
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 Acute/Chronic Renal Failure, MCT, and CHF:  +H/o positive serologies (FOREST, RNP Ab, Sjogren's Ab, anti-chromatin Ab, and Anti-smith Ab). Followed by Rheumatology in the past.  +Suspected mixed connective tissue disease. Earlier this summer, she went to the ED with jt pain. She was found to be in ARF. BNP was in the 4K range. +Weight loss. F/u labs showed an elevated CRP in the 9 range. At her last apt, her records were faxed to Rheumatology and Nephrology. Her cardiologist was out of the office at that time. I spoke with the doctor on call for the practice after her apt and per the discussion I had, I decided against adjusting her lasix. Despite hearing some bibasilar crackles on her PE last wk, she had no peripheral edema and I was concerned that she had some prerenal azotemia. Her most recent labs show persistent ARF. Labs within the past 3 months show: a vitamin D level of 19, PTH is high at 300 (secondary hyperparathyroidism), low iron of 18, low iron saturation at 10, ferritin of 525, and a hemoglobin of 9.6. MCV was WNL. WBC was WNL. Platelets are 556. Incidentally,her uric acid level was high this summer at 12.7. At her last apt, she had right hand swelling and pain. A course of steroids was prescribed. She completed this rx w/o difficulty and her hand swelling/pain has improved dramatically over the past wk. She has some minor swelling/pain present though along her right hand. She was scheduled to see Nephrology on 10/12/20. Her records were received by the Nephrology office and reviewed by the doctor there. She continues to take: coreg, norvasc, and lasix. 7/17/20 CXR: No acute findings. Mild cardiomegaly post CABG with AICD. 5/20/19 Echocardiogram: Left Ventricle: Mildly dilated left ventricle. Mild concentric hypertrophy. Moderate systolic dysfunction. Estimated left ventricular ejection fraction is 36 - 40%. Mild (grade 1) left ventricular diastolic dysfunction. Tricuspid Valve: Mild to moderate tricuspid valve regurgitation is present. Pulmonary Artery: Moderate pulmonary hypertension. Pulmonary arterial systolic pressure is 54.6 mmHg. Pulmonic Valve: Mild to moderate pulmonic valve regurgitation is present. Aortic Valve: Probably trileaflet aortic valve. Aortic valve sclerosis. Mitral Valve: Mitral valve thickening. Mitral annular calcification. Mild mitral valve regurgitation. Left Atrium: Moderately dilated left atrium. Patient Active Problem List  
 Diagnosis Date Noted  Impaired glucose tolerance 06/12/2019  Chronic combined systolic and diastolic congestive heart failure ([address] 06/02/2019  Peripheral venous insufficiency 02/21/2019  Severe obesity (BMI 35.0-39. 9) with comorbidity ([address] 05/08/2018  Mixed connective tissue disease ([facility] 08/22/2017  CKD (chronic kidney disease) stage 3, GFR 30-59 ml/min 07/10/2017  Seasonal allergic rhinitis 11/23/2016  Gout of left foot 11/22/2016  Cataract 08/09/2016  
 OAB (overactive bladder) 07/06/2016  Mixed hyperlipidemia 07/05/2016  Gastroesophageal reflux disease without esophagitis 07/05/2016  Chronic gastritis with bleeding - in February/March of 2016 per FOBT and EGD results 07/05/2016  History of CVA (cerebrovascular accident) 07/05/2016  Vitamin D deficiency 07/05/2016  Dysphagia s/p dilation 07/05/2016  Anemia 07/05/2016  Pacemaker (for h/o AV block) 07/05/2016  Intracranial aneurysm - left cavernous ICA on 2014 head/neck CTA 12/01/2014  
 HTN (hypertension) 10/13/2014  Atrial fibrillation ([facility] 10/13/2014  Carotid stenosis 10/13/2014  PAD (peripheral artery disease) ([facility] 10/13/2014  Atherosclerotic heart disease, s/p CABG X3 in 6/08, in the setting of severe left ventricular dysfunction, and s/p a dual-chamber  S/P CABG x 3 Current Outpatient Medications Medication Sig Dispense Refill  rosuvastatin (CRESTOR) 20 mg tablet Take 1 Tab by mouth nightly. 30 Tab 6  predniSONE (DELTASONE) 20 mg tablet Take 40 mg by mouth daily (with breakfast). 10 Tab 0  
 diclofenac (VOLTAREN) 1 % gel APPLY 4G ON THE KNEE FOUR TIME A DAY AS NEEDED 400 g 11  
 ondansetron (ZOFRAN ODT) 8 mg disintegrating tablet Take 1 Tab by mouth every eight (8) hours as needed for Nausea for up to 12 doses. 20 Tab 0  carvediloL (COREG) 25 mg tablet TAKE 1 TABLET BY MOUTH (2) TIMES A DAY 60 Tab 5  furosemide (LASIX) 40 mg tablet Take 1.5 Tabs by mouth daily. TAKE 1 TABLET BY MOUTH EVERY DAY 45 Tab 5  
 amLODIPine (NORVASC) 5 mg tablet Take 1 Tab by mouth daily. 5 Tab 0  
 hydrocortisone (HYTONE) 2.5 % ointment APPLY LIGHTLY TO THE AFFECTED AREAS ON THE LEGS AND FEET TWICE A DAY. 2  
 aspirin 81 mg tablet Take 81 mg by mouth daily. Allergies Allergen Reactions  Nifedipine Nausea and Vomiting and Other (comments) Dizzy  Ace Inhibitors Cough  Codeine Unknown (comments), Nausea Only and Nausea and Vomiting  Hydralazine Other (comments) Dizziness and Fatigue  Lipitor [Atorvastatin] Nausea and Vomiting and Vertigo  Plaquenil [Hydroxychloroquine] Other (comments)  Simvastatin Nausea and Vomiting Past Medical History:  
Diagnosis Date  Abnormal ankle brachial index 11/13/2014 Mild arterial disease in right leg. R VICTOR MANUEL 0.88. L VICTOR MANUEL 1.00.  Anemia  Arm DVT (deep venous thromboembolism), acute (HCC)   
 s/p anticoagulation  Atherosclerotic heart disease  Atrial fibrillation ([address]  AV block  CAD (coronary artery disease)  Cardiomyopathy, ischemic  Carotid artery stenosis  Carotid duplex 11/13/2014 Mild <50% bilateral ICA plaquing. Possible left vertebral occlusion.  Cerebral artery occlusion with cerebral infarction ([address] stroke x 2  Chest pain  CVA (cerebral infarction)  Echocardiogram 08/19/2015 EF 55%. Apical inferior, apical septal hypk (new since study of 12/19/11), likely due to chronic V-pacing. Mild LVH. RVSP 30 mmHg. Mild LAE. Mild MR. Mild TR.  Gastritis  GERD (gastroesophageal reflux disease)  Heart failure ([address]  Hiatal hernia  Hyperlipidemia  Hypertension  Hypertensive cardiovascular disease  Intracranial aneurysm   
 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  Nuclear cardiac imaging test 09/24/2015 Low risk. Sm apical infarction w/no ischemia vs apical thinning. Sm basal inferior defect, likely artifact. EF 56%. Inferoseptal, inferoapical WMA related to sternotomy or paced rhythm.  Pacemaker  PAD (peripheral artery disease) ([address]  PVC's   
 chronic  S/P CABG x 3 06/08/2008 [facility]. SVG-OM. SVG-dRCA  S/P cardiac cath 06/08/2008 pRCA 100%. LM patent. Cx patent. OM1 100%. mLAD 95%. LVEDP 27-29mmHg. EF 20%. mod MR  
 Tilt table evaluation 06/01/1995 Positive for orthostatic hypotension  Vitamin D deficiency Past Surgical History:  
Procedure Laterality Date  CABG, ARTERY-VEIN, THREE  2008  CARDIAC SURG PROCEDURE UNLIST    
 medtronic dual-chamber cardioverted-defibrillator  HX  SECTION    
 x2  
 HX ENDOSCOPY  3/1/16  HX ENDOSCOPY  3/1/16 [address]  HX HYSTERECTOMY 1980  HX ORTHOPAEDIC    
 knee surgery  HX OTHER SURGICAL  2/10/16 Pacemaker Gen Change  HX PACEMAKER Defibrillator   HX TUMOR REMOVAL    
 removed from arm.  benign No family history on file. Social History Tobacco Use  Smoking status: Never Smoker  Smokeless tobacco: Never Used  Tobacco comment: just smoked 2 weeks in college Substance Use Topics  Alcohol use: No  
 
 
ROS Review of Systems Constitutional: Positive for malaise/fatigue and weight loss. Negative for chills and fever. HENT: Negative for ear pain and sore throat. Eyes: Negative for blurred vision and pain. Respiratory: Negative for cough. Cardiovascular: Negative for chest pain. Gastrointestinal: Negative for abdominal pain, blood in stool and melena. Genitourinary: Negative for dysuria and hematuria. Musculoskeletal: Positive for joint pain. Negative for falls and myalgias. Skin: Negative for rash. Neurological: Negative for headaches. Endo/Heme/Allergies: Does not bruise/bleed easily. Psychiatric/Behavioral: Negative for substance abuse. Objective Vitals:  
 10/02/20 1406 BP: 136/61 Pulse: 65 Resp: 12 Temp: 97.3 °F (36.3 °C) TempSrc: Temporal  
SpO2: 100% Weight: 143 lb (64.9 kg) Height: 5' 4\" (1.626 m) Physical Exam 
Vitals signs and nursing note reviewed. HENT:  
   Head: Normocephalic and atraumatic. Right Ear: External ear normal.  
   Left Ear: External ear normal.  
Eyes:  
   General: No scleral icterus. Right eye: No discharge. Left eye: No discharge. Conjunctiva/sclera: Conjunctivae normal.  
Neck: Musculoskeletal: Neck supple. Cardiovascular:  
   Rate and Rhythm: Normal rate and regular rhythm. Heart sounds: Normal heart sounds. No murmur. No friction rub. No gallop. Pulmonary:  
   Effort: Pulmonary effort is normal. No respiratory distress. Breath sounds: Rales (less bibasilar crackles today) present. No wheezing. Abdominal:  
   General: Bowel sounds are normal. There is no distension. Palpations: Abdomen is soft. Musculoskeletal:     
   General: No tenderness (BUE are NTTP except along her right 3rd digit, which is still sligthly swollen). Comments: Prominent MCP jts Lymphadenopathy:  
   Cervical: No cervical adenopathy. Skin: 
   General: Skin is warm and dry. Findings: No erythema. Neurological:  
   Mental Status: She is alert and oriented to person, place, and time. Motor: No abnormal muscle tone. Gait: Gait is intact. Gait normal.  
Psychiatric:     
   Mood and Affect: Mood and affect normal.  
 
 
 
LABS Data Review:  
Lab Results Component Value Date/Time WBC 4.9 09/21/2020 09:50 AM  
 HGB 9.6 (L) 09/21/2020 09:50 AM  
 HCT 29.3 (L) 09/21/2020 09:50 AM  
 PLATELET 863 (H) 29/75/4568 09:50 AM  
 MCV 83.0 09/21/2020 09:50 AM  
 
 
Lab Results Component Value Date/Time Sodium 136 09/21/2020 09:50 AM  
 Potassium 4.0 09/21/2020 09:50 AM  
 Chloride 104 09/21/2020 09:50 AM  
 CO2 24 09/21/2020 09:50 AM  
 Anion gap 8 09/21/2020 09:50 AM  
 Glucose 97 09/21/2020 09:50 AM  
 BUN 40 (H) 09/21/2020 09:50 AM  
 Creatinine 2.30 (H) 09/21/2020 09:50 AM  
 BUN/Creatinine ratio 17 09/21/2020 09:50 AM  
 GFR est AA 25 (L) 09/21/2020 09:50 AM  
 GFR est non-AA 21 (L) 09/21/2020 09:50 AM  
 Calcium 10.1 09/21/2020 09:50 AM  
 Calcium 10.3 (H) 09/21/2020 09:50 AM  
 
 
Lab Results Component Value Date/Time  Cholesterol, total 288 (H) 09/21/2020 09:44 AM  
 HDL Cholesterol 58 09/21/2020 09:44 AM  
 LDL, calculated 193.4 (H) 09/21/2020 09:44 AM  
 VLDL, calculated 36.6 09/21/2020 09:44 AM  
 Triglyceride 183 (H) 09/21/2020 09:44 AM  
 CHOL/HDL Ratio 5.0 09/21/2020 09:44 AM  
 
 
Lab Results Component Value Date/Time Hemoglobin A1c 5.4 09/21/2020 09:50 AM  
 
 
Assessment/Plan: 1. CHF: Improved clinically (per my PE) - Will hold off on any med changes pending recommendations from her cardiologist. 
 
2. ARF: +Stage 3 chronic kidney disease, unspecified whether stage 3a or 3b CKD. +Anemia. +Vitamin D Deficiency and secondary hyperparathyroidism? - F/u with Mehul Sneed with Nephrology. Kidney biopsy? 
- Retroperitoneal ultrasound ordered. 3. Mixed connective tissue disease: CRP is in the 9 range. Right hand swelling/pain improved s/p steroid course. +Hyperuricemia - is also c/w her h/o gout. +Residual swelling/pain along her right middle finger PIP/DIP jts - Waiting from Rheumatology on further recommendations. There are no preventive care reminders to display for this patient. Lab review: labs are reviewed in the EHR I have discussed the diagnosis with the patient and the intended plan as seen in the above orders. The patient has received an after-visit summary and questions were answered concerning future plans. I have discussed medication side effects and warnings with the patient as well. I have reviewed the plan of care with the patient, accepted their input and they are in agreement with the treatment goals. All questions were answered. The patient understands the plan of care. Handouts provided today with above information. Pt instructed if symptoms worsen to call the office or report to the ED for continued care. Greater than 50% of the visit time was spent in counseling and/or coordination of care.    
 
Voice recognition was used to generate this report, which may have resulted in some phonetic based errors in grammar and contents. Even though attempts were made to correct all the mistakes, some may have been missed, and remained in the body of the document. Follow-up and Dispositions · Return in about 3 weeks (around 10/23/2020).  
  
 
 
Nadeen Sawyer MD

## 2020-10-02 NOTE — PROGRESS NOTES
Liliana Gross is a 68 y.o. female who was seen by synchronous (real-time) audio-video technology on 10/2/2020. Assessment & Plan:  
There are no diagnoses linked to this encounter. Lab review: labs are reviewed in the EHR*** 
  
I have discussed the diagnosis with the patient and the intended plan as seen in the above orders. I have discussed medication side effects and warnings with the patient as well. I have reviewed the plan of care with the patient, accepted their input and they are in agreement with the treatment goals. All questions were answered. The patient understands the plan of care. Pt instructed if symptoms worsen to call the office or report to the ED for continued care. Greater than 50% of the visit time was spent in counseling and/or coordination of care. *** Voice recognition was used to generate this report, which may have resulted in some phonetic based errors in grammar and contents. Even though attempts were made to correct all the mistakes, some may have been missed, and remained in the body of the document. Subjective:  
Liliana Gross was seen for Chief Complaint Patient presents with  Follow-up 1 week f/u Patient is a 46-year-old -American female with history of allergic rhinitis, gout, cataract, overactive bladder, hyperlipidemia, GERD, mixed connective tissue disease (positive FOREST, RNP Ab, Sjogren's Ab, anti-chromatin Ab, and Anti-Parnell Ab), chronic gastritis with bleeding in March 2016, history of CVA, vitamin D deficiency, dysphasia status post dilation, anemia, pacemaker for history of AV block, left cavernous ICA aneurysm, hypertension, atrial fibrillation,  statin intolerance, CHF, carotid stenosis, 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 1. Acute/Chronic Renal Failure:  Her most recent labs show ARF. +H/o positive serologies (FOREST, RNP Ab, Sjogren's Ab, anti-chromatin Ab, and Anti-smith Ab). Followed by Rheumatology. 2. ***: ***. Taking: ***. No adverse side effects from this rx.*** Prior to Admission medications Medication Sig Start Date End Date Taking? Authorizing Provider  
rosuvastatin (CRESTOR) 20 mg tablet Take 1 Tab by mouth nightly. 10/1/20   Rita Dempsey MD  
predniSONE (DELTASONE) 20 mg tablet Take 40 mg by mouth daily (with breakfast). 9/25/20   Denzel Valenzuela MD  
diclofenac (VOLTAREN) 1 % gel APPLY 4G ON THE KNEE FOUR TIME A DAY AS NEEDED 8/9/20   Denzel Valenzuela MD  
ondansetron (ZOFRAN ODT) 8 mg disintegrating tablet Take 1 Tab by mouth every eight (8) hours as needed for Nausea for up to 12 doses. 7/18/20   Sedrick Jacinto NP  
carvediloL (COREG) 25 mg tablet TAKE 1 TABLET BY MOUTH (2) TIMES A DAY 4/20/20   León Avelar NP  
furosemide (LASIX) 40 mg tablet Take 1.5 Tabs by mouth daily. TAKE 1 TABLET BY MOUTH EVERY DAY 4/20/20   Marcellus Avelar, CANDE  
amLODIPine (NORVASC) 5 mg tablet Take 1 Tab by mouth daily. 8/12/19   Bryanna PINK NP  
hydrocortisone (HYTONE) 2.5 % ointment APPLY LIGHTLY TO THE AFFECTED AREAS ON THE LEGS AND FEET TWICE A DAY. 2/2/18   Provider, Historical  
aspirin 81 mg tablet Take 81 mg by mouth daily. Provider, Historical  
 
Allergies Allergen Reactions  Nifedipine Nausea and Vomiting and Other (comments) Dizzy  Ace Inhibitors Cough  Codeine Unknown (comments), Nausea Only and Nausea and Vomiting  Hydralazine Other (comments) Dizziness and Fatigue  Lipitor [Atorvastatin] Nausea and Vomiting and Vertigo  Plaquenil [Hydroxychloroquine] Other (comments)  Simvastatin Nausea and Vomiting Past Medical History:  
Diagnosis Date  Abnormal ankle brachial index 11/13/2014 Mild arterial disease in right leg. R VICTOR MANUEL 0.88. L VICTOR MANUEL 1.00.  Anemia  Arm DVT (deep venous thromboembolism), acute (HCC)   
 s/p anticoagulation  Atherosclerotic heart disease  Atrial fibrillation ([address]  AV block  CAD (coronary artery disease)  Cardiomyopathy, ischemic  Carotid artery stenosis  Carotid duplex 11/13/2014 Mild <50% bilateral ICA plaquing. Possible left vertebral occlusion.  Cerebral artery occlusion with cerebral infarction ([address] stroke x 2  Chest pain  CVA (cerebral infarction)  Echocardiogram 08/19/2015 EF 55%. Apical inferior, apical septal hypk (new since study of 12/19/11), likely due to chronic V-pacing. Mild LVH. RVSP 30 mmHg. Mild LAE. Mild MR. Mild TR.  Gastritis  GERD (gastroesophageal reflux disease)  Heart failure ([address]  Hiatal hernia  Hyperlipidemia  Hypertension  Hypertensive cardiovascular disease  Intracranial aneurysm   
 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  Nuclear cardiac imaging test 09/24/2015 Low risk. Sm apical infarction w/no ischemia vs apical thinning. Sm basal inferior defect, likely artifact. EF 56%. Inferoseptal, inferoapical WMA related to sternotomy or paced rhythm.  Pacemaker  PAD (peripheral artery disease) ([address]  PVC's   
 chronic  S/P CABG x 3 06/08/2008 LIMA-LAD. SVG-OM. SVG-dRCA  S/P cardiac cath 06/08/2008 pRCA 100%. LM patent. Cx patent. OM1 100%. mLAD 95%. LVEDP 27-29mmHg. EF 20%. mod MR  
 Tilt table evaluation 06/01/1995 Positive for orthostatic hypotension  Vitamin D deficiency Past Surgical History:  
Procedure Laterality Date  CABG, ARTERY-VEIN, THREE  6/2008  CARDIAC SURG PROCEDURE UNLIST    
 medtronic dual-chamber cardioverted-defibrillator  HX  SECTION    
 x2  
 HX ENDOSCOPY  3/1/16  HX ENDOSCOPY  3/1/16 [address]  HX HYSTERECTOMY 1980  HX ORTHOPAEDIC    
 knee surgery  HX OTHER SURGICAL  2/10/16 Pacemaker Gen Change  HX PACEMAKER Defibrillator   HX TUMOR REMOVAL    
 removed from arm.  benign No family history on file. Social History Socioeconomic History  Marital status:  Spouse name: Not on file  Number of children: Not on file  Years of education: Not on file  Highest education level: Not on file Tobacco Use  Smoking status: Never Smoker  Smokeless tobacco: Never Used  Tobacco comment: just smoked 2 weeks in college Substance and Sexual Activity  Alcohol use: No  
 Drug use: No  
 Sexual activity: Never Other Topics Concern ROS: 
Gen: No fever/chills HEENT: No sore throat, eye pain, ear pain, or congestion. No HA 
CV: No CP Resp: No cough/SOB 
GI: No abdominal pain : No hematuria/dysuria Derm: No rash Neuro: No new paresthesias/weakness Musc: No new myalgias/jt pain Psych: No depression sx Objective:  
 
General: {gen appear:10353::\"alert\",\"cooperative\",\"no distress\"} Mental  status: {mental status:815291::\"alert, oriented to person, place, and time\",\"normal mood, behavior, speech, dress, motor activity, and thought processes\":1} Resp: {resp:56120::\"normal effort\",\"no respiratory distress\":1} Neuro: {neuro:24464::\"no gross deficits\":1} Skin: {skin:012337::\"no discoloration or lesions of concern on visible areas\":1} LABS: 
Lab Results Component Value Date/Time  Sodium 136 2020 09:50 AM  
 Potassium 4.0 2020 09:50 AM  
 Chloride 104 2020 09:50 AM  
 CO2 24 2020 09:50 AM  
 Anion gap 8 2020 09:50 AM  
 Glucose 97 2020 09:50 AM  
 BUN 40 (H) 2020 09:50 AM  
 Creatinine 2.30 (H) 2020 09:50 AM  
 BUN/Creatinine ratio 17 2020 09:50 AM  
 GFR est AA 25 (L) 09/21/2020 09:50 AM  
 GFR est non-AA 21 (L) 09/21/2020 09:50 AM  
 Calcium 10.1 09/21/2020 09:50 AM  
 Calcium 10.3 (H) 09/21/2020 09:50 AM  
 
 
Lab Results Component Value Date/Time Cholesterol, total 288 (H) 09/21/2020 09:44 AM  
 HDL Cholesterol 58 09/21/2020 09:44 AM  
 LDL, calculated 193.4 (H) 09/21/2020 09:44 AM  
 VLDL, calculated 36.6 09/21/2020 09:44 AM  
 Triglyceride 183 (H) 09/21/2020 09:44 AM  
 CHOL/HDL Ratio 5.0 09/21/2020 09:44 AM  
 
 
Lab Results Component Value Date/Time WBC 4.9 09/21/2020 09:50 AM  
 HGB 9.6 (L) 09/21/2020 09:50 AM  
 HCT 29.3 (L) 09/21/2020 09:50 AM  
 PLATELET 582 (H) 10/90/7029 09:50 AM  
 MCV 83.0 09/21/2020 09:50 AM  
 
 
Lab Results Component Value Date/Time Hemoglobin A1c 5.4 09/21/2020 09:50 AM  
 
 
Lab Results Component Value Date/Time TSH 1.87 07/10/2017 11:17 AM  
 
 
 
 
Due to this being a TeleHealth *** evaluation, many elements of the physical examination are unable to be assessed. The pt was seen by synchronous (real-time) audio-video*** technology, and/or her healthcare decision maker, is aware that this patient-initiated, Telehealth encounter is a billable service, with coverage as determined by her insurance carrier. She is aware that she may receive a bill and has provided verbal consent to proceed: Yes. The pt is being evaluated by a video*** visit encounter for concerns as above. A caregiver was present when appropriate. Due to this being a TeleHealth encounter (During [facility] public health emergency), evaluation of the following organ systems was limited: Vitals/Constitutional/EENT/Resp/CV/GI//MS/Neuro/Skin/Heme-Lymph-Imm.   
Pursuant to the emergency declaration under the [address] [place] [address], [facility] waiver authority and the [employer] Act, this Virtual *** Visit was conducted, with patient's (and/or legal guardian's) consent, to reduce the patient's risk of exposure to COVID-19 and provide necessary medical care. Services were provided through a video*** synchronous discussion virtually to substitute for in-person clinic visit. Patient and provider were located at their individual homes. We discussed the expected course, resolution and complications of the diagnosis(es) in detail. Medication risks, benefits, costs, interactions, and alternatives were discussed as indicated. I advised her to contact the office if her condition worsens, changes or fails to improve as anticipated. She expressed understanding with the diagnosis(es) and plan.   
 
Aaron Rey MD

## 2020-10-06 NOTE — TELEPHONE ENCOUNTER
LVM for rheumatology to return my call. Faxed over most notes and labs for review. Will try to reach them again.

## 2020-10-06 NOTE — TELEPHONE ENCOUNTER
----- Message from Aidan Hernandez MD sent at 10/2/2020  3:09 PM EDT -----  Regarding: F/u issues  Hi April,    Has Rheumatology reviewed her records? Have they scheduled with her? Please let her know that after her apt, I decided to order a retroperitoneal ultrasound to investigate her acute renal injury. Please fax her results and my last two office notes to [address] [place] [address] for review - when he returns from vacation.     Thanks,  Dr. Melissa Anderson  [facility], [address] [place] [address], [facility]  Phone: [phone]  Fax: [phone]

## 2020-10-06 NOTE — TELEPHONE ENCOUNTER
Order changed.     Dr. Bernardo Rater  [facility], [address] [facility], [facility] [address]  Phone: [phone]  Fax: [phone]

## 2020-10-07 NOTE — TELEPHONE ENCOUNTER
Rheumatology returned call (DR. Peace's office) they state that the patient never called back to schedule her new patient appointment. Tried scheduling for patient, but there first new patient apt is not until the end of Jan.    Reached DR. Alejandre's office in [place], they can schedule to see the patient in November, but will need to fax all information over again. Patient was scheduled a year ago there but no showed to the appointment. Patient reached and aware that we will need to change the referral to DR. Alejandre. Patient refused and stated that its to far to travel. PLEASE ADVISE.

## 2020-10-07 NOTE — TELEPHONE ENCOUNTER
Have her see  in January. Nephrology may want to pursue a biopsy of her kidneys to see if there is any involvement in her mixed connective tissue disease between now and then. I'll address this more with the pt at her upcoming apt with me.     Dr. Quintero Swann  [facility], [address] [place] [address], [address] [place] [address] Str[address]  Phone: [phone]  Fax: [phone]

## 2020-10-07 NOTE — TELEPHONE ENCOUNTER
Patient scheduled with DR. Peace for 1-28-21 @ 11am in the [place] office. Will mail patient appointment info, as it its hard for her to write things down. Patient is aware of appointment.

## 2020-10-16 PROBLEM — I25.10 CAD (CORONARY ARTERY DISEASE): Status: ACTIVE | Noted: 2020-01-01

## 2020-10-16 PROBLEM — R55 SYNCOPE: Status: ACTIVE | Noted: 2020-01-01

## 2020-10-16 PROBLEM — N39.0 UTI (URINARY TRACT INFECTION): Status: ACTIVE | Noted: 2020-01-01

## 2020-10-16 PROBLEM — R11.2 NAUSEA AND VOMITING: Status: ACTIVE | Noted: 2020-01-01

## 2020-10-16 NOTE — ROUTINE PROCESS
TRANSFER - IN REPORT: 
 
Verbal report received from Arnie Santana(name) on [employer]  being received from [facility] ED(unit) for routine progression of care Report consisted of patients Situation, Background, Assessment and  
Recommendations(SBAR). Information from the following report(s) SBAR, Kardex and Recent Results was reviewed with the receiving nurse. Opportunity for questions and clarification was provided. Assessment completed upon patients arrival to unit and care assumed.

## 2020-10-16 NOTE — ED NOTES
Transport team still transporting other patients.  
Updated ETA, another 30-minutes to pickup patient at [facility] ED

## 2020-10-16 NOTE — ED NOTES
Pt resting comfortably Requesting food. Spoke to Dr Dennys Arshad and kristal for patient to eat regular diet  at bedside speaking to pt and family member

## 2020-10-16 NOTE — ED NOTES
Spoke with [employer], states never received interrogation previously sent. Attempted again, this time with WiFi initiated.

## 2020-10-16 NOTE — ED NOTES
Verbal shift change report given to Toby Kirk (oncoming nurse) by Chase Terry RN (offgoing nurse). Report included the following information SBAR, ED Summary and Recent Results.

## 2020-10-16 NOTE — PROGRESS NOTES
conducted an initial consultation and Spiritual Assessment for Kendell Foster, who is a 68 y.o.,female. Patient's Primary Language is: [place]. According to the patient's EMR Sabianist Affiliation is: [facility].  
 
The reason the Patient came to the hospital is:  
Patient Active Problem List  
 Diagnosis Date Noted  Syncope 10/16/2020  UTI (urinary tract infection) 10/16/2020  CAD (coronary artery disease) 10/16/2020  Nausea and vomiting 10/16/2020  Impaired glucose tolerance 06/12/2019  Chronic combined systolic and diastolic congestive heart failure ([address] 06/02/2019  Peripheral venous insufficiency 02/21/2019  Severe obesity (BMI 35.0-39. 9) with comorbidity ([address] 05/08/2018  Mixed connective tissue disease ([address] 08/22/2017  CKD (chronic kidney disease) stage 3, GFR 30-59 ml/min 07/10/2017  Seasonal allergic rhinitis 11/23/2016  Gout of left foot 11/22/2016  Cataract 08/09/2016  
 OAB (overactive bladder) 07/06/2016  Mixed hyperlipidemia 07/05/2016  Gastroesophageal reflux disease without esophagitis 07/05/2016  Chronic gastritis with bleeding - in February/March of 2016 per FOBT and EGD results 07/05/2016  History of CVA (cerebrovascular accident) 07/05/2016  Vitamin D deficiency 07/05/2016  Dysphagia s/p dilation 07/05/2016  Anemia 07/05/2016  Pacemaker (for h/o AV block) 07/05/2016  Intracranial aneurysm - left cavernous ICA on 2014 head/neck CTA 12/01/2014  
 HTN (hypertension) 10/13/2014  Atrial fibrillation ([address] 10/13/2014  Carotid stenosis 10/13/2014  PAD (peripheral artery disease) ([address] 10/13/2014  Atherosclerotic heart disease, s/p CABG X3 in 6/08, in the setting of severe left ventricular dysfunction, and s/p a dual-chamber  S/P CABG x 3 The  provided the following Interventions: 
Initiated a relationship of care and support. Explored issues of rosa, belief, spirituality and Christianity/ritual needs while hospitalized. Listened empathically. Provided chaplaincy education. Provided information about Spiritual Care Services. Offered prayer and assurance of continued prayers on patient's behalf. Chart reviewed. The following outcomes where achieved: 
Patient shared limited information about both their medical narrative and spiritual journey/beliefs.  confirmed Patient's [employer] Affiliation. Patient processed feeling about current hospitalization. Patient expressed gratitude for 's visit. Assessment: 
Patient does not have any Christianity/cultural needs that will affect patient's preferences in health care. There are no spiritual or Christianity issues which require intervention at this time. She is a member of [address] [place] [address] in [place]. Active in the [facility]. Plan: 
Chaplains will continue to follow and will provide pastoral care on an as needed/requested basis.  recommends bedside caregivers page  on duty if patient shows signs of acute spiritual or emotional distress. Chaplain YANET Patrick Spiritual Care  
[phone]

## 2020-10-16 NOTE — ED TRIAGE NOTES
Patient's daughter states that patient was her typical self this morning prior to 0930, when she witnessed her mother slumped forward and had vomitus on her clothing. She states that her mother began \"coming to\" when paramedics arrive. Daughter states that patient is in Stage IV kidney failure and had routine MD visit yesterday.

## 2020-10-16 NOTE — ED NOTES
Per Dr. Svetlana Wilson, No further neuro workup required.   Per [facility], dysphagia screen canceled by MD.

## 2020-10-16 NOTE — ED NOTES
Report called to Yoseph Hernandez at [facility] Will arrange transport

## 2020-10-16 NOTE — ED NOTES
Patient provided with bedside commode, moist wipes and tissue for convenience. Warm blankets given. Family member at bedside.

## 2020-10-16 NOTE — ED NOTES
Pt resting comfortably on stretcvher No complaints of pain, nausea, vomitting Call bell within reach Daughter at bedside

## 2020-10-16 NOTE — ED PROVIDER NOTES
EMERGENCY DEPARTMENT HISTORY AND PHYSICAL EXAM 
 
10:13 AM 
 
 
Date: 10/16/2020 Patient Name: Keith Hansen History of Presenting Illness Chief Complaint Patient presents with  Vomiting  Syncope History Provided By: Patient's Daughter Additional History (Context): Keith Hansen is a 68 y.o. female with Past medical history of CAD, ischemic cardiomyopathy, stroke, atrial fibrillation, and had CABG in 2008 who presents with complaint of altered mental status this morning per daughter. Patient's daughter states that she was her normal self at 9:30 AM and at the table after eating breakfast.  Daughter states that she had a vomiting episode and then was altered and not speaking. States that she tried to arouse her with she would not respond or say anything. Daughter states that she slumped over. She was able to straighten her up but then had another vomiting episode. Patient arrived by EMS and they had a blood pressure of 146/68, heart rate 60, blood sugar 160. Patient alert and talking. She reports that she felt like she was getting nauseous. She denies chest pain, dizziness, numbness, weakness, shortness of breath, abdominal pain, weakness, cough, and no other complaint. PCP: Coral Koroma MD 
 
 
 
Past History Past Medical History: 
Past Medical History:  
Diagnosis Date  Abnormal ankle brachial index 11/13/2014 Mild arterial disease in right leg. R VICTOR MANUEL 0.88. L VICTOR MANUEL 1.00.  Anemia  Arm DVT (deep venous thromboembolism), acute (HCC)   
 s/p anticoagulation  Atherosclerotic heart disease  Atrial fibrillation ([address]  AV block  CAD (coronary artery disease)  Cardiomyopathy, ischemic  Carotid artery stenosis  Carotid duplex 11/13/2014 Mild <50% bilateral ICA plaquing. Possible left vertebral occlusion.  Cerebral artery occlusion with cerebral infarction ([address] stroke x 2  Chest pain  CVA (cerebral infarction)  Echocardiogram 2015 EF 55%. Apical inferior, apical septal hypk (new since study of 11), likely due to chronic V-pacing. Mild LVH. RVSP 30 mmHg. Mild LAE. Mild MR. Mild TR.  Gastritis  GERD (gastroesophageal reflux disease)  Heart failure ([facility]  Hiatal hernia  Hyperlipidemia  Hypertension  Hypertensive cardiovascular disease  Intracranial aneurysm   
 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  Nuclear cardiac imaging test 2015 Low risk. Sm apical infarction w/no ischemia vs apical thinning. Sm basal inferior defect, likely artifact. EF 56%. Inferoseptal, inferoapical WMA related to sternotomy or paced rhythm.  Pacemaker  PAD (peripheral artery disease) ([facility]  PVC's   
 chronic  S/P CABG x 3 2008 [facility]-[facility]. SVG-OM. SVG-dRCA  S/P cardiac cath 2008 pRCA 100%. LM patent. Cx patent. OM1 100%. mLAD 95%. LVEDP 27-29mmHg. EF 20%. mod MR  
 Tilt table evaluation 1995 Positive for orthostatic hypotension  Vitamin D deficiency Past Surgical History: 
Past Surgical History:  
Procedure Laterality Date  CABG, ARTERY-VEIN, THREE  2008  CARDIAC SURG PROCEDURE UNLIST    
 medtronic dual-chamber cardioverted-defibrillator  HX  SECTION    
 x2  
 HX ENDOSCOPY  3/1/16  HX ENDOSCOPY  3/1/16 [address] [place] [facility]  HX HYSTERECTOMY   HX ORTHOPAEDIC    
 knee surgery  HX OTHER SURGICAL  2/10/16 Pacemaker Gen Change  HX PACEMAKER Defibrillator   HX TUMOR REMOVAL    
 removed from arm.  benign Family History: 
History reviewed. No pertinent family history. Social History: 
Social History Tobacco Use  Smoking status: Never Smoker  Smokeless tobacco: Never Used  Tobacco comment: just smoked 2 weeks in college Substance Use Topics  Alcohol use: No  
 Drug use: No  
 
 
Allergies: Allergies Allergen Reactions  Nifedipine Nausea and Vomiting and Other (comments) Dizzy  Ace Inhibitors Cough  Codeine Unknown (comments), Nausea Only and Nausea and Vomiting  Hydralazine Other (comments) Dizziness and Fatigue  Lipitor [Atorvastatin] Nausea and Vomiting and Vertigo  Plaquenil [Hydroxychloroquine] Other (comments)  Simvastatin Nausea and Vomiting Review of Systems Review of Systems Constitutional: Negative for chills and fever. HENT: Negative for congestion, rhinorrhea, sore throat and trouble swallowing. Eyes: Negative for visual disturbance. Respiratory: Negative for cough, shortness of breath and wheezing. Cardiovascular: Negative for chest pain. Gastrointestinal: Positive for nausea and vomiting. Negative for abdominal pain. Endocrine: Negative for polyuria. Genitourinary: Negative for dysuria. Musculoskeletal: Negative for arthralgias and neck stiffness. Skin: Negative for pallor and rash. Neurological: Negative for dizziness, weakness, numbness and headaches. Altered mental status Hematological: Does not bruise/bleed easily. Psychiatric/Behavioral: Negative for agitation. All other systems reviewed and are negative. Physical Exam  
 
Visit Vitals /64 Pulse 60 Temp 98.4 °F (36.9 °C) Resp 17 Ht 5' 4.5\" (1.638 m) Wt 63.5 kg (140 lb) SpO2 100% BMI 23.66 kg/m² Physical Exam 
Vitals signs and nursing note reviewed. Constitutional:   
   General: She is not in acute distress. Appearance: She is well-developed. She is not toxic-appearing or diaphoretic. HENT:  
   Head: Normocephalic and atraumatic. Eyes:  
   General: No scleral icterus. Conjunctiva/sclera: Conjunctivae normal.  
   Pupils: Pupils are equal, round, and reactive to light. Neck: Musculoskeletal: Normal range of motion and neck supple. Cardiovascular:  
   Rate and Rhythm: Normal rate. Pulses: Normal pulses. Comments: Capillary refill < 3 seconds Pulmonary:  
   Effort: Pulmonary effort is normal. No respiratory distress. Breath sounds: Normal breath sounds. No wheezing. Abdominal:  
   General: Bowel sounds are normal. There is no distension. Palpations: Abdomen is soft. Tenderness: There is no abdominal tenderness. Musculoskeletal: Normal range of motion. Skin: 
   General: Skin is warm and dry. Coloration: Skin is not pale. Neurological:  
   General: No focal deficit present. Mental Status: She is alert and oriented to person, place, and time. Cranial Nerves: No cranial nerve deficit. Sensory: No sensory deficit. Motor: No weakness. Coordination: Coordination normal.  
   Comments: No slurred speech No facial droop No cerebellar ataxia on finger-nose test 
Strength 5 out of 5 throughout No drifting Psychiatric:     
   Thought Content: Thought content normal.  
 
 
 
 
Diagnostic Study Results Labs - Recent Results (from the past 12 hour(s)) EKG, 12 LEAD, INITIAL Collection Time: 10/16/20 10:26 AM  
Result Value Ref Range Ventricular Rate 60 BPM  
 Atrial Rate 60 BPM  
 P-R Interval 80 ms QRS Duration 196 ms  
 Q-T Interval 478 ms QTC Calculation (Bezet) 478 ms Calculated P Axis 94 degrees Calculated R Axis -72 degrees Calculated T Axis 107 degrees Diagnosis Normal sinus rhythm Vicki-Parkinson-White Abnormal ECG When compared with ECG of 14-JUL-2019 20:44, Sinus rhythm has replaced Electronic ventricular pacemaker GLUCOSE, POC Collection Time: 10/16/20 10:37 AM  
Result Value Ref Range Glucose (POC) 120 (H) 70 - 110 mg/dL CBC WITH AUTOMATED DIFF Collection Time: 10/16/20 10:40 AM  
Result Value Ref Range WBC 4.4 (L) 4.6 - 13.2 K/uL RBC 3.47 (L) 4.20 - 5.30 M/uL HGB 9.4 (L) 12.0 - 16.0 g/dL HCT 29.1 (L) 35.0 - 45.0 % MCV 83.9 74.0 - 97.0 FL  
 MCH 27.1 24.0 - 34.0 PG  
 MCHC 32.3 31.0 - 37.0 g/dL  
 RDW 14.4 11.6 - 14.5 % PLATELET 295 427 - 126 K/uL MPV 8.8 (L) 9.2 - 11.8 FL  
 NEUTROPHILS 56 40 - 73 % LYMPHOCYTES 32 21 - 52 % MONOCYTES 7 3 - 10 % EOSINOPHILS 4 0 - 5 % BASOPHILS 1 0 - 2 %  
 ABS. NEUTROPHILS 2.5 1.8 - 8.0 K/UL  
 ABS. LYMPHOCYTES 1.4 0.9 - 3.6 K/UL  
 ABS. MONOCYTES 0.3 0.05 - 1.2 K/UL  
 ABS. EOSINOPHILS 0.2 0.0 - 0.4 K/UL  
 ABS. BASOPHILS 0.0 0.0 - 0.1 K/UL  
 DF AUTOMATED CARDIAC PANEL,(CK, CKMB & TROPONIN) Collection Time: 10/16/20 10:40 AM  
Result Value Ref Range CK - MB <1.0 <3.6 ng/ml CK-MB Index  0.0 - 4.0 % CALCULATION NOT PERFORMED WHEN RESULT IS BELOW LINEAR LIMIT  
 CK 29 26 - 192 U/L Troponin-I, QT <0.02 0.0 - 0.045 NG/ML  
PROTHROMBIN TIME + INR Collection Time: 10/16/20 10:40 AM  
Result Value Ref Range Prothrombin time 13.3 11.5 - 15.2 sec INR 1.0 0.8 - 1.2 PTT Collection Time: 10/16/20 10:40 AM  
Result Value Ref Range aPTT 26.7 23.0 - 36.4 SEC METABOLIC PANEL, COMPREHENSIVE Collection Time: 10/16/20 10:40 AM  
Result Value Ref Range Sodium 135 (L) 136 - 145 mmol/L Potassium 3.6 3.5 - 5.5 mmol/L Chloride 103 100 - 111 mmol/L  
 CO2 22 21 - 32 mmol/L Anion gap 10 3.0 - 18 mmol/L Glucose 113 (H) 74 - 99 mg/dL BUN 40 (H) 7.0 - 18 MG/DL Creatinine 2.78 (H) 0.6 - 1.3 MG/DL  
 BUN/Creatinine ratio 14 12 - 20 GFR est AA 20 (L) >60 ml/min/1.73m2 GFR est non-AA 16 (L) >60 ml/min/1.73m2 Calcium 9.5 8.5 - 10.1 MG/DL Bilirubin, total 0.2 0.2 - 1.0 MG/DL  
 ALT (SGPT) 6 (L) 13 - 56 U/L  
 AST (SGOT) 11 10 - 38 U/L Alk. phosphatase 79 45 - 117 U/L Protein, total 6.6 6.4 - 8.2 g/dL Albumin 3.0 (L) 3.4 - 5.0 g/dL Globulin 3.6 2.0 - 4.0 g/dL A-G Ratio 0.8 0.8 - 1.7 URINALYSIS W/ RFLX MICROSCOPIC  
 Collection Time: 10/16/20 12:13 PM  
Result Value Ref Range Color YELLOW Appearance CLOUDY Specific gravity 1.008 1.005 - 1.030    
 pH (UA) 5.5 5.0 - 8.0 Protein Negative NEG mg/dL Glucose Negative NEG mg/dL Ketone Negative NEG mg/dL Bilirubin Negative NEG Blood TRACE (A) NEG Urobilinogen 1.0 0.2 - 1.0 EU/dL Nitrites Positive (A) NEG Leukocyte Esterase LARGE (A) NEG URINE MICROSCOPIC ONLY Collection Time: 10/16/20 12:13 PM  
Result Value Ref Range WBC TOO NUMEROUS TO COUNT 0 - 4 /hpf  
 RBC 0 to 3 0 - 5 /hpf Epithelial cells 2+ 0 - 5 /lpf Bacteria 2+ (A) NEG /hpf Hyaline cast 0 to 3 0 - 2 /lpf Radiologic Studies -  
XR CHEST PORT Final Result IMPRESSION:  
No acute cardiac pulmonary process. CT HEAD WO CONT Final Result IMPRESSION:  
  
No acute intracranial hemorrhage or mass effect. Note: If clinical concern of acute stroke, CTA or MRI with diffusion weighted  
images is recommended for further evaluation. 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  
bilateral basal ganglia. The wet read was provided to the ordering physician in ER,, Dr. Mary Ramsay, by me upon  
the initial interpretation at approximately 1024 hours, confirmation received. Thank you for your referral.   
  
 
 
 
Medical Decision Making I am the first provider for this patient. I reviewed the vital signs, available nursing notes, past medical history, past surgical history, family history and social history. Vital Signs-Reviewed the patient's vital signs. Pulse Oximetry Analysis -  100% on room air (Interpretation) normal 
 
Cardiac Monitor: 
Rate: 60 Rhythm:  Paced EKG: Interpreted by the LOKESH Morin. Time Interpreted: 10:26 AM 
 Rate: 60 Rhythm: Paced Interpretation: Paced Records Reviewed: Nursing Notes and Old Medical Records (Time of Review: 10:16 AM) Provider Notes (Medical Decision Making): DDX: TIA, nausea vomiting, cardiac, orthostatic, dehydration, brain mass, pacer Code stroke called from the field MDM Medications  
ondansetron (ZOFRAN) injection 4 mg (4 mg IntraVENous Given 10/16/20 1046)  
sodium chloride 0.9 % bolus infusion 500 mL (0 mL IntraVENous IV Completed 10/16/20 1159) cefTRIAXone (ROCEPHIN) 1 g in sterile water (preservative free) 10 mL IV syringe (1 g IntraVENous Given 10/16/20 1235) Had carotid Dopplers earlier this year. Bilateral carotid Dopplers study February 2020: Right Carotid The right CCA is patent. There is mild stenosis in the right ICA (<50%). The right ICA has heterogeneous and diffuse plaque. The right ECA is patent. The right vertebral is antegrade. The right subclavian is normal with triphasic waveforms. Left Carotid The left CCA is patent. There is mild stenosis in the left ICA (<50%). The left ICA has heterogeneous and diffuse plaque. The left vertebral is not well visualized. The left subclavian is normal with biphasic waveforms. 2D echo May 2019:  
Result status: Final result · Left Ventricle: Mildly dilated left ventricle. Mild concentric hypertrophy. Moderate systolic dysfunction. Estimated left ventricular ejection fraction is 36 - 40%. Mild (grade 1) left ventricular diastolic dysfunction. · Tricuspid Valve: Mild to moderate tricuspid valve regurgitation is present. · Pulmonary Artery: Moderate pulmonary hypertension. Pulmonary arterial systolic pressure is 15.3 mmHg. · Pulmonic Valve: Mild to moderate pulmonic valve regurgitation is present. · Aortic Valve: Probably trileaflet aortic valve. Aortic valve sclerosis. · Mitral Valve: Mitral valve thickening. Mitral annular calcification. Mild mitral valve regurgitation. · Left Atrium: Moderately dilated left atrium. ED Course: Progress Notes, Reevaluation, and Consults: Consult:  Discussed care with Dr. Jacques De La Cruz, Specialty: Teleneurologist, standard discussion; including history of patients chief complaint, available diagnostic results, and treatment course. Accepts consult, will evaluate patient 10:24 AM, 10/16/2020 Patient has been evaluated by teleneurologist and she states no further neurology work-up. She agrees with continued cardiac work-up Gave IVF WBC wnl Initial trop (-) 
 
(+)UTI, culture urine, rocephin given I discussed dx's, results and plan with pt and daugther who are in agreement Consult:  Discussed care with HENRY Verduzco, Specialty: on call for cardiology,  Standard discussion; including history of patients chief complaint, available diagnostic results, and treatment course. She accepts consult, states will see on admission Consult:  Discussed care with Dr Nicholaus Krabbe, Specialty: Hospitalist,  Standard discussion; including history of patients chief complaint, available diagnostic results, and treatment course. He accepts admit Diagnosis Clinical Impression: 1. Syncope, unspecified syncope type 2. Urinary tract infection without hematuria, site unspecified 3. Non-intractable vomiting with nausea, unspecified vomiting type 4. Pacemaker Disposition: admitted Follow-up Information None Patient's Medications Start Taking No medications on file Continue Taking AMLODIPINE (NORVASC) 5 MG TABLET    Take 1 Tab by mouth daily. ASPIRIN 81 MG TABLET    Take 81 mg by mouth daily. CARVEDILOL (COREG) 25 MG TABLET    TAKE 1 TABLET BY MOUTH (2) TIMES A DAY  
 DICLOFENAC (VOLTAREN) 1 % GEL    APPLY 4G ON THE KNEE FOUR TIME A DAY AS NEEDED FUROSEMIDE (LASIX) 40 MG TABLET    Take 1.5 Tabs by mouth daily. TAKE 1 TABLET BY MOUTH EVERY DAY  
 HYDROCORTISONE (HYTONE) 2.5 % OINTMENT    APPLY LIGHTLY TO THE AFFECTED AREAS ON THE LEGS AND FEET TWICE A DAY. ONDANSETRON (ZOFRAN ODT) 8 MG DISINTEGRATING TABLET    Take 1 Tab by mouth every eight (8) hours as needed for Nausea for up to 12 doses. ROSUVASTATIN (CRESTOR) 20 MG TABLET    Take 1 Tab by mouth nightly. These Medications have changed No medications on file Stop Taking PREDNISONE (DELTASONE) 20 MG TABLET    Take 40 mg by mouth daily (with breakfast). Jennifer East  
 
Dragon medical dictation software was used for portions of this report. Unintended transcription errors may occur. My signature above authenticates this document and my orders, the final   
diagnosis (es), discharge prescription (s), and instructions in the Epic   
record.

## 2020-10-16 NOTE — ED NOTES
Patient denies pain or nausea at present. Family member remains at bedside. Patient appears in no apparent distress. IVF continue to infuse without difficulty.

## 2020-10-16 NOTE — ED NOTES
TRANSFER - OUT REPORT: 
 
Verbal report given to Toby Gates on [facility]  being transferred to 412(unit) for routine progression of care Report consisted of patients Situation, Background, Assessment and  
Recommendations(SBAR). Information from the following report(s) ED Summary was reviewed with the receiving nurse. Lines:  
Peripheral IV 10/16/20 Right Antecubital (Active) Site Assessment Clean, dry, & intact 10/16/20 1045 Dressing Status Clean, dry, & intact 10/16/20 1045 Dressing Type Transparent 10/16/20 1045 Hub Color/Line Status Flushed 10/16/20 1045 Opportunity for questions and clarification was provided. Patient transported with: 
 Monitor

## 2020-10-17 NOTE — CONSULTS
Consult Note Consult requested by: Jose Pang MD  
Peace Holland is a 68 y.o. female [address] who is being seen on consult for CKD stage 4. Chief Complaint Patient presents with  Vomiting  Syncope HPI:  68 yr old female with pmhx of CAD, cardiomyopathy,stroke, A fib, CABG who comes in for episode of vomiting and syncope vs seizure. She is not a good historian and says she has history of weakness and not able to walk properly. She see's my partner Almaz Yuan as OP for her renal needs. She has CKD stage 4 due to htn,cardiorenal syndrome etc. She also had hypercalcemia which was felt to be related to her CKD. She denies any complaints at this point and says she feels ok Past Medical History:  
Diagnosis Date  Abnormal ankle brachial index 11/13/2014 Mild arterial disease in right leg. R VICTOR MANUEL 0.88. L VICTOR MANUEL 1.00.  Anemia  Arm DVT (deep venous thromboembolism), acute (HCC)   
 s/p anticoagulation  Atherosclerotic heart disease  Atrial fibrillation ([address]  AV block  CAD (coronary artery disease)  Cardiomyopathy, ischemic  Carotid artery stenosis  Carotid duplex 11/13/2014 Mild <50% bilateral ICA plaquing. Possible left vertebral occlusion.  Cerebral artery occlusion with cerebral infarction ([address] stroke x 2  Chest pain  CVA (cerebral infarction)  Echocardiogram 08/19/2015 EF 55%. Apical inferior, apical septal hypk (new since study of 12/19/11), likely due to chronic V-pacing. Mild LVH. RVSP 30 mmHg. Mild LAE. Mild MR. Mild TR.  Gastritis  GERD (gastroesophageal reflux disease)  Heart failure ([address]  Hiatal hernia  Hyperlipidemia  Hypertension  Hypertensive cardiovascular disease  Intracranial aneurysm   
 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  Nuclear cardiac imaging test 2015 Low risk. Sm apical infarction w/no ischemia vs apical thinning. Sm basal inferior defect, likely artifact. EF 56%. Inferoseptal, inferoapical WMA related to sternotomy or paced rhythm.  Pacemaker  PAD (peripheral artery disease) [facility]  PVC's   
 chronic  S/P CABG x 3 2008 LIMA-LAD. SVG-OM. SVG-dRCA  S/P cardiac cath 2008 pRCA 100%. LM patent. Cx patent. OM1 100%. mLAD 95%. LVEDP 27-29mmHg. EF 20%. mod MR  
 Tilt table evaluation 1995 Positive for orthostatic hypotension  Vitamin D deficiency Past Surgical History:  
Procedure Laterality Date  CABG, ARTERY-VEIN, THREE  2008  CARDIAC SURG PROCEDURE UNLIST    
 medtronic dual-chamber cardioverted-defibrillator  HX  SECTION    
 x2  
 HX ENDOSCOPY  3/1/16  HX ENDOSCOPY  3/1/16 [address]  HX HYSTERECTOMY   HX ORTHOPAEDIC    
 knee surgery  HX OTHER SURGICAL  2/10/16 Pacemaker Gen Change  HX PACEMAKER Defibrillator   HX TUMOR REMOVAL    
 removed from arm.  benign Social History Socioeconomic History  Marital status:  Spouse name: Not on file  Number of children: Not on file  Years of education: Not on file  Highest education level: Not on file Occupational History  Not on file Social Needs  Financial resource strain: Not on file  Food insecurity Worry: Not on file Inability: Not on file  Transportation needs Medical: Not on file Non-medical: Not on file Tobacco Use  Smoking status: Never Smoker  Smokeless tobacco: Never Used  Tobacco comment: just smoked 2 weeks in college Substance and Sexual Activity  Alcohol use: No  
 Drug use: No  
 Sexual activity: Not Currently Lifestyle  Physical activity Days per week: Not on file Minutes per session: Not on file  Stress: Not on file Relationships  Social connections Talks on phone: Not on file Gets together: Not on file Attends Mu-ism service: Not on file Active member of club or organization: Not on file Attends meetings of clubs or organizations: Not on file Relationship status: Not on file  Intimate partner violence Fear of current or ex partner: Not on file Emotionally abused: Not on file Physically abused: Not on file Forced sexual activity: Not on file Other Topics Concern [address] Service Not Asked  Blood Transfusions Not Asked  Caffeine Concern Not Asked  Occupational Exposure Not Asked [place] Speak Hazards Not Asked  Sleep Concern Not Asked  Stress Concern Not Asked  Weight Concern Not Asked  Special Diet Not Asked  Back Care Not Asked  Exercise Not Asked  Bike Helmet Not Asked  Seat Belt Not Asked  Self-Exams Not Asked Social History Narrative  Not on file History reviewed. No pertinent family history. Allergies Allergen Reactions  Nifedipine Nausea and Vomiting and Other (comments) Dizzy  Ace Inhibitors Cough  Codeine Unknown (comments), Nausea Only and Nausea and Vomiting  Hydralazine Other (comments) Dizziness and Fatigue  Lipitor [Atorvastatin] Nausea and Vomiting and Vertigo  Plaquenil [Hydroxychloroquine] Other (comments)  Simvastatin Nausea and Vomiting Home Medications:  
 
Prior to Admission Medications Prescriptions Last Dose Informant Patient Reported? Taking? amLODIPine (NORVASC) 5 mg tablet 10/16/2020 at Unknown time  No Yes Sig: Take 1 Tab by mouth daily. Patient taking differently: Take 10 mg by mouth daily. aspirin 81 mg tablet 10/16/2020 at Unknown time  Yes Yes Sig: Take 81 mg by mouth daily. carvediloL (COREG) 25 mg tablet 10/16/2020 at Unknown time  No Yes Sig: TAKE 1 TABLET BY MOUTH (2) TIMES A DAY  
diclofenac (VOLTAREN) 1 % gel   No No  
 Sig: APPLY 4G ON THE KNEE FOUR TIME A DAY AS NEEDED  
furosemide (LASIX) 40 mg tablet 10/16/2020 at Unknown time  No Yes Sig: Take 1.5 Tabs by mouth daily. TAKE 1 TABLET BY MOUTH EVERY DAY Patient taking differently: Take 40 mg by mouth daily. TAKE 1 TABLET BY MOUTH EVERY DAY  
hydrocortisone (HYTONE) 2.5 % ointment   Yes No  
Sig: APPLY LIGHTLY TO THE AFFECTED AREAS ON THE LEGS AND FEET TWICE A DAY. ondansetron (ZOFRAN ODT) 8 mg disintegrating tablet 10/15/2020 at Unknown time  No Yes Sig: Take 1 Tab by mouth every eight (8) hours as needed for Nausea for up to 12 doses. rosuvastatin (CRESTOR) 20 mg tablet 10/15/2020 at Unknown time  No Yes Sig: Take 1 Tab by mouth nightly. Facility-Administered Medications: None Current Facility-Administered Medications Medication Dose Route Frequency  cefTRIAXone (ROCEPHIN) 1 g in sterile water (preservative free) 10 mL IV syringe  1 g IntraVENous Q24H  
 sodium chloride (NS) flush 5-40 mL  5-40 mL IntraVENous Q8H  
 sodium chloride (NS) flush 5-40 mL  5-40 mL IntraVENous PRN  
 acetaminophen (TYLENOL) tablet 650 mg  650 mg Oral Q6H PRN Or  
 acetaminophen (TYLENOL) suppository 650 mg  650 mg Rectal Q6H PRN  promethazine (PHENERGAN) tablet 12.5 mg  12.5 mg Oral Q6H PRN Or  
 ondansetron (ZOFRAN) injection 4 mg  4 mg IntraVENous Q6H PRN  
 bisacodyL (DULCOLAX) tablet 5 mg  5 mg Oral DAILY PRN  
 aspirin chewable tablet 81 mg  81 mg Oral DAILY  rosuvastatin (CRESTOR) tablet 20 mg  20 mg Oral QHS  amLODIPine (NORVASC) tablet 10 mg  10 mg Oral DAILY  carvediloL (COREG) tablet 25 mg  25 mg Oral BID WITH MEALS Review of Systems:  
 
 Complete 10-point review of systems were obtained and discussed in length 
with the patient. Complete review of systems was negative/unremarkable 
except as mentioned in HPI section. Data Review: 
 
Labs: Results:  
   
Chemistry Recent Labs 10/17/20 
0427 10/16/20 
1040 10/14/20 
7736 GLU 89 113* 96  135* 132* K 3.7 3.6 4.0  
 103 100 CO2 20* 22 17* BUN 41* 40* 38* CREA 2.66* 2.78* 2.70* CA 10.0 9.5 9.9  9.6 AGAP 9 10 15 BUCR 15 14 14 AP  --  79  --   
TP  --  6.6  --   
ALB  --  3.0* 3.4 GLOB  --  3.6  --   
AGRAT  --  0.8  --   
  
CBC w/Diff Recent Labs 10/17/20 
0427 10/16/20 
1040 10/14/20 
1256 WBC 5.7 4.4* 5.9  
RBC 3.32* 3.47* 3.59* HGB 8.8* 9.4* 9.7* HCT 27.4* 29.1* 29.7*  
 245 241 GRANS 55 56  --   
LYMPH 37 32  --   
EOS 3 4  --   
  
Coagulation Recent Labs 10/16/20 
1040 PTP 13.3 INR 1.0 APTT 26.7 Iron/Ferritin Recent Labs 10/14/20 
1256 IRON 28* BNP No results for input(s): BNPP in the last 72 hours. Cardiac Enzymes Recent Labs 10/16/20 
1040 CPK 29 CKND1 CALCULATION NOT PERFORMED WHEN RESULT IS BELOW LINEAR LIMIT Liver Enzymes Recent Labs 10/16/20 
1040 TP 6.6 ALB 3.0* AP 79 Thyroid Studies Lab Results Component Value Date/Time TSH 1.87 07/10/2017 11:17 AM  
    
. Physical Assessment:  
 
Visit Vitals /79 (BP 1 Location: Left leg, BP Patient Position: At rest) Pulse 69 Temp 98.1 °F (36.7 °C) Resp 17 Ht 5' 4.5\" (1.638 m) Wt 63.5 kg (139 lb 15.9 oz) SpO2 98% BMI 23.66 kg/m² Weight change: No intake or output data in the 24 hours ending 10/17/20 1043 Physical Exam:  
General: comfortable, no acute distress HEENT sclera anicteric, supple neck, no thyromegaly CVS: S1S2 heard,  no rub RS: + air entry b/l, Abd: Soft, Non tender, Not distended, Positive bowel sounds, no organomegaly, no CVA / supra pubic tenderness Neuro: non focal, awake, alert , CN II-XII are grossly intact Extrm: no edema, no cyanosis, clubbing Skin: no visible  Rash Musculoskeletal: No gross joints or bone deformities Procedures/imaging: see electronic medical records for all procedures, Xrays and details which were not copied into this note but were reviewed prior to creation of Plan Impression & Plan:  
IMPRESSION:  
CKD stage 4 due to cardiorenal syndrome. Baseline is around 2-2.5. USG kidneys 10/14-Right kidney: Limited visualization due to bowel gas. Roughly 8.1 cm length. Slightly increased echotexture. No hydronephrosis. No focal abnormality. 
 Left kidney: Limited visualization due to bowel gas. Roughly 8.7 cm length. Slightly increased echotexture. No hydronephrosis. 1.2 x 1.2 cm probable cyst in 
the medial kidney with suggestion of faint internal echoes. Similar 1.0 x 1.0 cm 
lesion in the mid to lower left kidney. A few punctate echogenic foci noted. · Hypercalcemia due to  Immobilization,ckd with secondary hyperparathyroidism. Rule out secondary causes like malignancy,myeloma, 125 hydroxy vitamin d induced etc 
· UA with numerous WBC and bacteria ? UTI · Positive FOREST, anti Sm ab in past? 
· Anemia of iron deficiency vs CKD · Cardiomyopathy with EF of 35-40 and pulmonary htn on previous Echo · Proteinuria · Hypertension PLAN:  
 Get SPEP, IEP, K/l ratio, FOREST, Anti dsDNA,Complements,anti smith, anti histone antibodies. Also get hepatitis serologies for completion sake Get PTH, Vitamin D -both 25 and 1,25 hydroxy, PTHrp IV normal saline at 50 cc per hr for now Rocephin for UTI No  nephrotoxic agents including lasix, diuretics at this point No ACE inhibitor and ARBs Close monitoring of serum creatinine and urinary output Consider of alternatives to radio contrast agents Hemodynamic monitoring and optimization according to a hemodynamic algorithm. Keep MAP>65-70 mm Hg Dose medications for cr clearance Frequent labs Kristie Baig MD 
October 17, 2020 [facility] [phone]

## 2020-10-17 NOTE — PROGRESS NOTES
10:46- Pt not seen for skilled PT due to: 
 
[ ]  Nausea/vomiting [ ]  Eating [ ]  Pain [ ]  Pt lethargic [place] ]  Off Unit Other: For [employer] Will f/u later as schedule allows. Thank you.  
Jyoti Chamberlain, PT, DPT

## 2020-10-17 NOTE — PROGRESS NOTES
Problem: Falls - Risk of 
Goal: *Absence of Falls Description: Document Ivin White Fall Risk and appropriate interventions in the flowsheet. Outcome: Progressing Towards Goal 
Note: Fall Risk Interventions: 
Mobility Interventions: OT consult for ADLs, Patient to call before getting OOB, PT Consult for mobility concerns, PT Consult for assist device competence Medication Interventions: Assess postural VS orthostatic hypotension, Bed/chair exit alarm, Patient to call before getting OOB, Teach patient to arise slowly History of Falls Interventions: Bed/chair exit alarm, Door open when patient unattended

## 2020-10-17 NOTE — H&P
Hospitalist Admission History and Physical 
 
NAME:  Jelani Leary :   1940 MRN:   [id number] PCP:  Paty Coleman MD 
Date/Time:  10/16/2020 8:26 PM 
 
Subjective: CHIEF COMPLAINT:  Vomiting and syncope HISTORY OF PRESENT ILLNESS:    
Cheryle Lathe is a 68 y.o.  female who presents with episode of syncope, vomiting and altered mental status earlier today. Primary source of information is daughter who is present during this episode. Per daughter onset of symptoms happened at approximately 80 - 935am during which time patient had been in chair daughter then found her slumped to one side and noticed that she had vomited a small amount, patient proceeded to withdrawal and was per daughter foaming at the mouth at this time with eyes rolled back in her head. Episode lasted total of approximately 5 minutes after which patient became more alert stating only complaint was that she was hot. Daughter did not see any tremor or jerking motion. Patient herself does not remember this event and only recalls being notified that the ambulance was there. Patient does states she gets dyspnea with exertion and notes that she has had worsening of her gait since  or July of this year but feels she has been progressively stronger. Patient also notes that it has been greater than 1 week since her last bowel movement but denies any symptoms of constipation. Patient has lost approximately 70 pounds since 2019. At time of conversation with this provider, patient is saying she is feeling fine denies headache, shortness of breath, cough, no known sick contacts, no chest pain, nausea, abdominal pain or dark stools [facility]. Past Medical History:  
Diagnosis Date  Abnormal ankle brachial index 2014 Mild arterial disease in right leg. R VICTOR MANUEL 0.88. L VICTOR MANUEL 1.00.  Anemia  Arm DVT (deep venous thromboembolism), acute (HCC)   
 s/p anticoagulation  Atherosclerotic heart disease  Atrial fibrillation [address]  AV block  CAD (coronary artery disease)  Cardiomyopathy, ischemic  Carotid artery stenosis  Carotid duplex 2014 Mild <50% bilateral ICA plaquing. Possible left vertebral occlusion.  Cerebral artery occlusion with cerebral infarction ([facility] stroke x 2  Chest pain  CVA (cerebral infarction)  Echocardiogram 2015 EF 55%. Apical inferior, apical septal hypk (new since study of 11), likely due to chronic V-pacing. Mild LVH. RVSP 30 mmHg. Mild LAE. Mild MR. Mild TR.  Gastritis  GERD (gastroesophageal reflux disease)  Heart failure ([facility]  Hiatal hernia  Hyperlipidemia  Hypertension  Hypertensive cardiovascular disease  Intracranial aneurysm   
 left cavernous ICA 2mm aneurysm from head/neck CTA in   Long term (current) use of anticoagulants 3/10/2011  Lower extremity venous duplex 2015 No DVT bilaterally.  Nuclear cardiac imaging test 2015 Low risk. Sm apical infarction w/no ischemia vs apical thinning. Sm basal inferior defect, likely artifact. EF 56%. Inferoseptal, inferoapical WMA related to sternotomy or paced rhythm.  Pacemaker  PAD (peripheral artery disease) ([facility]  PVC's   
 chronic  S/P CABG x 3 2008 LIMA-LAD. SVG-OM. SVG-dRCA  S/P cardiac cath 2008 pRCA 100%. LM patent. Cx patent. OM1 100%. mLAD 95%. LVEDP 27-29mmHg. EF 20%. mod MR  
 Tilt table evaluation 1995 Positive for orthostatic hypotension  Vitamin D deficiency Past Surgical History:  
Procedure Laterality Date  CABG, ARTERY-VEIN, THREE  2008  CARDIAC SURG PROCEDURE UNLIST    
 medtronic dual-chamber cardioverted-defibrillator  HX  SECTION    
 x2  
 HX ENDOSCOPY  3/1/16  HX ENDOSCOPY  3/1/16 [address]  HX HYSTERECTOMY   HX ORTHOPAEDIC    
 knee surgery  HX OTHER SURGICAL  2/10/16 Pacemaker Gen Change  HX PACEMAKER Defibrillator 2008  HX TUMOR REMOVAL    
 removed from arm.  benign Social History Tobacco Use  Smoking status: Never Smoker  Smokeless tobacco: Never Used  Tobacco comment: just smoked 2 weeks in college Substance Use Topics  Alcohol use: No  
  
 
History reviewed. No pertinent family history. Allergies Allergen Reactions  Nifedipine Nausea and Vomiting and Other (comments) Dizzy  Ace Inhibitors Cough  Codeine Unknown (comments), Nausea Only and Nausea and Vomiting  Hydralazine Other (comments) Dizziness and Fatigue  Lipitor [Atorvastatin] Nausea and Vomiting and Vertigo  Plaquenil [Hydroxychloroquine] Other (comments)  Simvastatin Nausea and Vomiting Prior to Admission Medications Prescriptions Last Dose Informant Patient Reported? Taking? amLODIPine (NORVASC) 5 mg tablet   No No  
Sig: Take 1 Tab by mouth daily. aspirin 81 mg tablet   Yes No  
Sig: Take 81 mg by mouth daily. carvediloL (COREG) 25 mg tablet   No No  
Sig: TAKE 1 TABLET BY MOUTH (2) TIMES A DAY  
diclofenac (VOLTAREN) 1 % gel   No No  
Sig: APPLY 4G ON THE KNEE FOUR TIME A DAY AS NEEDED  
furosemide (LASIX) 40 mg tablet   No No  
Sig: Take 1.5 Tabs by mouth daily. TAKE 1 TABLET BY MOUTH EVERY DAY  
hydrocortisone (HYTONE) 2.5 % ointment   Yes No  
Sig: APPLY LIGHTLY TO THE AFFECTED AREAS ON THE LEGS AND FEET TWICE A DAY. ondansetron (ZOFRAN ODT) 8 mg disintegrating tablet   No No  
Sig: Take 1 Tab by mouth every eight (8) hours as needed for Nausea for up to 12 doses. rosuvastatin (CRESTOR) 20 mg tablet   No No  
Sig: Take 1 Tab by mouth nightly. Facility-Administered Medications: None REVIEW OF SYSTEMS:   
 [] Unable to obtain  ROS due to  []mental status change  []sedated   []intubated 
 [x]Total of 12 systems reviewed as follows: GENERAL: no fever or chills HEENT: mild chronic sinus congestion / hearing or vision changes NECK: No pain or stiffness. PULMONARY: +MCMILLAN; no shortness of breath at rest or cough Cardiovascular: denies palpitations; no lower extremity edema GASTROINTESTINAL: + vomiting earlier today; Denies abdominal pain, constipation, diarrhea, nausea, or melena / bloody stools GENITOURINARY: No urinary frequency, urgency, hesitancy or dysuria. MUSCULOSKELETAL: + chronic gait instability / strength - improving; no back / joint or muscle pain, no recent trauma;  
DERMATOLOGIC: denies pruritis, rashes or open areas ENDOCRINE: No polyuria, polydipsia, no heat or cold intolerance. HEMATOLOGICAL: No anemia or easy bruising or bleeding. NEUROLOGIC:  no focal weakness,  no speech changes Objective: VITALS:   
Visit Vitals /73 (BP 1 Location: Right arm, BP Patient Position: At rest) Pulse 70 Temp 98.6 °F (37 °C) Resp 17 Ht 5' 4.5\" (1.638 m) Wt 63.5 kg (140 lb) SpO2 99% BMI 23.66 kg/m² Temp (24hrs), Av.6 °F (37 °C), Min:98.4 °F (36.9 °C), Max:98.9 °F (37.2 °C) PHYSICAL EXAM:  
General:          Alert, in NAD HEENT:           Sclera anicteric. Conjunctiva pink Neck:               Supple. Trachea midline. No accessory muscle use. CV:                  Regular rate and rhythm. S1S2+ Lungs:             Clear to auscultation bilaterally. No Wheezing or Rhonchi. No rales. Abdomen:        Soft, non-tender. Not distended. Bowel sounds normal.  
Extremities:     No cyanosis. No edema. Pulses 2+ b/l Neurologic:      Alert and oriented X 3. Follows commands, responds appropriately. No focal neurological deficit was noted Skin:                Warm and dry. No rashes. LAB DATA REVIEWED:   
No components found for: [place] Recent Labs 10/16/20 
1040 10/14/20 
1256 * 132* K 3.6 4.0  
 100 CO2 22 17* BUN 40* 38* CREA 2.78* 2.70* * 96 PHOS  --  3.5 CA 9.5 9.9  9.6 ALB 3.0* 3.4 WBC 4.4* 5.9 HGB 9.4* 9.7* HCT 29.1* 29.7*  
 241 IMAGING RESULTS: 
 
 [x]  I have personally reviewed the actual   []CXR  []CT scan Assessment/Plan: Active Problems: 
  Pacemaker (for h/o AV block) (7/5/2016) Syncope (10/16/2020) UTI (urinary tract infection) (10/16/2020) CAD (coronary artery disease) (10/16/2020) Nausea and vomiting (10/16/2020) 
 
  
___________________________________________________ PLAN:   
1. Syncope -cardiology consulted /cardiac monitoring /orthostatics /check echo /teleneurology note reviewed 2. Urinary Tract Infection -continue Rocephin /follow urine culture results 3. CKD stage 4 -gradual inc of creatinine /renally dose all medications / nephrology consulted /hold Lasix for now until seen by nephrology 4. Chronic systolic CHF -recheck echo /strict I&O's 
5. Intentional 70 pound weight loss in 1 year -patient states she cut down from 4 meals a day to 2 meals per day /nutrition consult 6. H/o CAD s/p CABG x 3 in 2008 and AICD placement 7. AV block s/p pacemaker placement in 10/2008 8. HTN -continue home regimen with hold parameters 9. Debility -PT OT consulted CODE STATUS discussed with patient she continues to wish for full code at this point Risk of deterioration:  []Low    [x]Moderate  []High Prophylaxis:  []Lovenox  []Coumadin  [x]Hep SQ  []SCDs  []H2B/PPI Disposition:  []Home w/ Family   [x] PT,OT,RN   [x]SNF/LTC   []SAH/Rehab Discussed Code Status:    [x]Full Code      []DNR    
___________________________________________________ Care Plan discussed with: 
  [x]Patient   [x]Family    []ED Care Manager  []ED Doc   [x]Specialist : nephrology 
___________________________________________________ Admitting Provider: Naomy Thornton NP

## 2020-10-17 NOTE — PROGRESS NOTES
Problem: Falls - Risk of 
Goal: *Absence of Falls Description: Document Alissa Snyder Fall Risk and appropriate interventions in the flowsheet. Outcome: Progressing Towards Goal 
Note: Fall Risk Interventions: 
Mobility Interventions: Bed/chair exit alarm, PT Consult for assist device competence, PT Consult for mobility concerns, Patient to call before getting OOB, Communicate number of staff needed for ambulation/transfer Medication Interventions: Assess postural VS orthostatic hypotension, Teach patient to arise slowly, Patient to call before getting OOB, Evaluate medications/consider consulting pharmacy History of Falls Interventions: Bed/chair exit alarm, Door open when patient unattended, Investigate reason for fall Problem: Patient Education: Go to Patient Education Activity Goal: Patient/Family Education Outcome: Progressing Towards Goal

## 2020-10-17 NOTE — ROUTINE PROCESS
Bedside shift change report given to [employer] (oncoming nurse) by Jazmin Ford (offgoing nurse). Report included the following information SBAR, Procedure Summary, MAR and Recent Results.

## 2020-10-17 NOTE — ROUTINE PROCESS
Bedside and Verbal shift change report given to Lyndle Cogan, RN (oncoming nurse) by JALEN Blankenship RN (offgoing nurse). Report included the following information SBAR, Kardex, Intake/Output, MAR, Recent Results and Med Rec Status.

## 2020-10-17 NOTE — PROGRESS NOTES
[facility] Hospitalist Group Progress Note Patient: Nicolas Noyola Age: 68 y.o. : 1940 MR#: [id number] SSN: [id number] Date/Time: 10/17/2020 Subjective:  
 
Patient is sitting in bed in no apparent distress, awake, follows commands Assessment/Plan:  
 
Syncope Coronary artery disease, history of CABG Ischemic cardiomyopathy EF of 35 to 40% Chronic kidney disease stage IV Hypertension Dyslipidemia Chronic systolic congestive heart failure  compensated UTI PLAN Cardiology is following, plans to interrogate the AICD noted Nephrology is following, monitor renal function and calcium Monitor blood pressure, check orthostatics On aspirin, Coreg, amlodipine Monitor blood pressures On ceftriaxone for UTI, follow cultures PT OT Discussed with patient With patient's permission I called her son at phone #[phone] and updated him regarding patient's care Case discussed with:  [x]Patient  [x]Family  [x]Nursing  []Case Management DVT Prophylaxis:  []Lovenox  []Hep SQ  [x]SCDs  []Coumadin   []On Heparin gtt Objective:  
VS:  
Visit Vitals /65 Pulse 60 Temp 98.7 °F (37.1 °C) Resp 19 Ht 5' 4\" (1.626 m) Wt 63.5 kg (140 lb) SpO2 100% BMI 24.03 kg/m² Tmax/24hrs: Temp (24hrs), Av.4 °F (36.9 °C), Min:97.9 °F (36.6 °C), Max:98.9 °F (37.2 °C) Input/Output:  
 
Intake/Output Summary (Last 24 hours) at 10/17/2020 1707 Last data filed at 10/17/2020 1524 Gross per 24 hour Intake 120 ml Output  Net 120 ml General:  Awake, less commands, responds appropriately Cardiovascular:  S1S2+, RRR Pulmonary:  CTA b/l GI:  Soft, BS+, NT, ND Extremities:  trace edema Labs:   
Recent Results (from the past 24 hour(s)) METABOLIC PANEL, BASIC Collection Time: 10/17/20  4:27 AM  
Result Value Ref Range Sodium 136 136 - 145 mmol/L Potassium 3.7 3.5 - 5.5 mmol/L  Chloride 107 100 - 111 mmol/L  
 CO2 20 (L) 21 - 32 mmol/L  
 Anion gap 9 3.0 - 18 mmol/L Glucose 89 74 - 99 mg/dL BUN 41 (H) 7.0 - 18 MG/DL Creatinine 2.66 (H) 0.6 - 1.3 MG/DL  
 BUN/Creatinine ratio 15 12 - 20 GFR est AA 21 (L) >60 ml/min/1.73m2 GFR est non-AA 17 (L) >60 ml/min/1.73m2 Calcium 10.0 8.5 - 10.1 MG/DL  
CBC WITH AUTOMATED DIFF Collection Time: 10/17/20  4:27 AM  
Result Value Ref Range WBC 5.7 4.6 - 13.2 K/uL  
 RBC 3.32 (L) 4.20 - 5.30 M/uL HGB 8.8 (L) 12.0 - 16.0 g/dL HCT 27.4 (L) 35.0 - 45.0 % MCV 82.5 74.0 - 97.0 FL  
 MCH 26.5 24.0 - 34.0 PG  
 MCHC 32.1 31.0 - 37.0 g/dL  
 RDW 14.6 (H) 11.6 - 14.5 % PLATELET 601 291 - 780 K/uL MPV 9.0 (L) 9.2 - 11.8 FL  
 NEUTROPHILS 55 40 - 73 % LYMPHOCYTES 37 21 - 52 % MONOCYTES 5 3 - 10 % EOSINOPHILS 3 0 - 5 % BASOPHILS 0 0 - 2 %  
 ABS. NEUTROPHILS 3.2 1.8 - 8.0 K/UL  
 ABS. LYMPHOCYTES 2.1 0.9 - 3.6 K/UL  
 ABS. MONOCYTES 0.3 0.05 - 1.2 K/UL  
 ABS. EOSINOPHILS 0.2 0.0 - 0.4 K/UL  
 ABS. BASOPHILS 0.0 0.0 - 0.1 K/UL  
 DF AUTOMATED    
ECHO ADULT COMPLETE Collection Time: 10/17/20 11:44 AM  
Result Value Ref Range IVSd 1.25 (A) 0.6 - 0.9 cm LVIDd 5.21 3.9 - 5.3 cm LVIDs 3.82 cm  
 LVOT d 2.05 cm  
 LVPWd 0.86 0.6 - 0.9 cm  
 LVOT Cardiac Output 3.24 liter/minute LVOT Peak Gradient 2.99 mmHg Left Ventricular Outflow Tract Mean Gradient 1.39 mmHg LVOT SV 53.0 mL  
 LVOT Peak Velocity 86.40 cm/s LVOT VTI 16.02 cm LA Volume 49.10 22 - 52 mL  
 LA Area 4C 12.97 cm2 LA Vol 2C 53.30 (A) 22 - 52 mL  
 LA Vol 4C 32.11 22 - 52 mL  
 MV A Taz 85.36 cm/s Mitral Valve E Wave Deceleration Time 0.24 s MV E Taz 45.30 cm/s  
 MV E/A 0.53 E/E' lateral 8.22   
 E/E' septal 7.25 LV E' Lateral Velocity 5.51 cm/s LV E' Septal Velocity 6.25 cm/s Tapse 1.61 1.5 - 2.0 cm Triscuspid Valve Regurgitation Peak Gradient 31.95 mmHg  
 TR Max Velocity 282.61 cm/s  Ao Root D 3.25 cm  
 LV Mass .3 67 - 162 g  
 LV Mass AL Index 124.5 43 - 95 g/m2 E/E' ratio (averaged) 7.73 LA Vol Index 29.21 16 - 28 ml/m2 LA Vol Index 31.71 16 - 28 ml/m2 LA Vol Index 19.10 16 - 28 ml/m2 PTH INTACT Collection Time: 10/17/20  2:10 PM  
Result Value Ref Range Calcium 9.6 8.5 - 10.1 MG/DL  
 PTH, Intact PENDING pg/mL Additional Data Reviewed:   
 
Signed By: Mita Tucker MD   
 October 17, 2020 Dragon medical dictation software was used for portions of this report. Unintended errors may occur.

## 2020-10-17 NOTE — PROGRESS NOTES
Problem: Mobility Impaired (Adult and Pediatric) Goal: *Acute Goals and Plan of Care (Insert Text) Description: Physical Therapy Goals Initiated 10/17/2020 and to be accomplished within 7 day(s) 1. Patient will move from supine to sit and sit to supine  in bed with supervision/set-up. 2.  Patient will transfer from bed to chair and chair to bed with supervision/set-up using the least restrictive device. 3.  Patient will perform sit to stand with supervision/set-up. 4.  Patient will ambulate with supervision/set-up for 100 feet with the least restrictive device. 5.  Patient will ascend/descend 3 stairs with B handrail(s) with supervision/set-up. PLOF: Pt lives in a [facility] [place] with 3 LORA B HR. Her son lives with her but works during the day and she is home alone. She has a cane and walker but uses a cane as she states her walker has made her fall before. Pt needed assist from son with dressing and bathing. Outcome: Progressing Towards Goal 
  
PHYSICAL THERAPY EVALUATION Patient: Tiffanie Brooks (18 y.o. female) Date: 10/17/2020 Primary Diagnosis: Syncope [R55] Pacemaker [Z95.0] CAD (coronary artery disease) [I25.10] UTI (urinary tract infection) [N39.0] Nausea and vomiting [R11.2] Precautions:  Fall ASSESSMENT : 
Based on the objective data described below, the patient presents with decreased strength, endurance, and instability during ambulation. RN in room with patient sitting EOB, asking for assistance with getting pt sitting up in chair. Pt stood with minAx1 for safety and ambulated 6 feet to get in chair. Pt very unsteady during amb, deviating towards right and refusing help from PT and RN. When asked about unsteadiness, patient stated she walks fine. Pt with very poor eccentric control to sitting in recliner. Once sitting, objective screen shows general 3+/5 strength B LE.  Pt reports she does not want to use a RW to ambulate cus it has made her fall in the past. Pt educated on importance of having stability with RW when walking to prevent falls. Pt report having several falls in the past. Pt requested to go back to bed, pt stood with CGA, no AD, and ambulated with same unsteadiness back to bed, reaching for objects while ambulated. Pt back sitting Eob with RN at end of session. At this time, pt requires acute PT to improve safety with all functional mobility. Recommend d/c home with [facility] and assist/supervision 24/7. Pt would benefit from use of a RW with all mobility, but she is refusing to use one. Patient will benefit from skilled intervention to address the above impairments. Patient's rehabilitation potential is considered to be Fair Factors which may influence rehabilitation potential include:  
[]         None noted 
[]         Mental ability/status []         Medical condition 
[x]         Home/family situation and support systems 
[x]         Safety awareness 
[]         Pain tolerance/management 
[]         Other: PLAN : 
Recommendations and Planned Interventions:  
[x]           Bed Mobility Training             [x]    Neuromuscular Re-Education 
[x]           Transfer Training                   []    Orthotic/Prosthetic Training 
[x]           Gait Training                          []    Modalities [x]           Therapeutic Exercises           []    Edema Management/Control 
[x]           Therapeutic Activities            [x]    Family Training/Education 
[x]           Patient Education 
[]           Other (comment): Frequency/Duration: Patient will be followed by physical therapy 1-2 times per day/4-7 days per week to address goals. Discharge Recommendations: Home Health with 24/7 supervision/assist 
Further Equipment Recommendations for Discharge: N/A  
 
SUBJECTIVE:  
Patient stated I dont want any home health or anything like that. Don't touch me while i'm walking OBJECTIVE DATA SUMMARY:  
 
Past Medical History: Diagnosis Date Abnormal ankle brachial index 11/13/2014 Mild arterial disease in right leg. R VICTOR MANUEL 0.88. L VICTOR MANUEL 1.00. Anemia Arm DVT (deep venous thromboembolism), acute (HCC)   
 s/p anticoagulation Atherosclerotic heart disease Atrial fibrillation ([facility] [address] AV block CAD (coronary artery disease) Cardiomyopathy, ischemic Carotid artery stenosis Carotid duplex 11/13/2014 Mild <50% bilateral ICA plaquing. Possible left vertebral occlusion. Cerebral artery occlusion with cerebral infarction ([address] stroke x 2 Chest pain CVA (cerebral infarction) Echocardiogram 08/19/2015 EF 55%. Apical inferior, apical septal hypk (new since study of 12/19/11), likely due to chronic V-pacing. Mild LVH. RVSP 30 mmHg. Mild LAE. Mild MR. Mild TR. Gastritis GERD (gastroesophageal reflux disease) Heart failure ([address] Hiatal hernia Hyperlipidemia Hypertension Hypertensive cardiovascular disease Intracranial aneurysm   
 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 Nuclear cardiac imaging test 09/24/2015 Low risk. Sm apical infarction w/no ischemia vs apical thinning. Sm basal inferior defect, likely artifact. EF 56%. Inferoseptal, inferoapical WMA related to sternotomy or paced rhythm. Pacemaker PAD (peripheral artery disease) ([facility] [address] PVC's   
 chronic S/P CABG x 3 06/08/2008 LIMA-LAD. SVG-OM. SVG-dRCA   
 S/P cardiac cath 06/08/2008 pRCA 100%. LM patent. Cx patent. OM1 100%. mLAD 95%. LVEDP 27-29mmHg. EF 20%. mod MR Tilt table evaluation 06/01/1995 Positive for orthostatic hypotension Vitamin D deficiency Past Surgical History:  
Procedure Laterality Date CABG, ARTERY-VEIN, THREE  6/2008 CARDIAC SURG PROCEDURE UNLIST medtronic dual-chamber cardioverted-defibrillator HX  SECTION    
 x2 HX ENDOSCOPY  3/1/16 HX ENDOSCOPY  3/1/16 [address] HX HYSTERECTOMY  HX ORTHOPAEDIC    
 knee surgery HX OTHER SURGICAL  2/10/16 Pacemaker Gen Change HX PACEMAKER Defibrillator  HX TUMOR REMOVAL    
 removed from arm.  benign Barriers to Learning/Limitations: None Compensate with: N/A Home Situation: 
Home Situation Home Environment: Private residence # Steps to Enter: 3 Rails to Enter: Yes Hand Rails : Bilateral 
One/Two Story Residence: One story Living Alone: No 
Support Systems: Child(celio)(son) Patient Expects to be Discharged to[de-identified] Private residence Current DME Used/Available at Home: Amrita Prima, rolling, Walker, rollator, [employer] [place], straight Critical Behavior: 
Neurologic State: Alert Orientation Level: Oriented X4 Cognition: Follows commands Safety/Judgement: Fall prevention;Lack of insight into deficits Psychosocial 
Purposeful Interaction: Yes Pt Identified Daily Priority: Clinical issues (comment) Caritas Process: Nurture loving kindness;Enable rosa/hope;Establish trust 
Caring Interventions: Reassure; Therapeutic modalities Reassure: Therapeutic listening;Prayer; Informing;Caring rounds Therapeutic Modalities: Humor; Intentional therapeutic touch Strength:   
Strength: Generally decreased, functional 
  
Tone & Sensation:  
Tone: Normal 
 
Sensation: Intact Range Of Motion: 
AROM: Generally decreased, functional 
 
  
PROM: Generally decreased, functional 
 
Transfers: 
Sit to Stand: Minimum assistance Stand to Sit: Minimum assistance Balance:  
Sitting: Intact; Without support Standing: Impaired; With support Standing - Static: Fair;Occasional 
Standing - Dynamic : Poor Ambulation/Gait Training: 
Distance (ft): 15 Feet (ft) Assistive Device: Other (comment)(none) Ambulation - Level of Assistance: Minimal assistance;Assist x1 
  
  
 Gait Abnormalities: Path deviations;Decreased step clearance Base of Support: Narrowed Speed/Riz[place]ana: Pace decreased (<100 feet/min) Step Length: Right shortened;Left shortened Pain: 
Pain level pre-treatment: 0/10 Pain level post-treatment: 0/10 Pain Intervention(s) : Medication (see MAR); Rest, Repositioning Response to intervention: Nurse notified, See doc flow Activity Tolerance:  
Pt very unsteady during gait and she is unaware of it Please refer to the flowsheet for vital signs taken during this treatment. After treatment:  
[]         Patient left in no apparent distress sitting up in chair 
[x]         Patient left in no apparent distress in bed 
[x]         Call bell left within reach [x]         Nursing notified 
[]         Caregiver present 
[]         Bed alarm activated 
[]         SCDs applied COMMUNICATION/EDUCATION:  
[x]         Role of Physical Therapy in the acute care setting. [x]         Fall prevention education was provided and the patient/caregiver indicated understanding. [x]         Patient/family have participated as able in goal setting and plan of care. []         Patient/family agree to work toward stated goals and plan of care. []         Patient understands intent and goals of therapy, but is neutral about his/her participation. []         Patient is unable to participate in goal setting/plan of care: ongoing with therapy staff. 
[]         Other: Thank you for this referral. 
Trixie Bruce Time Calculation: 12 mins Eval Complexity: History: MEDIUM  Complexity : 1-2 comorbidities / personal factors will impact the outcome/ POC Exam:MEDIUM Complexity : 3 Standardized tests and measures addressing body structure, function, activity limitation and / or participation in recreation  Presentation: MEDIUM Complexity : Evolving with changing characteristics  Clinical Decision Making:Medium Complexity    Overall Complexity:MEDIUM

## 2020-10-17 NOTE — PROGRESS NOTES
Echocardiogram completed. Patient returned to room with armband in place. Report to follow.  
 
[address] [facility]

## 2020-10-17 NOTE — CONSULTS
[facility] - Consult Note Consultation request by Jasper Zuleta MD for advice/opinion related to evaluating Syncope [R55] Pacemaker [Z95.0] CAD (coronary artery disease) [I25.10] UTI (urinary tract infection) [N39.0] Nausea and vomiting [R11.2] Date of  Admission: 10/16/2020 10:13 AM  
Primary Care Physician:  Tomas Pablo MD 
 
 Assessment:  
 
Patient Active Problem List  
Diagnosis Code  Atherosclerotic heart disease, s/p CABG X3 in 6/08, in the setting of severe left ventricular dysfunction, and s/p a dual-chamber I25.10  
 S/P CABG x 3 Z95.1  HTN (hypertension) I10  
 Atrial fibrillation ([facility]) I48.91  
 Carotid stenosis I65.29  
 PAD (peripheral artery disease) ([facility]) I73.9  Intracranial aneurysm - left cavernous ICA on 2014 head/neck CTA I67.1  Mixed hyperlipidemia E78.2  Gastroesophageal reflux disease without esophagitis K21.9  Chronic gastritis with bleeding - in February/March of 2016 per FOBT and EGD results K29.51  
 History of CVA (cerebrovascular accident) Z80.78  
 Vitamin D deficiency E55.9  Dysphagia s/p dilation R13.10  Anemia D64.9  
 Pacemaker (for h/o AV block) Z95.0  
 OAB (overactive bladder) N32.81  
 Cataract H26.9  Gout of left foot M10.9  Seasonal allergic rhinitis J30.2  CKD (chronic kidney disease) stage 3, GFR 30-59 ml/min N18.30  Mixed connective tissue disease ([facility] [address] M35.1  Severe obesity (BMI 35.0-39. 9) with comorbidity ([address] E66.01  
 Peripheral venous insufficiency I87.2  Chronic combined systolic and diastolic congestive heart failure (HCC) I50.42  
 Impaired glucose tolerance R73.02  
 Syncope R55  UTI (urinary tract infection) N39.0  CAD (coronary artery disease) I25.10  Nausea and vomiting R11.2  
 
-Syncope. Unclear etiology at this point. Reportedly patient's AICD was interrogated at the [facility] ER, but there are no details of this on the chart. -Ischemic cardiomyopathy. EF 35 to 40% by echocardiogram today. This is unchanged compared to her previous echocardiogram from 2019. She does not have any signs or symptoms of decompensated heart failure. She has been treated with a beta-blocker, but is not on ACE or ARB due to her chronic kidney disease. 
-History of CAD s/p coronary artery bypass grafting surgery x three in June of 2008 by Dr. Jaylen Morales with the following bypasses: 
   
1. Left internal mammary artery to the LAD. 2. Reverse saphenous vein graft to the first obtuse marginal branch of the circumflex. 3. Reverse saphenous vein graft to the distal right coronary artery. - 
-History of AV block s/p [employer] dual-chamber AICD. Device was initially placed due to low LVEF in the past. 
-HTN- stable 
-Dyslipidemia-intolerant to statins, extremely elevated cholesterol numbers. Suspect she is not taking the Crestor which was recently prescribed. 
-Chronic kidney disease, stage IV. Creatinine appears to be close to baseline. 
 
-Primary cardiologist Dr. Tamia Bryant Plan: We will have [employer] representative interrogate AICD over the weekend to ensure she is not having any ventricular arrhythmias as a cause for her syncope. Will check BP orthostatics. Patient not on any new meds, and she has been feeling otherwise well. Continue with ASA, amlodipine, and BB. Can resume Crestor if patient was taking at home. No ACE inhibitors or ARB due to chronic kidney disease Possible discharge home tomorrow if no arrhythmias found and patient not orthostatic. History of Present Illness: This is a 68 y.o. female admitted for Syncope [R55] Pacemaker [Z95.0] CAD (coronary artery disease) [I25.10] UTI (urinary tract infection) [N39.0] Nausea and vomiting [R11.2].    
Patient complains of:  Patient was feeling normal prior to having a syncopal episode in which the patient states she was with her daughter and sitting in a mobile chair and by history had an episode of unconsciousness for an unknown period of time. She had vomited as part of this episode and there is no report of seizure activity or incontinence. She currently is not complaining of any symptoms, and denies any chest pain, shortness of breath, lightheadedness, dizziness, or claudication. Cardiac risk factors: family history, dyslipidemia, hypertension, post-menopausal, age, CAD Review of Symptoms:  Except as stated above include: 
Constitutional:  negative Respiratory:  negative Cardiovascular:  negative Gastrointestinal: negative Genitourinary:  negative Musculoskeletal:  Negative Neurological:  Negative Dermatological:  Negative Endocrinological: Negative Psychological:  Negative Pertinent items are noted in HPI. Past Medical History:  
 
Past Medical History:  
Diagnosis Date  Abnormal ankle brachial index 11/13/2014 Mild arterial disease in right leg. R VICTOR MANUEL 0.88. L VICTOR MANUEL 1.00.  Anemia  Arm DVT (deep venous thromboembolism), acute (HCC)   
 s/p anticoagulation  Atherosclerotic heart disease  Atrial fibrillation ([address]  AV block  CAD (coronary artery disease)  Cardiomyopathy, ischemic  Carotid artery stenosis  Carotid duplex 11/13/2014 Mild <50% bilateral ICA plaquing. Possible left vertebral occlusion.  Cerebral artery occlusion with cerebral infarction ([address] stroke x 2  Chest pain  CVA (cerebral infarction)  Echocardiogram 08/19/2015 EF 55%. Apical inferior, apical septal hypk (new since study of 12/19/11), likely due to chronic V-pacing. Mild LVH. RVSP 30 mmHg. Mild LAE. Mild MR. Mild TR.  Gastritis  GERD (gastroesophageal reflux disease)  Heart failure ([address]  Hiatal hernia  Hyperlipidemia  Hypertension  Hypertensive cardiovascular disease  Intracranial aneurysm left cavernous ICA 2mm aneurysm from head/neck CTA in 2014  Long term (current) use of anticoagulants 3/10/2011  Lower extremity venous duplex 01/26/2015 No DVT bilaterally.  Nuclear cardiac imaging test 09/24/2015 Low risk. Sm apical infarction w/no ischemia vs apical thinning. Sm basal inferior defect, likely artifact. EF 56%. Inferoseptal, inferoapical WMA related to sternotomy or paced rhythm.  Pacemaker  PAD (peripheral artery disease) ([address]  PVC's   
 chronic  S/P CABG x 3 06/08/2008 LIMA-LAD. SVG-OM. SVG-dRCA  S/P cardiac cath 06/08/2008 pRCA 100%. LM patent. Cx patent. OM1 100%. mLAD 95%. LVEDP 27-29mmHg. EF 20%. mod MR  
 Tilt table evaluation 06/01/1995 Positive for orthostatic hypotension  Vitamin D deficiency Social History:  
 
Social History Socioeconomic History  Marital status:  Spouse name: Not on file  Number of children: Not on file  Years of education: Not on file  Highest education level: Not on file Tobacco Use  Smoking status: Never Smoker  Smokeless tobacco: Never Used  Tobacco comment: just smoked 2 weeks in college Substance and Sexual Activity  Alcohol use: No  
 Drug use: No  
 Sexual activity: Not Currently Other Topics Concern Family History:  
History reviewed. No pertinent family history. Medications: Allergies Allergen Reactions  Nifedipine Nausea and Vomiting and Other (comments) Dizzy  Ace Inhibitors Cough  Codeine Unknown (comments), Nausea Only and Nausea and Vomiting  Hydralazine Other (comments) Dizziness and Fatigue  Lipitor [Atorvastatin] Nausea and Vomiting and Vertigo  Plaquenil [Hydroxychloroquine] Other (comments)  Simvastatin Nausea and Vomiting Current Facility-Administered Medications Medication Dose Route Frequency  0.9% sodium chloride infusion  50 mL/hr IntraVENous CONTINUOUS  
  cefTRIAXone (ROCEPHIN) 1 g in sterile water (preservative free) 10 mL IV syringe  1 g IntraVENous Q24H  
 sodium chloride (NS) flush 5-40 mL  5-40 mL IntraVENous Q8H  
 sodium chloride (NS) flush 5-40 mL  5-40 mL IntraVENous PRN  
 acetaminophen (TYLENOL) tablet 650 mg  650 mg Oral Q6H PRN Or  
 acetaminophen (TYLENOL) suppository 650 mg  650 mg Rectal Q6H PRN  promethazine (PHENERGAN) tablet 12.5 mg  12.5 mg Oral Q6H PRN Or  
 ondansetron (ZOFRAN) injection 4 mg  4 mg IntraVENous Q6H PRN  
 bisacodyL (DULCOLAX) tablet 5 mg  5 mg Oral DAILY PRN  
 aspirin chewable tablet 81 mg  81 mg Oral DAILY  rosuvastatin (CRESTOR) tablet 20 mg  20 mg Oral QHS  amLODIPine (NORVASC) tablet 10 mg  10 mg Oral DAILY  carvediloL (COREG) tablet 25 mg  25 mg Oral BID WITH MEALS Physical Exam:  
 
Visit Vitals /79 Pulse 69 Temp 98.1 °F (36.7 °C) Resp 17 Ht 5' 4\" (1.626 m) Wt 63.5 kg (140 lb) SpO2 98% BMI 24.03 kg/m² BP Readings from Last 3 Encounters:  
10/17/20 137/79  
10/02/20 136/61  
09/25/20 132/71 Pulse Readings from Last 3 Encounters:  
10/17/20 69  
10/02/20 65  
09/25/20 65 Wt Readings from Last 3 Encounters:  
10/17/20 63.5 kg (140 lb) 10/02/20 64.9 kg (143 lb)  
09/25/20 63.5 kg (140 lb) General:  alert, cooperative, no distress, appears stated age Neck:  nontender, no carotid bruit, no JVD Lungs:  clear to auscultation bilaterally Heart:  regular rate and rhythm, S1, S2 normal, no murmur, click, rub or gallop Abdomen:  abdomen is soft without significant tenderness, masses, organomegaly or guarding Extremities:  extremities normal, atraumatic, no cyanosis or edema Skin: Warm and dry. no hyperpigmentation, vitiligo, or suspicious lesions Neuro: alert, oriented x3, affect appropriate, no focal neurological deficits, moves all extremities well, no involuntary movements Psych: non focal 
 
 Data Review:  
 
Recent Labs 10/17/20 2762 10/16/20 
1040 10/14/20 
1256 WBC 5.7 4.4* 5.9 HGB 8.8* 9.4* 9.7* HCT 27.4* 29.1* 29.7*  
 245 241 Recent Labs 10/17/20 
0427 10/16/20 
1040 10/14/20 
1256  135* 132* K 3.7 3.6 4.0  
 103 100 CO2 20* 22 17* GLU 89 113* 96 BUN 41* 40* 38* CREA 2.66* 2.78* 2.70* CA 10.0 9.5 9.9  9.6 PHOS  --   --  3.5 ALB  --  3.0* 3.4 ALT  --  6*  --   
INR  --  1.0  -- Results for orders placed or performed during the hospital encounter of 10/16/20 EKG, 12 LEAD, INITIAL Result Value Ref Range Ventricular Rate 60 BPM  
 Atrial Rate 60 BPM  
 P-R Interval 80 ms QRS Duration 196 ms  
 Q-T Interval 478 ms QTC Calculation (Bezet) 478 ms Calculated P Axis 94 degrees Calculated R Axis -72 degrees Calculated T Axis 107 degrees Diagnosis AV sequential or dual chamber electronic pacemaker When compared with ECG of 14-JUL-2019 20:44, 
atrial-paced complexes are now present Confirmed by Amelia Ro MD, Audrey Offer ([id number]) on 10/16/2020 2:15:18 PM 
  
Results for orders placed or performed in visit on 05/08/18 AMB POC EKG ROUTINE W/ 12 LEADS, INTER & REP Narrative Normal sinus mechanism with atrial sensing and ventricular pacing and occasional PAC's. Compared to the EKG of October 24, 2017 there was a little interval change. Results for orders placed or performed in visit on 12/31/14 PACEMAKER CHECK Impression V-paced - 77%; A-sensed - 82%; A-paced - 29%; V-sensed - 23%; Lead impedances and threshold WNL; No events All Cardiac Markers in the last 24 hours:   
Lab Results Component Value Date/Time TROIQ <0.02 10/16/2020 02:42 PM  
 
 
Last Lipid:   
Lab Results Component Value Date/Time  Cholesterol, total 288 (H) 09/21/2020 09:44 AM  
 HDL Cholesterol 58 09/21/2020 09:44 AM  
 LDL, calculated 193.4 (H) 09/21/2020 09:44 AM  
 Triglyceride 183 (H) 09/21/2020 09:44 AM  
 CHOL/HDL Ratio 5.0 09/21/2020 09:44 AM  
 
 
 Signed By: HENRY Rodriguez October 17, 2020 Godwin Hutchins MD

## 2020-10-17 NOTE — ROUTINE PROCESS
Bedside and Verbal shift change report given to Shireen Carter and Saige Rachel (oncoming nurse) by Ra Walton RN (offgoing nurse). Report included the following information SBAR, Kardex and Recent Results.

## 2020-10-18 NOTE — PROGRESS NOTES
Discharge noted for today. Patient is refusing home health and walker. Patient states \"when I need it or when my son is not able to help me I will let you know\". Patient stated she will be fine at home and needs no help. Dr. Jannetta Brittle notified. Son will transport home. Son lives with patient, daughter also lives local.  
 
 
Cassandra Chu, MSW Case Management [phone]

## 2020-10-18 NOTE — PROGRESS NOTES
RENAL DAILY PROGRESS NOTE Subjective:  
 
 
Complaint: feels ok Overnight events noted 
no nausea, vomiting, chest pain, short of breath, cough, seizure. IMPRESSION:  
· CKD stage 4 due to cardiorenal syndrome. Baseline is around 2-2.5. USG kidneys 10/14-Right kidney: Limited visualization due to bowel gas. Roughly 8.1 cm length. Slightly increased echotexture. No hydronephrosis. No focal abnormality. 
 Left kidney: Limited visualization due to bowel gas. Roughly 8.7 cm length. Slightly increased echotexture. No hydronephrosis. 1.2 x 1.2 cm probable cyst in 
the medial kidney with suggestion of faint internal echoes. Similar 1.0 x 1.0 cm 
lesion in the mid to lower left kidney. A few punctate echogenic foci noted. 
  
· Hypercalcemia due to  Immobilization,ckd with secondary hyperparathyroidism. Rule out secondary causes like malignancy,myeloma, 125 hydroxy vitamin d induced etc 
· UA with numerous WBC and bacteria ? UTI · Positive FOREST, anti Sm ab in past? 
· Anemia of iron deficiency vs CKD · Cardiomyopathy with EF of 35-40 and pulmonary htn on previous Echo. S/p ACID · Proteinuria · Hypertension 
   
PLAN:  
·  Labs pending include SPEP, IEP, K/l ratio, FOREST, Anti dsDNA,Complements,anti smith, anti histone antibodies, hepatitis serologies ·  PTH is 481, Vitamin D is 15.6. 1,25 hydroxy, PTHrp are pending · Corrected calcium is 10.4. Hesistant to use lot of vitamin D at this point. However will start with 600 units of daily vitamin D as vitamin d def can itself stimulate PTH and worsen hypercalcemia. · Rocephin for UTI · D/c fluids for now and get labs today. · From renal standpoint there is no contraindication for discharge. I will follow her in office with above pending labs Current Facility-Administered Medications Medication Dose Route Frequency  0.9% sodium chloride infusion  50 mL/hr IntraVENous CONTINUOUS  
  cefTRIAXone (ROCEPHIN) 1 g in sterile water (preservative free) 10 mL IV syringe  1 g IntraVENous Q24H  
 sodium chloride (NS) flush 5-40 mL  5-40 mL IntraVENous Q8H  
 sodium chloride (NS) flush 5-40 mL  5-40 mL IntraVENous PRN  
 acetaminophen (TYLENOL) tablet 650 mg  650 mg Oral Q6H PRN Or  
 acetaminophen (TYLENOL) suppository 650 mg  650 mg Rectal Q6H PRN  promethazine (PHENERGAN) tablet 12.5 mg  12.5 mg Oral Q6H PRN Or  
 ondansetron (ZOFRAN) injection 4 mg  4 mg IntraVENous Q6H PRN  
 bisacodyL (DULCOLAX) tablet 5 mg  5 mg Oral DAILY PRN  
 aspirin chewable tablet 81 mg  81 mg Oral DAILY  rosuvastatin (CRESTOR) tablet 20 mg  20 mg Oral QHS  amLODIPine (NORVASC) tablet 10 mg  10 mg Oral DAILY  carvediloL (COREG) tablet 25 mg  25 mg Oral BID WITH MEALS Review of Symptoms: comprehensive ROS negative except above. Objective:  
 
Patient Vitals for the past 24 hrs: 
 Temp Pulse Resp BP SpO2  
10/18/20 0800 97.3 °F (36.3 °C) 89 18 126/67 95 % 10/18/20 0419 97.8 °F (36.6 °C) 69 18 124/69 99 % 10/18/20 0012 97.4 °F (36.3 °C) 63 18 132/63 100 % 10/17/20 2036 97.7 °F (36.5 °C) 61 19 132/68 99 % 10/17/20 1524 98.7 °F (37.1 °C) 60 19 137/65 100 % 10/17/20 1105    137/79  Weight change: 0 kg (0 lb) 10/16 1901 - 10/18 0700 In: 120 [P.O.:120] Out: - Intake/Output Summary (Last 24 hours) at 10/18/2020 1044 Last data filed at 10/17/2020 1524 Gross per 24 hour Intake 120 ml Output  Net 120 ml Physical Exam:  
General: comfortable, no acute distress HEENT sclera anicteric, supple neck, no thyromegaly CVS: S1S2 heard,  no rub RS: + air entry b/l, Abd: Soft, Non tender, Not distended, Positive bowel sounds, no organomegaly, no CVA / supra pubic tenderness Neuro: non focal, awake, alert , CN II-XII are grossly intact Extrm: mild edema, no cyanosis, clubbing Skin: no visible  Rash Musculoskeletal: No gross joints or bone deformities Data Review:  
 
LABS:  
Hematology:  
Recent Labs 10/17/20 
0427 10/16/20 
1040 WBC 5.7 4.4* HGB 8.8* 9.4* HCT 27.4* 29.1* Chemistry:  
Recent Labs 10/17/20 
1410 10/17/20 
0427 10/16/20 
1040 BUN  --  41* 40* CREA  --  2.66* 2.78* CA 9.6 10.0 9.5 ALB  --   --  3.0*  
K  --  3.7 3.6 NA  --  136 135* CL  --  107 103 CO2  --  20* 22 GLU  --  89 113* Procedures/imaging: see electronic medical records for all procedures, Xrays and details which were not copied into this note but were reviewed prior to creation of Plan Juan Xavier MD 
10/18/2020 
10:44 AM

## 2020-10-18 NOTE — ROUTINE PROCESS
Ortho Static for Lying B/P 126/63 Lt. Arm Temp 97.8 pusle 78 O2 Sat 98 Resp. 18 Sitting B/P 126/65 Lt. Arm, Temp 97.8 Pusle76 )2 Sat 98 Resp 18  Standing B/P 111/69 Lt. Arm Temp 97.8 PUSLE 88 ) o2 Sat 98 18

## 2020-10-18 NOTE — PROGRESS NOTES
[facility]  -  Progress Note Patient: Vik Roach MRN: [id number]  SSN: [id number] YOB: 1940  Age: 68 y.o. Sex: female Admit Date: 10/16/2020 Assessment:  
 
Hospital Problems  Date Reviewed: 10/2/2020 Codes Class Noted POA Syncope ICD-10-CM: R55 
ICD-9-CM: 780.2  10/16/2020 Unknown UTI (urinary tract infection) ICD-10-CM: N39.0 ICD-9-CM: 599.0  10/16/2020 Unknown CAD (coronary artery disease) ICD-10-CM: I25.10 ICD-9-CM: 414.00  10/16/2020 Unknown Nausea and vomiting ICD-10-CM: R11.2 ICD-9-CM: 787.01  10/16/2020 Unknown Pacemaker (for h/o AV block) ICD-10-CM: Z95.0 ICD-9-CM: V45.01  7/5/2016 Unknown -Syncope. Unclear etiology at this point. Reportedly patient's AICD was interrogated at the [facility] ER, but there are no details of this on the chart. 
-Ischemic cardiomyopathy. EF 35 to 40% by echocardiogram today. This is unchanged compared to her previous echocardiogram from 2019. She does not have any signs or symptoms of decompensated heart failure. She has been treated with a beta-blocker, but is not on ACE or ARB due to her chronic kidney disease. 
-History of CAD s/p coronary artery bypass grafting surgery x three in June of 2008 by Dr. Yenny Jones with the following bypasses: 
   
1. Left internal mammary artery to the LAD. 2. Reverse saphenous vein graft to the first obtuse marginal branch of the circumflex. 3. Reverse saphenous vein graft to the distal right coronary artery. - 
-History of AV block s/p [employer] dual-chamber AICD. Device was initially placed due to low LVEF in the past. 
-HTN- stable 
-Dyslipidemia-intolerant to statins, extremely elevated cholesterol numbers. Suspect she is not taking the Crestor which was recently prescribed. 
-Chronic kidney disease, stage IV. Creatinine appears to be close to baseline. 
  
-Primary cardiologist Dr. Navi Armando Plan: PPM/ICD interrogation done and no VT/VRF episodes and no arrhythmia activity noted with 3.3 years battery life. Her orthostatics are normal and patient feels at baseline CV status and hemodynamics stable from a CV standpoint and no further workup needed. Can be discharged home with follow up with Dr. Hansel Bond and continue on ASA, BB, and Norvasc as outpatient. Would also restart her on her Crestor too. Will have patient follow up with her regularly scheduled visit or sooner if needed. Subjective: No new complaints. PPM/ICD interrogation done this am. 
 
Objective:  
  
Patient Vitals for the past 8 hrs: 
 Temp Pulse Resp BP SpO2  
10/18/20 0800 97.3 °F (36.3 °C) 89 18 126/67 95 % 10/18/20 0419 97.8 °F (36.6 °C) 69 18 124/69 99 % Patient Vitals for the past 96 hrs: 
 Weight 10/18/20 0419 68 kg (150 lb) 10/17/20 1105 63.5 kg (140 lb) 10/16/20 2005 63.5 kg (139 lb 15.9 oz) 10/16/20 1027 63.5 kg (140 lb) Intake/Output Summary (Last 24 hours) at 10/18/2020 1780 Last data filed at 10/17/2020 1524 Gross per 24 hour Intake 120 ml Output  Net 120 ml Physical Exam: 
General:  alert, cooperative, no distress, appears stated age Neck:  nontender, no JVD Lungs:  clear to auscultation bilaterally Heart:  regular rate and rhythm, S1, S2 normal, no murmur, click, rub or gallop Abdomen:  abdomen is soft without significant tenderness, masses, organomegaly or guarding Extremities:  extremities normal, atraumatic, no cyanosis or edema Data Review:  
 
Labs: Results:  
   
Chemistry Recent Labs 10/17/20 
1410 10/17/20 
0427 10/16/20 
1040 GLU  --  89 113* NA  --  136 135* K  --  3.7 3.6 CL  --  107 103 CO2  --  20* 22 BUN  --  41* 40* CREA  --  2.66* 2.78* CA 9.6 10.0 9.5 AGAP  --  9 10 BUCR  --  15 14 AP  --   --  79  
TP  --   --  6.6 ALB  --   --  3.0*  
GLOB  --   --  3.6 AGRAT  --   --  0.8 CBC w/Diff Recent Labs 10/17/20 5139 10/16/20 
1040 WBC 5.7 4.4*  
RBC 3.32* 3.47* HGB 8.8* 9.4* HCT 27.4* 29.1*  
 245 GRANS 55 56 LYMPH 37 32 EOS 3 4 Cardiac Enzymes No results found for: CPK, CK, CKMMB, CKMB, RCK3, CKMBT, CKNDX, CKND1, KELI, TROPT, TROIQ, AZUL, TROPT, TNIPOC, BNP, BNPP Coagulation Recent Labs 10/16/20 
1040 PTP 13.3 INR 1.0 APTT 26.7 Lipid Panel Lab Results Component Value Date/Time Cholesterol, total 288 (H) 09/21/2020 09:44 AM  
 HDL Cholesterol 58 09/21/2020 09:44 AM  
 LDL, calculated 193.4 (H) 09/21/2020 09:44 AM  
 VLDL, calculated 36.6 09/21/2020 09:44 AM  
 Triglyceride 183 (H) 09/21/2020 09:44 AM  
 CHOL/HDL Ratio 5.0 09/21/2020 09:44 AM  
  
BNP Lab Results Component Value Date/Time  
 B-type Natriuretic Peptide 184.7 (H) 04/13/2011 12:00 AM  
 B-type Natriuretic Peptide CANCELED 04/07/2011 12:00 AM  
  
Liver Enzymes Recent Labs 10/16/20 
1040 TP 6.6 ALB 3.0* AP 79 Digoxin Thyroid Studies Lab Results Component Value Date/Time  TSH 1.87 07/10/2017 11:17 AM

## 2020-10-18 NOTE — DISCHARGE SUMMARY
Discharge Summary Patient: Quinton Hernandez MRN: [id number]  CSN: [id number] YOB: 1940  Age: 68 y.o. Sex: female DOA: 10/16/2020 LOS:  LOS: 2 days   Discharge Date: 10/18/20 Admission Diagnoses: Syncope [R55] Pacemaker [Z95.0] CAD (coronary artery disease) [I25.10] UTI (urinary tract infection) [N39.0] Nausea and vomiting [R11.2] Discharge Diagnoses: 1. Syncope 2. UTItreated 3. CAD, Hx CABG 
4. Ischemic cardiomyopathywith EF 35-40% 5. Chronic systolic CHFcompensated CKD stage IV 6. Hx AV block status post AICD 7. HTN 
8. HLD Discharge Condition: Stable PHYSICAL EXAM 
Visit Vitals /63 (BP 1 Location: Right arm, BP Patient Position: Supine) Pulse 78 Temp 97.8 °F (36.6 °C) Resp 18 Ht 5' 4\" (1.626 m) Wt 68 kg (150 lb) SpO2 98% BMI 25.75 kg/m² General: Alert, cooperative, no acute distress HEENT: NC, Atraumatic. EOMI. Anicteric sclerae. Lungs:  CTA Bilaterally. No Wheezing/Rhonchi/Rales. Heart:  Regular  rhythm,  No murmur, No Rubs, No Gallops Abdomen: Soft, Non distended, Non tender. +Bowel sounds, no HSM Extremities: No c/c/e Psych:   Not anxious or agitated. Neurologic:  Alert and oriented X 3. No acute neurological deficits. Hospital Course: Ms. Rita Chakraborty is a 29-year-old female with a PMHx of ischemic cardiomyopathy with EF 35-40%, CAD status post CABG x3 and 2008, Hx of AV block status post AICD, HTN, HLD, CKD stage IV, PAD, DVT, and anemia who presents to the ED after having a syncopal episode, with vomiting and altered mental status earlier that day. In the ED, vitals were reassuring and labs were notable for WBCs 4.4, Hgb 9.4, BUN 40, creatinine 2.7 (baseline 22.5), troponin less than 0.02, and a UA showed trace blood, was positive for nitrites and large leuk esterase, with 2+ bacteria. CXR showed no acute process and an EKG showed paced rhythm.   MsCassie cross was then started on IV antibiotics and admitted for syncope and UTI. Cardiology and nephrology were consulted and made further recommendations. An echo was done and showed an EF of 35-40%, with G1DD and no significant changes since her previous echo in 5/2019. Nephrology ordered labs to further evaluate her CKD stage IV which is likely due to cardiorenal syndrome. Her AICD was interrogated and showed no events and no significant arrhythmias. Orthostatic vital signs were done and were negative. After thorough evaluation she was cleared for discharge from cardiology and nephrology. On 10/18/2020, Ms. Yenny Suarez felt ready for discharge home. She had been treated with 3 days of IV ceftriaxone for her UTI. The urine culture at discharge was positive for gram-negative rods, with speciation pending She had remained hemodynamically stable. Return precautions were discussed and she was given instructions and prescriptions for the medications as below. She was also instructed to follow up with her PCP, cardiology and nephrology and was then discharged home. Consults:  
NephrologyDr. Rosalie Land MD  
CardiologyDr. Suleiman Herrera MD 
 
Significant Diagnostic Studies:  
 
CT Head WO Cont 10/16/20: No acute intracranial hemorrhage or mass effect. Note: If clinical concern of acute stroke, CTA or MRI with diffusion weighted 
images is recommended for further evaluation. 
  
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 
bilateral basal ganglia. CXR 10/16/20: No acute cardiac pulmonary process. Labs pending at discharge: SPEP, IEP, K/l ratio, FOREST, Anti dsDNA,Complements,anti smith, anti histone antibodies, hepatitis serologies Procedures Performed: None Discharge Medications: 
Current Discharge Medication List  
  
START taking these medications Details  
cholecalciferol (VITAMIN D3) (400 Units /10 mcg) tab tablet Take 1.5 Tabs by mouth daily. Qty: 100 Tab, Refills: 0 CONTINUE these medications which have NOT CHANGED Details  
furosemide (Lasix) 40 mg tablet Take 40 mg by mouth daily. amLODIPine (NORVASC) 10 mg tablet Take 10 mg by mouth daily. rosuvastatin (CRESTOR) 20 mg tablet Take 1 Tab by mouth nightly. Qty: 30 Tab, Refills: 6 Associated Diagnoses: Mixed hyperlipidemia  
  
carvediloL (COREG) 25 mg tablet TAKE 1 TABLET BY MOUTH (2) TIMES A DAY Qty: 60 Tab, Refills: 5  
  
aspirin 81 mg tablet Take 81 mg by mouth daily. Activity: activity as tolerated Diet: Cardiac Diet Wound Care: None needed Follow-up Information Follow up With Specialties Details Why Contact Info Mary Gutierrez MD Family Medicine Schedule an appointment as soon as possible for a visit in 1 week [facility]  [facility] [address] [facility] [address] 
[phone] Rosa Dunne MD Nephrology Schedule an appointment as soon as possible for a visit in 2 weeks F/U  [address] [facility] [place] [address] [facility] 
[phone] Rosalinda Lala DO Cardiology Schedule an appointment as soon as possible for a visit in 3 weeks F/U  [address] [facility] [address] 
[phone] Minutes spent on discharge: >30 minutes spent coordinating this discharge (review instructions/follow-up, prescriptions, preparing report for sign off) MARTA Rodriguez DO October 18, 2020 Disclaimer: Sections of this note are dictated using utilizing voice recognition software, which may have resulted in some phonetic based errors in grammar and contents. Even though attempts were made to correct all the mistakes, some may have been missed, and remained in the body of the document. If questions arise, please contact our department.

## 2020-10-18 NOTE — PROGRESS NOTES
Orthostatic BP was taken at 0000. Reading includes--Laying 132/63, Sitting 141/90, and Standing 89/55

## 2020-10-18 NOTE — CONSULTS
Comprehensive Nutrition Assessment Type and Reason for Visit: Initial, Consult Nutrition Recommendations/Plan: Continue current diet. Nutrition Assessment:  Admitted after syncopal episode and vomiting. Meal intake adequate. H/o intentional wt loss and postivie eating habit changes PTA. Pt reports nephrologist to meet with her after discharge regarding her diet. Offered outpatient nutrition counseling if needed for further diet education pending nephrologists plan of care. Malnutrition Assessment: 
Malnutrition Status:  No malnutrition Nutrition History and Allergies: PT reports intentional wt loss since January 2020 per MD recommendation to lose wt. Reports modifying her eating habits, cutting back on fried foods and discontinuing soda consumption. NKFA Estimated Daily Nutrient Needs: 
Energy (kcal):  0841-8933 Protein (g):  52 Fluid (ml/day):  1612-2430 Nutrition Related Findings:  BM 10/16. Creat 2.66, NS at 50 mL/hr   
 
Wounds:   
None Current Nutrition Therapies: DIET CARDIAC Regular; FR 1500ML Anthropometric Measures: 
· Height:  5' 4\" (162.6 cm) · Current Body Wt:  68 kg (149 lb 14.6 oz) · Admission Body Wt:  139 lb 15.9 oz   
· Usual Body Wt:  216 lb(January 2020) · Ideal Body Wt:  120 lbs:  124.9 % · Adjusted Body Weight:   ; Weight Adjustment for: No adjustment · Adjusted BMI:      
· BMI Category: Overweight (BMI 25.0-29. 9) Nutrition Diagnosis: · No nutrition diagnosis at this time related to   as evidenced by   
 
 
Nutrition Interventions:  
Food and/or Nutrient Delivery: Continue current diet Nutrition Education and Counseling: Other (specify)(possible outpatient nutrition education/counseling) Coordination of Nutrition Care: Continued inpatient monitoring Goals: 
PO nutrition intake will continue to meet >75% of patients estimated nutritional needs over the next 7 days. Nutrition Monitoring and Evaluation: Food/Nutrient Intake Outcomes: Food and nutrient intake Physical Signs/Symptoms Outcomes: Biochemical data, Meal time behavior, Nutrition focused physical findings Discharge Planning:   
Continue current diet, Recommend pursue outpatient nutrition counseling Electronically signed by Mohamud Navarro RD on 10/18/2020 at 10:40 AM 
 
Contact: [phone]

## 2020-10-18 NOTE — ROUTINE PROCESS
Bedside shift change report given to [employer] (oncoming nurse) by Ismael Alexander RN (offgoing nurse). Report included the following information SBAR, Kardex, Intake/Output, MAR and Med Rec Status.

## 2020-10-18 NOTE — PROGRESS NOTES
Problem: Falls - Risk of 
Goal: *Absence of Falls Description: Document Reinier Mahoney Fall Risk and appropriate interventions in the flowsheet. Outcome: Progressing Towards Goal 
Note: Fall Risk Interventions: 
Mobility Interventions: Assess mobility with egress test, Bed/chair exit alarm, OT consult for ADLs, Patient to call before getting OOB, Utilize walker, cane, or other assistive device Medication Interventions: Assess postural VS orthostatic hypotension, Evaluate medications/consider consulting pharmacy, Patient to call before getting OOB, Teach patient to arise slowly Elimination Interventions: Bed/chair exit alarm, Call light in reach, Toileting schedule/hourly rounds History of Falls Interventions: Bed/chair exit alarm, Door open when patient unattended, Investigate reason for fall, Room close to nurse's station Problem: Pressure Injury - Risk of 
Goal: *Prevention of pressure injury Description: Document Adeel Scale and appropriate interventions in the flowsheet. Outcome: Progressing Towards Goal 
Note: Pressure Injury Interventions: 
  
 
Moisture Interventions: Absorbent underpads, Apply protective barrier, creams and emollients, Limit adult briefs, Minimize layers, Check for incontinence Q2 hours and as needed, Internal/External urinary devices Activity Interventions: PT/OT evaluation, Pressure redistribution bed/mattress(bed type), Increase time out of bed Mobility Interventions: HOB 30 degrees or less, Pressure redistribution bed/mattress (bed type), Trapeze to reposition, Turn and reposition approx. every two hours(pillow and wedges), PT/OT evaluation Nutrition Interventions: Document food/fluid/supplement intake, Offer support with meals,snacks and hydration Friction and Shear Interventions: Apply protective barrier, creams and emollients, Feet elevated on foot rest, HOB 30 degrees or less, Sit at 90-degree angle, Transferring/repositioning devices

## 2020-10-18 NOTE — DISCHARGE INSTRUCTIONS
Patient Education      Patient armband removed and shredded  DISCHARGE SUMMARY from Nurse    PATIENT INSTRUCTIONS:    After general anesthesia or intravenous sedation, for 24 hours or while taking prescription Narcotics:  · Limit your activities  · Do not drive and operate hazardous machinery  · Do not make important personal or business decisions  · Do  not drink alcoholic beverages  · If you have not urinated within 8 hours after discharge, please contact your surgeon on call. Report the following to your surgeon:  · Excessive pain, swelling, redness or odor of or around the surgical area  · Temperature over 100.5  · Nausea and vomiting lasting longer than 4 hours or if unable to take medications  · Any signs of decreased circulation or nerve impairment to extremity: change in color, persistent  numbness, tingling, coldness or increase pain  · Any questions    What to do at Home:  Recommended activity: Activity as tolerated,     If you experience any of the following symptoms shortness of breath, chest pain, dizziness, fainting, please follow up with Emergency Department or Primary Md. *  Please give a list of your current medications to your Primary Care Provider. *  Please update this list whenever your medications are discontinued, doses are      changed, or new medications (including over-the-counter products) are added. *  Please carry medication information at all times in case of emergency situations. These are general instructions for a healthy lifestyle:    No smoking/ No tobacco products/ Avoid exposure to second hand smoke  Surgeon General's Warning:  Quitting smoking now greatly reduces serious risk to your health.     Obesity, smoking, and sedentary lifestyle greatly increases your risk for illness    A healthy diet, regular physical exercise & weight monitoring are important for maintaining a healthy lifestyle    You may be retaining fluid if you have a history of heart failure or if you experience any of the following symptoms:  Weight gain of 3 pounds or more overnight or 5 pounds in a week, increased swelling in our hands or feet or shortness of breath while lying flat in bed. Please call your doctor as soon as you notice any of these symptoms; do not wait until your next office visit. The discharge information has been reviewed with the patient. The patient verbalized understanding. Discharge medications reviewed with the patient and appropriate educational materials and side effects teaching were provided. ___________________________________________________________________________________________________________________________________  MyChart Activation    Thank you for requesting access to DescribeMe. Please follow the instructions below to securely access and download your online medical record. DescribeMe allows you to send messages to your doctor, view your test results, renew your prescriptions, schedule appointments, and more. How Do I Sign Up? 1. In your internet browser, go to [email or web address]  2. Click on the First Time User? Click Here link in the Sign In box. You will be redirect to the New Member Sign Up page. 3. Enter your DescribeMe Access Code exactly as it appears below. You will not need to use this code after youve completed the sign-up process. If you do not sign up before the expiration date, you must request a new code. DescribeMe Access Code: [email or web address]  Expires: 2020 10:19 AM (This is the date your DescribeMe access code will )    4. Enter the last four digits of your Social Security Number (xxxx) and Date of Birth (mm/dd/yyyy) as indicated and click Submit. You will be taken to the next sign-up page. 5. Create a DescribeMe ID. This will be your MyChart login ID and cannot be changed, so think of one that is secure and easy to remember. 6. Create a DescribeMe password. You can change your password at any time.   7. Enter your Password Reset Question and Answer. This can be used at a later time if you forget your password. 8. Enter your e-mail address. You will receive e-mail notification when new information is available in [address] 9. Click Sign Up. You can now view and download portions of your medical record. 10. Click the Download Summary menu link to download a portable copy of your medical information. Additional Information    If you have questions, please visit the Frequently Asked Questions section of the V[employer] website at [email or web address] Remember, [employer]ee[employer] is NOT to be used for urgent needs. For medical emergencies, dial 911. As part of the discharge instructions, medications already given today were discussed with the patient. The next dose due of all ordered meds was highlighted as part of the medication discharge instructions. Discussed with the patient the importance of taking medications as directed, as well as the side effects and adverse reactions to medications ordered. Fainting: Care Instructions  Your Care Instructions     When you faint, or pass out, you lose consciousness for a short time. A brief drop in blood flow to the brain often causes it. When you fall or lie down, more blood flows to your brain and you regain consciousness. Emotional stress, pain, or overheating--especially if you have been standing--can make you faint. In these cases, fainting is usually not serious. But fainting can be a sign of a more serious problem. Your doctor may want you to have more tests to rule out other causes. The treatment you need depends on the reason why you fainted. The doctor has checked you carefully, but problems can develop later. If you notice any problems or new symptoms, get medical treatment right away. Follow-up care is a key part of your treatment and safety. Be sure to make and go to all appointments, and call your doctor if you are having problems.  It's also a good idea to know your test results and keep a list of the medicines you take. How can you care for yourself at home? · Drink plenty of fluids to prevent dehydration. If you have kidney, heart, or liver disease and have to limit fluids, talk with your doctor before you increase your fluid intake. When should you call for help? Call 911 anytime you think you may need emergency care. For example, call if:    · You have symptoms of a heart problem. These may include:  ? Chest pain or pressure. ? Severe trouble breathing. ? A fast or irregular heartbeat. ? Lightheadedness or sudden weakness. ? Coughing up pink, foamy mucus. ? Passing out. After you call 911, the  may tell you to chew 1 adult-strength or 2 to 4 low-dose aspirin. Wait for an ambulance. Do not try to drive yourself.     · You have symptoms of a stroke. These may include:  ? Sudden numbness, tingling, weakness, or loss of movement in your face, arm, or leg, especially on only one side of your body. ? Sudden vision changes. ? Sudden trouble speaking. ? Sudden confusion or trouble understanding simple statements. ? Sudden problems with walking or balance. ? A sudden, severe headache that is different from past headaches.     · You passed out (lost consciousness) again. Watch closely for changes in your health, and be sure to contact your doctor if:    · You do not get better as expected. Where can you learn more? Go to http://www.gray.com/  Enter A848 in the search box to learn more about \"Fainting: Care Instructions. \"  Current as of: June 26, 2019               Content Version: 12.6  © 2723-7835 [employer], [employer]orporated. Care instructions adapted under license by [employer] (which disclaims liability or warranty for this information).  If you have questions about a medical condition or this instruction, always ask your healthcare professional. [address] any warranty or liability for your use of this information. Patient Education        Lightheadedness or Faintness: Care Instructions  Your Care Instructions  Lightheadedness is a feeling that you are about to faint or \"pass out. \" You do not feel as if you or your surroundings are moving. It is different from vertigo, which is the feeling that you or things around you are spinning or tilting. Lightheadedness usually goes away or gets better when you lie down. If lightheadedness gets worse, it can lead to a fainting spell. It is common to feel lightheaded from time to time. Lightheadedness usually is not caused by a serious problem. It often is caused by a short-lasting drop in blood pressure and blood flow to your head that occurs when you get up too quickly from a seated or lying position. Follow-up care is a key part of your treatment and safety. Be sure to make and go to all appointments, and call your doctor if you are having problems. It's also a good idea to know your test results and keep a list of the medicines you take. How can you care for yourself at home? · Lie down for 1 or 2 minutes when you feel lightheaded. After lying down, sit up slowly and remain sitting for 1 to 2 minutes before slowly standing up. · Avoid movements, positions, or activities that have made you lightheaded in the past.  · Get plenty of rest, especially if you have a cold or flu, which can cause lightheadedness. · Make sure you drink plenty of fluids, especially if you have a fever or have been sweating. · Do not drive or put yourself and others in danger while you feel lightheaded. When should you call for help? Call 911 anytime you think you may need emergency care. For example, call if:    · You have symptoms of a stroke. These may include:  ? Sudden numbness, tingling, weakness, or loss of movement in your face, arm, or leg, especially on only one side of your body. ? Sudden vision changes. ? Sudden trouble speaking.   ? Sudden confusion or trouble understanding simple statements. ? Sudden problems with walking or balance. ? A sudden, severe headache that is different from past headaches.     · You have symptoms of a heart attack. These may include:  ? Chest pain or pressure, or a strange feeling in the chest.  ? Sweating. ? Shortness of breath. ? Nausea or vomiting. ? Pain, pressure, or a strange feeling in the back, neck, jaw, or upper belly or in one or both shoulders or arms. ? Lightheadedness or sudden weakness. ? A fast or irregular heartbeat. After you call 911, the  may tell you to chew 1 adult-strength or 2 to 4 low-dose aspirin. Wait for an ambulance. Do not try to drive yourself. Watch closely for changes in your health, and be sure to contact your doctor if:    · Your lightheadedness gets worse or does not get better with home care. Where can you learn more? Go to http://www.gray.com/  Enter [id number] in the search box to learn more about \"Lightheadedness or Faintness: Care Instructions. \"  Current as of: June 26, 2019               Content Version: 12.6  © 1658-8021 [employer]. Care instructions adapted under license by [employer] (which disclaims liability or warranty for this information). If you have questions about a medical condition or this instruction, always ask your healthcare professional. [place] [address] any warranty or liability for your use of this information. Patient Education        Vasovagal Syncope: Care Instructions  Your Care Instructions     Vasovagal syncope (say \"juq-jii-GXT-gul ZCQN-uel-bni\")is sudden dizziness or fainting that can be set off by things such as pain, stress, fear, or trauma. You may sweat or feel lightheaded, sick to your stomach, or tingly. The problem causes the heart rate to slow and the blood vessels to widen, or dilate, for a short time.  When this happens, blood pools in the lower body, and less blood goes to the brain. You can usually get relief by lying down with your legs raised (elevated). This helps more blood to flow to your brain and may help relieve symptoms like feeling dizzy. Some doctors may recommend a technique that involves tensing your fists and arms. This type of fainting is often easy to predict. For example, it happens to some people when they see blood or have to get a shot. They may feel symptoms before they faint. An episode of vasovagal syncope usually responds well to self-care. Other treatment often isn't needed. But if the fainting keeps happening, your doctor may suggest further treatments. Follow-up care is a key part of your treatment and safety. Be sure to make and go to all appointments, and call your doctor if you are having problems. It's also a good idea to know your test results and keep a list of the medicines you take. How can you care for yourself at home? · Drink plenty of fluids to prevent dehydration. If you have kidney, heart, or liver disease and have to limit fluids, talk with your doctor before you increase your fluid intake. · Try to avoid things that you think may set off vasovagal syncope. · Talk to your doctor about any medicines you take. Some medicines may increase the chance of this condition occurring. · If you feel symptoms, lie down with your legs raised. Talk to your doctor about what to do if your symptoms come back. When should you call for help? Call 911 anytime you think you may need emergency care. For example, call if:    · You have symptoms of a heart problem. These may include:  ? Chest pain or pressure. ? Severe trouble breathing. ? A fast or irregular heartbeat. Watch closely for changes in your health, and be sure to contact your doctor if:    · You have more episodes of fainting at home.     · You do not get better as expected. Where can you learn more?   Go to http://www.gray.com/  Enter L754 in the search box to learn more about \"Vasovagal Syncope: Care Instructions. \"  Current as of: June 26, 2019               Content Version: 12.6  © 2515-0812 [employer]. Care instructions adapted under license by [employer] (which disclaims liability or warranty for this information). If you have questions about a medical condition or this instruction, always ask your healthcare professional. [place] [address] any warranty or liability for your use of this information.

## 2020-10-19 NOTE — PROGRESS NOTES
Patient was admitted to [facility] on 10/16/2002  and discharged on 10/18/2002  For:  
 
Discharge Diagnoses: 1. Syncope 2. UTItreated 3. CAD, Hx CABG 
4. Ischemic cardiomyopathywith EF 35-40% 5. Chronic systolic CHFcompensated CKD stage IV 6. Hx AV block status post AICD 7. HTN 
8. HLD [facility] Patient was contacted within 1  business days of discharge. Top Discharge Challenges to be reviewed by the provider Additional needs identified to be addressed with provider no Discussed COVID-19 related testing which was not done at this time. Method of communication with provider : none Advance Care Planning:  
Does patient have an Advance Directive:  yes; reviewed and current , appears current per chart review. Inpatient Readmission Risk score: 84% Was this a readmission? no  
Patient stated reason for the admission: n/a Patients top risk factors for readmission: medical condition Interventions to address risk factors:  
See goals please Care Transition Nurse (CTN) contacted the patient by telephone to perform post hospital discharge assessment. Verified name and  with patient as identifiers. Provided introduction to self, and explanation of the CTN role. CTN reviewed discharge instructions, medical action plan and red flags with patient who verbalized understanding. Patient given an opportunity to ask questions and does not have any further questions or concerns at this time. The patient agrees to contact the PCP office for questions related to their healthcare. Medication reconciliation was performed with patient, who verbalizes understanding of administration of home medications. Advised obtaining a 90-day supply of all daily and as-needed medications. Referral to Pharm D needed: no  
 
 
Home Health/Outpatient orders at discharge: refused, per chart review [address]: n/a Date of initial visit: n/a Durable Medical Equipment ordered at discharge: none noted at this time [address]:  
Durable Medical Equipment received:  
 
Covid Risk Education Patient has following risk factors of: heart failure. Education provided regarding infection prevention, and signs and symptoms of COVID-19 and when to seek medical attention with patient who verbalized understanding. Discussed exposure protocols and quarantine From CDC: Are you at higher risk for severe illness?  and given an opportunity for questions and concerns. The patient agrees to contact the COVID-19 hotline [phone] or PCP office for questions related to COVID-19. For more information on steps you can take to protect yourself, see CDC's How to Protect Yourself Patient/family/caregiver given information for [employer] and agrees to enroll no Discussed follow-up appointments. If no appointment was previously scheduled, appointment scheduling offered: yes Patient declined sooner follow up appointment. [facility] follow up appointment(s):  
Future Appointments Date Time Provider Korey Monte 10/27/2020 11:45 AM Monica Aguilar MD [facility] BS AMB  
12/10/2020  9:30 AM C[facility], PACER [facility] BS AMB  
2/25/2021  8:00 AM [facility] NONIMAGING [facility] BS AMB  
2/25/2021  9:00 AM [facility] IMAGING 1 [facility] BS AMB  
2/25/2021 10:00 AM [facility] IMAGING 1 [facility] BS AMB  
2/25/2021  1:00 PM [place] Iza alcantara, [address] [facility] BS AMB  
3/9/2021  8:00 AM Darby Salazar DO [facility] BS AMB Non-BS follow up appointment(s):  
 
Specialty follow up Appointment:  
- Nephrology - Cardiology Plan for follow-up call in 10-14 days/as needed  based on severity of symptoms and risk factors. CTN provided contact information for future needs. Goals Addressed This Visit's Progress  Attends follow-up appointments as directed. Target Date:  11- Patient to follow up as follows:    
Patient to follow up with PCP on 10-. Patient declined assistance with obtaining a sooner follow up appointment at this time. Patient to follow up with cardiology and nephrology physicians post hospital discharge.  Prevent complications post hospitalization. Target Date: 11/19/2020 
 
10/19/2020 Patient and/or family members were alerted to the availability of physician or other advanced practioners after hours, weekends, and holidays. Please call the PCP office phone number and speak with the answering service for assistance. Please call 911 for assistance as needed. Care Transitions Nurse ( CTN)   contact information provided for follow up as needed.  Understands red flags post discharge. Target Date: 11- Care Transitions Nurse (CTN) reviewed red flags with patient as follows: Please call 911 for immediate assistance for symptoms such as:  
- Chest Pain - Severe shortness of breath  
- Change in mental status - Blue tint to face or lips - New or worsening symptoms Patient reports she is \" doing fine\"

## 2020-10-27 NOTE — PROGRESS NOTES
Vik Roach is a 68 y.o. female who was seen by synchronous (real-time) audio-phonr only technology on 10/27/2020. Assessment & Plan: 1. JAS/UTI Hx: I suspect her acute on chronic renal failure to be 2/2 mixed connective tissue disease. Her recent UTI only complicated this preexisting issue. 
- F/u with Nephrology. Renal biopsy? - C/w rx pending Nephrology evaluation/recommendations. - F/u with Rheumatology - strongly recommended. - Checking labs in March. 2. CHF/AF/HTN/CAD: Stable clinically per pt hx.  
- F/u with Cardiology. - C/w rx pending their evaluation - Checking labs in March. F/u with me in the office a wk later. Lab review: labs are reviewed in the EHR and ordered as mentioned above I have discussed the diagnosis with the patient and the intended plan as seen in the above orders. I have discussed medication side effects and warnings with the patient as well. I have reviewed the plan of care with the patient, accepted their input and they are in agreement with the treatment goals. All questions were answered. The patient understands the plan of care. Pt instructed if symptoms worsen to call the office or report to the ED for continued care. Greater than 50% of the visit time was spent in counseling and/or coordination of care. I spent 10 min with the pt for this phone only encounter. Voice recognition was used to generate this report, which may have resulted in some phonetic based errors in grammar and contents. Even though attempts were made to correct all the mistakes, some may have been missed, and remained in the body of the document. Subjective:  
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 1. UTI/JAS:  Earlier this summer,the pt went to the ED w/ c/o jt pain. At that time, she was found to have JAS. Her BNP was in the 4K range. Her CRP was in the 9 range. Note that she has a h/o positive serologies (FOREST, RNP Ab, Sjogren's Ab, anti-chromatin Ab, and Anti-Parnell Ab). She was referred back to her Rheumatologist and Nephrologist for evaluation. She was treated with steroid rx for jt pain suspected to be from mixed connective tissue disease. Jt pain sx improved. Note that, labs drawn at the time of her jt pain episode also showed: a vitamin D level of 19, PTH of 300 (high), low iron of 18, low iron sat of 10, ferritin of 525, a hemoglobin of 9.6, and a normal WBC and platelet count. MCV was WNL. Her uric acid was high at 12.7. She was d/c on 10/18/20 s/p a UTI. S/p abx. Her upcoming apt with Nephrology: Friday per her hx  
 
2. CHF/AF/HTN/CAD: While being treated for #1, the pt was found to have strain on her heart. Cardiology was consulted after she presented to the hospital after a syncopal event. An echocardiogram was obtained. Findings are listed below. It was thought that she likely has cardiorenal syndrome per the d/c summary. Her AICD was interrogated and unremarkable. She tested negative for orthostatic hypotension. Her syncope episode was thought to be 2/2 #1. Today, she denies SOB/CP. No syncope since she left the hospital. No refills are needed.  She continues to take lasix, norvasc, coreg, and lipitor, with ASA. 10/17/20 Echocardiogram: LV: Estimated LVEF is 35 - 40%. Visually measured ejection fraction. Normal cavity size and wall thickness. Moderately reduced systolic function. Mild (grade 1) left ventricular diastolic dysfunction. TV: Mild to moderate tricuspid valve regurgitation is present. PV: Mild pulmonic valve regurgitation is present. MV: Mitral annular calcification. Mild mitral valve regurgitation is present. LA: Left Atrium volume index is 29 mL/m2. PA: Mild pulmonary hypertension. Pulmonary arterial systolic pressure is 40 mmHg. RV: Pacer/ICD present. Prior to Admission medications Medication Sig Start Date End Date Taking? Authorizing Provider  
furosemide (Lasix) 40 mg tablet Take 40 mg by mouth daily. Provider, Historical  
amLODIPine (NORVASC) 10 mg tablet Take 10 mg by mouth daily. Provider, Historical  
cholecalciferol (VITAMIN D3) (400 Units /10 mcg) tab tablet Take 1.5 Tabs by mouth daily. 10/19/20   Nathan Vasquez DO  
rosuvastatin (CRESTOR) 20 mg tablet Take 1 Tab by mouth nightly. 10/1/20   Niharika Dempsey MD  
carvediloL (COREG) 25 mg tablet TAKE 1 TABLET BY MOUTH (2) TIMES A DAY 4/20/20   Kitty Avelar NP  
aspirin 81 mg tablet Take 81 mg by mouth daily. Provider, Historical  
 
Allergies Allergen Reactions  Nifedipine Nausea and Vomiting and Other (comments) Dizzy  Ace Inhibitors Cough  Codeine Unknown (comments), Nausea Only and Nausea and Vomiting  Hydralazine Other (comments) Dizziness and Fatigue  Lipitor [Atorvastatin] Nausea and Vomiting and Vertigo  Plaquenil [Hydroxychloroquine] Other (comments)  Simvastatin Nausea and Vomiting Past Medical History:  
Diagnosis Date  Abnormal ankle brachial index 11/13/2014 Mild arterial disease in right leg. R VICTOR MANUEL 0.88. L VICTOR MANUEL 1.00.  Anemia  Arm DVT (deep venous thromboembolism), acute (HCC)   
 s/p anticoagulation  Atherosclerotic heart disease  Atrial fibrillation ([facility]  AV block  CAD (coronary artery disease)  Cardiomyopathy, ischemic  Carotid artery stenosis  Carotid duplex 2014 Mild <50% bilateral ICA plaquing. Possible left vertebral occlusion.  Cerebral artery occlusion with cerebral infarction ([facility] [address] stroke x 2  Chest pain  CVA (cerebral infarction)  Echocardiogram 2015 EF 55%. Apical inferior, apical septal hypk (new since study of 11), likely due to chronic V-pacing. Mild LVH. RVSP 30 mmHg. Mild LAE. Mild MR. Mild TR.  Gastritis  GERD (gastroesophageal reflux disease)  Heart failure ([facility] [address] [facility]  Hiatal hernia  Hyperlipidemia  Hypertension  Hypertensive cardiovascular disease  Intracranial aneurysm   
 left cavernous ICA 2mm aneurysm from head/neck CTA in   Long term (current) use of anticoagulants 3/10/2011  Lower extremity venous duplex 2015 No DVT bilaterally.  Nuclear cardiac imaging test 2015 Low risk. Sm apical infarction w/no ischemia vs apical thinning. Sm basal inferior defect, likely artifact. EF 56%. Inferoseptal, inferoapical WMA related to sternotomy or paced rhythm.  Pacemaker  PAD (peripheral artery disease) ([facility] [address] [facility]  PVC's   
 chronic  S/P CABG x 3 2008 LIMA-LAD. SVG-OM. SVG-dRCA  S/P cardiac cath 2008 pRCA 100%. LM patent. Cx patent. OM1 100%. mLAD 95%. LVEDP 27-29mmHg. EF 20%. mod MR  
 Tilt table evaluation 1995 Positive for orthostatic hypotension  Vitamin D deficiency Past Surgical History:  
Procedure Laterality Date  CABG, ARTERY-VEIN, THREE  2008  CARDIAC SURG PROCEDURE UNLIST    
 medtronic dual-chamber cardioverted-defibrillator  HX  SECTION    
 x2  
 HX ENDOSCOPY  3/1/16  HX ENDOSCOPY  3/1/16 [address] [facility] [address]  HX HYSTERECTOMY   HX ORTHOPAEDIC    
 knee surgery  HX OTHER SURGICAL  2/10/16 Pacemaker Gen Change  HX PACEMAKER Defibrillator 2008  HX TUMOR REMOVAL    
 removed from arm.  benign No family history on file. Social History Socioeconomic History  Marital status:  Spouse name: Not on file  Number of children: Not on file  Years of education: Not on file  Highest education level: Not on file Tobacco Use  Smoking status: Never Smoker  Smokeless tobacco: Never Used  Tobacco comment: just smoked 2 weeks in college Substance and Sexual Activity  Alcohol use: No  
 Drug use: No  
 Sexual activity: Not Currently Other Topics Concern ROS: 
Gen: No fever/chills HEENT: No sore throat, eye pain, ear pain, or congestion. No HA 
CV: No CP Resp: No new cough/SOB 
GI: No abdominal pain : No hematuria/dysuria Derm: No rash Neuro: No new paresthesias/weakness Musc: No new myalgias/jt pain Psych: No depression sx LABS: 
Lab Results Component Value Date/Time Sodium 137 10/18/2020 12:35 PM  
 Potassium 3.7 10/18/2020 12:35 PM  
 Chloride 106 10/18/2020 12:35 PM  
 CO2 22 10/18/2020 12:35 PM  
 Anion gap 9 10/18/2020 12:35 PM  
 Glucose 98 10/18/2020 12:35 PM  
 BUN 36 (H) 10/18/2020 12:35 PM  
 Creatinine 2.41 (H) 10/18/2020 12:35 PM  
 BUN/Creatinine ratio 15 10/18/2020 12:35 PM  
 GFR est AA 24 (L) 10/18/2020 12:35 PM  
 GFR est non-AA 19 (L) 10/18/2020 12:35 PM  
 Calcium 9.3 10/18/2020 12:35 PM  
 
 
Lab Results Component Value Date/Time Cholesterol, total 288 (H) 09/21/2020 09:44 AM  
 HDL Cholesterol 58 09/21/2020 09:44 AM  
 LDL, calculated 193.4 (H) 09/21/2020 09:44 AM  
 VLDL, calculated 36.6 09/21/2020 09:44 AM  
 Triglyceride 183 (H) 09/21/2020 09:44 AM  
 CHOL/HDL Ratio 5.0 09/21/2020 09:44 AM  
 
 
Lab Results Component Value Date/Time  WBC 5.7 10/17/2020 04:27 AM  
 HGB 8.8 (L) 10/17/2020 04:27 AM  
 HCT 27.4 (L) 10/17/2020 04:27 AM  
 PLATELET 828 07/00/9022 04:27 AM  
 MCV 82.5 10/17/2020 04:27 AM  
 
 
Lab Results Component Value Date/Time Hemoglobin A1c 5.4 09/21/2020 09:50 AM  
 
 
Lab Results Component Value Date/Time TSH 1.87 07/10/2017 11:17 AM  
 
 
 
 
Due to this being a TeleHealth phone only evaluation, many elements of the physical examination are unable to be assessed. The pt was seen by synchronous (real-time) audio-phone only technology, and/or her healthcare decision maker, is aware that this patient-initiated, Telehealth encounter is a billable service, with coverage as determined by her insurance carrier. She is aware that she may receive a bill and has provided verbal consent to proceed: Yes. The pt is being evaluated by a phone only visit encounter for concerns as above. A caregiver was present when appropriate. Due to this being a TeleHealth encounter (During [facility]- public [facility] emergency), evaluation of the following organ systems was limited: Vitals/Constitutional/EENT/Resp/CV/GI//MS/Neuro/Skin/Heme-Lymph-Imm. Pursuant to the emergency declaration under the [address] [place] [address], [address] [place] [address] authority and the [employer] General Act, this Virtual phone only Visit was conducted, with patient's (and/or legal guardian's) consent, to reduce the patient's risk of exposure to COVID-19 and provide necessary medical care. Services were provided through a phone only synchronous discussion virtually to substitute for in-person clinic visit. Patient and provider were located at their individual homes. We discussed the expected course, resolution and complications of the diagnosis(es) in detail. Medication risks, benefits, costs, interactions, and alternatives were discussed as indicated.   I advised her to contact the office if her condition worsens, changes or fails to improve as anticipated. She expressed understanding with the diagnosis(es) and plan.   
 
Matthew Daniels MD

## 2020-11-04 NOTE — PROGRESS NOTES
Transitions of Care Follow Up Care Transitions Nurse ( CTN) spoke with patient via telephone call. Patient provided name and date of birth as identifiers. Patient related:  
- she is feeling a little sore due to rheumatoid arthritis and probably the weather change. - I'm fine, feel allright. CTN reviewed upcoming appointment  with Dr. Adeel Escamilla on 11-6-2020. Patient related that she is aware of this. Patient has no concerns or questions at this time. CTN to follow case. Goals Addressed This Visit's Progress  Attends follow-up appointments as directed. On track Target Date:  11- Patient to follow up as follows:    
Patient to follow up with PCP on 10-. Patient declined assistance with obtaining a sooner follow up appointment at this time. Patient to follow up with cardiology and nephrology physicians post hospital discharge. 11/4/2020 Patient attended PCP follow up appointment. CTN reviewed upcoming follow up appointment with Dr. Adeel Escamilla scheduled for 11-6-2020. Patient is aware of appointment and did not voice any barriers to attending appointment.  Prevent complications post hospitalization. On track Target Date: 11/19/2020 
 
10/19/2020 Patient and/or family members were alerted to the availability of physician or other advanced practioners after hours, weekends, and holidays. Please call the PCP office phone number and speak with the answering service for assistance. Please call 911 for assistance as needed. Care Transitions Nurse ( CTN)   contact information provided for follow up as needed.  Understands red flags post discharge. On track Target Date: 11- Care Transitions Nurse (CTN) reviewed red flags with patient as follows: Please call 911 for immediate assistance for symptoms such as:  
- Chest Pain - Severe shortness of breath  
- Change in mental status - Blue tint to face or lips - New or worsening symptoms 11/4/2020 Patient has no concerns or questions at this time CTN contact information provided for follow up as needed. Patient encouraged to follow up with physicians, urgent care, or ED as needed for medical questions/concerns.

## 2020-11-09 NOTE — PROGRESS NOTES
Transitions of Care Chart reviewed. Patient to follow up with PCP on 11-. Care Transitions Nurse to follow case.

## 2020-11-09 NOTE — TELEPHONE ENCOUNTER
----- Message from Eli Canales MD sent at 11/8/2020  3:20 PM EST -----  Regarding: F/u apts needed in early March  Please schedule for labs in early March and a 30 min follow up apt with me a wk later.     Thanks,  Dr. Anuj Cisneros  [facility] [address], [address] [place] [address] Str.  Phone: [phone]  Fax: [phone]

## 2020-11-13 NOTE — PROGRESS NOTES
Transitions of Care  Follow Up Care Transitions Nurse ( CTN) spoke with patient via telephone call. Patient provided name and date of birth as identifiers. Patient reports she is feeling all right but feeling constipated. Patient reports:  
- her last bowel movement was about 2 weeks ago. - she is not eating much  
- she passes gas sometimes  
- she is not feeling nauseous or vomiting  
- she does not feel bloated or having  abdominal pain  
- she uses Metamucil as needed and this has helped in the past.  
 
CTN encouraged patient to follow up with her PCP, Urgent Care/ED as needed. Patient states that she will be all right and this has happened before. Patient made aware that here is an after hours provider on call for Dr. Dacia Manuel office that is available after hours and over the weekend if needed. Chart routed to Robbin Hill for review. Goals Addressed This Visit's Progress  Attends follow-up appointments as directed. Target Date:  11- Patient to follow up as follows:    
Patient to follow up with PCP on 10-. Patient declined assistance with obtaining a sooner follow up appointment at this time. Patient to follow up with cardiology and nephrology physicians post hospital discharge. 11/4/2020 Patient attended PCP follow up appointment. CTN reviewed upcoming follow up appointment with Dr. Hutchins Russian scheduled for 11-6-2020. Patient is aware of appointment and did not voice any barriers to attending appointment. 11/13/2020 CTN discussed [facility] patient that Dr. Robbin Cummings would like to see her for follow up in early March (2021). Please call the office to schedule follow up appointment. CTN also asked patient to please follow up with PCP before that time as needed.  Prevent complications post hospitalization. Target Date: 11/19/2020 
 
10/19/2020 Patient and/or family members were alerted to the availability of physician or other advanced practioners after hours, weekends, and holidays. Please call the PCP office phone number and speak with the answering service for assistance. Please call 911 for assistance as needed. Care Transitions Nurse ( CTN)   contact information provided for follow up as needed. 11/13/2020 CTN reviewed with patient that there is a provider that may be reached after hours and over the weekend for concerns or questions. Please call the main office phone number for assistance.  Understands red flags post discharge. Target Date: 11- Care Transitions Nurse (CTN) reviewed red flags with patient as follows: Please call 911 for immediate assistance for symptoms such as:  
- Chest Pain - Severe shortness of breath  
- Change in mental status - Blue tint to face or lips - New or worsening symptoms 11/4/2020 Patient has no concerns or questions at this time 11/13/2020 Patient reports constipation. Patient referred to PCP, urgent care, ED as needed.

## 2020-11-19 NOTE — PROGRESS NOTES
Transition of Care Care Transitions Nurse ( CTN) spoke with patient via telephone call. Patient reports:  
- She is fine - She has had bowel movement 
-She is not eating much. She ate breakfast and will eat again when her son comes over in the evening.  
- Patient reports she has necessary medications. Patient does not have any concerns or questions at this time. Patient did not want to make a follow up appointment with PCP at this time. Patient reminded that she can call PCP office after hours, weekends, and holidays for follow up with an on call provider if needed. Patient encouraged to call 911 as needed for emergency assistance as needed. CTN discussed referral for case management with Dr. Naeem Baker, RN,  Ambulatory Care Manager. CTN will route chart to Mrs. Tim RN for review and/or follow up. Patient has graduated from the Transitions of Care Coordination  program on 11-. Patient's symptoms are stable at this time. Patient/family has the ability to self-manage. Care management goals have been completed at this time. No further care transitions nurse follow up scheduled. Goals Addressed This Visit's Progress  COMPLETED: Attends follow-up appointments as directed. Target Date:  11- Patient to follow up as follows:    
Patient to follow up with PCP on 10-. Patient declined assistance with obtaining a sooner follow up appointment at this time. Patient to follow up with cardiology and nephrology physicians post hospital discharge. 11/4/2020 Patient attended PCP follow up appointment. CTN reviewed upcoming follow up appointment with Dr. Milagros Julien scheduled for 11-6-2020. Patient is aware of appointment and did not voice any barriers to attending appointment. 11/13/2020 CTN discussed [facility] patient that Dr. Wilcox would like to see her for follow up in early March (2021).  Please call the office to schedule follow up appointment. CTN also asked patient to please follow up with PCP before that time as needed.  COMPLETED: Prevent complications post hospitalization. Target Date: 11/19/2020 
 
10/19/2020 Patient and/or family members were alerted to the availability of physician or other advanced practioners after hours, weekends, and holidays. Please call the PCP office phone number and speak with the answering service for assistance. Please call 911 for assistance as needed. Care Transitions Nurse ( CTN)   contact information provided for follow up as needed. 11/13/2020 CTN reviewed with patient that there is a provider that may be reached after hours and over the weekend for concerns or questions. Please call the main office phone number for assistance.  COMPLETED: Understands red flags post discharge. Target Date: 11- Care Transitions Nurse (CTN) reviewed red flags with patient as follows: Please call 911 for immediate assistance for symptoms such as:  
- Chest Pain - Severe shortness of breath  
- Change in mental status - Blue tint to face or lips - New or worsening symptoms 11/4/2020 Patient has no concerns or questions at this time 11/13/2020 Patient reports constipation. Patient referred to PCP, urgent care, ED as needed. 11/19/2020 Patient reports feeling better. No further concerns re: constipation at this time. Patient has care transitions nurse contact information for any further questions, concerns, or needs. Patient's upcoming visits:   
Future Appointments Date Time Provider Korey Monte 12/10/2020  9:30 AM C[facility], PACER [facility] BS AMB  
2/25/2021  8:00 AM [facility] NONIMAGING [facility] BS AMB  
2/25/2021  9:00 AM [facility] IMAGING 1 [facility] BS AMB  
2/25/2021 10:00 AM [facility] IMAGING 1 [facility] BS AMB  
2/25/2021  1:00 PM [place], [place] [facility] BS AMB  
3/9/2021  8:00 AM Shiela Kapadia DO [facility] BS AMB

## 2020-12-10 NOTE — TELEPHONE ENCOUNTER
----- Message from Vicki Calvillo MD sent at 12/9/2020  4:31 PM EST -----  Regarding: RE: F/u phone call  Please contact pt's Nephrology team and see if they plan to biopsy her kidneys. Please request her last Nephrology note as well for me to have scanned into her chart. Please have her scheduled to see me in office in March for a 30 min in office apt. She needs to be scheduled for labs 1 wk before her apt. Also contact the pt and see if she is having any new sx (SOB? Worsening edema specifically?). Thanks,  Dr. Eugene Mckeon  [facility], [address] [place] [address], [address] [place] [address].  Phone: [phone]  Fax: [phone]    ----- Message -----  From: Juan Pimentel MD  Sent: 11/8/2020   3:20 PM EST  To: Vicki Calvillo MD  Subject: F/u phone call                                   Follow up with her about her Nephrology apt.

## 2020-12-10 NOTE — TELEPHONE ENCOUNTER
Patient reached and denies having any SOB or edema. Patient wants us to call a few weeks before march to schedule her as she is having a hard time keeping up with her appointments. I will forward a message to the nurses and front office for that time period to contact the patient for an appointment and labs 1 week prior.

## 2020-12-11 NOTE — TELEPHONE ENCOUNTER
Please make sure she is scheduled for a check up with Rheumatology.     Dr. Cipriano Martinez  [facility], [address] [facility], [address] [place] [address] Str.  Phone: [phone]  Fax: [phone]

## 2020-12-15 NOTE — PROGRESS NOTES
Referral received from [facility] attempted to reach patient by telephone. Left HIPPA compliant message requesting a return call. Will attempt to reach patient at another time.

## 2020-12-18 NOTE — PROGRESS NOTES
ACM attempted to reach patient by telephone. Left HIPPA compliant message requesting a return call. Will attempt to reach patient at another time.

## 2021-01-20 ENCOUNTER — TELEPHONE (OUTPATIENT)
Dept: INTERNAL MEDICINE CLINIC | Age: 81
End: 2021-01-20

## 2021-01-20 NOTE — TELEPHONE ENCOUNTER
Mr Vincent Cummings from [facility] called asking if Dr Jose Boyle would sign pt death certificate , he said he called Cardiologist ( Dr Johanne Hernadez) first but was told he retired and to call PCP   Please advise

## 2021-01-21 NOTE — TELEPHONE ENCOUNTER
Please let them know I will try to complete it but I need more information. I will try to have it done by Monday.     Dr. Jaeger Friday  [facility], [facility], [address] [place] [address] Str.  Phone: [phone]  Fax: [phone]

## 2021-01-26 NOTE — TELEPHONE ENCOUNTER
Daughter calling asking when the death cert will be signed. She can't do any of the legal things until the cert is signed.  Laura Brooks [phone]

## 2021-01-26 NOTE — TELEPHONE ENCOUNTER
Ms. Brendon Wheatley is aware death certificate was signed via state web site which is used by the  home. Ms. Meseret Cedeño understanding.

## 2021-01-26 NOTE — TELEPHONE ENCOUNTER
Please let the family know that it was completed yesterday via the state website used by the  home.     Dr. Anna Bass  [facility] [address], [address] [place] [address] Str.  Phone: [phone]  Fax: [phone]

## 2021-02-21 RX ORDER — FUROSEMIDE 40 MG/1
TABLET ORAL
Qty: 135 TAB | Refills: 1 | OUTPATIENT
Start: 2021-02-21

## 2021-10-26 NOTE — ED NOTES
Airway  Performed by: Hemant Roche MD  Authorized by: Hemant Roche MD     Final Airway Type:  Endotracheal airway  Final Endotracheal Airway*:  ETT  ETT Size (mm)*:  8.0  Cuff*:  Regular  Technique Used for Successful ETT Placement:  Direct laryngoscopy  Intubation Procedure*:  ETCO2, Preoxygenation, Atraumatic, Dentition Unchanged and Pharynx Clear  Insertion Site:  Oral  Blade Type*:  MAC  Blade Size*:  3  Cuff Volume (mL):  4  Measured from*:  Lips  Secured at (cm)*:  24  Placement Verified by: capnometry and equal breath sounds    Glottic View*:  2 - partial view of glottis   Patient Identified, Procedure confirmed, Emergency equipment available and Safety protocols followed  Location:  OR  Urgency:  Elective  Difficult Airway: No    Indications for Airway Management:  Anesthesia  Spontaneous Ventilation: absent    Sedation Level:  Anesthetized  Anesthesiologist:  Hemant Roche MD  Start Time: 10/26/2021 1:28 PM         Pt report to [facility]. ...

## 2022-08-30 NOTE — PROCEDURES
[facility]   *** FINAL REPORT ***    Name: Kwadwo Jordan  MRN: [id number]       Outpatient  : 1940  HIS Order #: [id number]  PRERNAKnet Visit #: [id number]  Date: 10 Josr 2018    TYPE OF TEST: Cerebrovascular Duplex    REASON FOR TEST  Carotid disease    Right Carotid:-             Proximal               Mid                 Distal  cm/s  Systolic  Diastolic  Systolic  Diastolic  Systolic  Diastolic  CCA:     45.1       9.0       82.0      13.0       61.0      15.0  Bulb:  ECA:     91.0       7.0  ICA:    109.0      23.0       88.0      20.0       67.0      11.0  ICA/CCA:  1.3       1.8    ICA Stenosis: <50%    Right Vertebral:-  Finding: Antegrade  Sys:       86.0  Kathleen:       23.0    Right Subclavian:    Left Carotid:-            Proximal                Mid                 Distal  cm/s  Systolic  Diastolic  Systolic  Diastolic  Systolic  Diastolic  CCA:    057.6      23.0       93.0      23.0       76.0      12.0  Bulb:  ECA:     50.0       6.0  ICA:     88.0      34.0       95.0      26.0       68.0      22.0  ICA/CCA:  0.8       1.1    ICA Stenosis: <50%    Left Vertebral:-  Finding: Occluded  Sys:  Kathleen:    Left Subclavian:    INTERPRETATION/FINDINGS  Duplex images were obtained using 2-D gray scale, color flow and  spectral doppler analysis. 1. Mild plaquing of the internal carotid arteries bilaterally in the  range of less than 50%  without evidence of a hemodynamically  significant stenosis. 2. No significant stenosis in the external carotid arteries  bilaterally. 3. Antegrade flow in the right vertebral artery. 4. Absent flow in the left vertebral artery. Plaque Morphology:  1. Heterogeneous plaque in the bulb and right ICA. 2. Heterogeneous plaque in the bulb and left ICA. Compared to the previous study on 12-1-15 there is no significant  change. ADDITIONAL COMMENTS    I have personally reviewed the data relevant to the interpretation of  this  study. TECHNOLOGIST: Gailen Blossom Darylene Bender, RVT, PAUL  Signed: 01/10/2018 10:08 AM    PHYSICIAN: Skylar Nance D.O.   Signed: 01/11/2018 05:23 PM [facility] Pregnancy And Lactation Text: It is unknown if this medication is safe to use during pregnancy.  It is unknown if this medication is excreted in breast milk.  Breastfeeding women should use the topical cream on the smallest area of the skin for the shortest time needed while breastfeeding.  Do not apply to nipple and areola.

## 2023-10-05 NOTE — PROGRESS NOTES
HPI: I saw Harrison Willis in my office today in cardiovascular evaluation regarding her ischemic cardiomyopathy. Ms. Dinh Castro is a very pleasant 67 year old obese  female with history of hypertension, hyperlipidemia, gastroesophageal reflux disease, and a CVA in the past. She also has history of coronary artery disease, status post coronary artery bypass grafting surgery x three in June of 2008 by Dr. Jackelin Lamar with the following bypasses: 
   
1. Left internal mammary artery to the LAD. 2. Reverse saphenous vein graft to the first obtuse marginal branch of the circumflex. 3. Reverse saphenous vein graft to the distal right coronary artery. 
   
She developed a deep vein thrombosis in her left arm after surgery and had to be on Coumadin for a number of months, but due to the fact that she was not having any paroxysmal atrial fibrillation and we were having trouble keeping her INRs controlled, we decided to take her off Coumadin a couple of years ago. She has had some problems with AV block and a dual chamber pacemaker was originally placed in October of 2008 with a generator change in February of 2016 by my associate Dr. Gissell Fairbanks.   
  
She comes in today relates that she is doing reasonably well. She is having some increased shortness of breath and lower extremity edema, but denies any chest pain and relates that in general she is feeling reasonably well. She does have history of stage IV chronic kidney disease which is going to make any failure management somewhat difficult. Encounter Diagnoses Name Primary?  Atherosclerosis of native coronary artery of native heart without angina pectoris  S/P CABG x 3 in June of 2008  SOB (shortness of breath) Yes  Cardiomyopathy, unspecified type ([address]  Chronic combined systolic and diastolic congestive heart failure ([address]  Stage 4 chronic kidney disease ([address]  Edema of both legs  AICD (automatic cardioverter/defibrillator) present Discussion: This patient appears to be doing reasonably well from a cardiovascular vantage, however she has been having some increased problems with peripheral edema and her weight is up some 6 pounds when I saw her last in the office in July 2019 and she unfortunately has stage III-IV chronic kidney disease which makes her management somewhat difficult. I would increase her Lasix from 40 mg to 60 mg a day and I am going to get a BMP and a BMP as baseline and I am also going to have her weigh daily and give us a call within a week to let us know if her weight is come down any. We did check her dual-chamber Medtronic AICD today and she has 5 years remaining on the battery with no ventricular high rates. She is a paced 44% of time and V paced 100% of time, but her OptiVol is quite elevated today. Most recent lipid profile which was completed on August 1, 2019 was quite high with a total cholesterol of 289, triglycerides 135, HDL of 73, LDL of 169, and VLDL of 27. She has had problems with nausea and vomiting on both Zocor and Lipitor but I am going to try her on Crestor 10 mg daily and if she tolerates that medication we will get a lipid profile in a couple of months. Since she is otherwise doing well I will plan to see her again in about 6 months. PCP:  Annie Turcios MD 
 
  
Past Medical History:  
Diagnosis Date  Abnormal ankle brachial index 11/13/2014 Mild arterial disease in right leg. R VICTOR MANUEL 0.88. L VICTOR MANUEL 1.00.  Anemia  Arm DVT (deep venous thromboembolism), acute (HCC)   
 s/p anticoagulation  Atherosclerotic heart disease  Atrial fibrillation ([address]  AV block  CAD (coronary artery disease)  Cardiomyopathy, ischemic  Carotid artery stenosis  Carotid duplex 11/13/2014 Mild <50% bilateral ICA plaquing. Possible left vertebral occlusion.  Cerebral artery occlusion with cerebral infarction ([facility] stroke x 2  Chest pain  CVA (cerebral infarction)  Echocardiogram 2015 EF 55%. Apical inferior, apical septal hypk (new since study of 11), likely due to chronic V-pacing. Mild LVH. RVSP 30 mmHg. Mild LAE. Mild MR. Mild TR.  Gastritis  GERD (gastroesophageal reflux disease)  Heart failure ([address]  Hiatal hernia  Hyperlipidemia  Hypertension  Hypertensive cardiovascular disease  Intracranial aneurysm   
 left cavernous ICA 2mm aneurysm from head/neck CTA in   Long term (current) use of anticoagulants 3/10/2011  Lower extremity venous duplex 2015 No DVT bilaterally.  Nuclear cardiac imaging test 2015 Low risk. Sm apical infarction w/no ischemia vs apical thinning. Sm basal inferior defect, likely artifact. EF 56%. Inferoseptal, inferoapical WMA related to sternotomy or paced rhythm.  Pacemaker  PAD (peripheral artery disease) ([facility]  PVC's   
 chronic  S/P CABG x 3 2008 LIMA-LAD. SVG-OM. SVG-dRCA  S/P cardiac cath 2008 pRCA 100%. LM patent. Cx patent. OM1 100%. mLAD 95%. LVEDP 27-29mmHg. EF 20%. mod MR  
 Tilt table evaluation 1995 Positive for orthostatic hypotension  Vitamin D deficiency Past Surgical History:  
Procedure Laterality Date  CABG, ARTERY-VEIN, THREE  2008  CARDIAC SURG PROCEDURE UNLIST    
 medtronic dual-chamber cardioverted-defibrillator  HX  SECTION    
 x2  
 HX ENDOSCOPY  3/1/16  HX ENDOSCOPY  3/1/16 [address]  HX HYSTERECTOMY   HX ORTHOPAEDIC    
 knee surgery  HX OTHER SURGICAL  2/10/16 Pacemaker Gen Change  HX PACEMAKER Defibrillator   HX TUMOR REMOVAL    
 removed from arm.  benign Current Outpatient Medications Medication Sig  
  furosemide (LASIX) 40 mg tablet Take 1.5 Tabs by mouth daily. TAKE 1 TABLET BY MOUTH EVERY DAY  allopurinol (ZYLOPRIM) 100 mg tablet TAKE 1 TABLET BY MOUTH EVERY DAY [facility]  hydroxychloroquine (PLAQUENIL) 200 mg tablet TAKE 1 TABLET BY MOUTH TWICE A DAY WITH FOOD OR MILK  carvedilol (COREG) 25 mg tablet TAKE 1 TABLET BY MOUTH (2) TIMES A DAY  amLODIPine (NORVASC) 5 mg tablet Take 1 Tab by mouth daily.  diclofenac (VOLTAREN) 1 % gel USE 4 GRAMS ON KNEE FOUR TIMES A DAY AS NEEDED  hydrocortisone (HYTONE) 2.5 % ointment APPLY LIGHTLY TO THE AFFECTED AREAS ON THE LEGS AND FEET TWICE A DAY.  aspirin 81 mg tablet Take 81 mg by mouth daily. No current facility-administered medications for this visit. Allergies Allergen Reactions  Nifedipine Nausea and Vomiting and Other (comments) Dizzy  Ace Inhibitors Cough  Codeine Unknown (comments), Nausea Only and Nausea and Vomiting  Hydralazine Other (comments) Dizziness and Fatigue  Lipitor [Atorvastatin] Nausea and Vomiting and Vertigo  Simvastatin Nausea and Vomiting Social History Socioeconomic History  Marital status:  Spouse name: Not on file  Number of children: Not on file  Years of education: Not on file  Highest education level: Not on file Tobacco Use  Smoking status: Never Smoker  Smokeless tobacco: Never Used  Tobacco comment: just smoked 2 weeks in college Substance and Sexual Activity  Alcohol use: No  
 Drug use: No  
 Sexual activity: Never Other Topics Concern History reviewed. No pertinent family history. Review of Systems:  
Constitutional: Positive for malaise/fatigue and weight loss. Negative for chills and fever. Respiratory: Positive for shortness of breath. Negative for cough, hemoptysis and wheezing. Cardiovascular: Positive for orthopnea and leg swelling. Negative for chest pain and palpitations. Gastrointestinal: Negative. Musculoskeletal: Positive for joint pain. Negative for falls and myalgias. Neurological: Negative for dizziness. Physical Exam:  
The patient is an alert, oriented, well developed, well nourished 70 y.o.  female who was in no acute distress at the time of my examination. Visit Vitals /54 (BP 1 Location: Left arm, BP Patient Position: Sitting) Pulse 60 Ht 5' 4\" (1.626 m) Wt 89.8 kg (198 lb) SpO2 100% BMI 33.99 kg/m² BP Readings from Last 3 Encounters:  
11/12/19 136/54  
08/29/19 142/60  
07/24/19 136/66 Wt Readings from Last 3 Encounters:  
11/12/19 89.8 kg (198 lb)  
08/29/19 86.4 kg (190 lb 6.4 oz)  
07/24/19 87.1 kg (192 lb) HEENT: Conjunctivae white, mucosa moist, no pallor or cyanosis. Neck: Supple without masses, tenderness or thyromegaly. No jugular venous distention. Carotid upstrokes are full bilaterally, without bruits. Chest: symmetrical with good excursion. Cardiovascular: Well-healed incision in left upper chest over pacer-defibrillator insertion site with some keloid and some tenderness over the site. Mid-sternotomy scar with some keloid. Yale is displaced between the MCL and AAL. No lifts, heaves, or thrills felt on palpation. Normal S1 and S2, with a grade I-II/VI apical systolic murmur without radiation and without appreciable diastolic murmur, rubs, clicks or gallops Lungs: Clear to auscultation in all fields. Abdomen: Soft, with no masses, tenderness or organomegaly. Extremities: Thick lower legs with some decrease in peripheral pulses with 1 + edema. Review of Data: Please refer to past medical history for most recent cardiac testing. Lab Results Component Value Date/Time  Cholesterol, total 269 (H) 08/01/2019 10:32 AM  
 HDL Cholesterol 73 (H) 08/01/2019 10:32 AM  
 LDL, calculated 169 (H) 08/01/2019 10:32 AM  
 VLDL, calculated 27 08/01/2019 10:32 AM  
 Triglyceride 135 08/01/2019 10:32 AM  
 CHOL/HDL Ratio 3.7 08/01/2019 10:32 AM  
 
 
Results for orders placed or performed in visit on 05/08/18 AMB POC EKG ROUTINE W/ 12 LEADS, INTER & REP     Status: None Narrative Normal sinus mechanism with atrial sensing and ventricular pacing and occasional PAC's. Compared to the EKG of October 24, 2017 there was a little interval change. Lenore Castleman, D.O., F.A.C.C. [facility] [place] [address] [facility] [address] [facility] [place] [address] Giovanna Lazaro [phone] PLEASE NOTE:  This document has been produced using voice recognition software. Unrecognized errors in transcription may be present. [id number] Billing Preferences: 1

## 2024-07-02 NOTE — PATIENT INSTRUCTIONS
Returned call to patient, lethargy has improved.     Patient denies lightheadedness, dizziness, chest pain, shortness of breath, palpations, fluttering, syncope, tiring with activity, weakness, and fatigue    Patient does not monitor BP and HR at home, no records.    Writer advised patient to update our office if fatigue or lethargy returns or with any of the above symptom development. Patient verbalized understanding, no questions at this time.   Take Zocor in the morning    Check BP a couple of times per week over the next few weeks - if remains over 539 systolic, follow up with Dr Amanda Szymanski early